# Patient Record
Sex: FEMALE | Race: WHITE | NOT HISPANIC OR LATINO | Employment: FULL TIME | URBAN - NONMETROPOLITAN AREA
[De-identification: names, ages, dates, MRNs, and addresses within clinical notes are randomized per-mention and may not be internally consistent; named-entity substitution may affect disease eponyms.]

---

## 2019-03-06 ENCOUNTER — OFFICE VISIT (OUTPATIENT)
Dept: FAMILY MEDICINE CLINIC | Facility: CLINIC | Age: 34
End: 2019-03-06

## 2019-03-06 VITALS
TEMPERATURE: 98.2 F | DIASTOLIC BLOOD PRESSURE: 72 MMHG | HEIGHT: 65 IN | OXYGEN SATURATION: 98 % | BODY MASS INDEX: 39.49 KG/M2 | HEART RATE: 96 BPM | WEIGHT: 237 LBS | SYSTOLIC BLOOD PRESSURE: 110 MMHG

## 2019-03-06 DIAGNOSIS — E03.4 HYPOTHYROIDISM DUE TO ACQUIRED ATROPHY OF THYROID: ICD-10-CM

## 2019-03-06 DIAGNOSIS — E66.09 CLASS 2 OBESITY DUE TO EXCESS CALORIES WITHOUT SERIOUS COMORBIDITY WITH BODY MASS INDEX (BMI) OF 39.0 TO 39.9 IN ADULT: ICD-10-CM

## 2019-03-06 DIAGNOSIS — Z00.00 ENCOUNTER FOR MEDICAL EXAMINATION TO ESTABLISH CARE: Primary | ICD-10-CM

## 2019-03-06 PROBLEM — E66.812 CLASS 2 OBESITY DUE TO EXCESS CALORIES WITHOUT SERIOUS COMORBIDITY WITH BODY MASS INDEX (BMI) OF 39.0 TO 39.9 IN ADULT: Status: ACTIVE | Noted: 2019-03-06

## 2019-03-06 PROBLEM — E03.9 HYPOTHYROIDISM: Status: ACTIVE | Noted: 2019-03-06

## 2019-03-06 PROCEDURE — 99203 OFFICE O/P NEW LOW 30 MIN: CPT | Performed by: NURSE PRACTITIONER

## 2019-03-06 RX ORDER — PHENTERMINE HYDROCHLORIDE 37.5 MG/1
37.5 TABLET ORAL
Qty: 30 TABLET | Refills: 0 | Status: SHIPPED | OUTPATIENT
Start: 2019-03-06 | End: 2019-04-10 | Stop reason: SDUPTHER

## 2019-03-06 RX ORDER — HYDROXYZINE PAMOATE 25 MG/1
CAPSULE ORAL
COMMUNITY
Start: 2018-11-28 | End: 2019-08-26

## 2019-03-06 RX ORDER — PROMETHAZINE HYDROCHLORIDE 25 MG/1
TABLET ORAL
COMMUNITY
Start: 2019-03-04 | End: 2019-08-26

## 2019-03-06 RX ORDER — LEVOTHYROXINE SODIUM 88 UG/1
TABLET ORAL
COMMUNITY
Start: 2019-01-31 | End: 2019-04-12 | Stop reason: DRUGHIGH

## 2019-03-06 NOTE — PROGRESS NOTES
"Subjective   Jarad Barragan is a 33 y.o. female.     Chief Complaint   Patient presents with   • Establish Care       History of Present Illness     Establish Care-Here to establish care.    Thyroid disorder-is presently on Synthroid 88 mcg.  She reports her last thyroid check was approx 5-6 months ago.  She denies any negative side effects.  She reports prior to diagnosis she was extremely fatigued.  She has had a US at [place] in the [facility].    GERD-she reports she uses OTC Zantac for symptoms.  She does not take daily.  Mostly for acute symptoms.    Weight concern-reports she has noted gain since she stopped smoking approx 50#.  She reports she has taken Adipex in the past.  She tolerated the med well.  She did not have any cardiac related symptoms.  She reports she does eat bread often.  Is not a \"sweet eater\".  Drinks diet soda.  Water intake is fair.  She reports this is the most she has weighed.  She reports she does not cook and eats out often.  Not at goal.      The following portions of the patient's history were reviewed and updated as appropriate: allergies, current medications, past family history, past medical history, past social history, past surgical history and problem list.    Review of Systems   Constitutional: Negative for appetite change, fatigue and unexpected weight change.   HENT: Positive for rhinorrhea. Negative for congestion, ear pain, nosebleeds, sore throat, trouble swallowing and voice change.    Eyes: Negative for pain and visual disturbance.   Respiratory: Negative for cough, shortness of breath and wheezing.    Cardiovascular: Negative for chest pain and palpitations.   Gastrointestinal: Negative for abdominal pain, blood in stool, constipation and diarrhea.   Endocrine: Negative for cold intolerance and polydipsia.   Genitourinary: Negative for difficulty urinating, flank pain and hematuria.   Musculoskeletal: Negative for arthralgias, back pain, gait problem, joint " "swelling and myalgias.   Skin: Negative for color change and rash.   Allergic/Immunologic: Negative.    Neurological: Negative for syncope, numbness and headaches.   Hematological: Negative.    Psychiatric/Behavioral: Negative for sleep disturbance and suicidal ideas.   All other systems reviewed and are negative.      Objective     /72   Pulse 96   Temp 98.2 °F (36.8 °C) (Temporal)   Ht 165.1 cm (65\")   Wt 108 kg (237 lb)   LMP 02/20/2019   SpO2 98%   BMI 39.44 kg/m²     Physical Exam   Constitutional: She is oriented to person, place, and time. She appears well-developed and well-nourished. No distress.   HENT:   Head: Normocephalic and atraumatic.   Right Ear: Hearing, tympanic membrane, external ear and ear canal normal.   Left Ear: Hearing, tympanic membrane, external ear and ear canal normal.   Nose: No mucosal edema or rhinorrhea.   Mouth/Throat: Oropharynx is clear and moist and mucous membranes are normal.   Eyes: Conjunctivae, EOM and lids are normal. Pupils are equal, round, and reactive to light. No scleral icterus. Right eye exhibits normal extraocular motion and no nystagmus. Left eye exhibits normal extraocular motion and no nystagmus.   Neck: Normal range of motion. Neck supple. No JVD present. No tracheal tenderness present. Carotid bruit is not present. No thyromegaly present.   Cardiovascular: Normal rate, regular rhythm, S1 normal, S2 normal, normal heart sounds and intact distal pulses.   No murmur heard.  Pulmonary/Chest: Effort normal and breath sounds normal. She exhibits no tenderness.   Abdominal: Soft. Bowel sounds are normal. She exhibits no mass. There is no hepatosplenomegaly. There is no tenderness.   Musculoskeletal: Normal range of motion. She exhibits no edema or tenderness.   Gait normal.   equal bilaterally. No muscular atrophy or flaccidity.   Lymphadenopathy:     She has no cervical adenopathy.        Right cervical: No superficial cervical adenopathy " present.       Left cervical: No superficial cervical adenopathy present.   Neurological: She is alert and oriented to person, place, and time. She has normal strength and normal reflexes. She displays no tremor. No cranial nerve deficit or sensory deficit. She exhibits normal muscle tone. Coordination and gait normal.   Skin: Skin is warm and dry. Capillary refill takes less than 2 seconds. She is not diaphoretic. No cyanosis. No pallor. Nails show no clubbing.   Psychiatric: She has a normal mood and affect. Her speech is normal and behavior is normal. Judgment and thought content normal. She is not actively hallucinating. Cognition and memory are normal. She is attentive.   Vitals reviewed.    Assessment/Plan     Problem List Items Addressed This Visit        Digestive    Class 2 obesity due to excess calories without serious comorbidity with body mass index (BMI) of 39.0 to 39.9 in adult    Relevant Medications    phentermine (ADIPEX-P) 37.5 MG tablet       Endocrine    Hypothyroidism    Relevant Medications    levothyroxine (SYNTHROID, LEVOTHROID) 88 MCG tablet      Other Visit Diagnoses     Encounter for medical examination to establish care    -  Primary          Patient's Body mass index is 39.44 kg/m². BMI is above normal parameters. Recommendations include: nutrition counseling.   (Normal BMI:  18.5-24.9, OW 25-29.9, Obesity 30 or greater)  Jarad does not use tobacco products.      Understands disease processes and need for medications.  Understands reasons for urgent and emergent care.  Patient (& family) verbalized agreement for treatment plan.   Emotional support and active listening provided.  Patient provided time to verbalize feelings.  Continue medications as directed.  The patient has read and signed the Controlled Substance Contract.  I will continue to see patient for regular follow up appointments.  They are well controlled on their medication.  The patient is aware of the potential for  addiction and dependence.  This patient has been made aware of the appropriate use of such medications, including potential risk of somnolence, limited ability to drive and / or work safely, and potential for overdose.   It has also been made clear that these medications are for use by this patient only, without concomitant use of alcohol or other substances unless prescribed/advised by medical provider.  Patient is aware they will be subjected to random UDS and pill count.  Patient is aware they cannot receive narcotics from any other provider except if under care of pain management or speciality clinic.  Risk and benefits of medication use has been reviewed.  History and physical exam exhibit continued safe and appropriate use of controlled substances.    Patient at low risk for addiction based on use of single controlled substance.    Continue her Synthroid at present dosing.  We will plan labs at her next visit as she is not fasting today.  Return to the clinic in 1 month, sooner if needed for problems or concerns            This document has been electronically signed by:  LANA Quinn, AMILCAR

## 2019-04-10 ENCOUNTER — OFFICE VISIT (OUTPATIENT)
Dept: FAMILY MEDICINE CLINIC | Facility: CLINIC | Age: 34
End: 2019-04-10

## 2019-04-10 VITALS
BODY MASS INDEX: 37.85 KG/M2 | DIASTOLIC BLOOD PRESSURE: 80 MMHG | TEMPERATURE: 98 F | WEIGHT: 227.2 LBS | HEART RATE: 104 BPM | SYSTOLIC BLOOD PRESSURE: 122 MMHG | HEIGHT: 65 IN | OXYGEN SATURATION: 97 %

## 2019-04-10 DIAGNOSIS — E66.09 CLASS 2 OBESITY DUE TO EXCESS CALORIES WITHOUT SERIOUS COMORBIDITY WITH BODY MASS INDEX (BMI) OF 39.0 TO 39.9 IN ADULT: ICD-10-CM

## 2019-04-10 DIAGNOSIS — E03.4 HYPOTHYROIDISM DUE TO ACQUIRED ATROPHY OF THYROID: Primary | ICD-10-CM

## 2019-04-10 LAB
25(OH)D3 SERPL-MCNC: 16.6 NG/ML (ref 30–100)
ALBUMIN SERPL-MCNC: 4.6 G/DL (ref 3.5–5.2)
ALBUMIN/GLOB SERPL: 1.5 G/DL
ALP SERPL-CCNC: 68 U/L (ref 39–117)
ALT SERPL W P-5'-P-CCNC: 12 U/L (ref 1–33)
ANION GAP SERPL CALCULATED.3IONS-SCNC: 14.1 MMOL/L
AST SERPL-CCNC: 12 U/L (ref 1–32)
BASOPHILS # BLD AUTO: 0.07 10*3/MM3 (ref 0–0.2)
BASOPHILS NFR BLD AUTO: 1.3 % (ref 0–1.5)
BILIRUB SERPL-MCNC: 0.7 MG/DL (ref 0.2–1.2)
BUN BLD-MCNC: 13 MG/DL (ref 6–20)
BUN/CREAT SERPL: 13.3 (ref 7–25)
CALCIUM SPEC-SCNC: 9.2 MG/DL (ref 8.6–10.5)
CHLORIDE SERPL-SCNC: 102 MMOL/L (ref 98–107)
CHOLEST SERPL-MCNC: 138 MG/DL (ref 0–200)
CO2 SERPL-SCNC: 22.9 MMOL/L (ref 22–29)
CREAT BLD-MCNC: 0.98 MG/DL (ref 0.57–1)
DEPRECATED RDW RBC AUTO: 44 FL (ref 37–54)
EOSINOPHIL # BLD AUTO: 0.09 10*3/MM3 (ref 0–0.4)
EOSINOPHIL NFR BLD AUTO: 1.7 % (ref 0.3–6.2)
ERYTHROCYTE [DISTWIDTH] IN BLOOD BY AUTOMATED COUNT: 13 % (ref 12.3–15.4)
GFR SERPL CREATININE-BSD FRML MDRD: 65 ML/MIN/1.73
GLOBULIN UR ELPH-MCNC: 3.1 GM/DL
GLUCOSE BLD-MCNC: 106 MG/DL (ref 65–99)
HBA1C MFR BLD: 4.96 % (ref 4.8–5.6)
HCT VFR BLD AUTO: 42.7 % (ref 34–46.6)
HDLC SERPL-MCNC: 42 MG/DL (ref 40–60)
HGB BLD-MCNC: 13.6 G/DL (ref 12–15.9)
IMM GRANULOCYTES # BLD AUTO: 0.02 10*3/MM3 (ref 0–0.05)
IMM GRANULOCYTES NFR BLD AUTO: 0.4 % (ref 0–0.5)
LDLC SERPL CALC-MCNC: 85 MG/DL (ref 0–100)
LDLC/HDLC SERPL: 2.02 {RATIO}
LYMPHOCYTES # BLD AUTO: 1.62 10*3/MM3 (ref 0.7–3.1)
LYMPHOCYTES NFR BLD AUTO: 30.8 % (ref 19.6–45.3)
MCH RBC QN AUTO: 29.5 PG (ref 26.6–33)
MCHC RBC AUTO-ENTMCNC: 31.9 G/DL (ref 31.5–35.7)
MCV RBC AUTO: 92.6 FL (ref 79–97)
MONOCYTES # BLD AUTO: 0.44 10*3/MM3 (ref 0.1–0.9)
MONOCYTES NFR BLD AUTO: 8.4 % (ref 5–12)
NEUTROPHILS # BLD AUTO: 3.02 10*3/MM3 (ref 1.4–7)
NEUTROPHILS NFR BLD AUTO: 57.4 % (ref 42.7–76)
NRBC BLD AUTO-RTO: 0 /100 WBC (ref 0–0)
PLATELET # BLD AUTO: 306 10*3/MM3 (ref 140–450)
PMV BLD AUTO: 10.9 FL (ref 6–12)
POTASSIUM BLD-SCNC: 3.8 MMOL/L (ref 3.5–5.2)
PROT SERPL-MCNC: 7.7 G/DL (ref 6–8.5)
RBC # BLD AUTO: 4.61 10*6/MM3 (ref 3.77–5.28)
SODIUM BLD-SCNC: 139 MMOL/L (ref 136–145)
T4 FREE SERPL-MCNC: 1.68 NG/DL (ref 0.93–1.7)
TRIGL SERPL-MCNC: 56 MG/DL (ref 0–150)
TSH SERPL DL<=0.05 MIU/L-ACNC: 6.7 MIU/ML (ref 0.27–4.2)
VIT B12 BLD-MCNC: 284 PG/ML (ref 211–946)
VLDLC SERPL-MCNC: 11.2 MG/DL (ref 5–40)
WBC NRBC COR # BLD: 5.26 10*3/MM3 (ref 3.4–10.8)

## 2019-04-10 PROCEDURE — 36415 COLL VENOUS BLD VENIPUNCTURE: CPT | Performed by: NURSE PRACTITIONER

## 2019-04-10 PROCEDURE — 82306 VITAMIN D 25 HYDROXY: CPT | Performed by: NURSE PRACTITIONER

## 2019-04-10 PROCEDURE — 85025 COMPLETE CBC W/AUTO DIFF WBC: CPT | Performed by: NURSE PRACTITIONER

## 2019-04-10 PROCEDURE — 82607 VITAMIN B-12: CPT | Performed by: NURSE PRACTITIONER

## 2019-04-10 PROCEDURE — 80053 COMPREHEN METABOLIC PANEL: CPT | Performed by: NURSE PRACTITIONER

## 2019-04-10 PROCEDURE — 99213 OFFICE O/P EST LOW 20 MIN: CPT | Performed by: NURSE PRACTITIONER

## 2019-04-10 PROCEDURE — 84439 ASSAY OF FREE THYROXINE: CPT | Performed by: NURSE PRACTITIONER

## 2019-04-10 PROCEDURE — 80061 LIPID PANEL: CPT | Performed by: NURSE PRACTITIONER

## 2019-04-10 PROCEDURE — 83036 HEMOGLOBIN GLYCOSYLATED A1C: CPT | Performed by: NURSE PRACTITIONER

## 2019-04-10 PROCEDURE — 84443 ASSAY THYROID STIM HORMONE: CPT | Performed by: NURSE PRACTITIONER

## 2019-04-10 RX ORDER — PHENTERMINE HYDROCHLORIDE 37.5 MG/1
37.5 TABLET ORAL
Qty: 30 TABLET | Refills: 0 | Status: SHIPPED | OUTPATIENT
Start: 2019-04-10 | End: 2019-08-26

## 2019-04-10 NOTE — PROGRESS NOTES
"Subjective   Jarad Barragan is a 33 y.o. female.     Chief Complaint   Patient presents with   • Weight Loss     1 mon follow up   • Thyroid Problem       History of Present Illness     Obesity-ongoing.  Patient is here to follow-up after beginning Adipex at her last appointment.  She has noted 10 pound weight loss today.  She denies any negative side effects of the Adipex.  She reports she is cutting portion size.    Right ankle injury-under care of Dr Yang.  She reports occurred approx 2 weeks ago.  She is presently in a walking boot.  She does have a possible MRI to evaluate her foot.  Not at goal    The following portions of the patient's history were reviewed and updated as appropriate: allergies, current medications, past family history, past medical history, past social history, past surgical history and problem list.    Review of Systems   Constitutional: Positive for fatigue. Negative for appetite change.   Respiratory: Negative for cough and shortness of breath.    Cardiovascular: Negative for chest pain and palpitations.   Gastrointestinal: Negative for abdominal pain, constipation and diarrhea.   Genitourinary: Negative for dysuria.   Neurological: Negative for dizziness and headaches.   All other systems reviewed and are negative.      Objective     /80 (BP Location: Right arm, Patient Position: Sitting, Cuff Size: Adult)   Pulse 104   Temp 98 °F (36.7 °C) (Temporal)   Ht 165.1 cm (65\")   Wt 103 kg (227 lb 3.2 oz)   SpO2 97%   BMI 37.81 kg/m²     Physical Exam   Constitutional: She is oriented to person, place, and time. She appears well-developed and well-nourished. No distress.   HENT:   Head: Normocephalic and atraumatic.   Right Ear: Hearing, tympanic membrane, external ear and ear canal normal.   Left Ear: Hearing, tympanic membrane, external ear and ear canal normal.   Nose: No mucosal edema or rhinorrhea.   Mouth/Throat: Oropharynx is clear and moist and mucous membranes are normal. "   Eyes: Conjunctivae, EOM and lids are normal. Pupils are equal, round, and reactive to light. No scleral icterus. Right eye exhibits normal extraocular motion and no nystagmus. Left eye exhibits normal extraocular motion and no nystagmus.   Neck: Normal range of motion. Neck supple. No JVD present. No tracheal tenderness present. Carotid bruit is not present. No thyromegaly present.   Cardiovascular: Normal rate, regular rhythm, S1 normal, S2 normal, normal heart sounds and intact distal pulses.   No murmur heard.  Pulmonary/Chest: Effort normal and breath sounds normal. She exhibits no tenderness.   Abdominal: Soft. Bowel sounds are normal. She exhibits no mass. There is no hepatosplenomegaly. There is no tenderness.   Musculoskeletal: Normal range of motion. She exhibits no edema or tenderness.   Gait normal.   equal bilaterally. No muscular atrophy or flaccidity.   Lymphadenopathy:     She has no cervical adenopathy.        Right cervical: No superficial cervical adenopathy present.       Left cervical: No superficial cervical adenopathy present.   Neurological: She is alert and oriented to person, place, and time. She has normal strength and normal reflexes. She displays no tremor. No cranial nerve deficit or sensory deficit. She exhibits normal muscle tone. Coordination and gait normal.   Skin: Skin is warm and dry. Capillary refill takes less than 2 seconds. She is not diaphoretic. No cyanosis. No pallor. Nails show no clubbing.   Psychiatric: She has a normal mood and affect. Her speech is normal and behavior is normal. Judgment and thought content normal. She is not actively hallucinating. Cognition and memory are normal. She is attentive.   Vitals reviewed.      Assessment/Plan     Problem List Items Addressed This Visit        Digestive    Class 2 obesity due to excess calories without serious comorbidity with body mass index (BMI) of 39.0 to 39.9 in adult    Relevant Medications    phentermine  (ADIPEX-P) 37.5 MG tablet    Other Relevant Orders    CBC & Differential    Comprehensive Metabolic Panel    Lipid Panel    TSH    Hemoglobin A1c    T4, Free    Vitamin B12    Vitamin D 25 Hydroxy    CBC Auto Differential       Endocrine    Hypothyroidism - Primary    Relevant Orders    TSH    T4, Free          Patient's Body mass index is 37.81 kg/m². BMI is above normal parameters. Recommendations include: nutrition counseling.   (Normal BMI:  18.5-24.9, OW 25-29.9, Obesity 30 or greater)    Understands disease processes and need for medications.  Understands reasons for urgent and emergent care.  Patient (& family) verbalized agreement for treatment plan.   Emotional support and active listening provided.  Patient provided time to verbalize feelings.    [facility] #[id number] reviewed today and consistent.  Will refill prescribed controlled medication today.  Patient is aware they cannot receive narcotics from any other provider except if under care of pain management or speciality clinic.  Risk and benefits of medication use has been reviewed.  History and physical exam exhibit continued safe and appropriate use of controlled substances.  The patient is aware of the potential for addiction and dependence.  This patient has been made aware of the appropriate use of such medications, including potential risk of somnolence, limited ability to drive and / or work safely, and potential for overdose.    It has also been made clear that these medications are for use by this patient only, without concomitant use of alcohol or other substances unless prescribed/advised by medical provider.  Patient understands they may be subject to UDS and pill counts at random.    Patient considered to be low risk for addiction due to use of single controlled medications.  Patient understands and accepts these risks.    Goal of [place]: Patient will have reduction in weight with use of Adipex.  Patient will have no negative side effects of  medication.  RTC 1 Month, sooner if needed for problems or concerns          This document has been electronically signed by:  LANA Quinn, FNP-C

## 2019-04-12 RX ORDER — LEVOTHYROXINE SODIUM 112 UG/1
112 TABLET ORAL DAILY
Qty: 30 TABLET | Refills: 5 | Status: SHIPPED | OUTPATIENT
Start: 2019-04-12 | End: 2019-09-09 | Stop reason: SDUPTHER

## 2019-04-12 RX ORDER — ERGOCALCIFEROL 1.25 MG/1
50000 CAPSULE ORAL WEEKLY
Qty: 4 CAPSULE | Refills: 5 | Status: SHIPPED | OUTPATIENT
Start: 2019-04-12 | End: 2019-11-27 | Stop reason: SDUPTHER

## 2019-04-12 NOTE — PROGRESS NOTES
Her thyroid is elevated I will be increasing her Synthroid.  Her vitamin D is low and if she is not taken 1 I will be sending a supplement.  Cholesterol is normal no diabetes.  No anemia.

## 2019-05-23 ENCOUNTER — TELEPHONE (OUTPATIENT)
Dept: FAMILY MEDICINE CLINIC | Facility: CLINIC | Age: 34
End: 2019-05-23

## 2019-05-23 NOTE — TELEPHONE ENCOUNTER
----- Message from LANA Quinn sent at 5/23/2019 10:05 AM EDT -----  That is fine.  Have the patient to make arrangements to come and see Christina and make sure that she has the material she needs to provide the test  ----- Message -----  From: Kamini Mcallister RegSched Rep  Sent: 5/22/2019   2:12 PM  To: LANA Quinn, Betsy Benitez MA, #        interested in having a swab done to tell which anxiety meds would best fits her.  Do we offer those swabs or what/where she need to go         CALLED PATIENT AND LET HER KNOW THAT SHE COULD COME IN ANYTIME NEXT WEEK TO HAVE THE GENETIC TESTING DONE.

## 2019-06-03 ENCOUNTER — RESULTS ENCOUNTER (OUTPATIENT)
Dept: FAMILY MEDICINE CLINIC | Facility: CLINIC | Age: 34
End: 2019-06-03

## 2019-06-03 DIAGNOSIS — F41.9 ANXIETY: ICD-10-CM

## 2019-06-03 DIAGNOSIS — F41.9 ANXIETY: Primary | ICD-10-CM

## 2019-06-24 ENCOUNTER — OFFICE VISIT (OUTPATIENT)
Dept: FAMILY MEDICINE CLINIC | Facility: CLINIC | Age: 34
End: 2019-06-24

## 2019-06-24 VITALS
BODY MASS INDEX: 37.65 KG/M2 | TEMPERATURE: 98.2 F | DIASTOLIC BLOOD PRESSURE: 70 MMHG | SYSTOLIC BLOOD PRESSURE: 118 MMHG | HEART RATE: 98 BPM | HEIGHT: 65 IN | OXYGEN SATURATION: 98 % | WEIGHT: 226 LBS

## 2019-06-24 DIAGNOSIS — E66.09 CLASS 2 OBESITY DUE TO EXCESS CALORIES WITHOUT SERIOUS COMORBIDITY WITH BODY MASS INDEX (BMI) OF 37.0 TO 37.9 IN ADULT: ICD-10-CM

## 2019-06-24 DIAGNOSIS — E03.4 HYPOTHYROIDISM DUE TO ACQUIRED ATROPHY OF THYROID: ICD-10-CM

## 2019-06-24 DIAGNOSIS — E55.9 VITAMIN D DEFICIENCY: ICD-10-CM

## 2019-06-24 DIAGNOSIS — J04.0 LARYNGITIS: Primary | ICD-10-CM

## 2019-06-24 PROCEDURE — 99214 OFFICE O/P EST MOD 30 MIN: CPT | Performed by: PHYSICIAN ASSISTANT

## 2019-06-24 PROCEDURE — 96372 THER/PROPH/DIAG INJ SC/IM: CPT | Performed by: PHYSICIAN ASSISTANT

## 2019-06-24 RX ORDER — METHYLPREDNISOLONE ACETATE 80 MG/ML
80 INJECTION, SUSPENSION INTRA-ARTICULAR; INTRALESIONAL; INTRAMUSCULAR; SOFT TISSUE ONCE
Status: COMPLETED | OUTPATIENT
Start: 2019-06-24 | End: 2019-06-24

## 2019-06-24 RX ORDER — CETIRIZINE HYDROCHLORIDE 10 MG/1
10 TABLET ORAL DAILY
Qty: 30 TABLET | Refills: 0 | Status: SHIPPED | OUTPATIENT
Start: 2019-06-24 | End: 2019-08-26

## 2019-06-24 RX ADMIN — METHYLPREDNISOLONE ACETATE 80 MG: 80 INJECTION, SUSPENSION INTRA-ARTICULAR; INTRALESIONAL; INTRAMUSCULAR; SOFT TISSUE at 09:00

## 2019-06-24 NOTE — PROGRESS NOTES
"Subjective   Jarad Barragan is a 33 y.o. female.     Chief Complaint   Patient presents with   • Loss of Voice       History of Present Illness     The following portions of the patient's history were reviewed and updated as appropriate: allergies, current medications, past family history, past medical history, past social history, past surgical history and problem list.    Review of Systems   Constitutional: Negative for appetite change, fatigue and unexpected weight change.   HENT: Positive for sore throat and voice change. Negative for congestion, ear pain, nosebleeds, postnasal drip, rhinorrhea and trouble swallowing.    Eyes: Negative for pain and visual disturbance.   Respiratory: Negative for cough, shortness of breath and wheezing.    Cardiovascular: Negative for chest pain and palpitations.   Gastrointestinal: Negative for abdominal pain, blood in stool, constipation and diarrhea.   Endocrine: Negative for cold intolerance and polydipsia.   Genitourinary: Negative for difficulty urinating, flank pain and hematuria.   Musculoskeletal: Negative for arthralgias, back pain, gait problem, joint swelling and myalgias.   Skin: Negative for color change and rash.   Allergic/Immunologic: Negative.    Neurological: Negative for syncope, numbness and headaches.   Hematological: Negative.    Psychiatric/Behavioral: Negative for sleep disturbance and suicidal ideas.   All other systems reviewed and are negative.      Objective     /70   Pulse 98   Temp 98.2 °F (36.8 °C) (Temporal)   Ht 165.1 cm (65\")   Wt 103 kg (226 lb)   SpO2 98%   BMI 37.61 kg/m²     Physical Exam    Assessment/Plan     Problem List Items Addressed This Visit     None          Patient's Body mass index is 37.61 kg/m². BMI is {BMI range:95742}.   (Normal BMI:  18.5-24.9, OW 25-29.9, Obesity 30 or greater)  I advised Jarad of the risks of continuing to use tobacco, and I provided her with tobacco cessation educational materials in the After " Visit Summary.     During this visit, I spent *** minutes counseling the patient regarding tobacco cessation.      Understands disease processes and need for medications.  Understands reasons for urgent and emergent care.  Patient (& family) verbalized agreement for treatment plan.   Emotional support and active listening provided.  Patient provided time to verbalize feelings.            This document has been electronically signed by:  LANA Quinn, VINNIEP-C

## 2019-06-24 NOTE — PROGRESS NOTES
Subjective   Jarad Barragan is a 33 y.o. female.       Chief Complaint -loss of voice    History of Present Illness -      Loss of voice-  She complains of hoarseness and loss of voice that began 3 days ago after visiting [email or web address] where it drained during her trip.  She reports that she woke up the next day with an inability to speak.  She denies any pain, fever, or cough.    Vitamin D deficiency-  We discussed her most recent lab work with low vitamin D.  She is taking vitamin D supplementation.    Hypothyroidism-stable with Synthroid 112 mcg daily.  Patient reports she is not experiencing hypothyroid symptoms at this time  Lab Results   Component Value Date    TSH 6.700 (H) 04/10/2019         Obesity-not at goal.  She states that she has been eating smaller portion sizes and trying to remain active by walking.  She has had some appetite suppression with the use of Adipex.    The following portions of the patient's history were reviewed and updated as appropriate: allergies, current medications, past family history, past medical history, past social history, past surgical history and problem list.    Review of Systems   Constitutional: Negative for activity change, appetite change and fever.   HENT: Positive for voice change. Negative for ear pain, sinus pressure and sore throat.    Eyes: Negative for pain and visual disturbance.   Respiratory: Negative for cough and chest tightness.    Cardiovascular: Negative for chest pain and palpitations.   Gastrointestinal: Negative for abdominal pain, constipation, diarrhea, nausea and vomiting.   Endocrine: Negative for polydipsia and polyuria.   Genitourinary: Negative for dysuria and frequency.   Musculoskeletal: Negative for back pain and myalgias.   Skin: Negative for color change and rash.   Allergic/Immunologic: Negative for food allergies and immunocompromised state.   Neurological: Negative for dizziness, syncope and headaches.   Hematological: Negative for  "adenopathy. Does not bruise/bleed easily.   Psychiatric/Behavioral: Negative for hallucinations and suicidal ideas. The patient is not nervous/anxious.        /70   Pulse 98   Temp 98.2 °F (36.8 °C) (Temporal)   Ht 165.1 cm (65\")   Wt 103 kg (226 lb)   SpO2 98%   BMI 37.61 kg/m²   Office Visit on 04/10/2019   Component Date Value Ref Range Status   • Glucose 04/10/2019 106* 65 - 99 mg/dL Final   • BUN 04/10/2019 13  6 - 20 mg/dL Final   • Creatinine 04/10/2019 0.98  0.57 - 1.00 mg/dL Final   • Sodium 04/10/2019 139  136 - 145 mmol/L Final   • Potassium 04/10/2019 3.8  3.5 - 5.2 mmol/L Final   • Chloride 04/10/2019 102  98 - 107 mmol/L Final   • CO2 04/10/2019 22.9  22.0 - 29.0 mmol/L Final   • Calcium 04/10/2019 9.2  8.6 - 10.5 mg/dL Final   • Total Protein 04/10/2019 7.7  6.0 - 8.5 g/dL Final   • Albumin 04/10/2019 4.60  3.50 - 5.20 g/dL Final   • ALT (SGPT) 04/10/2019 12  1 - 33 U/L Final   • AST (SGOT) 04/10/2019 12  1 - 32 U/L Final   • Alkaline Phosphatase 04/10/2019 68  39 - 117 U/L Final   • Total Bilirubin 04/10/2019 0.7  0.2 - 1.2 mg/dL Final   • eGFR Non African Amer 04/10/2019 65  >60 mL/min/1.73 Final   • Globulin 04/10/2019 3.1  gm/dL Final   • A/G Ratio 04/10/2019 1.5  g/dL Final   • BUN/Creatinine Ratio 04/10/2019 13.3  7.0 - 25.0 Final   • Anion Gap 04/10/2019 14.1  mmol/L Final   • Total Cholesterol 04/10/2019 138  0 - 200 mg/dL Final   • Triglycerides 04/10/2019 56  0 - 150 mg/dL Final   • HDL Cholesterol 04/10/2019 42  40 - 60 mg/dL Final   • LDL Cholesterol  04/10/2019 85  0 - 100 mg/dL Final   • VLDL Cholesterol 04/10/2019 11.2  5 - 40 mg/dL Final   • LDL/HDL Ratio 04/10/2019 2.02   Final   • TSH 04/10/2019 6.700* 0.270 - 4.200 mIU/mL Final   • Hemoglobin A1C 04/10/2019 4.96  4.80 - 5.60 % Final   • Free T4 04/10/2019 1.68  0.93 - 1.70 ng/dL Final   • Vitamin B-12 04/10/2019 284  211 - 946 pg/mL Final   • 25 Hydroxy, Vitamin D 04/10/2019 16.6* 30.0 - 100.0 ng/ml Final   • WBC " 04/10/2019 5.26  3.40 - 10.80 10*3/mm3 Final   • RBC 04/10/2019 4.61  3.77 - 5.28 10*6/mm3 Final   • Hemoglobin 04/10/2019 13.6  12.0 - 15.9 g/dL Final   • Hematocrit 04/10/2019 42.7  34.0 - 46.6 % Final   • MCV 04/10/2019 92.6  79.0 - 97.0 fL Final   • MCH 04/10/2019 29.5  26.6 - 33.0 pg Final   • MCHC 04/10/2019 31.9  31.5 - 35.7 g/dL Final   • RDW 04/10/2019 13.0  12.3 - 15.4 % Final   • RDW-SD 04/10/2019 44.0  37.0 - 54.0 fl Final   • MPV 04/10/2019 10.9  6.0 - 12.0 fL Final   • Platelets 04/10/2019 306  140 - 450 10*3/mm3 Final   • Neutrophil % 04/10/2019 57.4  42.7 - 76.0 % Final   • Lymphocyte % 04/10/2019 30.8  19.6 - 45.3 % Final   • Monocyte % 04/10/2019 8.4  5.0 - 12.0 % Final   • Eosinophil % 04/10/2019 1.7  0.3 - 6.2 % Final   • Basophil % 04/10/2019 1.3  0.0 - 1.5 % Final   • Immature Grans % 04/10/2019 0.4  0.0 - 0.5 % Final   • Neutrophils, Absolute 04/10/2019 3.02  1.40 - 7.00 10*3/mm3 Final   • Lymphocytes, Absolute 04/10/2019 1.62  0.70 - 3.10 10*3/mm3 Final   • Monocytes, Absolute 04/10/2019 0.44  0.10 - 0.90 10*3/mm3 Final   • Eosinophils, Absolute 04/10/2019 0.09  0.00 - 0.40 10*3/mm3 Final   • Basophils, Absolute 04/10/2019 0.07  0.00 - 0.20 10*3/mm3 Final   • Immature Grans, Absolute 04/10/2019 0.02  0.00 - 0.05 10*3/mm3 Final   • nRBC 04/10/2019 0.0  0.0 - 0.0 /100 WBC Final       Physical Exam   Constitutional: She is oriented to person, place, and time. She appears well-developed and well-nourished.   HENT:   Head: Normocephalic and atraumatic.   Nose: Nose normal.   Oropharynx mildly erythematous without exudates   Eyes: Conjunctivae and EOM are normal. Pupils are equal, round, and reactive to light.   Neck: Normal range of motion. Neck supple. No tracheal deviation present. No thyromegaly present.   Cardiovascular: Normal rate, regular rhythm, normal heart sounds and intact distal pulses.   No murmur heard.  Pulmonary/Chest: Effort normal and breath sounds normal. No respiratory  distress. She has no wheezes.   Abdominal: Soft. Bowel sounds are normal. There is no tenderness. There is no guarding.   Musculoskeletal: Normal range of motion. She exhibits no edema or tenderness.   Lymphadenopathy:     She has no cervical adenopathy.   Neurological: She is alert and oriented to person, place, and time.   Skin: Skin is warm and dry. No rash noted.   Psychiatric: She has a normal mood and affect. Her behavior is normal.   Nursing note and vitals reviewed.      Assessment/Plan     Jarad was seen today for loss of voice.    Diagnoses and all orders for this visit:    Laryngitis  Comments:  Conservative measures were advised including voice rest, and drinking hot liquids and soup.  Orders:  -     methylPREDNISolone acetate (DEPO-medrol) injection 80 mg  -     cetirizine (zyrTEC) 10 MG tablet; Take 1 tablet by mouth Daily.    Hypothyroidism due to acquired atrophy of thyroid  Comments:  Continue Synthroid 112 mcg daily  Advised patient to report any symptoms related to thyroid function.  Labs discussed today    Vitamin D deficiency  Comments:  Continue vitamin D supplementation    Class 2 obesity due to excess calories without serious comorbidity with body mass index (BMI) of 37.0 to 37.9 in adult  Comments:  Advised to continue at least 30 minutes of cardiovascular activity daily and smaller portion sizes.    Patient's Body mass index is 37.61 kg/m². BMI is above normal parameters. Recommendations include: educational material and exercise counseling.               This document has been electronically signed by:  Juli Mcdonald PA-C

## 2019-06-24 NOTE — PATIENT INSTRUCTIONS

## 2019-08-16 ENCOUNTER — TELEPHONE (OUTPATIENT)
Dept: FAMILY MEDICINE CLINIC | Facility: CLINIC | Age: 34
End: 2019-08-16

## 2019-08-16 NOTE — TELEPHONE ENCOUNTER
Left message for patient, with this info and for patient to call back  ===View-only below this line===    ----- Message -----  From: Christine Joaquin APRN  Sent: 8/16/2019   2:35 PM  To: Syeda Blue MA    I have reviewed her genetic testing.  I have also reviewed the office notes that she has seen me.  I do not have any record of any antidepressant or antianxiety medicine that she has been on.  I am happy to write her a 2 to 3-week supply of a medication to be trying.  She will need to see me at the end of that time.  So that we can tell whether or not she will need an adjustment or if she is having any negative side effects.  We will begin with some of the antidepressants that help with panic and anxiety.  Or if she would prefer I am happy to let her see the psychiatric nurse practitioner.  ----- Message -----  From: Syeda Blue MA  Sent: 8/15/2019  12:45 PM  To: LANA Quinn    Patient called wanting to know if we could give her the results to the genetic testing over the phone, she said that her panic attacks are getting worse.

## 2019-08-26 ENCOUNTER — OFFICE VISIT (OUTPATIENT)
Dept: FAMILY MEDICINE CLINIC | Facility: CLINIC | Age: 34
End: 2019-08-26

## 2019-08-26 VITALS
DIASTOLIC BLOOD PRESSURE: 70 MMHG | HEART RATE: 90 BPM | TEMPERATURE: 98.2 F | SYSTOLIC BLOOD PRESSURE: 110 MMHG | OXYGEN SATURATION: 98 % | HEIGHT: 65 IN | BODY MASS INDEX: 38.32 KG/M2 | WEIGHT: 230 LBS

## 2019-08-26 DIAGNOSIS — E66.09 CLASS 2 OBESITY DUE TO EXCESS CALORIES WITHOUT SERIOUS COMORBIDITY WITH BODY MASS INDEX (BMI) OF 37.0 TO 37.9 IN ADULT: ICD-10-CM

## 2019-08-26 DIAGNOSIS — E03.4 HYPOTHYROIDISM DUE TO ACQUIRED ATROPHY OF THYROID: Primary | ICD-10-CM

## 2019-08-26 DIAGNOSIS — F41.1 GAD (GENERALIZED ANXIETY DISORDER): ICD-10-CM

## 2019-08-26 PROCEDURE — 99214 OFFICE O/P EST MOD 30 MIN: CPT | Performed by: NURSE PRACTITIONER

## 2019-08-26 RX ORDER — VENLAFAXINE HYDROCHLORIDE 37.5 MG/1
37.5 CAPSULE, EXTENDED RELEASE ORAL DAILY
Qty: 21 CAPSULE | Refills: 0 | Status: SHIPPED | OUTPATIENT
Start: 2019-08-26 | End: 2019-09-09

## 2019-08-26 NOTE — PATIENT INSTRUCTIONS
Sciatica Rehab  Ask your health care provider which exercises are safe for you. Do exercises exactly as told by your health care provider and adjust them as directed. It is normal to feel mild stretching, pulling, tightness, or discomfort as you do these exercises, but you should stop right away if you feel sudden pain or your pain gets worse. Do not begin these exercises until told by your health care provider.  Stretching and range of motion exercises  These exercises warm up your muscles and joints and improve the movement and flexibility of your hips and your back. These exercises also help to relieve pain, numbness, and tingling.  Exercise A: Sciatic nerve glide  1. Sit in a chair with your head facing down toward your chest. Place your hands behind your back. Let your shoulders slump forward.  2. Slowly straighten one of your knees while you tilt your head back as if you are looking toward the ceiling. Only straighten your leg as far as you can without making your symptoms worse.  3. Hold for __________ seconds.  4. Slowly return to the starting position.  5. Repeat with your other leg.  Repeat __________ times. Complete this exercise __________ times a day.  Exercise B: Knee to chest with hip adduction and internal rotation    1. Lie on your back on a firm surface with both legs straight.  2. Bend one of your knees and move it up toward your chest until you feel a gentle stretch in your lower back and buttock. Then, move your knee toward the shoulder that is on the opposite side from your leg.  ? Hold your leg in this position by holding onto the front of your knee.  3. Hold for __________ seconds.  4. Slowly return to the starting position.  5. Repeat with your other leg.  Repeat __________ times. Complete this exercise __________ times a day.  Exercise C: Prone extension on elbows    1. Lie on your abdomen on a firm surface. A bed may be too soft for this exercise.  2. Prop yourself up on your  elbows.  3. Use your arms to help lift your chest up until you feel a gentle stretch in your abdomen and your lower back.  ? This will place some of your body weight on your elbows. If this is uncomfortable, try stacking pillows under your chest.  ? Your hips should stay down, against the surface that you are lying on. Keep your hip and back muscles relaxed.  4. Hold for __________ seconds.  5. Slowly relax your upper body and return to the starting position.  Repeat __________ times. Complete this exercise __________ times a day.  Strengthening exercises  These exercises build strength and endurance in your back. Endurance is the ability to use your muscles for a long time, even after they get tired.  Exercise D: Pelvic tilt  1. Lie on your back on a firm surface. Bend your knees and keep your feet flat.  2. Tense your abdominal muscles. Tip your pelvis up toward the ceiling and flatten your lower back into the floor.  ? To help with this exercise, you may place a small towel under your lower back and try to push your back into the towel.  3. Hold for __________ seconds.  4. Let your muscles relax completely before you repeat this exercise.  Repeat __________ times. Complete this exercise __________ times a day.  Exercise E: Alternating arm and leg raises    1. Get on your hands and knees on a firm surface. If you are on a hard floor, you may want to use padding to cushion your knees, such as an exercise mat.  2. Line up your arms and legs. Your hands should be below your shoulders, and your knees should be below your hips.  3. Lift your left leg behind you. At the same time, raise your right arm and straighten it in front of you.  ? Do not lift your leg higher than your hip.  ? Do not lift your arm higher than your shoulder.  ? Keep your abdominal and back muscles tight.  ? Keep your hips facing the ground.  ? Do not arch your back.  ? Keep your balance carefully, and do not hold your breath.  4. Hold for  __________ seconds.  5. Slowly return to the starting position and repeat with your right leg and your left arm.  Repeat __________ times. Complete this exercise __________ times a day.  Posture and body mechanics    Body mechanics refers to the movements and positions of your body while you do your daily activities. Posture is part of body mechanics. Good posture and healthy body mechanics can help to relieve stress in your body's tissues and joints. Good posture means that your spine is in its natural S-curve position (your spine is neutral), your shoulders are pulled back slightly, and your head is not tipped forward. The following are general guidelines for applying improved posture and body mechanics to your everyday activities.  Standing    · When standing, keep your spine neutral and your feet about hip-width apart. Keep a slight bend in your knees. Your ears, shoulders, and hips should line up.  · When you do a task in which you  one place for a long time, place one foot up on a stable object that is 2-4 inches (5-10 cm) high, such as a footstool. This helps keep your spine neutral.  Sitting    · When sitting, keep your spine neutral and keep your feet flat on the floor. Use a footrest, if necessary, and keep your thighs parallel to the floor. Avoid rounding your shoulders, and avoid tilting your head forward.  · When working at a desk or a computer, keep your desk at a height where your hands are slightly lower than your elbows. Slide your chair under your desk so you are close enough to maintain good posture.  · When working at a computer, place your monitor at a height where you are looking straight ahead and you do not have to tilt your head forward or downward to look at the screen.  Resting    · When lying down and resting, avoid positions that are most painful for you.  · If you have pain with activities such as sitting, bending, stooping, or squatting (flexion-based activities), lie in a  position in which your body does not bend very much. For example, avoid curling up on your side with your arms and knees near your chest (fetal position).  · If you have pain with activities such as standing for a long time or reaching with your arms (extension-based activities), lie with your spine in a neutral position and bend your knees slightly. Try the following positions:  ? Lying on your side with a pillow between your knees.  ? Lying on your back with a pillow under your knees.  Lifting    · When lifting objects, keep your feet at least shoulder-width apart and tighten your abdominal muscles.  · Bend your knees and hips and keep your spine neutral. It is important to lift using the strength of your legs, not your back. Do not lock your knees straight out.  · Always ask for help to lift heavy or awkward objects.  This information is not intended to replace advice given to you by your health care provider. Make sure you discuss any questions you have with your health care provider.  Document Released: 12/18/2006 Document Revised: 08/24/2017 Document Reviewed: 09/02/2016  [employer] Interactive Patient Education © 2019 [employer]

## 2019-08-26 NOTE — PROGRESS NOTES
Subjective   Jarad Barragan is a 33 y.o. female.     Chief Complaint   Patient presents with   • Follow up Genetic Testing       History of Present Illness     Anxiety-reports has been present for approx 4 years.  She has been on Hydroxyzine 25 mg TID but feels it is not helping.  She reports she has not been on any other meds.  She reports she has noted an increase in panic symptoms 7-8 times during a week.  She reports some pounding of heart, nervous and shaking.  She reports some SOA.    Thyroid-needs a refill on meds.  She is taking medication as directed.  She denies any negative side effects of her Synthroid 112 mcg.  She denies any concerns hair skin changes.  No heat or cold intolerance.  Ongoing.    Back and hip pain-right.  Has been present for approx 3 weeks.  No falls.  Reports she has been doing conservative measures at home such as OTC pain relievers and ice.  She would like to have some information regarding stretches she can do to aid with relief of her back pain.  Not at goal.     The following portions of the patient's history were reviewed and updated as appropriate: allergies, current medications, past family history, past medical history, past social history, past surgical history and problem list.    Review of Systems   Constitutional: Positive for fatigue. Negative for appetite change and unexpected weight change.   HENT: Negative for congestion, ear pain, nosebleeds, postnasal drip, rhinorrhea, sore throat, trouble swallowing and voice change.    Eyes: Negative for pain and visual disturbance.   Respiratory: Positive for shortness of breath. Negative for cough and wheezing.    Cardiovascular: Positive for palpitations. Negative for chest pain.   Gastrointestinal: Negative for abdominal pain, blood in stool, constipation and diarrhea.   Endocrine: Negative for cold intolerance and polydipsia.   Genitourinary: Negative for difficulty urinating, dysuria, flank pain and hematuria.  "  Musculoskeletal: Positive for back pain. Negative for arthralgias, gait problem, joint swelling and myalgias.   Skin: Negative for color change and rash.   Allergic/Immunologic: Negative.    Neurological: Negative for dizziness, syncope, numbness and headaches.   Hematological: Negative.    Psychiatric/Behavioral: Positive for sleep disturbance. Negative for suicidal ideas. The patient is nervous/anxious.    All other systems reviewed and are negative.      Objective     /70   Pulse 90   Temp 98.2 °F (36.8 °C) (Temporal)   Ht 165.1 cm (65\")   Wt 104 kg (230 lb)   LMP 08/12/2019   SpO2 98%   BMI 38.27 kg/m²     Physical Exam   Constitutional: She is oriented to person, place, and time. She appears well-developed and well-nourished. No distress.   HENT:   Head: Normocephalic and atraumatic.   Right Ear: Hearing, tympanic membrane, external ear and ear canal normal.   Left Ear: Hearing, tympanic membrane, external ear and ear canal normal.   Nose: No mucosal edema or rhinorrhea.   Mouth/Throat: Oropharynx is clear and moist and mucous membranes are normal.   Eyes: Conjunctivae, EOM and lids are normal. Pupils are equal, round, and reactive to light. No scleral icterus. Right eye exhibits normal extraocular motion and no nystagmus. Left eye exhibits normal extraocular motion and no nystagmus.   Neck: Normal range of motion. Neck supple. No JVD present. No tracheal tenderness present. Carotid bruit is not present. No thyromegaly present.   Cardiovascular: Normal rate, regular rhythm, S1 normal, S2 normal, normal heart sounds and intact distal pulses.   No murmur heard.  Pulmonary/Chest: Effort normal and breath sounds normal. She exhibits no tenderness.   Abdominal: Soft. Bowel sounds are normal. She exhibits no mass. There is no hepatosplenomegaly. There is no tenderness.   Musculoskeletal: Normal range of motion. She exhibits no edema.        Lumbar back: She exhibits tenderness.    equal " bilaterally. No muscular atrophy or flaccidity.  Pain elicited with palpation over lumbar musculature.   Lymphadenopathy:     She has no cervical adenopathy.        Right cervical: No superficial cervical adenopathy present.       Left cervical: No superficial cervical adenopathy present.   Neurological: She is alert and oriented to person, place, and time. She has normal strength and normal reflexes. She displays no tremor. No cranial nerve deficit or sensory deficit. She exhibits normal muscle tone. Coordination and gait normal.   Skin: Skin is warm and dry. Capillary refill takes less than 2 seconds. She is not diaphoretic. No cyanosis. No pallor. Nails show no clubbing.   Psychiatric: Her speech is normal and behavior is normal. Judgment and thought content normal. Her mood appears anxious. She is not actively hallucinating. Cognition and memory are normal. She exhibits a depressed mood.   Crying  She is attentive.   Vitals reviewed.      Assessment/Plan     Problem List Items Addressed This Visit        Digestive    Class 2 obesity due to excess calories without serious comorbidity with body mass index (BMI) of 37.0 to 37.9 in adult    Current Assessment & Plan     Dietary counseling provided:  Healthy food choices including fruits and vegetables, limit soda and junk foods, adequate water intake.           Relevant Orders    TSH    T4, Free       Endocrine    Hypothyroidism - Primary    Relevant Orders    TSH    T4, Free       Other    [place] (generalized anxiety disorder)    Relevant Medications    venlafaxine XR (EFFEXOR XR) 37.5 MG 24 hr capsule    Other Relevant Orders    T4, Free          Patient's Body mass index is 38.27 kg/m². BMI is above normal parameters. Recommendations include: nutrition counseling.   (Normal BMI:  18.5-24.9, OW 25-29.9, Obesity 30 or greater)    Understands disease processes and need for medications.  Understands reasons for urgent and emergent care.  Patient (& family) verbalized  agreement for treatment plan.   Emotional support and active listening provided.  Patient provided time to verbalize feelings.    Reviewed DNA results with patient and provided her with a copy.   Trial of Effexor for her anxiety symptoms.  She may stop Hydroxyzine.      Will consider weight loss observation once anxiety has improved.      RTC 2-3 weeks for re-eval, sooner for worsening of symptoms.             This document has been electronically signed by:  LANA Quinn FNP-C Dragon disclaimer:  Much of this encounter note is an electronic transcription/translation of spoken language to printed text. The electronic translation of spoken language may permit erroneous, or at times, nonsensical words or phrases to be inadvertently transcribed; Although I have reviewed the note for such errors, some may still exist.

## 2019-08-30 PROBLEM — F41.1 GAD (GENERALIZED ANXIETY DISORDER): Status: ACTIVE | Noted: 2019-08-30

## 2019-08-30 NOTE — ASSESSMENT & PLAN NOTE
Dietary counseling provided:  Healthy food choices including fruits and vegetables, limit soda and junk foods, adequate water intake.

## 2019-09-09 ENCOUNTER — OFFICE VISIT (OUTPATIENT)
Dept: FAMILY MEDICINE CLINIC | Facility: CLINIC | Age: 34
End: 2019-09-09

## 2019-09-09 VITALS
OXYGEN SATURATION: 98 % | SYSTOLIC BLOOD PRESSURE: 118 MMHG | DIASTOLIC BLOOD PRESSURE: 78 MMHG | TEMPERATURE: 98 F | HEART RATE: 89 BPM | BODY MASS INDEX: 38.49 KG/M2 | HEIGHT: 65 IN | WEIGHT: 231 LBS

## 2019-09-09 DIAGNOSIS — F41.9 ANXIETY: Primary | ICD-10-CM

## 2019-09-09 PROCEDURE — 99213 OFFICE O/P EST LOW 20 MIN: CPT | Performed by: NURSE PRACTITIONER

## 2019-09-09 RX ORDER — LEVOTHYROXINE SODIUM 112 UG/1
112 TABLET ORAL DAILY
Qty: 30 TABLET | Refills: 5 | Status: SHIPPED | OUTPATIENT
Start: 2019-09-09 | End: 2019-11-27 | Stop reason: SDUPTHER

## 2019-09-09 RX ORDER — FLUOXETINE 10 MG/1
10 TABLET, FILM COATED ORAL DAILY
Qty: 30 TABLET | Refills: 0 | Status: SHIPPED | OUTPATIENT
Start: 2019-09-09 | End: 2019-09-27 | Stop reason: DRUGHIGH

## 2019-09-09 RX ORDER — BUSPIRONE HYDROCHLORIDE 7.5 MG/1
7.5 TABLET ORAL 3 TIMES DAILY
Qty: 90 TABLET | Refills: 0 | Status: SHIPPED | OUTPATIENT
Start: 2019-09-09 | End: 2019-09-27 | Stop reason: SINTOL

## 2019-09-09 NOTE — PROGRESS NOTES
"Subjective   Jarad Barragan is a 33 y.o. female.     Chief Complaint   Patient presents with   • Hypothyroidism   • Anxiety       History of Present Illness     Anxiety-reports she has not noted any difference in anxiety symptoms.  She feels her HR has been elevated with use of Effexor.  She reports she stopped the medication.  She reports she is feeling more and more like she cannot function due to being so anxious and overwhelmed with panic.  She is tearful at times.  She reports today that she is always struggled mildly with some anxiety but has been more able to control it than at present time.  She cannot think of any specific stressors or triggers reports that \"it is just life\".  Not at goal    The following portions of the patient's history were reviewed and updated as appropriate: allergies, current medications, past family history, past medical history, past social history, past surgical history and problem list.    Review of Systems   Constitutional: Positive for fatigue. Negative for appetite change and unexpected weight change.   HENT: Negative for congestion, ear pain, nosebleeds, postnasal drip, rhinorrhea, sore throat, trouble swallowing and voice change.    Eyes: Negative for pain and visual disturbance.   Respiratory: Positive for shortness of breath. Negative for cough and wheezing.    Cardiovascular: Positive for palpitations. Negative for chest pain.   Gastrointestinal: Negative for abdominal pain, blood in stool, constipation and diarrhea.   Endocrine: Negative for cold intolerance and polydipsia.   Genitourinary: Negative for difficulty urinating, dysuria, flank pain and hematuria.   Musculoskeletal: Positive for back pain. Negative for arthralgias, gait problem, joint swelling and myalgias.   Skin: Negative for color change and rash.   Allergic/Immunologic: Negative.    Neurological: Negative for dizziness, syncope, numbness and headaches.   Hematological: Negative.    Psychiatric/Behavioral: " "Positive for sleep disturbance. Negative for suicidal ideas. The patient is nervous/anxious.    All other systems reviewed and are negative.      Objective     /78   Pulse 89   Temp 98 °F (36.7 °C) (Temporal)   Ht 165.1 cm (65\")   Wt 105 kg (231 lb)   LMP 09/09/2019   SpO2 98%   BMI 38.44 kg/m²     Physical Exam   Constitutional: She is oriented to person, place, and time. She appears well-developed and well-nourished. No distress.   HENT:   Head: Normocephalic and atraumatic.   Right Ear: Hearing, tympanic membrane, external ear and ear canal normal.   Left Ear: Hearing, tympanic membrane, external ear and ear canal normal.   Nose: No mucosal edema or rhinorrhea.   Mouth/Throat: Oropharynx is clear and moist and mucous membranes are normal.   Eyes: Conjunctivae, EOM and lids are normal. Pupils are equal, round, and reactive to light. No scleral icterus. Right eye exhibits normal extraocular motion and no nystagmus. Left eye exhibits normal extraocular motion and no nystagmus.   Neck: Normal range of motion. Neck supple. No JVD present. No tracheal tenderness present. Carotid bruit is not present. No thyromegaly present.   Cardiovascular: Normal rate, regular rhythm, S1 normal, S2 normal, normal heart sounds and intact distal pulses.   No murmur heard.  Pulmonary/Chest: Effort normal and breath sounds normal. She exhibits no tenderness.   Abdominal: Soft. Bowel sounds are normal. She exhibits no mass. There is no hepatosplenomegaly. There is no tenderness.   Musculoskeletal: Normal range of motion. She exhibits no edema.        Lumbar back: She exhibits tenderness.    equal bilaterally. No muscular atrophy or flaccidity.  Pain elicited with palpation over lumbar musculature.   Lymphadenopathy:     She has no cervical adenopathy.        Right cervical: No superficial cervical adenopathy present.       Left cervical: No superficial cervical adenopathy present.   Neurological: She is alert and " oriented to person, place, and time. She has normal strength and normal reflexes. She displays no tremor. No cranial nerve deficit or sensory deficit. She exhibits normal muscle tone. Coordination and gait normal.   Skin: Skin is warm and dry. Capillary refill takes less than 2 seconds. She is not diaphoretic. No cyanosis. No pallor. Nails show no clubbing.   Psychiatric: Her speech is normal and behavior is normal. Judgment and thought content normal. Her mood appears anxious. She is not actively hallucinating. Cognition and memory are normal. She exhibits a depressed mood.   Crying as she discusses her concerns about her anxiety She is attentive.   Vitals reviewed.      Assessment/Plan     Problem List Items Addressed This Visit     None      Visit Diagnoses     Anxiety    -  Primary    Relevant Medications    FLUoxetine (PROzac) 10 MG tablet    busPIRone (BUSPAR) 7.5 MG tablet          Patient's Body mass index is 38.44 kg/m². BMI is above normal parameters. Recommendations include: nutrition counseling.   (Normal BMI:  18.5-24.9, OW 25-29.9, Obesity 30 or greater)    Understands disease processes and need for medications.  Understands reasons for urgent and emergent care.  Patient (& family) verbalized agreement for treatment plan.   Emotional support and active listening provided.  Patient provided time to verbalize feelings.    Reviewed genetic screening again today with patient.  Will try different course of medication that may be effective for patient.    Stop Effexor.  We will begin Prozac 10 mg and as needed buspirone and reevaluate symptoms in approximately 2 weeks.    RTC 2 to 3 weeks sooner if needed for problems or concerns            This document has been electronically signed by:  LANA Quinn FNP-C Dragon disclaimer:  Much of this encounter note is an electronic transcription/translation of spoken language to printed text. The electronic translation of spoken language may permit  erroneous, or at times, nonsensical words or phrases to be inadvertently transcribed; Although I have reviewed the note for such errors, some may still exist.

## 2019-09-13 ENCOUNTER — TELEPHONE (OUTPATIENT)
Dept: FAMILY MEDICINE CLINIC | Facility: CLINIC | Age: 34
End: 2019-09-13

## 2019-09-13 RX ORDER — PROMETHAZINE HYDROCHLORIDE 25 MG/1
25 TABLET ORAL EVERY 6 HOURS PRN
Qty: 60 TABLET | Refills: 1 | Status: SHIPPED | OUTPATIENT
Start: 2019-09-13 | End: 2020-04-20 | Stop reason: SDUPTHER

## 2019-09-13 NOTE — TELEPHONE ENCOUNTER
----- Message from LANA Quinn sent at 9/13/2019 12:32 PM EDT -----  sent  ----- Message -----  From: Betsy Benitez MA  Sent: 9/13/2019  11:50 AM  To: LANA Quinn    Patient wants to know if you could send her in some phenergan for an upset stomach. She cant take zofran.

## 2019-09-27 ENCOUNTER — OFFICE VISIT (OUTPATIENT)
Dept: FAMILY MEDICINE CLINIC | Facility: CLINIC | Age: 34
End: 2019-09-27

## 2019-09-27 VITALS
HEART RATE: 89 BPM | OXYGEN SATURATION: 97 % | HEIGHT: 65 IN | TEMPERATURE: 98.3 F | WEIGHT: 227 LBS | SYSTOLIC BLOOD PRESSURE: 120 MMHG | BODY MASS INDEX: 37.82 KG/M2 | DIASTOLIC BLOOD PRESSURE: 76 MMHG

## 2019-09-27 DIAGNOSIS — F41.1 GAD (GENERALIZED ANXIETY DISORDER): Primary | ICD-10-CM

## 2019-09-27 PROCEDURE — 99213 OFFICE O/P EST LOW 20 MIN: CPT | Performed by: NURSE PRACTITIONER

## 2019-09-27 RX ORDER — FLUOXETINE HYDROCHLORIDE 20 MG/1
20 CAPSULE ORAL DAILY
Qty: 30 CAPSULE | Refills: 0 | Status: SHIPPED | OUTPATIENT
Start: 2019-09-27 | End: 2019-10-14

## 2019-09-27 RX ORDER — CLONAZEPAM 0.5 MG/1
.25-.5 TABLET ORAL DAILY PRN
Qty: 10 TABLET | Refills: 0 | Status: SHIPPED | OUTPATIENT
Start: 2019-09-27 | End: 2019-10-14 | Stop reason: SDUPTHER

## 2019-09-27 NOTE — PROGRESS NOTES
"Subjective   Jarad Barragan is a 33 y.o. female.     Chief Complaint   Patient presents with   • Anxiety       History of Present Illness     Panic Attacks-reports she has not noted any improvement in symptoms.  She reports BuSpar made her have nausea and headaches.  She is taking prozac 10 mg but feels it is not helping.  She reports her last episode was last night.  Lasted several hours.  She reports she was at [employer] when feeling \"came out of no where\".  She reports she is not anxious this morning but \"am afraid that one will come on\".  She reports she is having some difficulty at work and at times has to \"leave\".  She reports \"I cannot tell when it is going to hit\".  No new stressors.      The following portions of the patient's history were reviewed and updated as appropriate: allergies, current medications, past family history, past medical history, past social history, past surgical history and problem list.    Review of Systems   Constitutional: Negative for appetite change, fatigue and unexpected weight change.   HENT: Negative for congestion, ear pain, nosebleeds, postnasal drip, rhinorrhea, sore throat, trouble swallowing and voice change.    Eyes: Negative for pain and visual disturbance.   Respiratory: Negative for cough, shortness of breath and wheezing.    Cardiovascular: Negative for chest pain and palpitations.   Gastrointestinal: Negative for abdominal pain, blood in stool, constipation and diarrhea.   Endocrine: Negative for cold intolerance and polydipsia.   Genitourinary: Negative for difficulty urinating, flank pain and hematuria.   Musculoskeletal: Negative for arthralgias, back pain, gait problem, joint swelling and myalgias.   Skin: Negative for color change and rash.   Allergic/Immunologic: Negative.    Neurological: Negative for syncope, numbness and headaches.   Hematological: Negative.    Psychiatric/Behavioral: Negative for sleep disturbance and suicidal ideas.   All other systems " "reviewed and are negative.      Objective     /76   Pulse 89   Temp 98.3 °F (36.8 °C) (Tympanic)   Ht 165.1 cm (65\")   Wt 103 kg (227 lb)   LMP 09/09/2019   SpO2 97%   BMI 37.77 kg/m²     Physical Exam   Constitutional: She is oriented to person, place, and time. She appears well-developed and well-nourished. No distress.   HENT:   Head: Normocephalic and atraumatic.   Right Ear: Hearing, tympanic membrane, external ear and ear canal normal.   Left Ear: Hearing, tympanic membrane, external ear and ear canal normal.   Nose: No mucosal edema or rhinorrhea.   Mouth/Throat: Oropharynx is clear and moist and mucous membranes are normal.   Eyes: Conjunctivae, EOM and lids are normal. Pupils are equal, round, and reactive to light. No scleral icterus. Right eye exhibits normal extraocular motion and no nystagmus. Left eye exhibits normal extraocular motion and no nystagmus.   Neck: Normal range of motion. Neck supple. No JVD present. No tracheal tenderness present. Carotid bruit is not present. No thyromegaly present.   Cardiovascular: Normal rate, regular rhythm, S1 normal, S2 normal, normal heart sounds and intact distal pulses.   No murmur heard.  Pulmonary/Chest: Effort normal and breath sounds normal. She exhibits no tenderness.   Abdominal: Soft. Bowel sounds are normal. She exhibits no mass. There is no hepatosplenomegaly. There is no tenderness.   Musculoskeletal: Normal range of motion. She exhibits no edema.        Lumbar back: She exhibits tenderness.    equal bilaterally. No muscular atrophy or flaccidity.  Pain elicited with palpation over lumbar musculature.   Lymphadenopathy:     She has no cervical adenopathy.        Right cervical: No superficial cervical adenopathy present.       Left cervical: No superficial cervical adenopathy present.   Neurological: She is alert and oriented to person, place, and time. She has normal strength and normal reflexes. She displays no tremor. No cranial " nerve deficit or sensory deficit. She exhibits normal muscle tone. Coordination and gait normal.   Skin: Skin is warm and dry. Capillary refill takes less than 2 seconds. She is not diaphoretic. No cyanosis. No pallor. Nails show no clubbing.   Psychiatric: Her speech is normal and behavior is normal. Judgment and thought content normal. Her mood appears anxious. She is not actively hallucinating. Cognition and memory are normal. She exhibits a depressed mood.   Crying as she discusses her concerns about her anxiety She is attentive.   Vitals reviewed.      Assessment/Plan     Problem List Items Addressed This Visit        Other    VALENTINA (generalized anxiety disorder) - Primary    Relevant Medications    FLUoxetine (PROzac) 20 MG capsule          Understands disease processes and need for medications.  Understands reasons for urgent and emergent care.  Patient (& family) verbalized agreement for treatment plan.   Emotional support and active listening provided.  Patient provided time to verbalize feelings.    PURVI requested.  Patient considered to be low risk for addiction due to use of single controlled medications.  Patient understands and accepts these risks.  Patient need for medication will be reassessed at each visit.  Doses will be adjusted according to patient need and findings.    Goal of TX: Patient will not have any adverse reactions of medication.  Patient will have reduction in anxiety symptoms with use of PRN Klonopin daily.  RTC 2-3 weeks for re-eval, sooner for worsening of symptoms.             This document has been electronically signed by:  LANA Quinn, FNP-C    Dragon disclaimer:  Much of this encounter note is an electronic transcription/translation of spoken language to printed text. The electronic translation of spoken language may permit erroneous, or at times, nonsensical words or phrases to be inadvertently transcribed; Although I have reviewed the note for such errors, some may  still exist.

## 2019-10-14 ENCOUNTER — OFFICE VISIT (OUTPATIENT)
Dept: FAMILY MEDICINE CLINIC | Facility: CLINIC | Age: 34
End: 2019-10-14

## 2019-10-14 VITALS
OXYGEN SATURATION: 98 % | WEIGHT: 228.6 LBS | TEMPERATURE: 98.3 F | HEIGHT: 65 IN | SYSTOLIC BLOOD PRESSURE: 106 MMHG | BODY MASS INDEX: 38.09 KG/M2 | DIASTOLIC BLOOD PRESSURE: 72 MMHG | HEART RATE: 84 BPM

## 2019-10-14 DIAGNOSIS — F41.1 GAD (GENERALIZED ANXIETY DISORDER): Primary | ICD-10-CM

## 2019-10-14 DIAGNOSIS — K21.9 GASTROESOPHAGEAL REFLUX DISEASE WITHOUT ESOPHAGITIS: ICD-10-CM

## 2019-10-14 DIAGNOSIS — E03.4 HYPOTHYROIDISM DUE TO ACQUIRED ATROPHY OF THYROID: ICD-10-CM

## 2019-10-14 PROCEDURE — 99214 OFFICE O/P EST MOD 30 MIN: CPT | Performed by: NURSE PRACTITIONER

## 2019-10-14 RX ORDER — CLONAZEPAM 0.5 MG/1
.25-.5 TABLET ORAL DAILY PRN
Qty: 10 TABLET | Refills: 0 | Status: SHIPPED | OUTPATIENT
Start: 2019-10-14 | End: 2019-11-04 | Stop reason: SDUPTHER

## 2019-10-14 RX ORDER — RABEPRAZOLE SODIUM 20 MG/1
20 TABLET, DELAYED RELEASE ORAL DAILY
Qty: 30 TABLET | Refills: 5 | Status: SHIPPED | OUTPATIENT
Start: 2019-10-14 | End: 2019-11-04 | Stop reason: SDUPTHER

## 2019-10-14 NOTE — PROGRESS NOTES
"Subjective   Jarad Barragan is a 33 y.o. female.     Chief Complaint   Patient presents with   • Med check       History of Present Illness     Depression-Here to follow up with medication side effects.  She reports she has noted dizziness and some diarrhea.  Diarrhea is intermittent.  Dizziness is occurring with position changes.  She reports mood continues to be \"about the same\".  She reports taking PRN Klonopin has helped with panic attacks but she is not feeling any decrease in frequency.  She reports she is breaking Klonopin and is only having mild drowsiness.  She has not taken prozac in 2-3 days due to dizziness but feels it has not changed dizziness much.  She reports last took klonopin on Saturday.  Ongoing  GERD-patient reports she is having heartburn daily.  Using OTC PRN meds.  Reports she has been on multiple PPIs in the past but was not effective.  Requests trial of PPI.  Not at goal  Hypothyroidism-patient is presently on Synthroid 112 mcg.  She denies any negative side effects.  Other than anxiety she is not having any symptoms of hypothyroidism.  She denies any hair or skin changes.  No heat or cold intolerance is reported.  Stable    The following portions of the patient's history were reviewed and updated as appropriate: allergies, current medications, past family history, past medical history, past social history, past surgical history and problem list.    Review of Systems   Constitutional: Positive for fatigue.   Respiratory: Negative for cough and shortness of breath.    Cardiovascular: Negative for chest pain and palpitations.   Gastrointestinal: Positive for abdominal pain and diarrhea. Negative for blood in stool.   Genitourinary: Negative for dysuria and hematuria.   Neurological: Positive for light-headedness. Negative for dizziness.   Psychiatric/Behavioral: Negative for dysphoric mood, sleep disturbance and suicidal ideas. The patient is nervous/anxious.    All other systems reviewed and " "are negative.      Objective     /72 (BP Location: Right arm, Patient Position: Sitting, Cuff Size: Adult)   Pulse 84   Temp 98.3 °F (36.8 °C) (Temporal)   Ht 165.1 cm (65\")   Wt 104 kg (228 lb 9.6 oz)   SpO2 98%   BMI 38.04 kg/m²     Physical Exam   Constitutional: She is oriented to person, place, and time. She appears well-developed and well-nourished. No distress.   HENT:   Head: Normocephalic and atraumatic.   Right Ear: Hearing, external ear and ear canal normal. A middle ear effusion is present.   Left Ear: Hearing, tympanic membrane, external ear and ear canal normal.   Nose: No mucosal edema or rhinorrhea.   Mouth/Throat: Oropharynx is clear and moist and mucous membranes are normal.   Eyes: Conjunctivae, EOM and lids are normal. Pupils are equal, round, and reactive to light. No scleral icterus. Right eye exhibits normal extraocular motion and no nystagmus. Left eye exhibits normal extraocular motion and no nystagmus.   Neck: Normal range of motion. Neck supple. No JVD present. No tracheal tenderness present. Carotid bruit is not present. No thyromegaly present.   Cardiovascular: Normal rate, regular rhythm, S1 normal, S2 normal, normal heart sounds and intact distal pulses.   No murmur heard.  Pulmonary/Chest: Effort normal and breath sounds normal. She exhibits no tenderness.   Abdominal: Soft. Bowel sounds are normal. She exhibits no mass. There is no hepatosplenomegaly. There is tenderness in the epigastric area.   Musculoskeletal: Normal range of motion. She exhibits no edema.        Lumbar back: She exhibits tenderness.    equal bilaterally. No muscular atrophy or flaccidity.  Pain elicited with palpation over lumbar musculature.   Lymphadenopathy:     She has no cervical adenopathy.        Right cervical: No superficial cervical adenopathy present.       Left cervical: No superficial cervical adenopathy present.   Neurological: She is alert and oriented to person, place, and time. " She has normal strength and normal reflexes. She displays no tremor. No cranial nerve deficit or sensory deficit. She exhibits normal muscle tone. Coordination and gait normal.   Skin: Skin is warm and dry. Capillary refill takes less than 2 seconds. She is not diaphoretic. No cyanosis. No pallor. Nails show no clubbing.   Psychiatric: Her speech is normal and behavior is normal. Judgment and thought content normal. Her mood appears anxious. She is not actively hallucinating. Cognition and memory are normal. She exhibits a depressed mood.   Crying as she discusses her concerns about her anxiety She is attentive.   Vitals reviewed.      Assessment/Plan     Problem List Items Addressed This Visit        Endocrine    Hypothyroidism    Current Assessment & Plan     Continue Synthroid 112 mcg.  Report any negative side effects medication.  We will continue to monitor labs and adjust medication if needed.            Other    VALENTINA (generalized anxiety disorder) - Primary    Relevant Medications    Vortioxetine HBr (TRINTELLIX) 10 MG tablet      Other Visit Diagnoses     Gastroesophageal reflux disease without esophagitis        Relevant Medications    RABEprazole (ACIPHEX) 20 MG EC tablet          Patient's Body mass index is 38.04 kg/m². BMI is above normal parameters. Recommendations include: nutrition counseling.   (Normal BMI:  18.5-24.9, OW 25-29.9, Obesity 30 or greater)    Understands disease processes and need for medications.  Understands reasons for urgent and emergent care.  Patient (& family) verbalized agreement for treatment plan.   Emotional support and active listening provided.  Patient provided time to verbalize feelings.    PURVI #[id number] reviewed today and consistent.  Will refill prescribed controlled medication today.  Patient is aware they cannot receive narcotics from any other provider except if under care of pain management or speciality clinic.  Risk and benefits of medication use has been  reviewed.  History and physical exam exhibit continued safe and appropriate use of controlled substances.  The patient is aware of the potential for addiction and dependence.  This patient has been made aware of the appropriate use of such medications, including potential risk of somnolence, limited ability to drive and / or work safely, and potential for overdose.    It has also been made clear that these medications are for use by this patient only, without concomitant use of alcohol or other substances unless prescribed/advised by medical provider.  Patient understands they may be subject to UDS and pill counts at random.    Patient considered to be low risk for addiction due to use of single controlled medications.  Patient understands and accepts these risks.  Patient need for medication will be reassessed at each visit.  Doses will be adjusted according to patient need and findings.    Goal of TX: Patient will not have any adverse reactions of medication.  Patient will have reduction in anxiety symptoms with use of PRN Klonopin until daily maintenance meds can become therapeutic.    Will stop Prozac today.  We will begin Trintellix 10 mg.    Reviewed genetic testing again today.  Will try AcipHex with standard precaution recommendations due to and efficacy of multiple other PPIs noted.    RTC 2 to 3 weeks, sooner if needed for problems or concerns          This document has been electronically signed by:  LANA Quinn, FNP-C    Dragon disclaimer:  Much of this encounter note is an electronic transcription/translation of spoken language to printed text. The electronic translation of spoken language may permit erroneous, or at times, nonsensical words or phrases to be inadvertently transcribed; Although I have reviewed the note for such errors, some may still exist.

## 2019-10-14 NOTE — ASSESSMENT & PLAN NOTE
Continue Synthroid 112 mcg.  Report any negative side effects medication.  We will continue to monitor labs and adjust medication if needed.

## 2019-11-04 ENCOUNTER — OFFICE VISIT (OUTPATIENT)
Dept: FAMILY MEDICINE CLINIC | Facility: CLINIC | Age: 34
End: 2019-11-04

## 2019-11-04 VITALS
WEIGHT: 225 LBS | DIASTOLIC BLOOD PRESSURE: 82 MMHG | SYSTOLIC BLOOD PRESSURE: 122 MMHG | OXYGEN SATURATION: 98 % | HEIGHT: 65 IN | TEMPERATURE: 97.6 F | HEART RATE: 91 BPM | BODY MASS INDEX: 37.49 KG/M2

## 2019-11-04 DIAGNOSIS — F41.8 DEPRESSION WITH ANXIETY: ICD-10-CM

## 2019-11-04 DIAGNOSIS — F41.1 GAD (GENERALIZED ANXIETY DISORDER): Primary | ICD-10-CM

## 2019-11-04 DIAGNOSIS — K21.9 GASTROESOPHAGEAL REFLUX DISEASE WITHOUT ESOPHAGITIS: ICD-10-CM

## 2019-11-04 PROCEDURE — 99213 OFFICE O/P EST LOW 20 MIN: CPT | Performed by: NURSE PRACTITIONER

## 2019-11-04 RX ORDER — DESVENLAFAXINE 25 MG/1
25 TABLET, EXTENDED RELEASE ORAL DAILY
Qty: 30 TABLET | Refills: 0 | Status: SHIPPED | OUTPATIENT
Start: 2019-11-04 | End: 2019-11-04 | Stop reason: SDUPTHER

## 2019-11-04 RX ORDER — CLONAZEPAM 0.5 MG/1
.25-.5 TABLET ORAL DAILY PRN
Qty: 10 TABLET | Refills: 0 | Status: SHIPPED | OUTPATIENT
Start: 2019-11-04 | End: 2019-11-27 | Stop reason: SDUPTHER

## 2019-11-04 RX ORDER — DESVENLAFAXINE 25 MG/1
25 TABLET, EXTENDED RELEASE ORAL DAILY
Qty: 30 TABLET | Refills: 0 | Status: SHIPPED | OUTPATIENT
Start: 2019-11-04 | End: 2019-11-06

## 2019-11-04 RX ORDER — RABEPRAZOLE SODIUM 20 MG/1
20 TABLET, DELAYED RELEASE ORAL DAILY
Qty: 30 TABLET | Refills: 5 | Status: SHIPPED | OUTPATIENT
Start: 2019-11-04 | End: 2020-04-20 | Stop reason: SDUPTHER

## 2019-11-04 NOTE — PROGRESS NOTES
"Subjective   Jarad Barragan is a 33 y.o. female.     Chief Complaint   Patient presents with   • Anxiety       History of Present Illness     Anxiety-patient is here to follow-up for anxiety.  She is presently on Trintellix 10 mg.  She has been taking Klonopin 0.5 mg daily PRN.  Today she reports she could not take Trintellex due to nausea.  She reports nausea that was bad even when eating.  Patient has failed Trintellix, BuSpar, Prozac, Effexor, and hydroxyzine.  Her genetic testing does not recommend Elavil, Silenor, Lexapro, or Tofranil.  It is noted that she does not metabolize those medications correctly.  She reports that anxiety and \"panic attacks\" have been occurring for the last 4 to 5 years but over the last 6 months has become progressive and debilitating at times.  She reports that when the anxiety happens she has to leave work or stop whatever she is doing.  She is using PRN Klonopin.  She reports after the \"attack\" she feels weak fatigued and has to lay down for short period of time.  She reports that while she is having the episode she feels that her heart is pounding and she cannot catch her breath.  She has not noted any specific precipitating factors.  Not at goal  GERD-patient has not been able to obtain AcipHex ordered at her last visit.  Would like a paper copy of the prescription so that she can check at other pharmacies for availability.  She reports that she is using multiple OTC times but has stopped Zantac due to fear for cancer due to recall of medication.  At goal    The following portions of the patient's history were reviewed and updated as appropriate: CC, ROS, allergies, current medications, past family history, past medical history, past social history, past surgical history and problem list.    Review of Systems   Constitutional: Positive for fatigue. Negative for appetite change and unexpected weight change.   HENT: Negative for congestion, ear pain, nosebleeds, postnasal drip, " "rhinorrhea, sore throat, trouble swallowing and voice change.    Eyes: Negative for pain and visual disturbance.   Respiratory: Negative for cough, shortness of breath and wheezing.    Cardiovascular: Negative for chest pain and palpitations.   Gastrointestinal: Negative for abdominal pain, blood in stool, constipation and diarrhea.   Endocrine: Negative for cold intolerance and polydipsia.   Genitourinary: Negative for difficulty urinating, dysuria, flank pain and hematuria.   Musculoskeletal: Negative for arthralgias, back pain, gait problem, joint swelling and myalgias.   Skin: Negative for color change and rash.   Allergic/Immunologic: Negative.    Neurological: Positive for light-headedness. Negative for dizziness, syncope, numbness and headaches.   Hematological: Negative.    Psychiatric/Behavioral: Negative for dysphoric mood, sleep disturbance and suicidal ideas. The patient is nervous/anxious.    All other systems reviewed and are negative.      Objective     /82   Pulse 91   Temp 97.6 °F (36.4 °C) (Temporal)   Ht 165.1 cm (65\")   Wt 102 kg (225 lb)   LMP 10/31/2019   SpO2 98%   BMI 37.44 kg/m²     Physical Exam   Constitutional: She is oriented to person, place, and time. She appears well-developed and well-nourished. No distress.   HENT:   Head: Normocephalic and atraumatic.   Right Ear: Hearing, tympanic membrane, external ear and ear canal normal.   Left Ear: Hearing, tympanic membrane, external ear and ear canal normal.   Nose: No mucosal edema or rhinorrhea.   Mouth/Throat: Oropharynx is clear and moist and mucous membranes are normal.   Eyes: Conjunctivae, EOM and lids are normal. Pupils are equal, round, and reactive to light. No scleral icterus. Right eye exhibits normal extraocular motion and no nystagmus. Left eye exhibits normal extraocular motion and no nystagmus.   Neck: Normal range of motion. Neck supple. No JVD present. No tracheal tenderness present. Carotid bruit is not " present. No thyromegaly present.   Cardiovascular: Normal rate, regular rhythm, S1 normal, S2 normal, normal heart sounds and intact distal pulses.   No murmur heard.  Pulmonary/Chest: Effort normal and breath sounds normal. She exhibits no tenderness.   Abdominal: Soft. Bowel sounds are normal. She exhibits no mass. There is no hepatosplenomegaly. There is tenderness in the epigastric area.   Musculoskeletal: Normal range of motion. She exhibits no edema.        Lumbar back: She exhibits tenderness.    equal bilaterally. No muscular atrophy or flaccidity.  Pain elicited with palpation over lumbar musculature.   Lymphadenopathy:     She has no cervical adenopathy.        Right cervical: No superficial cervical adenopathy present.       Left cervical: No superficial cervical adenopathy present.   Neurological: She is alert and oriented to person, place, and time. She has normal strength and normal reflexes. She displays no tremor. No cranial nerve deficit or sensory deficit. She exhibits normal muscle tone. Coordination and gait normal.   Skin: Skin is warm and dry. Capillary refill takes less than 2 seconds. She is not diaphoretic. No cyanosis. No pallor. Nails show no clubbing.   Psychiatric: Her speech is normal and behavior is normal. Judgment and thought content normal. Her mood appears anxious. She is not actively hallucinating. Cognition and memory are normal. She exhibits a depressed mood.   Crying as she discusses her concerns about her anxiety She is attentive.   Vitals reviewed.      Assessment/Plan     Problem List Items Addressed This Visit        Other    Depression with anxiety - Primary    Relevant Medications    Desvenlafaxine Succinate ER (PRISTIQ) 25 MG tablet sustained-release 24 hour    clonazePAM (KlonoPIN) 0.5 MG tablet      Other Visit Diagnoses     Gastroesophageal reflux disease without esophagitis        Relevant Medications    RABEprazole (ACIPHEX) 20 MG EC tablet          Patient's  Body mass index is 37.44 kg/m². BMI is above normal parameters. Recommendations include: nutrition counseling.       Emotional support and active listening provided.  Patient provided time to verbalize feelings.  Understands disease processes and need for medications.  Understands reasons for urgent and emergent care.  Patient (& family) verbalized agreement for treatment plan     PURVI#[id number] reviewed today and consistent.  Will refill prescribed controlled medication today.  Patient is aware they cannot receive narcotics from any other provider except if under care of pain management or speciality clinic.  Risk and benefits of medication use has been reviewed.  History and physical exam exhibit continued safe and appropriate use of controlled substances.  The patient is aware of the potential for addiction and dependence.  This patient has been made aware of the appropriate use of such medications, including potential risk of somnolence, limited ability to drive and / or work safely, and potential for overdose.    It has also been made clear that these medications are for use by this patient only, without concomitant use of alcohol or other substances unless prescribed/advised by medical provider.  Patient understands they may be subject to UDS and pill counts at random.     Patient considered to be low risk for addiction due to use of single controlled medications.  Patient understands and accepts these risks.  Patient need for medication will be reassessed at each visit.  Doses will be adjusted according to patient need and findings.    Goal of TX: Patient will not have any adverse reactions of medication.  Patient will have reduction in anxiety symptoms with use of PRN Klonopin patient will be able to function and maintain her activities at work without interference from anxiety symptoms    RTC 1 month, sooner if needed for problems or concerns          This document has been electronically signed by:   LANA Quinn, FNP-C    Dragon disclaimer:  Much of this encounter note is an electronic transcription/translation of spoken language to printed text. The electronic translation of spoken language may permit erroneous, or at times, nonsensical words or phrases to be inadvertently transcribed; Although I have reviewed the note for such errors, some may still exist.

## 2019-11-06 ENCOUNTER — TELEPHONE (OUTPATIENT)
Dept: FAMILY MEDICINE CLINIC | Facility: CLINIC | Age: 34
End: 2019-11-06

## 2019-11-06 RX ORDER — PAROXETINE 10 MG/1
10 TABLET, FILM COATED ORAL EVERY MORNING
Qty: 30 TABLET | Refills: 0 | Status: SHIPPED | OUTPATIENT
Start: 2019-11-06 | End: 2019-12-24 | Stop reason: SDUPTHER

## 2019-11-06 NOTE — TELEPHONE ENCOUNTER
Pt is aware of this information.   ----- Message from LANA Quinn sent at 11/6/2019  1:42 PM EST -----  I have sent her Paxil to the pharmacy.  ----- Message -----  From: Betsy Benitez MA  Sent: 11/4/2019   2:34 PM  To: LANA Quinn    Patient will need different medicine please.

## 2019-11-27 ENCOUNTER — OFFICE VISIT (OUTPATIENT)
Dept: FAMILY MEDICINE CLINIC | Facility: CLINIC | Age: 34
End: 2019-11-27

## 2019-11-27 VITALS
HEIGHT: 65 IN | TEMPERATURE: 98.3 F | BODY MASS INDEX: 37.65 KG/M2 | OXYGEN SATURATION: 99 % | SYSTOLIC BLOOD PRESSURE: 120 MMHG | HEART RATE: 85 BPM | DIASTOLIC BLOOD PRESSURE: 70 MMHG | WEIGHT: 226 LBS

## 2019-11-27 DIAGNOSIS — E66.09 CLASS 2 OBESITY DUE TO EXCESS CALORIES WITHOUT SERIOUS COMORBIDITY WITH BODY MASS INDEX (BMI) OF 37.0 TO 37.9 IN ADULT: Primary | ICD-10-CM

## 2019-11-27 DIAGNOSIS — F41.1 GAD (GENERALIZED ANXIETY DISORDER): ICD-10-CM

## 2019-11-27 DIAGNOSIS — F41.8 DEPRESSION WITH ANXIETY: ICD-10-CM

## 2019-11-27 PROCEDURE — 99214 OFFICE O/P EST MOD 30 MIN: CPT | Performed by: NURSE PRACTITIONER

## 2019-11-27 RX ORDER — CLONAZEPAM 0.5 MG/1
.25-.5 TABLET ORAL DAILY PRN
Qty: 10 TABLET | Refills: 0 | Status: SHIPPED | OUTPATIENT
Start: 2019-11-27 | End: 2019-12-24 | Stop reason: SDUPTHER

## 2019-11-27 RX ORDER — ERGOCALCIFEROL 1.25 MG/1
50000 CAPSULE ORAL WEEKLY
Qty: 4 CAPSULE | Refills: 5 | Status: SHIPPED | OUTPATIENT
Start: 2019-11-27 | End: 2020-06-08 | Stop reason: SDUPTHER

## 2019-11-27 RX ORDER — LEVOTHYROXINE SODIUM 112 UG/1
112 TABLET ORAL DAILY
Qty: 30 TABLET | Refills: 5 | Status: SHIPPED | OUTPATIENT
Start: 2019-11-27 | End: 2020-03-11

## 2019-11-27 NOTE — PROGRESS NOTES
"Subjective   Jarad Barragan is a 34 y.o. female.     Chief Complaint   Patient presents with   • Anxiety       History of Present Illness     Anxiety-ongoing.  She does feel she is less anxious but is only taking 1/2 of the Paxil.  She reports she is only using Klonopin PRN.  She reports she has had some GI upset.    Insomnia-reports she is having difficulty \"falling asleep\".  She reports she is concerned it is due to RLS.  She reports once she goes to sleep she \"is asleep\".  She reports she feels persistent urge to keep moving.  She has tried positioning devices and \"hot pad\".  She is not having symptoms during the day.  She does not have a standing position at work and does primarily sit for her job.  She reports she had only been noting the restlessness of her legs in the last month.  Not at goal.   GERD-is improving with Aciphex.  She reports \"only thing\" has ever helped.  She reports she has not noted any symptoms \"coming up in my neck\".      The following portions of the patient's history were reviewed and updated as appropriate: CC, ROS, allergies, current medications, past family history, past medical history, past social history, past surgical history and problem list.      Review of Systems   Constitutional: Positive for fatigue. Negative for appetite change and unexpected weight change.   HENT: Negative for congestion, ear pain, nosebleeds, postnasal drip, rhinorrhea, sore throat, trouble swallowing and voice change.    Eyes: Negative for pain and visual disturbance.   Respiratory: Negative for cough, shortness of breath and wheezing.    Cardiovascular: Negative for chest pain and palpitations.   Gastrointestinal: Negative for abdominal pain, blood in stool, constipation and diarrhea.   Endocrine: Negative for cold intolerance and polydipsia.   Genitourinary: Negative for difficulty urinating, dysuria, flank pain and hematuria.   Musculoskeletal: Negative for arthralgias, back pain, gait problem, joint " "swelling and myalgias.   Skin: Negative for color change and rash.   Allergic/Immunologic: Negative.    Neurological: Positive for light-headedness. Negative for dizziness, syncope, numbness and headaches.   Hematological: Negative.    Psychiatric/Behavioral: Negative for dysphoric mood, sleep disturbance and suicidal ideas. The patient is nervous/anxious.    All other systems reviewed and are negative.      Objective     /70   Pulse 85   Temp 98.3 °F (36.8 °C) (Temporal)   Ht 165.1 cm (65\")   Wt 103 kg (226 lb)   LMP 10/31/2019   SpO2 99%   BMI 37.61 kg/m²     Physical Exam   Constitutional: She is oriented to person, place, and time. She appears well-developed and well-nourished. No distress.   HENT:   Head: Normocephalic and atraumatic.   Right Ear: External ear normal.   Left Ear: External ear normal.   Nose: Nose normal.   Mouth/Throat: Oropharynx is clear and moist.   Eyes: EOM are normal. Pupils are equal, round, and reactive to light. No scleral icterus.   Neck: Normal range of motion. Neck supple. No JVD present. No thyromegaly present.   Cardiovascular: Normal rate, regular rhythm and normal heart sounds.   Pulmonary/Chest: Effort normal and breath sounds normal.   Abdominal: Soft. Bowel sounds are normal. There is no tenderness.   Neurological: She is alert and oriented to person, place, and time. No cranial nerve deficit. Coordination normal.   Skin: Skin is warm and dry.   Psychiatric: She has a normal mood and affect. Her behavior is normal. Judgment and thought content normal. Her mood appears not anxious. She does not exhibit a depressed mood.   Very neatly groomed with hair and make up fixed today   Vitals reviewed.      Assessment/Plan     Problem List Items Addressed This Visit        Digestive    Class 2 obesity due to excess calories without serious comorbidity with body mass index (BMI) of 37.0 to 37.9 in adult - Primary    Current Assessment & Plan     Continue to work on diet " changes.  Be active as able.            Other    Depression with anxiety    Current Assessment & Plan     Increase paxil to 10 mg.  Report any negative side effects. PRN Klonopin for panic attacks symptoms.         Relevant Medications    clonazePAM (KlonoPIN) 0.5 MG tablet      Other Visit Diagnoses     VAL[place] (generalized anxiety disorder)        Relevant Medications    clonazePAM (KlonoPIN) 0.5 MG tablet          Patient's Body mass index is 37.61 kg/m². BMI is above normal parameters. Recommendations include: nutrition counseling.     Emotional support and active listening provided.  Patient provided time to verbalize feelings.  Understands disease processes and need for medications.  Understands reasons for urgent and emergent care.  Patient (& family) verbalized agreement for treatment plan.     [facility] #[id number] reviewed today and consistent.  Will refill prescribed controlled medication today.  Patient is aware they cannot receive narcotics from any other provider except if under care of pain management or speciality clinic.  Risk and benefits of medication use has been reviewed.  History and physical exam exhibit continued safe and appropriate use of controlled substances.  The patient is aware of the potential for addiction and dependence.  This patient has been made aware of the appropriate use of such medications, including potential risk of somnolence, limited ability to drive and / or work safely, and potential for overdose.    It has also been made clear that these medications are for use by this patient only, without concomitant use of alcohol or other substances unless prescribed/advised by medical provider.  Patient understands they may be subject to UDS and pill counts at random.      Patient considered to be low risk for addiction due to use of single controlled medications.  Patient understands and accepts these risks.  Patient need for medication will be reassessed at each visit.  Doses will be  adjusted according to patient need and findings.    Goal of TX: Patient will not have any adverse reactions of medication.  Patient will have reduction in anxiety symptoms with use of PRN Klonopin.     RTC 2-3 weeks for re-eval, sooner for worsening of symptoms.           This document has been electronically signed by:  LANA Quinn FNP-C Dragon disclaimer:  Much of this encounter note is an electronic transcription/translation of spoken language to printed text. The electronic translation of spoken language may permit erroneous, or at times, nonsensical words or phrases to be inadvertently transcribed; Although I have reviewed the note for such errors, some may still exist.

## 2019-11-30 NOTE — ASSESSMENT & PLAN NOTE
Increase paxil to 10 mg.  Report any negative side effects. PRN Klonopin for panic attacks symptoms.

## 2019-12-24 ENCOUNTER — OFFICE VISIT (OUTPATIENT)
Dept: FAMILY MEDICINE CLINIC | Facility: CLINIC | Age: 34
End: 2019-12-24

## 2019-12-24 VITALS
TEMPERATURE: 97.9 F | HEIGHT: 65 IN | OXYGEN SATURATION: 97 % | WEIGHT: 227 LBS | DIASTOLIC BLOOD PRESSURE: 80 MMHG | SYSTOLIC BLOOD PRESSURE: 124 MMHG | BODY MASS INDEX: 37.82 KG/M2 | HEART RATE: 100 BPM

## 2019-12-24 DIAGNOSIS — F41.8 DEPRESSION WITH ANXIETY: Primary | ICD-10-CM

## 2019-12-24 DIAGNOSIS — J30.1 SEASONAL ALLERGIC RHINITIS DUE TO POLLEN: ICD-10-CM

## 2019-12-24 DIAGNOSIS — K21.9 GASTROESOPHAGEAL REFLUX DISEASE WITHOUT ESOPHAGITIS: ICD-10-CM

## 2019-12-24 PROBLEM — F41.1 GAD (GENERALIZED ANXIETY DISORDER): Status: ACTIVE | Noted: 2019-12-24

## 2019-12-24 PROCEDURE — 99214 OFFICE O/P EST MOD 30 MIN: CPT | Performed by: NURSE PRACTITIONER

## 2019-12-24 RX ORDER — PAROXETINE 10 MG/1
10 TABLET, FILM COATED ORAL EVERY MORNING
Qty: 30 TABLET | Refills: 0 | Status: SHIPPED | OUTPATIENT
Start: 2019-12-24 | End: 2020-01-08 | Stop reason: SDUPTHER

## 2019-12-24 RX ORDER — FLUTICASONE PROPIONATE 50 MCG
SPRAY, SUSPENSION (ML) NASAL
Qty: 1 BOTTLE | Refills: 5 | Status: SHIPPED | OUTPATIENT
Start: 2019-12-24 | End: 2020-01-24 | Stop reason: SDUPTHER

## 2019-12-24 RX ORDER — CLONAZEPAM 0.5 MG/1
.25-.5 TABLET ORAL DAILY PRN
Qty: 10 TABLET | Refills: 0 | Status: SHIPPED | OUTPATIENT
Start: 2019-12-24 | End: 2020-01-08 | Stop reason: SDUPTHER

## 2019-12-24 NOTE — PROGRESS NOTES
"Subjective   Jarad Barragan is a 34 y.o. female.     Chief Complaint   Patient presents with   • follow back up on med paxil       History of Present Illness       URI symptom- since yesterday.  Reports she has been noting some sore throat.  No ear pain.  No HA.  She reports sinus pressure.  Mild cough.  Cough is dry in nature.  No obvious PND.  Anxiety-taking Paxil 5 mg.  She reports out of Clonazepam for 4 days.  Has been having more panic symptoms since that time.  She has been sleeping.  She does have some mild BP increase this morning. She has not been eating any salty foods that would trigger her BP to be elevated.  She reports that she has been more anxious with the upcoming holidays.  States \"I just keep feeling like I am panicking\".  Ongoing  GERD-stable on AcipHex 20.  No negative side effects of medication.  Patient does avoid foods that trigger reflux symptoms.  Denies any recent exacerbations.      The following portions of the patient's history were reviewed and updated as appropriate: CC, ROS, allergies, current medications, past family history, past medical history, past social history, past surgical history and problem list.      Review of Systems   Constitutional: Negative for appetite change, fatigue and unexpected weight change.   HENT: Positive for sinus pressure and sinus pain. Negative for congestion, ear pain, nosebleeds, postnasal drip, rhinorrhea, sore throat, trouble swallowing and voice change.    Eyes: Negative for pain and visual disturbance.   Respiratory: Negative for cough, shortness of breath and wheezing.    Cardiovascular: Negative for chest pain and palpitations.   Gastrointestinal: Negative for abdominal pain, blood in stool, constipation and diarrhea.   Endocrine: Negative for cold intolerance and polydipsia.   Genitourinary: Negative for difficulty urinating, flank pain and hematuria.   Musculoskeletal: Negative for arthralgias, back pain, gait problem, joint swelling and " "myalgias.   Skin: Negative for color change and rash.   Allergic/Immunologic: Negative.    Neurological: Negative for syncope, numbness and headaches.   Hematological: Negative.    Psychiatric/Behavioral: Negative for sleep disturbance and suicidal ideas.   All other systems reviewed and are negative.      Objective     /80 (BP Location: Right arm, Patient Position: Sitting, Cuff Size: Adult)   Pulse 100   Temp 97.9 °F (36.6 °C) (Tympanic)   Ht 165.1 cm (65\")   Wt 103 kg (227 lb)   LMP 12/21/2019   SpO2 97%   BMI 37.77 kg/m²     Physical Exam   Constitutional: She is oriented to person, place, and time. She appears well-developed and well-nourished. No distress.   HENT:   Head: Normocephalic and atraumatic.   Right Ear: Hearing, tympanic membrane, external ear and ear canal normal.   Left Ear: Hearing, tympanic membrane, external ear and ear canal normal.   Nose: Mucosal edema present. No rhinorrhea. Right sinus exhibits no maxillary sinus tenderness and no frontal sinus tenderness. Left sinus exhibits no maxillary sinus tenderness and no frontal sinus tenderness.   Mouth/Throat: Oropharynx is clear and moist and mucous membranes are normal.   Eyes: Pupils are equal, round, and reactive to light. Conjunctivae, EOM and lids are normal. No scleral icterus. Right eye exhibits normal extraocular motion and no nystagmus. Left eye exhibits normal extraocular motion and no nystagmus.   Neck: Normal range of motion. Neck supple. No JVD present. No tracheal tenderness present. Carotid bruit is not present. No thyromegaly present.   Cardiovascular: Normal rate, regular rhythm, S1 normal, S2 normal, normal heart sounds and intact distal pulses.   No murmur heard.  Pulmonary/Chest: Effort normal and breath sounds normal. She exhibits no tenderness.   Abdominal: Soft. Bowel sounds are normal. She exhibits no mass. There is no hepatosplenomegaly. There is no tenderness.   Musculoskeletal: Normal range of motion. She " exhibits no edema or tenderness.   Gait normal.   equal bilaterally. No muscular atrophy or flaccidity.   Lymphadenopathy:     She has no cervical adenopathy.        Right cervical: No superficial cervical adenopathy present.       Left cervical: No superficial cervical adenopathy present.   Neurological: She is alert and oriented to person, place, and time. She has normal strength and normal reflexes. She displays no tremor. No cranial nerve deficit or sensory deficit. She exhibits normal muscle tone. Coordination and gait normal.   Skin: Skin is warm and dry. Capillary refill takes less than 2 seconds. She is not diaphoretic. No cyanosis. No pallor. Nails show no clubbing.   Psychiatric: Her speech is normal and behavior is normal. Judgment and thought content normal. Her mood appears anxious. She is not actively hallucinating. Cognition and memory are normal. She exhibits a depressed mood.   Flat affect She is attentive.   Vitals reviewed.      Assessment/Plan     Problem List Items Addressed This Visit        Digestive    Gastroesophageal reflux disease without esophagitis    Current Assessment & Plan     Continue AcipHex as directed.  GI triggers            Other    Depression with anxiety - Primary    Current Assessment & Plan     Not at goal.  Again advised patient to increase her Paxil to 10 mg daily.  PRN Klonopin.         Relevant Medications    PARoxetine (PAXIL) 10 MG tablet    clonazePAM (KlonoPIN) 0.5 MG tablet      Other Visit Diagnoses     Seasonal allergic rhinitis due to pollen        Relevant Medications    fluticasone (FLONASE) 50 MCG/ACT nasal spray          Patient's Body mass index is 37.77 kg/m². BMI is above normal parameters. Recommendations include: nutrition counseling.     Emotional support and active listening provided.  Patient provided time to verbalize feelings.  Understands disease processes and need for medications.  Understands reasons for urgent and emergent care.  Patient  (& family) verbalized agreement for treatment plan    [facility] #[id number] reviewed today and consistent.  Will refill prescribed controlled medication today.  Patient is aware they cannot receive narcotics from any other provider except if under care of pain management or speciality clinic.  Risk and benefits of medication use has been reviewed.  History and physical exam exhibit continued safe and appropriate use of controlled substances.  The patient is aware of the potential for addiction and dependence.  This patient has been made aware of the appropriate use of such medications, including potential risk of somnolence, limited ability to drive and / or work safely, and potential for overdose.    It has also been made clear that these medications are for use by this patient only, without concomitant use of alcohol or other substances unless prescribed/advised by medical provider.  Patient understands they may be subject to UDS and pill counts at random.    Patient considered to be low risk for addiction due to use of single controlled medications.  Patient understands and accepts these risks.  Patient need for medication will be reassessed at each visit.  Doses will be adjusted according to patient need and findings.    Goal of TX: Patient will not have any adverse reactions of medication.  Patient will have reduction in anxiety symptoms with use of PRN Klonopin.    RTC 2 to 3 weeks, sooner if needed for problems or concerns          This document has been electronically signed by:  LANA Quinn, FNP-C    Dragon disclaimer:  Much of this encounter note is an electronic transcription/translation of spoken language to printed text. The electronic translation of spoken language may permit erroneous, or at times, nonsensical words or phrases to be inadvertently transcribed; Although I have reviewed the note for such errors, some may still exist.

## 2020-01-08 ENCOUNTER — OFFICE VISIT (OUTPATIENT)
Dept: FAMILY MEDICINE CLINIC | Facility: CLINIC | Age: 35
End: 2020-01-08

## 2020-01-08 VITALS
WEIGHT: 224 LBS | OXYGEN SATURATION: 98 % | TEMPERATURE: 98.3 F | SYSTOLIC BLOOD PRESSURE: 120 MMHG | DIASTOLIC BLOOD PRESSURE: 76 MMHG | HEIGHT: 66 IN | HEART RATE: 75 BPM | BODY MASS INDEX: 36 KG/M2

## 2020-01-08 DIAGNOSIS — F41.8 DEPRESSION WITH ANXIETY: ICD-10-CM

## 2020-01-08 DIAGNOSIS — J06.9 ACUTE URI: ICD-10-CM

## 2020-01-08 DIAGNOSIS — F41.1 GAD (GENERALIZED ANXIETY DISORDER): Primary | ICD-10-CM

## 2020-01-08 PROCEDURE — 99214 OFFICE O/P EST MOD 30 MIN: CPT | Performed by: NURSE PRACTITIONER

## 2020-01-08 RX ORDER — CLONAZEPAM 0.5 MG/1
.25-.5 TABLET ORAL DAILY PRN
Qty: 10 TABLET | Refills: 0 | Status: SHIPPED | OUTPATIENT
Start: 2020-01-08 | End: 2020-01-21 | Stop reason: SDUPTHER

## 2020-01-08 RX ORDER — AMOXICILLIN AND CLAVULANATE POTASSIUM 875; 125 MG/1; MG/1
1 TABLET, FILM COATED ORAL EVERY 12 HOURS SCHEDULED
Qty: 20 TABLET | Refills: 0 | Status: SHIPPED | OUTPATIENT
Start: 2020-01-08 | End: 2020-01-18

## 2020-01-08 RX ORDER — PAROXETINE 10 MG/1
10 TABLET, FILM COATED ORAL EVERY MORNING
Qty: 30 TABLET | Refills: 0 | Status: SHIPPED | OUTPATIENT
Start: 2020-01-08 | End: 2020-01-21 | Stop reason: SDUPTHER

## 2020-01-08 NOTE — PROGRESS NOTES
"Subjective   Jarad Barragan is a 34 y.o. female.     Chief Complaint   Patient presents with   • Depression   • Anxiety       History of Present Illness     Depression and Anxiety-ongoing. Continues to have some \"panic attacks\".  Reports \"bad last night\".  She reports overwhelming fear.  She reports no specific trigger.  She reports she had to take Klonopin last night to get calmed down.  She is taking Paxil 10 mg.  She reports she has tolerated the whole tablet well.  She does not feel symptoms are worse just not improving.  She is not sleeping well.  Denies any night kraus.  Reports her mind \"keeps going\" all night long.  She reports when she is able to fall asleep she does stay asleep.  She reports symptoms are occurring daily.  Occurs regardless of where she is.  She reports she has not had to leave work again for the symptoms.  Not at goal.    URI complaint-reports began with sore throat that has mildly improved.  Productive cough with small amount of phelgm.  She reports she is not having any HA.  No obvious sinus pressure or pain.  No eye complaints.  She reports she has noted some wheezing.  No SOA.  Has had some congestion and rhinorrhea but that seems to be improving.  She has not taken any meds at home for symptoms.  She reports she has noted some \"blisters\" in her mouth.  She reports she has noted the \"blisters\" for a month or longer\".  She reports no fluid filled areas \"just raw and sore\".  She has used salt gargles but that has not helped much.  No teeth pain is present.  She reports it takes approx 1 week for an area to heal.  Not at goal.    GERD-chronic and ongoing.  Patient is presently taking AcipHex 20 mg daily.  She denies any negative side effects.  Reports that her GI symptoms are doing \"better than I have ever done\".  No negative side effects of medication.  Patient does avoid foods that trigger reflux symptoms.  Denies any recent exacerbations.      The following portions of the patient's " "history were reviewed and updated as appropriate: CC, ROS, allergies, current medications, past family history, past medical history, past social history, past surgical history and problem list.      Review of Systems   Constitutional: Negative for chills, fatigue and fever.   HENT: Positive for congestion, postnasal drip, sneezing, sore throat and voice change. Negative for ear pain, sinus pressure and trouble swallowing.    Eyes: Negative for itching and visual disturbance.   Respiratory: Positive for cough and wheezing. Negative for chest tightness and shortness of breath.    Cardiovascular: Negative for chest pain, palpitations and leg swelling.   Gastrointestinal: Negative for abdominal pain, diarrhea, nausea and vomiting.   Genitourinary: Negative for difficulty urinating.   Musculoskeletal: Positive for back pain.   Neurological: Negative for dizziness, seizures, light-headedness and headaches.   Psychiatric/Behavioral: Positive for dysphoric mood and sleep disturbance. Negative for suicidal ideas. The patient is nervous/anxious.    All other systems reviewed and are negative.      Objective     /76 (BP Location: Left arm, Patient Position: Sitting, Cuff Size: Adult)   Pulse 75   Temp 98.3 °F (36.8 °C) (Temporal)   Ht 167.6 cm (66\")   Wt 102 kg (224 lb)   LMP 12/21/2019   SpO2 98%   BMI 36.15 kg/m²     Physical Exam   Constitutional: She is oriented to person, place, and time. She appears well-developed and well-nourished. No distress.   HENT:   Head: Normocephalic and atraumatic.   Right Ear: Hearing, external ear and ear canal normal. Tympanic membrane is erythematous. A middle ear effusion is present.   Left Ear: Hearing, external ear and ear canal normal. Tympanic membrane is erythematous. A middle ear effusion is present.   Nose: Mucosal edema and rhinorrhea present. Right sinus exhibits no maxillary sinus tenderness and no frontal sinus tenderness. Left sinus exhibits no maxillary sinus " tenderness and no frontal sinus tenderness.   Mouth/Throat: Mucous membranes are normal. Oral lesions present. Oropharyngeal exudate and posterior oropharyngeal erythema present.       Eyes: Pupils are equal, round, and reactive to light. Conjunctivae, EOM and lids are normal. No scleral icterus. Right eye exhibits normal extraocular motion and no nystagmus. Left eye exhibits normal extraocular motion and no nystagmus.   Neck: Normal range of motion. Neck supple. No JVD present. No tracheal tenderness present. Carotid bruit is not present. No thyromegaly present.   Cardiovascular: Normal rate, regular rhythm, S1 normal, S2 normal, normal heart sounds and intact distal pulses.   No murmur heard.  Pulmonary/Chest: Effort normal and breath sounds normal. She exhibits no tenderness.   Abdominal: Soft. Bowel sounds are normal. She exhibits no mass. There is no hepatosplenomegaly. There is no tenderness.   Musculoskeletal: Normal range of motion. She exhibits no edema or tenderness.   Gait normal.   equal bilaterally. No muscular atrophy or flaccidity.   Lymphadenopathy:     She has no cervical adenopathy.        Right cervical: No superficial cervical adenopathy present.       Left cervical: No superficial cervical adenopathy present.   Neurological: She is alert and oriented to person, place, and time. She has normal strength and normal reflexes. She displays no tremor. No cranial nerve deficit or sensory deficit. She exhibits normal muscle tone. Coordination and gait normal.   Skin: Skin is warm and dry. Capillary refill takes less than 2 seconds. She is not diaphoretic. No cyanosis. No pallor. Nails show no clubbing.   Psychiatric: Her speech is normal and behavior is normal. Judgment and thought content normal. Her mood appears anxious. She is not actively hallucinating. Cognition and memory are normal. She exhibits a depressed mood.   Flat affect She is attentive.   Vitals reviewed.      Assessment/Plan      Problem List Items Addressed This Visit        Other    Depression with anxiety    Relevant Medications    PARoxetine (PAXIL) 10 MG tablet    clonazePAM (KlonoPIN) 0.5 MG tablet    [place] (generalized anxiety disorder) - Primary    Relevant Medications    PARoxetine (PAXIL) 10 MG tablet      Other Visit Diagnoses     Acute URI        Relevant Medications    amoxicillin-clavulanate (AUGMENTIN) 875-125 MG per tablet    nystatin (MYCOSTATIN) 667046 UNIT/ML suspension          Patient's Body mass index is 36.15 kg/m². BMI is above normal parameters. Recommendations include: nutrition counseling.   Understands disease processes and need for medications.  Understands reasons for urgent and emergent care.  Patient (& family) verbalized agreement for treatment plan.   Emotional support and active listening provided.  Patient provided time to verbalize feelings.    [facility] #[id number] reviewed today and consistent.  Will refill prescribed controlled medication today.  Patient is aware they cannot receive narcotics from any other provider except if under care of pain management or speciality clinic.  Risk and benefits of medication use has been reviewed.  History and physical exam exhibit continued safe and appropriate use of controlled substances.  The patient is aware of the potential for addiction and dependence.  This patient has been made aware of the appropriate use of such medications, including potential risk of somnolence, limited ability to drive and / or work safely, and potential for overdose.    It has also been made clear that these medications are for use by this patient only, without concomitant use of alcohol or other substances unless prescribed/advised by medical provider.  Patient understands they may be subject to UDS and pill counts at random.      Patient considered to be low risk for addiction due to use of single controlled medications.  Patient understands and accepts these risks.  Patient need for  medication will be reassessed at each visit.  Doses will be adjusted according to patient need and findings.    Goal of TX: Patient will not have any adverse reactions of medication.  Patient will have reduction in her anxiety symptoms with use of PRN Klonopin and with Paxil.  Will refer to psych NP today for evaluation of symptoms.    RTC 2 to 3 weeks, sooner if needed for problems or concerns          This document has been electronically signed by:  LANA Quinn, FNP-C    Dragon disclaimer:  Much of this encounter note is an electronic transcription/translation of spoken language to printed text. The electronic translation of spoken language may permit erroneous, or at times, nonsensical words or phrases to be inadvertently transcribed; Although I have reviewed the note for such errors, some may still exist.

## 2020-01-21 ENCOUNTER — OFFICE VISIT (OUTPATIENT)
Dept: FAMILY MEDICINE CLINIC | Facility: CLINIC | Age: 35
End: 2020-01-21

## 2020-01-21 VITALS
DIASTOLIC BLOOD PRESSURE: 78 MMHG | TEMPERATURE: 97.6 F | SYSTOLIC BLOOD PRESSURE: 120 MMHG | HEIGHT: 66 IN | WEIGHT: 227 LBS | HEART RATE: 90 BPM | OXYGEN SATURATION: 98 % | BODY MASS INDEX: 36.48 KG/M2

## 2020-01-21 DIAGNOSIS — K21.9 GASTROESOPHAGEAL REFLUX DISEASE WITHOUT ESOPHAGITIS: ICD-10-CM

## 2020-01-21 DIAGNOSIS — E66.09 CLASS 2 OBESITY DUE TO EXCESS CALORIES WITHOUT SERIOUS COMORBIDITY WITH BODY MASS INDEX (BMI) OF 37.0 TO 37.9 IN ADULT: Primary | ICD-10-CM

## 2020-01-21 DIAGNOSIS — F41.8 DEPRESSION WITH ANXIETY: ICD-10-CM

## 2020-01-21 PROCEDURE — 99214 OFFICE O/P EST MOD 30 MIN: CPT | Performed by: NURSE PRACTITIONER

## 2020-01-21 RX ORDER — PAROXETINE 10 MG/1
15 TABLET, FILM COATED ORAL EVERY MORNING
Qty: 45 TABLET | Refills: 0 | Status: SHIPPED | OUTPATIENT
Start: 2020-01-21 | End: 2020-02-04 | Stop reason: SDUPTHER

## 2020-01-21 RX ORDER — CLONAZEPAM 0.5 MG/1
.25-.5 TABLET ORAL DAILY PRN
Qty: 10 TABLET | Refills: 0 | Status: SHIPPED | OUTPATIENT
Start: 2020-01-21 | End: 2020-02-04 | Stop reason: SDUPTHER

## 2020-01-21 NOTE — PROGRESS NOTES
"Subjective   Jarad Barragan is a 34 y.o. female.     Chief Complaint   Patient presents with   • Anxiety   • Depression       History of Present Illness     Panic attack and anxiety-last episode approx 2 days ago on Sunday.  She reports she did not have any specific trigger.  She is doing good on Paxil 10 mg.  She reports she has not had any GI side effects.  She reports she is taking Klonopin only PRN and last took on Sunday.  She is due to see SACHA Nicole APRN.  No new symptoms.  She reports depression is not any worse.  \"same\".    Leg color-has noted some occasional spotted dark discoloration on her BLE from the knee down.  Happens \"random\".  Not painful.  She reports does not vary more between sitting and standing.  Ongoing.      The following portions of the patient's history were reviewed and updated as appropriate: CC, ROS, allergies, current medications, past family history, past medical history, past social history, past surgical history and problem list.      Review of Systems   Constitutional: Negative for appetite change, chills, fatigue, fever and unexpected weight change.   HENT: Negative for congestion, ear pain, nosebleeds, postnasal drip, rhinorrhea, sinus pressure, sneezing, sore throat, trouble swallowing and voice change.    Eyes: Negative for pain, itching and visual disturbance.   Respiratory: Negative for cough, chest tightness, shortness of breath and wheezing.    Cardiovascular: Negative for chest pain, palpitations and leg swelling.   Gastrointestinal: Negative for abdominal pain, blood in stool, constipation, diarrhea, nausea and vomiting.   Endocrine: Negative for cold intolerance and polydipsia.   Genitourinary: Negative for difficulty urinating, flank pain and hematuria.   Musculoskeletal: Positive for back pain. Negative for arthralgias, gait problem, joint swelling and myalgias.   Skin: Negative for color change and rash.   Allergic/Immunologic: Negative.    Neurological: Negative for " "dizziness, seizures, syncope, light-headedness, numbness and headaches.   Hematological: Negative.    Psychiatric/Behavioral: Positive for dysphoric mood and sleep disturbance. Negative for suicidal ideas. The patient is nervous/anxious.    All other systems reviewed and are negative.      Objective     /78   Pulse 90   Temp 97.6 °F (36.4 °C) (Temporal)   Ht 167.6 cm (66\")   Wt 103 kg (227 lb)   LMP 01/14/2020   SpO2 98%   BMI 36.64 kg/m²     Physical Exam   Constitutional: She is oriented to person, place, and time. She appears well-developed and well-nourished. No distress.   HENT:   Head: Normocephalic and atraumatic.   Right Ear: Hearing, tympanic membrane, external ear and ear canal normal.   Left Ear: Hearing, tympanic membrane, external ear and ear canal normal.   Nose: No mucosal edema or rhinorrhea.   Mouth/Throat: Oropharynx is clear and moist and mucous membranes are normal.   Eyes: Pupils are equal, round, and reactive to light. Conjunctivae, EOM and lids are normal. No scleral icterus. Right eye exhibits normal extraocular motion and no nystagmus. Left eye exhibits normal extraocular motion and no nystagmus.   Neck: Normal range of motion. Neck supple. No JVD present. No tracheal tenderness present. Carotid bruit is not present. No thyromegaly present.   Cardiovascular: Normal rate, regular rhythm, S1 normal, S2 normal, normal heart sounds and intact distal pulses.   No murmur heard.  Pulmonary/Chest: Effort normal and breath sounds normal. She exhibits no tenderness.   Abdominal: Soft. Bowel sounds are normal. She exhibits no mass. There is no hepatosplenomegaly. There is no tenderness.   Musculoskeletal: Normal range of motion. She exhibits no edema or tenderness.   Gait normal.   equal bilaterally. No muscular atrophy or flaccidity.   Lymphadenopathy:     She has no cervical adenopathy.        Right cervical: No superficial cervical adenopathy present.       Left cervical: No " superficial cervical adenopathy present.   Neurological: She is alert and oriented to person, place, and time. She has normal strength and normal reflexes. She displays no tremor. No cranial nerve deficit or sensory deficit. She exhibits normal muscle tone. Coordination and gait normal.   Skin: Skin is warm and dry. Capillary refill takes less than 2 seconds. She is not diaphoretic. No cyanosis. No pallor. Nails show no clubbing.   Mottled darker pigmented discoloration to BLE.  Blanches with touch.  No pain elicited.  No sensation changes.  Extremities warm, pulses WNL.  Cap refill brisk   Psychiatric: Her speech is normal and behavior is normal. Judgment and thought content normal. She is not actively hallucinating. Cognition and memory are normal. She exhibits a depressed mood. She is attentive.   Vitals reviewed.      Assessment/Plan     Problem List Items Addressed This Visit        Digestive    Class 2 obesity due to excess calories without serious comorbidity with body mass index (BMI) of 37.0 to 37.9 in adult - Primary    Current Assessment & Plan     Discussed phendimetrazine.  Patient to consider.  Dietary counseling provided:  Healthy food choices including fruits and vegetables, limit soda and junk foods, adequate water intake.           Gastroesophageal reflux disease without esophagitis    Current Assessment & Plan     Continue AcipHex 20 mg daily.  Advised to avoid known GI triggers such as spicy foods.  Upright 30 minutes after meals and avoid eating large meals.  Several small meals daily as able to avoid overfilling stomach.            Other    Depression with anxiety    Relevant Medications    clonazePAM (KlonoPIN) 0.5 MG tablet    PARoxetine (PAXIL) 10 MG tablet          Patient's Body mass index is 36.64 kg/m². BMI is above normal parameters. Recommendations include: nutrition counseling.  Understands disease processes and need for medications.  Understands reasons for urgent and emergent care.   Patient (& family) verbalized agreement for treatment plan.   Emotional support and active listening provided.  Patient provided time to verbalize feelings.    PURVI reviewed today and consistent.  Will refill prescribed controlled medication today.  Patient is aware they cannot receive narcotics from any other provider except if under care of pain management or speciality clinic.  Risk and benefits of medication use has been reviewed.  History and physical exam exhibit continued safe and appropriate use of controlled substances.  The patient is aware of the potential for addiction and dependence.  This patient has been made aware of the appropriate use of such medications, including potential risk of somnolence, limited ability to drive and / or work safely, and potential for overdose.    It has also been made clear that these medications are for use by this patient only, without concomitant use of alcohol or other substances unless prescribed/advised by medical provider.  Patient understands they may be subject to UDS and pill counts at random.      Patient considered to be low risk for addiction due to use of single controlled medications.  Patient understands and accepts these risks.  Patient need for medication will be reassessed at each visit.  Doses will be adjusted according to patient need and findings.    Goal of TX: Patient will not have any adverse reactions of medication.  Patient will have reduction in anxiety with use of PRN klonopin.      Keep upcoming appt with LANA Marino  RTC 1 month, sooner if needed.           This document has been electronically signed by:  LANA Quinn, FNP-C    Dragon disclaimer:  Much of this encounter note is an electronic transcription/translation of spoken language to printed text. The electronic translation of spoken language may permit erroneous, or at times, nonsensical words or phrases to be inadvertently transcribed; Although I have reviewed the note for  such errors, some may still exist.

## 2020-01-22 NOTE — ASSESSMENT & PLAN NOTE
Continue AcipHex 20 mg daily.  Advised to avoid known GI triggers such as spicy foods.  Upright 30 minutes after meals and avoid eating large meals.  Several small meals daily as able to avoid overfilling stomach.

## 2020-01-22 NOTE — ASSESSMENT & PLAN NOTE
Discussed phendimetrazine.  Patient to consider.  Dietary counseling provided:  Healthy food choices including fruits and vegetables, limit soda and junk foods, adequate water intake.

## 2020-01-24 ENCOUNTER — OFFICE VISIT (OUTPATIENT)
Dept: FAMILY MEDICINE CLINIC | Facility: CLINIC | Age: 35
End: 2020-01-24

## 2020-01-24 DIAGNOSIS — J30.9 ALLERGIC RHINITIS, UNSPECIFIED SEASONALITY, UNSPECIFIED TRIGGER: ICD-10-CM

## 2020-01-24 DIAGNOSIS — R05.9 COUGH: ICD-10-CM

## 2020-01-24 DIAGNOSIS — J40 BRONCHITIS: Primary | ICD-10-CM

## 2020-01-24 PROCEDURE — 96372 THER/PROPH/DIAG INJ SC/IM: CPT | Performed by: NURSE PRACTITIONER

## 2020-01-24 PROCEDURE — 99213 OFFICE O/P EST LOW 20 MIN: CPT | Performed by: NURSE PRACTITIONER

## 2020-01-24 RX ORDER — BENZONATATE 200 MG/1
200 CAPSULE ORAL 3 TIMES DAILY PRN
Qty: 20 CAPSULE | Refills: 0 | Status: SHIPPED | OUTPATIENT
Start: 2020-01-24 | End: 2020-06-08

## 2020-01-24 RX ORDER — METHYLPREDNISOLONE ACETATE 80 MG/ML
80 INJECTION, SUSPENSION INTRA-ARTICULAR; INTRALESIONAL; INTRAMUSCULAR; SOFT TISSUE ONCE
Status: COMPLETED | OUTPATIENT
Start: 2020-01-24 | End: 2020-01-24

## 2020-01-24 RX ORDER — METHYLPREDNISOLONE 4 MG/1
TABLET ORAL
Qty: 21 TABLET | Refills: 0 | Status: SHIPPED | OUTPATIENT
Start: 2020-01-24 | End: 2020-06-08

## 2020-01-24 RX ORDER — FLUTICASONE PROPIONATE 50 MCG
1 SPRAY, SUSPENSION (ML) NASAL 2 TIMES DAILY PRN
Qty: 1 BOTTLE | Refills: 1 | Status: SHIPPED | OUTPATIENT
Start: 2020-01-24 | End: 2020-09-10

## 2020-01-24 RX ORDER — DEXTROMETHORPHAN HYDROBROMIDE AND PROMETHAZINE HYDROCHLORIDE 15; 6.25 MG/5ML; MG/5ML
5 SYRUP ORAL NIGHTLY PRN
Qty: 150 ML | Refills: 0 | Status: SHIPPED | OUTPATIENT
Start: 2020-01-24 | End: 2020-06-08

## 2020-01-24 RX ADMIN — METHYLPREDNISOLONE ACETATE 80 MG: 80 INJECTION, SUSPENSION INTRA-ARTICULAR; INTRALESIONAL; INTRAMUSCULAR; SOFT TISSUE at 13:27

## 2020-01-24 NOTE — PROGRESS NOTES
"  Jarad Barragan is a 34 y.o. female who presents to the clinic today c/o upper respiratory symptoms which started three to four days ago. Associated symptoms include a dry, nonproductive cough and \"sore chest\". She has tried no medications or home remedies. She does have GERD but does not feel it is related to this.     URI    This is a new problem. The current episode started in the past 7 days. The problem has been waxing and waning. There has been no fever. Associated symptoms include congestion and coughing. Pertinent negatives include no chest pain, ear pain, headaches, nausea, plugged ear sensation, rash, rhinorrhea, sinus pain, sneezing, sore throat, vomiting or wheezing. She has tried nothing for the symptoms.      Refer to ROS for additional information.    The following portions of the patient's history were reviewed and updated as appropriate: allergies, current medications, past family history, past medical history, past social history, past surgical history and problem list.    Current Outpatient Medications:   •  clonazePAM (KlonoPIN) 0.5 MG tablet, Take 0.5-1 tablets by mouth Daily As Needed for Anxiety., Disp: 10 tablet, Rfl: 0  •  fluticasone (FLONASE) 50 MCG/ACT nasal spray, 1 spray into the nostril(s) as directed by provider 2 (Two) Times a Day As Needed for Rhinitis. Administer 2 sprays both nostrils once daily, Disp: 1 bottle, Rfl: 1  •  levothyroxine (SYNTHROID) 112 MCG tablet, Take 1 tablet by mouth Daily., Disp: 30 tablet, Rfl: 5  •  nystatin (MYCOSTATIN) 256450 UNIT/ML suspension, Swish and swallow 5 mL 4 (Four) Times a Day., Disp: 120 mL, Rfl: 0  •  PARoxetine (PAXIL) 10 MG tablet, Take 1.5 tablets by mouth Every Morning., Disp: 45 tablet, Rfl: 0  •  promethazine (PHENERGAN) 25 MG tablet, Take 1 tablet by mouth Every 6 (Six) Hours As Needed for Nausea or Vomiting., Disp: 60 tablet, Rfl: 1  •  RABEprazole (ACIPHEX) 20 MG EC tablet, Take 1 tablet by mouth Daily., Disp: 30 tablet, Rfl: 5  •  " "vitamin D (ERGOCALCIFEROL) 1.25 MG (60773 UT) capsule capsule, Take 1 capsule by mouth 1 (One) Time Per Week., Disp: 4 capsule, Rfl: 5  •  benzonatate (TESSALON) 200 MG capsule, Take 1 capsule by mouth 3 (Three) Times a Day As Needed for Cough., Disp: 20 capsule, Rfl: 0  •  methylPREDNISolone (MEDROL, ROBIN,) 4 MG tablet, Take as directed on package instructions., Disp: 21 tablet, Rfl: 0  •  promethazine-dextromethorphan (PROMETHAZINE-DM) 6.25-15 MG/5ML syrup, Take 5 mL by mouth At Night As Needed for Cough., Disp: 150 mL, Rfl: 0  No current facility-administered medications for this visit.     Allergies   Allergen Reactions   • Tape Unknown (See Comments)     Blisters on skin   • Codeine Rash     Review of Systems   Constitutional: Positive for activity change and fatigue. Negative for appetite change and fever.   HENT: Positive for congestion and postnasal drip. Negative for ear pain, rhinorrhea, sinus pressure, sinus pain, sneezing and sore throat.    Eyes: Negative for discharge and redness.   Respiratory: Positive for cough and chest tightness. Negative for shortness of breath and wheezing.    Cardiovascular: Negative for chest pain, palpitations and leg swelling.   Gastrointestinal: Negative for nausea and vomiting.   Musculoskeletal: Negative for myalgias.   Skin: Negative for color change and rash.   Neurological: Negative for headaches.   Hematological: Negative for adenopathy.   All other systems reviewed and are negative.    Visit Vitals  /68   Pulse 80   Temp 98 °F (36.7 °C) (Temporal)   Resp 14   Ht 167.6 cm (66\")   Wt 103 kg (227 lb 6.4 oz)   LMP 01/14/2020   SpO2 98%   BMI 36.70 kg/m²     Physical Exam   Constitutional: She is oriented to person, place, and time. She appears well-developed and well-nourished. No distress.   HENT:   Head: Normocephalic.   Right Ear: Tympanic membrane and ear canal normal.   Left Ear: Tympanic membrane and ear canal normal.   Nose: Mucosal edema and rhinorrhea " present. Right sinus exhibits no maxillary sinus tenderness and no frontal sinus tenderness. Left sinus exhibits no maxillary sinus tenderness and no frontal sinus tenderness.   Mouth/Throat: Mucous membranes are normal. Oropharyngeal exudate (PND) and posterior oropharyngeal erythema present.   Eyes: Pupils are equal, round, and reactive to light. Conjunctivae are normal. Right eye exhibits no discharge. Left eye exhibits no discharge.   Neck: Neck supple.   Cardiovascular: Normal rate, regular rhythm and normal heart sounds. Exam reveals no friction rub.   No murmur heard.  Pulmonary/Chest: Effort normal. No respiratory distress. She has decreased breath sounds. She has no wheezes. She has no rhonchi. She has no rales.   Abdominal: Soft. There is no tenderness. There is no guarding.   Musculoskeletal: She exhibits no edema.   Lymphadenopathy:     She has no cervical adenopathy.   Neurological: She is alert and oriented to person, place, and time.   Skin: Skin is warm and dry. Capillary refill takes less than 2 seconds. No rash noted. No erythema.   Psychiatric: She has a normal mood and affect. Her speech is normal and behavior is normal. Judgment and thought content normal. Cognition and memory are normal.   Nursing note and vitals reviewed.    Assessment/Plan   Diagnoses and all orders for this visit:    Bronchitis  Comments:  Findings and recommendations discussed with Jarad. Treatment options reviewed.  Orders:  -     methylPREDNISolone acetate (DEPO-medrol) injection 80 mg  -     methylPREDNISolone (MEDROL, ROBIN,) 4 MG tablet; Take as directed on package instructions.    Cough  Comments:  Counseled regarding supportive care measures.  Orders:  -     benzonatate (TESSALON) 200 MG capsule; Take 1 capsule by mouth 3 (Three) Times a Day As Needed for Cough.  -     promethazine-dextromethorphan (PROMETHAZINE-DM) 6.25-15 MG/5ML syrup; Take 5 mL by mouth At Night As Needed for Cough.    Allergic rhinitis, unspecified  seasonality, unspecified trigger  -     fluticasone (FLONASE) 50 MCG/ACT nasal spray; 1 spray into the nostril(s) as directed by provider 2 (Two) Times a Day As Needed for Rhinitis. Administer 2 sprays both nostrils once daily    Findings and recommendations discussed with Jarad. Treatment options reviewed. Counseled regarding supportive care measures. Encouraged her to seek further medical evaluation if symptoms worsen or do not improve within 48-72 hours.     This document has been electronically signed by LANA Decker, IDANIA-BC, ELPIDIO  January 25, 2020 8:57 AM

## 2020-01-24 NOTE — PATIENT INSTRUCTIONS
Cough, Adult    A cough helps to clear your throat and lungs. A cough may last only 2-3 weeks (acute), or it may last longer than 8 weeks (chronic). Many different things can cause a cough. A cough may be a sign of an illness or another medical condition.  Follow these instructions at home:  · Pay attention to any changes in your cough.  · Take medicines only as told by your doctor.  ? If you were prescribed an antibiotic medicine, take it as told by your doctor. Do not stop taking it even if you start to feel better.  ? Talk with your doctor before you try using a cough medicine.  · Drink enough fluid to keep your pee (urine) clear or pale yellow.  · If the air is dry, use a cold steam vaporizer or humidifier in your home.  · Stay away from things that make you cough at work or at home.  · If your cough is worse at night, try using extra pillows to raise your head up higher while you sleep.  · Do not smoke, and try not to be around smoke. If you need help quitting, ask your doctor.  · Do not have caffeine.  · Do not drink alcohol.  · Rest as needed.  Contact a doctor if:  · You have new problems (symptoms).  · You cough up yellow fluid (pus).  · Your cough does not get better after 2-3 weeks, or your cough gets worse.  · Medicine does not help your cough and you are not sleeping well.  · You have pain that gets worse or pain that is not helped with medicine.  · You have a fever.  · You are losing weight and you do not know why.  · You have night sweats.  Get help right away if:  · You cough up blood.  · You have trouble breathing.  · Your heartbeat is very fast.  This information is not intended to replace advice given to you by your health care provider. Make sure you discuss any questions you have with your health care provider.  Document Released: 08/30/2012 Document Revised: 05/25/2017 Document Reviewed: 02/24/2016  [employer] Interactive Patient Education © 2019 [employer]

## 2020-01-25 VITALS
SYSTOLIC BLOOD PRESSURE: 120 MMHG | TEMPERATURE: 98 F | HEART RATE: 80 BPM | RESPIRATION RATE: 14 BRPM | BODY MASS INDEX: 36.55 KG/M2 | WEIGHT: 227.4 LBS | DIASTOLIC BLOOD PRESSURE: 68 MMHG | HEIGHT: 66 IN | OXYGEN SATURATION: 98 %

## 2020-02-04 ENCOUNTER — OFFICE VISIT (OUTPATIENT)
Dept: PSYCHIATRY | Facility: CLINIC | Age: 35
End: 2020-02-04

## 2020-02-04 VITALS
HEIGHT: 66 IN | DIASTOLIC BLOOD PRESSURE: 80 MMHG | HEART RATE: 79 BPM | SYSTOLIC BLOOD PRESSURE: 122 MMHG | WEIGHT: 225.8 LBS | BODY MASS INDEX: 36.29 KG/M2

## 2020-02-04 DIAGNOSIS — F41.0 PANIC DISORDER WITHOUT AGORAPHOBIA: Primary | ICD-10-CM

## 2020-02-04 DIAGNOSIS — F41.8 DEPRESSION WITH ANXIETY: ICD-10-CM

## 2020-02-04 PROCEDURE — 90792 PSYCH DIAG EVAL W/MED SRVCS: CPT | Performed by: NURSE PRACTITIONER

## 2020-02-04 RX ORDER — TRAZODONE HYDROCHLORIDE 50 MG/1
25 TABLET ORAL NIGHTLY
Qty: 15 TABLET | Refills: 0 | Status: SHIPPED | OUTPATIENT
Start: 2020-02-04 | End: 2020-05-04 | Stop reason: SDDI

## 2020-02-04 RX ORDER — PAROXETINE 10 MG/1
15 TABLET, FILM COATED ORAL EVERY MORNING
Qty: 45 TABLET | Refills: 0 | Status: SHIPPED | OUTPATIENT
Start: 2020-02-04 | End: 2020-03-04 | Stop reason: SDUPTHER

## 2020-02-04 RX ORDER — CLONAZEPAM 0.5 MG/1
.25-.5 TABLET ORAL DAILY PRN
Qty: 10 TABLET | Refills: 0
Start: 2020-02-04 | End: 2020-02-18 | Stop reason: SDUPTHER

## 2020-02-04 NOTE — PROGRESS NOTES
Subjective   Jarad Barragan is a 34 y.o. female who is here today for initial appointment to evaluate for medication options.     Chief Complaint: Panic attacks and anxiety    HPI:  History of Present Illness  Patient presents today for an initial evaluation for medication management for panic attacks and anxiety.  Patient reports this all started about 5 years ago.  Patient states it started bad then eased up then 6-7 months ago got severe.  Patient reports she has a lot of stress and worrying. Patient reports irritability and on edge.  Patient states she is currently having panic attacks almost every day and depends on stress at the time.  Patient states during the panic attack her heart is racing, feel like going to pass out, sweating, and tense. Patient states after attack she feels drained.  Patient states the panic attacks last approximately 20 to 30 minutes however she feels like she is on edge the rest the day.  Patient states her PCP prescribed clonazepam and it does help calm down sometimes unless it is severe.  Patient states currently her anxiety is at a 10 on a 0-to-10 scale with 10 being the worst.  Patient reports with the anxiety constanly thinking of things and worrying about what ifs. Patient denies any OCD s/s.  Patient reports she does have to make sure everything is turned off when leave home.  Patient states she will unplug TV and stove. Patient states if she can't remember if she something is off will go home and check. Patient denies any social anxiety.  Patient reports she feels like her depression gets fed by anxiety.  Patient states currently her depression is at a 4 or 5 on a 0-to-10 scale with 10 being the worst.  Patient reports symptoms of depression of low mood, low energy, insomnia, and decreased appetite.  Patient denies any mood swings, mita, or hypomania. Patient reports struggle going to sleep with mind racing.  Patient states she gets 5-6 hrs of sleep on a good night.  Patient  states on the days that she has had a panic attack she usually only gets about 3 hours asleep at night.  Patient denies any nightmares.  Patient reports her biggest problem is falling asleep and has woken up in panic attack before. Patient states appetite is decreased if anxiety and panic is high.  Patient states eating only 1 meal if high anxiety but 2-3 meals a day.  Patient denies any nausea or vomiting however states she is just unable to eat. Patient denies any trouble with focus or concentrate. Patient denies any flashbacks. Patient denies any auditory or visual hallucinations.  Patient adamantly denies any SI or HI.  Patient states she is currently on Paxil 10mg daily that was just recently started as well as clonazepam 0.5 every other day as needed.  Patient states the Paxil is the only medication she has started that she has not had side effects to.    Past Psych History:    Denies any inpatient treatment. Denies any suicide attempts or self harm. Denies any psychiatrist or therapy/counseling. Denies any abuse or trauma.     Previous Psych Meds:    Patient reports she has tried Trintellix, Effexor, Pristiq, Prozac, and Cymbalta.  Patient states each 1 of these she had side effects to them was unable to take.    Substance Abuse:    Denies any tobacco use, alcohol, or illicit drug use. Patient states she quit smoking three years ago.     Social History:    Patient reports she grew up with mom and sister.  Patient states her mom and dad were  and she had little contact with her dad however has close relationship with mom and sister.  Patient states she was able to graduate high school.  Patient states she has been  for 5 years has good relationship with .  Patient denies any children and states she currently works at [employer].      Family Psychiatric History:  family history includes Anxiety disorder in her maternal aunt, mother, and sister.    Medical/Surgical  History:  Past Medical History:   Diagnosis Date   • Broken ankle    • Hypothyroidism      Past Surgical History:   Procedure Laterality Date   • BACK SURGERY         Allergies   Allergen Reactions   • Tape Unknown (See Comments)     Blisters on skin   • Codeine Rash           Current Medications:   Current Outpatient Medications   Medication Sig Dispense Refill   • benzonatate (TESSALON) 200 MG capsule Take 1 capsule by mouth 3 (Three) Times a Day As Needed for Cough. 20 capsule 0   • clonazePAM (KlonoPIN) 0.5 MG tablet Take 0.5-1 tablets by mouth Daily As Needed for Anxiety. 10 tablet 0   • fluticasone (FLONASE) 50 MCG/ACT nasal spray 1 spray into the nostril(s) as directed by provider 2 (Two) Times a Day As Needed for Rhinitis. Administer 2 sprays both nostrils once daily 1 bottle 1   • levothyroxine (SYNTHROID) 112 MCG tablet Take 1 tablet by mouth Daily. 30 tablet 5   • methylPREDNISolone (MEDROL, ROBIN,) 4 MG tablet Take as directed on package instructions. 21 tablet 0   • nystatin (MYCOSTATIN) 898037 UNIT/ML suspension Swish and swallow 5 mL 4 (Four) Times a Day. 120 mL 0   • PARoxetine (PAXIL) 10 MG tablet Take 1.5 tablets by mouth Every Morning. 45 tablet 0   • promethazine (PHENERGAN) 25 MG tablet Take 1 tablet by mouth Every 6 (Six) Hours As Needed for Nausea or Vomiting. 60 tablet 1   • promethazine-dextromethorphan (PROMETHAZINE-DM) 6.25-15 MG/5ML syrup Take 5 mL by mouth At Night As Needed for Cough. 150 mL 0   • RABEprazole (ACIPHEX) 20 MG EC tablet Take 1 tablet by mouth Daily. 30 tablet 5   • traZODone (DESYREL) 50 MG tablet Take 0.5 tablets by mouth Every Night. 15 tablet 0   • vitamin D (ERGOCALCIFEROL) 1.25 MG (58123 UT) capsule capsule Take 1 capsule by mouth 1 (One) Time Per Week. 4 capsule 5     No current facility-administered medications for this visit.          Review of Systems   Constitutional: Positive for appetite change.   Respiratory: Negative.    Cardiovascular: Negative.   "  Gastrointestinal: Negative.    Neurological: Negative.    Psychiatric/Behavioral: Positive for dysphoric mood and sleep disturbance. The patient is nervous/anxious.     denies HEENT, cardiovascular, respiratory, liver, renal, GI/, endocrine, neuro, DERM, hematology, immunology, musculoskeletal disorders.    Objective   Physical Exam   Constitutional: Vital signs are normal. She appears well-developed and well-nourished. She is cooperative.   Neurological: She is alert.   Psychiatric: Her speech is normal and behavior is normal. Judgment and thought content normal. Her mood appears anxious. Cognition and memory are normal.   Vitals reviewed.    Blood pressure 122/80, pulse 79, height 167.6 cm (66\"), weight 102 kg (225 lb 12.8 oz), last menstrual period 01/14/2020. Body mass index is 36.45 kg/m².      Mental Status Exam:   Hygiene:   good  Cooperation:  Cooperative  Eye Contact:  Good  Psychomotor Behavior:  Appropriate  Affect:  Appropriate  Hopelessness: Denies  Speech:  Normal  Thought Process:  Goal directed and Linear  Thought Content:  Normal  Suicidal:  None  Homicidal:  None  Hallucinations:  None  Delusion:  None  Memory:  Intact  Orientation:  Person, Place, Time and Situation  Reliability:  fair  Insight:  Fair  Judgement:  Fair  Impulse Control:  Fair  Physical/Medical Issues:  No       Short-term goals: Patient will be compliant with clinic appointments.  Patient will be engaged in therapy, medication compliant with minimal side effects. Patient  will report decrease of symptoms and frequency.    Long-term goals: Patient will have minimal symptoms of panic attacks, anxiety and depression with continued medication management. Patient will be compliant with treatment and appointments.       Problem list: panic attacks  Strengths: honest, respectful, humble  Weaknesses: shy, backwards, anxious, panic              Assessment/Plan   Diagnoses and all orders for this visit:    Panic disorder without " agoraphobia  -     clonazePAM (KlonoPIN) 0.5 MG tablet; Take 0.5-1 tablets by mouth Daily As Needed for Anxiety.  -     PARoxetine (PAXIL) 10 MG tablet; Take 1.5 tablets by mouth Every Morning.    Depression with anxiety  -     clonazePAM (KlonoPIN) 0.5 MG tablet; Take 0.5-1 tablets by mouth Daily As Needed for Anxiety.  -     PARoxetine (PAXIL) 10 MG tablet; Take 1.5 tablets by mouth Every Morning.  -     traZODone (DESYREL) 50 MG tablet; Take 0.5 tablets by mouth Every Night.             Discussed medication options with patient. Start Trazodone 25mg at bedtime for depression and insomnia. Cont. Paxil 10mg daily for depression and anxiety and Clonazepam 0.5mg daily as needed for anxiety. Discussed the risks, benefits, and side effects of the medication; client acknowledged and verbally consented. Patient is being prescribed a controlled substance as part of treatment plan. Patient has been educated of appropriate use of the medications, including risk of somnolence, limited ability to drive and/or work safely, and potential for dependence, respiratory depression and overdose. Patient is also informed that the medication are to be used by the patient only- avoid any combined use of ETOH or other substances unless prescribed. [facility] report reviewed #[id number].    Patient is aware to contact the office with any worsening of symptoms, questions, or concerns.  Patient is agreeable to go to the ER or call 911 should they begin SI/HI.      Follow up in four weeks      Errors in dictation may reflect use of voice recognition software and not all errors in transcription may have been detected prior to signing.         This document has been electronically signed by ALNA Siu   February 10, 2020 8:57 AM

## 2020-02-10 NOTE — TREATMENT PLAN
Multi-Disciplinary Problems (from Behavioral Health Treatment Plan)    Active Problems     Problem: Assessment/EAP  Start Date: 02/10/20    Problem Details:  The patient self-scales this problem as a 2 with 10 being the worst.       Goal Priority Start Date Expected End Date End Date    Patient will utilize appropriate treatment services. -- 02/10/20 -- --    Goal Details:  Progress toward goal:  Not appropriate to rate progress toward goal since this is the initial treatment plan.         Goal Intervention Frequency Start Date End Date    Assist patient in developing a plan of action Weekly 02/10/20 --    Intervention Details:  Duration of treatment until until remission of symptoms.             Problem: Anxiety  Start Date: 02/10/20    Problem Details:  The patient self-scales this problem as a 10 with 10 being the worst.       Goal Priority Start Date Expected End Date End Date    Patient will develop and implement behavioral and cognitive strategies to reduce anxiety and irrational fears. -- 02/10/20 -- --    Goal Details:  Progress toward goal:  Not appropriate to rate progress toward goal since this is the initial treatment plan.       Goal Intervention Frequency Start Date End Date    Help patient explore past emotional issues in relation to present anxiety. Weekly 02/10/20 --    Intervention Details:  Duration of treatment until until remission of symptoms.       Goal Intervention Frequency Start Date End Date    Help patient develop an awareness of their cognitive and physical responses to anxiety. Weekly 02/10/20 --    Intervention Details:  Duration of treatment until until remission of symptoms.             Problem: Depression  Start Date: 02/10/20    Problem Details:  The patient self-scales this problem as a 4 with 10 being the worst.       Goal Priority Start Date Expected End Date End Date    Patient will demonstrate the ability to initiate new constructive life skills outside of sessions on a  consistent basis. -- 02/10/20 -- --    Goal Details:  Progress toward goal:  Not appropriate to rate progress toward goal since this is the initial treatment plan.       Goal Intervention Frequency Start Date End Date    Assist patient in setting attainable activities of daily living goals. PRN 02/10/20 --    Goal Intervention Frequency Start Date End Date    Provide education about depression Weekly 02/10/20 --    Intervention Details:  Duration of treatment until until remission of symptoms.       Goal Intervention Frequency Start Date End Date    Assist patient in developing healthy coping strategies. Weekly 02/10/20 --    Intervention Details:  Duration of treatment until until remission of symptoms.                          I have discussed and reviewed this treatment plan with the patient.

## 2020-02-18 DIAGNOSIS — F41.0 PANIC DISORDER WITHOUT AGORAPHOBIA: ICD-10-CM

## 2020-02-18 DIAGNOSIS — F41.8 DEPRESSION WITH ANXIETY: ICD-10-CM

## 2020-02-18 RX ORDER — CLONAZEPAM 0.5 MG/1
.25-.5 TABLET ORAL DAILY PRN
Qty: 10 TABLET | Refills: 0 | Status: SHIPPED | OUTPATIENT
Start: 2020-02-18 | End: 2020-03-04 | Stop reason: SDUPTHER

## 2020-02-19 ENCOUNTER — TELEPHONE (OUTPATIENT)
Dept: FAMILY MEDICINE CLINIC | Facility: CLINIC | Age: 35
End: 2020-02-19

## 2020-02-19 NOTE — TELEPHONE ENCOUNTER
S/w Jarad erazo     ----- Message from LANA Siu sent at 2/18/2020  4:20 PM EST -----  Regarding: RE: refill/medication concern   I sent her refill in, she can decrease the paxil back to 10mg   ----- Message -----  From: Kamini Mcallister RegSched Rep  Sent: 2/18/2020  11:02 AM EST  To: Rose Espinoza, Betsy Benitez MA, #  Subject: refill/medication concern                        [place]   1.) Needs refill on klonopin.    2.) She is concerned that the Paxil is effecting her vision

## 2020-02-19 NOTE — TELEPHONE ENCOUNTER
Pt is aware of this information.       ----- Message from LANA Siu sent at 2/18/2020  4:20 PM EST -----  Regarding: RE: refill/medication concern   I sent her refill in, she can decrease the paxil back to 10mg   ----- Message -----  From: Kamini Mcallister RegSched Rep  Sent: 2/18/2020  11:02 AM EST  To: Rose Espinoza, Betsy Benitez MA, #  Subject: refill/medication concern                        [place]   1.) Needs refill on klonopin.    2.) She is concerned that the Paxil is effecting her vision

## 2020-03-04 ENCOUNTER — OFFICE VISIT (OUTPATIENT)
Dept: PSYCHIATRY | Facility: CLINIC | Age: 35
End: 2020-03-04

## 2020-03-04 VITALS
HEART RATE: 79 BPM | BODY MASS INDEX: 36.32 KG/M2 | SYSTOLIC BLOOD PRESSURE: 138 MMHG | WEIGHT: 225 LBS | DIASTOLIC BLOOD PRESSURE: 70 MMHG

## 2020-03-04 DIAGNOSIS — F41.8 DEPRESSION WITH ANXIETY: ICD-10-CM

## 2020-03-04 DIAGNOSIS — F41.0 PANIC DISORDER WITHOUT AGORAPHOBIA: ICD-10-CM

## 2020-03-04 PROCEDURE — 99213 OFFICE O/P EST LOW 20 MIN: CPT | Performed by: NURSE PRACTITIONER

## 2020-03-04 RX ORDER — CLONAZEPAM 0.5 MG/1
.25-.5 TABLET ORAL DAILY PRN
Qty: 20 TABLET | Refills: 0 | Status: SHIPPED | OUTPATIENT
Start: 2020-03-04 | End: 2020-04-06 | Stop reason: SDUPTHER

## 2020-03-04 RX ORDER — PAROXETINE 10 MG/1
5 TABLET, FILM COATED ORAL EVERY MORNING
Qty: 15 TABLET | Refills: 0
Start: 2020-03-04 | End: 2020-04-06 | Stop reason: SDUPTHER

## 2020-03-04 NOTE — PROGRESS NOTES
Subjective   Jarad Barragan is a 34 y.o. female is here today for medication management follow-up.    Chief Complaint:  Panic attacks and anxiety     History of Present Illness:    Patient presents today for follow up for medication management for panic attacks and anxiety. Patient states she cut down to 5mg on the paxil due to the problems with eyes. Patient states the fogginess and glare starts in the middle then goes to the side of her vision. Patient states only lasts a few mins then its gone. Patient states it is happening more than once a day sometimes but not every day. Patient reports currently depression is at a 0 on a 0-10 scale with 10 being the worst. Patient reports anxiety is at a 8-9 on a 0-10 scale with 10 being the worst. Patient reports having daily panic attacks. Patient states some are worse than others. Patient reports panic attacks last 30 minutes and during an attacks patient has shortness of breath, sweating, tense, feel like going to pass out, and her heart racing. Patient states if panic attack is bad then will last for more than 30mins. Patient reports sleeping is about the same and patient denies any nightmares. Patient reports appetite is good and eating at least 2-3 meals a day. Patient denies any auditory or visual hallucinations. Patient adamantly denies any SI or HI. Patient denies any side effects to medications. Patient denies any medical changes since last visit.  Patient states she didn't try to pristiq due to insurance did not cover.   The following portions of the patient's history were reviewed and updated as appropriate: allergies, current medications, past family history, past medical history, past social history, past surgical history and problem list.    Review of Systems   Constitutional: Negative.    Respiratory: Negative.    Cardiovascular: Negative.    Gastrointestinal: Negative.    Neurological: Negative.    Psychiatric/Behavioral: Positive for sleep disturbance.  The patient is nervous/anxious.        Objective   Physical Exam   Constitutional: Vital signs are normal. She appears well-developed and well-nourished. She is cooperative.   Neurological: She is alert.   Psychiatric: Her speech is normal and behavior is normal. Judgment and thought content normal. Her mood appears anxious. Cognition and memory are normal.   Vitals reviewed.    Blood pressure 138/70, pulse 79, weight 102 kg (225 lb). Body mass index is 36.32 kg/m².    Allergies   Allergen Reactions   • Tape Unknown (See Comments)     Blisters on skin   • Codeine Rash       Medication List:   Current Outpatient Medications   Medication Sig Dispense Refill   • benzonatate (TESSALON) 200 MG capsule Take 1 capsule by mouth 3 (Three) Times a Day As Needed for Cough. 20 capsule 0   • clonazePAM (KlonoPIN) 0.5 MG tablet Take 0.5-1 tablets by mouth Daily As Needed for Anxiety. 10 tablet 0   • fluticasone (FLONASE) 50 MCG/ACT nasal spray 1 spray into the nostril(s) as directed by provider 2 (Two) Times a Day As Needed for Rhinitis. Administer 2 sprays both nostrils once daily 1 bottle 1   • levothyroxine (SYNTHROID) 112 MCG tablet Take 1 tablet by mouth Daily. 30 tablet 5   • methylPREDNISolone (MEDROL, ROBIN,) 4 MG tablet Take as directed on package instructions. 21 tablet 0   • nystatin (MYCOSTATIN) 830625 UNIT/ML suspension Swish and swallow 5 mL 4 (Four) Times a Day. 120 mL 0   • PARoxetine (PAXIL) 10 MG tablet Take 1.5 tablets by mouth Every Morning. 45 tablet 0   • promethazine (PHENERGAN) 25 MG tablet Take 1 tablet by mouth Every 6 (Six) Hours As Needed for Nausea or Vomiting. 60 tablet 1   • promethazine-dextromethorphan (PROMETHAZINE-DM) 6.25-15 MG/5ML syrup Take 5 mL by mouth At Night As Needed for Cough. 150 mL 0   • RABEprazole (ACIPHEX) 20 MG EC tablet Take 1 tablet by mouth Daily. 30 tablet 5   • traZODone (DESYREL) 50 MG tablet Take 0.5 tablets by mouth Every Night. 15 tablet 0   • vitamin D (ERGOCALCIFEROL)  1.25 MG (46843 UT) capsule capsule Take 1 capsule by mouth 1 (One) Time Per Week. 4 capsule 5     No current facility-administered medications for this visit.        Mental Status Exam:   Hygiene:   good  Cooperation:  Cooperative  Eye Contact:  Good  Psychomotor Behavior:  Appropriate  Affect:  Appropriate  Hopelessness: Denies  Speech:  Normal  Thought Process:  Goal directed and Linear  Thought Content:  Normal  Suicidal:  None  Homicidal:  None  Hallucinations:  None  Delusion:  None  Memory:  Intact  Orientation:  Person, Place, Time and Situation  Reliability:  fair  Insight:  Fair  Judgement:  Fair  Impulse Control:  Fair  Physical/Medical Issues:  No                 Assessment/Plan   Diagnoses and all orders for this visit:    Depression with anxiety  -     clonazePAM (KlonoPIN) 0.5 MG tablet; Take 0.5-1 tablets by mouth Daily As Needed for Anxiety.  -     PARoxetine (PAXIL) 10 MG tablet; Take 0.5 tablets by mouth Every Morning.    Panic disorder without agoraphobia  -     clonazePAM (KlonoPIN) 0.5 MG tablet; Take 0.5-1 tablets by mouth Daily As Needed for Anxiety.  -     PARoxetine (PAXIL) 10 MG tablet; Take 0.5 tablets by mouth Every Morning.            Discussed medication options with patient. Cont Paxil 5mg daily for anxiety and mood. Cont. Clonazepam 0.5mg daily as needed for anxiety. Discussed with patient after Optometrist appointment to evaluate vision difficulty with adjust medications. Reviewed the risks, benefits, and side effects of the medications; patient acknowledged and verbally consented.  Patient is agreeable to call the office with any questions, concerns, or worsening of symptoms.  Patient is aware to call 911 or go to the nearest ER should begin having SI/HI.          Follow up in three weeks        Errors in dictation may reflect use of voice recognition software and not all errors in transcription may have been detected prior to signing.              This document has been  electronically signed by LANA Siu   March 4, 2020 4:00 PM

## 2020-03-11 RX ORDER — LEVOTHYROXINE SODIUM 112 UG/1
112 TABLET ORAL DAILY
Qty: 30 TABLET | Refills: 5 | Status: SHIPPED | OUTPATIENT
Start: 2020-03-11 | End: 2020-06-08 | Stop reason: SDUPTHER

## 2020-04-06 ENCOUNTER — OFFICE VISIT (OUTPATIENT)
Dept: PSYCHIATRY | Facility: CLINIC | Age: 35
End: 2020-04-06

## 2020-04-06 DIAGNOSIS — F41.8 DEPRESSION WITH ANXIETY: ICD-10-CM

## 2020-04-06 DIAGNOSIS — F41.0 PANIC DISORDER WITHOUT AGORAPHOBIA: ICD-10-CM

## 2020-04-06 PROCEDURE — 99213 OFFICE O/P EST LOW 20 MIN: CPT | Performed by: NURSE PRACTITIONER

## 2020-04-06 RX ORDER — CLONAZEPAM 0.5 MG/1
.25-.5 TABLET ORAL DAILY PRN
Qty: 20 TABLET | Refills: 0 | Status: SHIPPED | OUTPATIENT
Start: 2020-04-06 | End: 2020-05-04 | Stop reason: SDUPTHER

## 2020-04-06 RX ORDER — PAROXETINE 10 MG/1
5 TABLET, FILM COATED ORAL EVERY MORNING
Qty: 15 TABLET | Refills: 0 | Status: SHIPPED | OUTPATIENT
Start: 2020-04-06 | End: 2020-05-04 | Stop reason: SDUPTHER

## 2020-04-06 NOTE — PROGRESS NOTES
You have chosen to receive care through a telephone visit today. Do you consent to use a telephone visit for your medical care today? Yes      Subjective   Jarad Barragan is a 34 y.o. female is here today for medication management follow-up.    This provider is located at [facility] at [address] [place] [address]. The provider identified herself as well as her credentials. The Patient is at/in home by herself/himself, using her/his phone because problems with video connection. The patient's condition being diagnosed/treated is appropriate for telemedicine. The patient gave consent to be seen remotely, and when consent is given they understand that the consent allows for patient identifiable information to be sent to a third party as needed. They may refuse to be seen remotely at any time. The electronic data is encrypted and password protected, and the patient has been advised of the potential risks to privacy not withstanding such measures.      Chief Complaint:  Panic attacks and anxiety     History of Present Illness:    Patient presents today for follow up for medication management for panic attacks and anxiety. Patient states she has not been able to go to the eye doctor. Patient states still taking the 5mg and still having panic attacks. Patient states she woke up this morning out of her sleep with a panic attack around 3am. Patient states she woke up she was panicky and starting to worry. Patient states heart racing when woke up too. Patient reports currently depression is at a 0 on a 0-10 scale with 10 being the worst. Patient denies any mood swings. Patient states irritability during panic attacks.  Patient reports anxiety is at a 10 on a 0-10 scale with 10 being the worst. Patient reports having sporadic and random panic attacks. Patient states she will have one everyday then not have one for a couple days. Patient reports panic attacks last 20-30 minutes and during an attacks patient has  shortness of breath and heart racing. Patient states she feels drained for the rest of the day after attack. Patient reports sleeping at least 6-8 hours a night and patient denies any nightmares. Patient reports appetite is good and eating at least 2-3 meals a day. Patient denies any auditory or visual hallucinations. Patient adamantly denies any SI or HI. Patient denies any side effects to medications. Patient denies any medical changes since last visit. Patient states she has still not started the trazodone and was waiting to see how the eye doctor appointment goes. Patient reports she has another appointment set up next week.  The following portions of the patient's history were reviewed and updated as appropriate: allergies, current medications, past family history, past medical history, past social history, past surgical history and problem list.    Review of Systems   Constitutional: Negative.    Respiratory: Negative.    Cardiovascular: Negative.    Gastrointestinal: Negative.    Neurological: Negative.    Psychiatric/Behavioral: Positive for agitation and sleep disturbance. The patient is nervous/anxious.        Objective   Physical Exam   Constitutional: She is cooperative.   Neurological: She is alert.   Psychiatric: Her speech is normal. Cognition and memory are normal.     There were no vitals taken for this visit. There is no height or weight on file to calculate BMI.  Unable to obtain due to telephone visit.     Allergies   Allergen Reactions   • Tape Unknown (See Comments)     Blisters on skin   • Codeine Rash       Medication List:   Current Outpatient Medications   Medication Sig Dispense Refill   • benzonatate (TESSALON) 200 MG capsule Take 1 capsule by mouth 3 (Three) Times a Day As Needed for Cough. 20 capsule 0   • clonazePAM (KlonoPIN) 0.5 MG tablet Take 0.5-1 tablets by mouth Daily As Needed for Anxiety. 20 tablet 0   • fluticasone (FLONASE) 50 MCG/ACT nasal spray 1 spray into the nostril(s)  as directed by provider 2 (Two) Times a Day As Needed for Rhinitis. Administer 2 sprays both nostrils once daily 1 bottle 1   • levothyroxine (SYNTHROID, LEVOTHROID) 112 MCG tablet TAKE 1 TABLET BY MOUTH DAILY. 30 tablet 5   • methylPREDNISolone (MEDROL, ROBIN,) 4 MG tablet Take as directed on package instructions. 21 tablet 0   • nystatin (MYCOSTATIN) 531107 UNIT/ML suspension Swish and swallow 5 mL 4 (Four) Times a Day. 120 mL 0   • PARoxetine (PAXIL) 10 MG tablet Take 0.5 tablets by mouth Every Morning. 15 tablet 0   • promethazine (PHENERGAN) 25 MG tablet Take 1 tablet by mouth Every 6 (Six) Hours As Needed for Nausea or Vomiting. 60 tablet 1   • promethazine-dextromethorphan (PROMETHAZINE-DM) 6.25-15 MG/5ML syrup Take 5 mL by mouth At Night As Needed for Cough. 150 mL 0   • RABEprazole (ACIPHEX) 20 MG EC tablet Take 1 tablet by mouth Daily. 30 tablet 5   • traZODone (DESYREL) 50 MG tablet Take 0.5 tablets by mouth Every Night. 15 tablet 0   • vitamin D (ERGOCALCIFEROL) 1.25 MG (80760 UT) capsule capsule Take 1 capsule by mouth 1 (One) Time Per Week. 4 capsule 5     No current facility-administered medications for this visit.        Mental Status Exam:   Hygiene:   Unable to evaluate  Cooperation:  Cooperative  Eye Contact:  Unable to evaluate  Psychomotor Behavior:  Unable to evaluate  Affect:  Unable to evaluate  Hopelessness: Denies  Speech:  Normal  Thought Process:  Goal directed and Linear  Thought Content:  Normal  Suicidal:  None  Homicidal:  None  Hallucinations:  None  Delusion:  None  Memory:  Intact  Orientation:  Person, Place, Time and Situation  Reliability:  fair  Insight:  Fair  Judgement:  Fair  Impulse Control:  Fair  Physical/Medical Issues:  No                 Assessment/Plan   Diagnoses and all orders for this visit:    Depression with anxiety  -     clonazePAM (KlonoPIN) 0.5 MG tablet; Take 0.5-1 tablets by mouth Daily As Needed for Anxiety.  -     PARoxetine (Paxil) 10 MG tablet; Take 0.5  tablets by mouth Every Morning.    Panic disorder without agoraphobia  -     clonazePAM (KlonoPIN) 0.5 MG tablet; Take 0.5-1 tablets by mouth Daily As Needed for Anxiety.  -     PARoxetine (Paxil) 10 MG tablet; Take 0.5 tablets by mouth Every Morning.            Discussed medication options with patient.  Continue Paxil 5 mg daily for panic disorder and depression.  Continue clonazepam 0.5 mg half to 1 tablet daily as needed for anxiety.  Reviewed the risks, benefits, and side effects of the medications; patient acknowledged and verbally consented. Patient is being prescribed a controlled substance as part of treatment plan. Patient has been educated of appropriate use of the medications, including risk of somnolence, limited ability to drive and/or work safely, and potential for dependence, respiratory depression and overdose. Patient is also informed that the medication are to be used by the patient only- avoid any combined use of ETOH or other substances unless prescribed.    Discussed with patient as soon as received results from optometry appointment to notify office.  Patient is agreeable to call the office with any questions, concerns, or worsening of symptoms.  Patient is aware to call 911 or go to the nearest ER should begin having SI/HI.          Follow up in four weeks    Errors in dictation may reflect use of voice recognition software and not all errors in transcription may have been detected prior to signing.      This visit has been rescheduled as a phone visit to comply with patient safety concerns in accordance with CDC recommendations. Total time of discussion was 16 minutes.            This document has been electronically signed by LANA Siu   April 6, 2020 14:23

## 2020-04-09 ENCOUNTER — TELEPHONE (OUTPATIENT)
Dept: PSYCHIATRY | Facility: CLINIC | Age: 35
End: 2020-04-09

## 2020-04-09 NOTE — TELEPHONE ENCOUNTER
PATIENT WAS MADE AWARE    ----- Message from LANA Siu sent at 4/9/2020  2:48 PM EDT -----  Let the patient know we will keep medications the same for the time being until we find out more information regarding her vision difficulty.  ----- Message -----  From: Rose Espinoza  Sent: 4/9/2020  10:47 AM EDT  To: LANA Siu    Patient called and wanted to let you know her eye doctor is not seeing anyone till after the 16th as of now but may change. She said you needed to know this concerning her medication

## 2020-04-20 DIAGNOSIS — K21.9 GASTROESOPHAGEAL REFLUX DISEASE WITHOUT ESOPHAGITIS: ICD-10-CM

## 2020-04-20 RX ORDER — RABEPRAZOLE SODIUM 20 MG/1
20 TABLET, DELAYED RELEASE ORAL DAILY
Qty: 30 TABLET | Refills: 5 | Status: SHIPPED | OUTPATIENT
Start: 2020-04-20 | End: 2020-06-08 | Stop reason: SDUPTHER

## 2020-04-20 RX ORDER — PROMETHAZINE HYDROCHLORIDE 25 MG/1
25 TABLET ORAL EVERY 6 HOURS PRN
Qty: 60 TABLET | Refills: 1 | Status: SHIPPED | OUTPATIENT
Start: 2020-04-20 | End: 2021-05-04

## 2020-04-20 RX ORDER — RABEPRAZOLE SODIUM 20 MG/1
20 TABLET, DELAYED RELEASE ORAL DAILY
Qty: 30 TABLET | Refills: 5 | Status: SHIPPED | OUTPATIENT
Start: 2020-04-20 | End: 2020-04-20 | Stop reason: SDUPTHER

## 2020-05-04 ENCOUNTER — OFFICE VISIT (OUTPATIENT)
Dept: PSYCHIATRY | Facility: CLINIC | Age: 35
End: 2020-05-04

## 2020-05-04 VITALS — WEIGHT: 227.2 LBS | BODY MASS INDEX: 36.67 KG/M2 | TEMPERATURE: 98 F | HEART RATE: 94 BPM

## 2020-05-04 DIAGNOSIS — F41.0 PANIC DISORDER WITHOUT AGORAPHOBIA: ICD-10-CM

## 2020-05-04 DIAGNOSIS — F41.8 DEPRESSION WITH ANXIETY: ICD-10-CM

## 2020-05-04 PROCEDURE — 99213 OFFICE O/P EST LOW 20 MIN: CPT | Performed by: NURSE PRACTITIONER

## 2020-05-04 RX ORDER — PAROXETINE 10 MG/1
5 TABLET, FILM COATED ORAL EVERY MORNING
Qty: 15 TABLET | Refills: 0 | Status: SHIPPED | OUTPATIENT
Start: 2020-05-04 | End: 2020-06-02 | Stop reason: SDUPTHER

## 2020-05-04 RX ORDER — CLONAZEPAM 0.5 MG/1
.25-.5 TABLET ORAL DAILY PRN
Qty: 20 TABLET | Refills: 0 | Status: SHIPPED | OUTPATIENT
Start: 2020-05-04 | End: 2020-06-02 | Stop reason: SDUPTHER

## 2020-05-04 NOTE — PROGRESS NOTES
Subjective   Jarad Barragan is a 34 y.o. female is here today for medication management follow-up.    Chief Complaint:  Anxiety panic    History of Present Illness:    Patient presents today for follow up for medication management for panic attacks and anxiety. Patient states she still hasn't been able to go into the eye doctor. Patient states the blurry vision and issues with her eyes is not as bad or as often. Patient states she is laid off for two weeks now. Patient states she has not even got her first unemployment check. Patient reports currently depression is at a 0 on a 0-10 scale with 10 being the worst. Patient reports anxiety is at a 8 on a 0-10 scale with 10 being the worst. Patient states worrying about things. Patient reports her panic attacks have slacked off now. Patient states after getting laid off had a few but its eased up since then. Patient reports panic attacks last 5 minutes and during an attacks patient has shortness of breath and heart racing. Patient reports sleeping good and patient denies any nightmares. Patient reports appetite is about the same and eating at least 2-3 meals a day. Patient denies any auditory or visual hallucinations. Patient adamantly denies any SI or HI. Patient denies any side effects to medications. Patient denies any medical changes since last visit.   The following portions of the patient's history were reviewed and updated as appropriate: allergies, current medications, past family history, past medical history, past social history, past surgical history and problem list.    Review of Systems   Constitutional: Negative.    Respiratory: Negative.    Cardiovascular: Negative.    Gastrointestinal: Negative.    Neurological: Negative.    Psychiatric/Behavioral: The patient is nervous/anxious.        Objective   Physical Exam   Constitutional: Vital signs are normal. She appears well-developed and well-nourished. She is cooperative.   Neurological: She is alert.    Psychiatric: Her speech is normal and behavior is normal. Judgment and thought content normal. Her mood appears anxious. Cognition and memory are normal.   Vitals reviewed.    Pulse 94, temperature 98 °F (36.7 °C), weight 103 kg (227 lb 3.2 oz). Body mass index is 36.67 kg/m².    Allergies   Allergen Reactions   • Tape Unknown (See Comments)     Blisters on skin   • Codeine Rash       Medication List:   Current Outpatient Medications   Medication Sig Dispense Refill   • benzonatate (TESSALON) 200 MG capsule Take 1 capsule by mouth 3 (Three) Times a Day As Needed for Cough. 20 capsule 0   • clonazePAM (KlonoPIN) 0.5 MG tablet Take 0.5-1 tablets by mouth Daily As Needed for Anxiety. 20 tablet 0   • fluticasone (FLONASE) 50 MCG/ACT nasal spray 1 spray into the nostril(s) as directed by provider 2 (Two) Times a Day As Needed for Rhinitis. Administer 2 sprays both nostrils once daily 1 bottle 1   • levothyroxine (SYNTHROID, LEVOTHROID) 112 MCG tablet TAKE 1 TABLET BY MOUTH DAILY. 30 tablet 5   • methylPREDNISolone (MEDROL, ROBIN,) 4 MG tablet Take as directed on package instructions. 21 tablet 0   • nystatin (MYCOSTATIN) 114882 UNIT/ML suspension Swish and swallow 5 mL 4 (Four) Times a Day. 120 mL 0   • PARoxetine (Paxil) 10 MG tablet Take 0.5 tablets by mouth Every Morning. 15 tablet 0   • promethazine (PHENERGAN) 25 MG tablet Take 1 tablet by mouth Every 6 (Six) Hours As Needed for Nausea or Vomiting. 60 tablet 1   • promethazine-dextromethorphan (PROMETHAZINE-DM) 6.25-15 MG/5ML syrup Take 5 mL by mouth At Night As Needed for Cough. 150 mL 0   • RABEprazole (Aciphex) 20 MG EC tablet Take 1 tablet by mouth Daily. 30 tablet 5   • vitamin D (ERGOCALCIFEROL) 1.25 MG (16419 UT) capsule capsule Take 1 capsule by mouth 1 (One) Time Per Week. 4 capsule 5     No current facility-administered medications for this visit.        Mental Status Exam:   Hygiene:   good  Cooperation:  Cooperative  Eye Contact:  Good  Psychomotor  Behavior:  Restless  Affect:  Appropriate  Hopelessness: Denies  Speech:  Normal  Thought Process:  Goal directed and Linear  Thought Content:  Normal  Suicidal:  None  Homicidal:  None  Hallucinations:  None  Delusion:  None  Memory:  Intact  Orientation:  Person, Place, Time and Situation  Reliability:  fair  Insight:  Fair  Judgement:  Fair  Impulse Control:  Fair  Physical/Medical Issues:  No                 Assessment/Plan   Diagnoses and all orders for this visit:    Depression with anxiety  -     clonazePAM (KlonoPIN) 0.5 MG tablet; Take 0.5-1 tablets by mouth Daily As Needed for Anxiety.  -     PARoxetine (Paxil) 10 MG tablet; Take 0.5 tablets by mouth Every Morning.    Panic disorder without agoraphobia  -     clonazePAM (KlonoPIN) 0.5 MG tablet; Take 0.5-1 tablets by mouth Daily As Needed for Anxiety.  -     PARoxetine (Paxil) 10 MG tablet; Take 0.5 tablets by mouth Every Morning.            Discussed medication options with patient.  Continue Paxil 5 mg daily for depression anxiety.  Continue clonazepam 0.5 mg half a tablet to 1 tablet daily as needed for anxiety and panic.  Reviewed the risks, benefits, and side effects of the medications; patient acknowledged and verbally consented. Patient is being prescribed a controlled substance as part of treatment plan. Patient has been educated of appropriate use of the medications, including risk of somnolence, limited ability to drive and/or work safely, and potential for dependence, respiratory depression and overdose. Patient is also informed that the medication are to be used by the patient only- avoid any combined use of ETOH or other substances unless prescribed.  [employer] report reviewed #[id number].   Patient is agreeable to call the office with any questions, concerns, or worsening of symptoms.  Patient is aware to call 911 or go to the nearest ER should begin having SI/HI.            Follow up in four weeks      Errors in dictation may reflect use of voice  recognition software and not all errors in transcription may have been detected prior to signing.            This document has been electronically signed by LANA Siu   May 12, 2020 15:28

## 2020-06-02 DIAGNOSIS — F41.8 DEPRESSION WITH ANXIETY: ICD-10-CM

## 2020-06-02 DIAGNOSIS — F41.0 PANIC DISORDER WITHOUT AGORAPHOBIA: ICD-10-CM

## 2020-06-02 RX ORDER — PAROXETINE 10 MG/1
5 TABLET, FILM COATED ORAL EVERY MORNING
Qty: 15 TABLET | Refills: 0 | Status: SHIPPED | OUTPATIENT
Start: 2020-06-02 | End: 2020-06-17 | Stop reason: SDUPTHER

## 2020-06-02 RX ORDER — CLONAZEPAM 0.5 MG/1
.25-.5 TABLET ORAL DAILY PRN
Qty: 20 TABLET | Refills: 0 | Status: SHIPPED | OUTPATIENT
Start: 2020-06-02 | End: 2020-06-17 | Stop reason: SDUPTHER

## 2020-06-08 ENCOUNTER — OFFICE VISIT (OUTPATIENT)
Dept: FAMILY MEDICINE CLINIC | Facility: CLINIC | Age: 35
End: 2020-06-08

## 2020-06-08 ENCOUNTER — HOSPITAL ENCOUNTER (OUTPATIENT)
Dept: GENERAL RADIOLOGY | Facility: HOSPITAL | Age: 35
Discharge: HOME | End: 2020-06-08

## 2020-06-08 ENCOUNTER — TELEPHONE (OUTPATIENT)
Dept: FAMILY MEDICINE CLINIC | Facility: CLINIC | Age: 35
End: 2020-06-08

## 2020-06-08 ENCOUNTER — HOSPITAL ENCOUNTER (OUTPATIENT)
Dept: GENERAL RADIOLOGY | Facility: HOSPITAL | Age: 35
Discharge: HOME | End: 2020-06-08
Admitting: NURSE PRACTITIONER

## 2020-06-08 VITALS
HEART RATE: 87 BPM | HEIGHT: 66 IN | WEIGHT: 225 LBS | BODY MASS INDEX: 36.16 KG/M2 | OXYGEN SATURATION: 98 % | TEMPERATURE: 97.8 F | SYSTOLIC BLOOD PRESSURE: 100 MMHG | DIASTOLIC BLOOD PRESSURE: 70 MMHG

## 2020-06-08 DIAGNOSIS — M54.9 MECHANICAL BACK PAIN: ICD-10-CM

## 2020-06-08 DIAGNOSIS — K21.9 GASTROESOPHAGEAL REFLUX DISEASE WITHOUT ESOPHAGITIS: ICD-10-CM

## 2020-06-08 DIAGNOSIS — E55.9 VITAMIN D DEFICIENCY: ICD-10-CM

## 2020-06-08 DIAGNOSIS — R30.0 DYSURIA: ICD-10-CM

## 2020-06-08 DIAGNOSIS — R10.2 PELVIC PAIN: Primary | ICD-10-CM

## 2020-06-08 DIAGNOSIS — E03.4 HYPOTHYROIDISM DUE TO ACQUIRED ATROPHY OF THYROID: ICD-10-CM

## 2020-06-08 PROCEDURE — 73523 X-RAY EXAM HIPS BI 5/> VIEWS: CPT

## 2020-06-08 PROCEDURE — 72100 X-RAY EXAM L-S SPINE 2/3 VWS: CPT

## 2020-06-08 PROCEDURE — 81003 URINALYSIS AUTO W/O SCOPE: CPT | Performed by: NURSE PRACTITIONER

## 2020-06-08 PROCEDURE — 99214 OFFICE O/P EST MOD 30 MIN: CPT | Performed by: NURSE PRACTITIONER

## 2020-06-08 PROCEDURE — 72100 X-RAY EXAM L-S SPINE 2/3 VWS: CPT | Performed by: RADIOLOGY

## 2020-06-08 PROCEDURE — 73523 X-RAY EXAM HIPS BI 5/> VIEWS: CPT | Performed by: RADIOLOGY

## 2020-06-08 RX ORDER — LEVOTHYROXINE SODIUM 112 UG/1
112 TABLET ORAL DAILY
Qty: 30 TABLET | Refills: 5 | Status: SHIPPED | OUTPATIENT
Start: 2020-06-08 | End: 2020-06-08 | Stop reason: SDUPTHER

## 2020-06-08 RX ORDER — RABEPRAZOLE SODIUM 20 MG/1
20 TABLET, DELAYED RELEASE ORAL DAILY
Qty: 30 TABLET | Refills: 5 | Status: SHIPPED | OUTPATIENT
Start: 2020-06-08 | End: 2020-07-23 | Stop reason: SDUPTHER

## 2020-06-08 RX ORDER — ERGOCALCIFEROL 1.25 MG/1
50000 CAPSULE ORAL WEEKLY
Qty: 4 CAPSULE | Refills: 5 | Status: SHIPPED | OUTPATIENT
Start: 2020-06-08 | End: 2020-12-16 | Stop reason: SDUPTHER

## 2020-06-08 RX ORDER — LEVOTHYROXINE SODIUM 112 UG/1
112 TABLET ORAL DAILY
Qty: 30 TABLET | Refills: 5 | Status: SHIPPED | OUTPATIENT
Start: 2020-06-08 | End: 2020-10-26 | Stop reason: SDUPTHER

## 2020-06-08 NOTE — PROGRESS NOTES
"Subjective   Jarad Barragan is a 34 y.o. female.     Chief Complaint   Patient presents with   • Back Pain   • Menstrual Problem       History of Present Illness     GERD-chronic and ongoing.  Patient is presently taking AcipHex 20 mg.  She reports that her GERD symptoms are doing well.  Vitamin D deficiency-chronic and ongoing.  Patient is presently taking vitamin D 50,000 units supplement.  No negative side effects of medication are reported.  Vitamin D level is stable with medication use.  Anxiety-patient is presently taking Paxil 10 mg and Klonopin 0.5 mg as needed.  Some increase due to stress of not working.  She feels that the paxil seems to be helping \"more and more as I take it\".    Hypothyroidism-patient is presently taking Synthroid 112 mcg.  No negative side effects of medication.  Patient denies any hair or skin changes.  No reports of heat or cold intolerance.  Back concern-reports has been bothering her \"for a while\".  She reports she is beginning to have difficulty \"turning in the bed\".  Sitting is not bothersome  She reports low back and into her right hip.  She reports is \"10 times worse\" if she does any activities.  She reports bending and squatting are difficult.  She reports no past injury but did have surgery \"for that nerve\".  She denies any numbness in her back or hip.    Menses pain-Has been \"present for a long time\".  She reports \"just painful\".  No history of female surgery.  Bleeding is mostly unchanged.  She reports pain begins with her menses and last for the first 4 days of her cycle.  She report across her low abdomen.  She reports she takes OTC meds but \"it don't touch it\".  She reports she uses heat and \"hot water\".  She reports her last female exam has \"been a while\".  No other period changes.    Dysuria-reports she has noted some urinary frequency.  She questions if she may have an infection.  She has not been drinking well.  She denies any hematuria is not having any bladder pain " "or flank pain.  Ongoing    The following portions of the patient's history were reviewed and updated as appropriate: CC, ROS, allergies, current medications, past family history, past medical history, past social history, past surgical history and problem list.      Review of Systems   Constitutional: Positive for activity change and fatigue. Negative for appetite change and fever.   HENT: Negative for congestion, ear pain, rhinorrhea, sinus pressure, sinus pain, sneezing and sore throat.    Eyes: Negative for discharge and redness.   Respiratory: Negative for cough, chest tightness, shortness of breath and wheezing.    Cardiovascular: Negative for chest pain, palpitations and leg swelling.   Gastrointestinal: Negative for abdominal pain, blood in stool, constipation, nausea and vomiting.   Genitourinary: Positive for frequency, pelvic pain and urgency. Negative for flank pain and hematuria.   Musculoskeletal: Positive for back pain. Negative for myalgias.   Skin: Negative for color change and rash.   Neurological: Negative for dizziness and headaches.   Hematological: Negative for adenopathy.   Psychiatric/Behavioral: Positive for dysphoric mood. Negative for sleep disturbance and suicidal ideas. The patient is nervous/anxious.    All other systems reviewed and are negative.      Objective     /70   Pulse 87   Temp 97.8 °F (36.6 °C) (Temporal)   Ht 167.6 cm (66\")   Wt 102 kg (225 lb)   SpO2 98%   BMI 36.32 kg/m²     Physical Exam   Constitutional: She is oriented to person, place, and time. She appears well-developed and well-nourished. No distress.   HENT:   Head: Normocephalic and atraumatic.   Right Ear: Hearing, tympanic membrane, external ear and ear canal normal.   Left Ear: Hearing, tympanic membrane, external ear and ear canal normal.   Oropharynx not examined.  Patient is presently wearing a face covering/mask due to COVID-19 pandemic.   Eyes: Pupils are equal, round, and reactive to light. " Conjunctivae, EOM and lids are normal. No scleral icterus. Right eye exhibits normal extraocular motion and no nystagmus. Left eye exhibits normal extraocular motion and no nystagmus.   Neck: Normal range of motion. Neck supple. No JVD present. No tracheal tenderness present. Carotid bruit is not present. No thyromegaly present.   Cardiovascular: Normal rate, regular rhythm, S1 normal, S2 normal, normal heart sounds and intact distal pulses.   No murmur heard.  Pulmonary/Chest: Effort normal and breath sounds normal. She exhibits no tenderness.   Abdominal: Soft. Bowel sounds are normal. She exhibits no mass. There is no hepatosplenomegaly. There is no tenderness. There is no CVA tenderness.   Musculoskeletal: Normal range of motion. She exhibits no edema.        Right hip: She exhibits tenderness.        Left hip: She exhibits tenderness.        Lumbar back: She exhibits tenderness.   Gait normal.   equal bilaterally. No muscular atrophy or flaccidity.   Lymphadenopathy:     She has no cervical adenopathy.        Right cervical: No superficial cervical adenopathy present.       Left cervical: No superficial cervical adenopathy present.   Neurological: She is alert and oriented to person, place, and time. She has normal strength and normal reflexes. She displays no tremor. No cranial nerve deficit or sensory deficit. She exhibits normal muscle tone. Coordination and gait normal.   Skin: Skin is warm and dry. Capillary refill takes less than 2 seconds. She is not diaphoretic. No cyanosis. No pallor. Nails show no clubbing.   Psychiatric: Her speech is normal and behavior is normal. Judgment and thought content normal. Her mood appears anxious. She is not actively hallucinating. Cognition and memory are normal. She exhibits a depressed mood. She is attentive.   Vitals reviewed.      Assessment/Plan     Problem List Items Addressed This Visit        Digestive    Vitamin D deficiency    Relevant Medications     vitamin D (ERGOCALCIFEROL) 1.25 MG (79077 UT) capsule capsule    Gastroesophageal reflux disease without esophagitis    Relevant Medications    RABEprazole (Aciphex) 20 MG EC tablet       Endocrine    Hypothyroidism    Relevant Medications    levothyroxine (SYNTHROID, LEVOTHROID) 112 MCG tablet      Other Visit Diagnoses     Pelvic pain    -  Primary    Relevant Orders    Ambulatory Referral to Gynecology (Completed)    Mechanical back pain        Relevant Orders    XR Spine Lumbar 2 or 3 View (Completed)    XR Hips Bilateral With or Without Pelvis 5 View (Completed)    Dysuria        Relevant Orders    POC Urinalysis Dipstick, Automated (Completed)          Patient's Body mass index is 36.32 kg/m². BMI is above normal parameters. Recommendations include: nutrition counseling.     Understands disease processes and need for medications.  Understands reasons for urgent and emergent care.  Patient (& family) verbalized agreement for treatment plan.   Emotional support and active listening provided.  Patient provided time to verbalize feelings.    GYN referral.      Refill on routine maintenance meds today.  Continue them as directed.  Continue under the care of psych NP for depression and anxiety symptoms.    POC urine for leukocytes and nitrites.  Encourage patient to be pushing fluids for hydration    RTC 2-3 weeks for re-eval, sooner for worsening of symptoms.           This document has been electronically signed by:  LANA Quinn, FNP-C    Dragon disclaimer:  Much of this encounter note is an electronic transcription/translation of spoken language to printed text. The electronic translation of spoken language may permit erroneous, or at times, nonsensical words or phrases to be inadvertently transcribed; Although I have reviewed the note for such errors, some may still exist.

## 2020-06-08 NOTE — TELEPHONE ENCOUNTER
----- Message from LANA Quinn sent at 6/8/2020  2:32 PM EDT -----  X-ray of her low back shows some arthritis changes at her L5-S1.  It also shows that she has moderate amount of stool in her colon.  She is interested in taking a stool softener to aid with her bowels please let me know.      Spoke to roxanne and she said she was good with no stool softener at this time

## 2020-06-09 LAB
BILIRUB BLD-MCNC: NEGATIVE MG/DL
CLARITY, POC: CLEAR
COLOR UR: YELLOW
GLUCOSE UR STRIP-MCNC: NEGATIVE MG/DL
KETONES UR QL: NEGATIVE
LEUKOCYTE EST, POC: NEGATIVE
NITRITE UR-MCNC: NEGATIVE MG/ML
PH UR: 60 [PH] (ref 5–8)
PROT UR STRIP-MCNC: NEGATIVE MG/DL
RBC # UR STRIP: NEGATIVE /UL
SP GR UR: 1.03 (ref 1–1.03)
UROBILINOGEN UR QL: NORMAL

## 2020-06-17 ENCOUNTER — OFFICE VISIT (OUTPATIENT)
Dept: PSYCHIATRY | Facility: CLINIC | Age: 35
End: 2020-06-17

## 2020-06-17 VITALS
HEART RATE: 87 BPM | SYSTOLIC BLOOD PRESSURE: 118 MMHG | BODY MASS INDEX: 36.64 KG/M2 | DIASTOLIC BLOOD PRESSURE: 82 MMHG | TEMPERATURE: 98 F | WEIGHT: 227 LBS

## 2020-06-17 DIAGNOSIS — F33.0 MILD EPISODE OF RECURRENT MAJOR DEPRESSIVE DISORDER (HCC): Primary | ICD-10-CM

## 2020-06-17 DIAGNOSIS — F41.0 PANIC DISORDER WITHOUT AGORAPHOBIA: ICD-10-CM

## 2020-06-17 DIAGNOSIS — F41.1 GENERALIZED ANXIETY DISORDER: ICD-10-CM

## 2020-06-17 PROCEDURE — 99213 OFFICE O/P EST LOW 20 MIN: CPT | Performed by: NURSE PRACTITIONER

## 2020-06-17 RX ORDER — CLONAZEPAM 0.5 MG/1
.25-.5 TABLET ORAL DAILY PRN
Qty: 20 TABLET | Refills: 0
Start: 2020-06-17 | End: 2020-06-30 | Stop reason: SDUPTHER

## 2020-06-17 RX ORDER — PAROXETINE 10 MG/1
5 TABLET, FILM COATED ORAL EVERY MORNING
Qty: 15 TABLET | Refills: 0
Start: 2020-06-17 | End: 2020-06-30 | Stop reason: SDUPTHER

## 2020-06-17 NOTE — PROGRESS NOTES
Subjective   Jarad Barragan is a 34 y.o. female is here today for medication management follow-up.    Chief Complaint:  Panic attacks anxiety, mood    History of Present Illness:   Patient presents today for follow up for medication management for panic attacks, depression and anxiety. Patient states everything is about the same. Patient states every once in a while has issues with eyes but not like it was. Patient states she is still doing unemployment. Patient states going back to work probably in July. Patient denies any mood swings or irritability. Patient reports currently depression is at a 0 on a 0-10 scale with 10 being the worst. Patient reports anxiety is at a 5 on a 0-10 scale with 10 being the worst. Patient reports having a lot panic attacks when she was not sure about work but calmed down now. Patient reports panic attacks last a couple minutes and during an attacks patient has shortness of breath and heart racing. Patient states the effects will last all day. Patient reports sleeping about 5-6 hours and patient denies any nightmares. Patient reports appetite is good and eating at least 2-3 meals a day. Patient denies any auditory or visual hallucinations. Patient adamantly denies any SI or HI. Patient denies any side effects to medications. Patient denies any medical changes since last visit.   The following portions of the patient's history were reviewed and updated as appropriate: allergies, current medications, past family history, past medical history, past social history, past surgical history and problem list.    Review of Systems   Constitutional: Negative.    Respiratory: Negative.    Cardiovascular: Negative.    Gastrointestinal: Negative.    Neurological: Negative.    Psychiatric/Behavioral: The patient is nervous/anxious.        Objective   Physical Exam   Constitutional: Vital signs are normal. She appears well-developed and well-nourished. She is cooperative.   Neurological: She is  alert.   Psychiatric: She has a normal mood and affect. Her speech is normal and behavior is normal. Judgment and thought content normal. Cognition and memory are normal.   Vitals reviewed.    Blood pressure 118/82, pulse 87, temperature 98 °F (36.7 °C), weight 103 kg (227 lb). Body mass index is 36.64 kg/m².    Allergies   Allergen Reactions   • Tape Unknown (See Comments)     Blisters on skin   • Codeine Rash       Medication List:   Current Outpatient Medications   Medication Sig Dispense Refill   • clonazePAM (KlonoPIN) 0.5 MG tablet Take 0.5-1 tablets by mouth Daily As Needed for Anxiety. 20 tablet 0   • fluticasone (FLONASE) 50 MCG/ACT nasal spray 1 spray into the nostril(s) as directed by provider 2 (Two) Times a Day As Needed for Rhinitis. Administer 2 sprays both nostrils once daily 1 bottle 1   • levothyroxine (SYNTHROID, LEVOTHROID) 112 MCG tablet Take 1 tablet by mouth Daily. 30 tablet 5   • nystatin (MYCOSTATIN) 931343 UNIT/ML suspension Swish and swallow 5 mL 4 (Four) Times a Day. 120 mL 0   • PARoxetine (Paxil) 10 MG tablet Take 0.5 tablets by mouth Every Morning. 15 tablet 0   • promethazine (PHENERGAN) 25 MG tablet Take 1 tablet by mouth Every 6 (Six) Hours As Needed for Nausea or Vomiting. 60 tablet 1   • RABEprazole (Aciphex) 20 MG EC tablet Take 1 tablet by mouth Daily. 30 tablet 5   • vitamin D (ERGOCALCIFEROL) 1.25 MG (86406 UT) capsule capsule Take 1 capsule by mouth 1 (One) Time Per Week. 4 capsule 5     No current facility-administered medications for this visit.        Mental Status Exam:   Hygiene:   good  Cooperation:  Cooperative  Eye Contact:  Good  Psychomotor Behavior:  Appropriate  Affect:  Appropriate  Hopelessness: Denies  Speech:  Normal  Thought Process:  Goal directed and Linear  Thought Content:  Normal  Suicidal:  None  Homicidal:  None  Hallucinations:  None  Delusion:  None  Memory:  Intact  Orientation:  Person, Place, Time and Situation  Reliability:  good  Insight:   Fair  Judgement:  Fair  Impulse Control:  Fair  Physical/Medical Issues:  No                 Assessment/Plan   Diagnoses and all orders for this visit:    Depression with anxiety  -     clonazePAM (KlonoPIN) 0.5 MG tablet; Take 0.5-1 tablets by mouth Daily As Needed for Anxiety.  -     PARoxetine (Paxil) 10 MG tablet; Take 0.5 tablets by mouth Every Morning.    Panic disorder without agoraphobia  -     clonazePAM (KlonoPIN) 0.5 MG tablet; Take 0.5-1 tablets by mouth Daily As Needed for Anxiety.  -     PARoxetine (Paxil) 10 MG tablet; Take 0.5 tablets by mouth Every Morning.          Discussed medication options with patient.  Continue Paxil 5 mg daily for depression and anxiety.  Continue clonazepam 0.5 mg half a tablet to 1 tablet daily as needed for anxiety.  Reviewed the risks, benefits, and side effects of the medications; patient acknowledged and verbally consented. Patient is being prescribed a controlled substance as part of treatment plan. Patient has been educated of appropriate use of the medications, including risk of somnolence, limited ability to drive and/or work safely, and potential for dependence, respiratory depression and overdose. Patient is also informed that the medication are to be used by the patient only- avoid any combined use of ETOH or other substances unless prescribed.  [employer] report reviewed #[id number].    Patient is agreeable to call the office with any questions, concerns, or worsening of symptoms.  Patient is aware to call 911 or go to the nearest ER should begin having SI/HI.            Follow up in four weeks        Errors in dictation may reflect use of voice recognition software and not all errors in transcription may have been detected prior to signing.              This document has been electronically signed by LANA Siu   June 17, 2020 10:26

## 2020-06-30 DIAGNOSIS — F41.0 PANIC DISORDER WITHOUT AGORAPHOBIA: ICD-10-CM

## 2020-06-30 DIAGNOSIS — F33.0 MILD EPISODE OF RECURRENT MAJOR DEPRESSIVE DISORDER (HCC): ICD-10-CM

## 2020-06-30 DIAGNOSIS — F41.1 GENERALIZED ANXIETY DISORDER: ICD-10-CM

## 2020-06-30 RX ORDER — CLONAZEPAM 0.5 MG/1
.25-.5 TABLET ORAL DAILY PRN
Qty: 20 TABLET | Refills: 0 | Status: SHIPPED | OUTPATIENT
Start: 2020-06-30 | End: 2020-07-17 | Stop reason: SDUPTHER

## 2020-06-30 RX ORDER — PAROXETINE 10 MG/1
5 TABLET, FILM COATED ORAL EVERY MORNING
Qty: 15 TABLET | Refills: 0 | Status: SHIPPED | OUTPATIENT
Start: 2020-06-30 | End: 2020-07-17 | Stop reason: SDUPTHER

## 2020-07-17 ENCOUNTER — OFFICE VISIT (OUTPATIENT)
Dept: PSYCHIATRY | Facility: CLINIC | Age: 35
End: 2020-07-17

## 2020-07-17 VITALS
DIASTOLIC BLOOD PRESSURE: 84 MMHG | WEIGHT: 227 LBS | SYSTOLIC BLOOD PRESSURE: 120 MMHG | HEART RATE: 87 BPM | BODY MASS INDEX: 36.64 KG/M2

## 2020-07-17 DIAGNOSIS — F41.0 PANIC DISORDER WITHOUT AGORAPHOBIA: ICD-10-CM

## 2020-07-17 DIAGNOSIS — F41.1 GENERALIZED ANXIETY DISORDER: ICD-10-CM

## 2020-07-17 DIAGNOSIS — F33.0 MILD EPISODE OF RECURRENT MAJOR DEPRESSIVE DISORDER (HCC): ICD-10-CM

## 2020-07-17 PROCEDURE — 99213 OFFICE O/P EST LOW 20 MIN: CPT | Performed by: NURSE PRACTITIONER

## 2020-07-17 RX ORDER — PAROXETINE 10 MG/1
5 TABLET, FILM COATED ORAL EVERY OTHER DAY
Qty: 7 TABLET | Refills: 0
Start: 2020-07-17 | End: 2020-09-10

## 2020-07-17 RX ORDER — ESCITALOPRAM OXALATE 5 MG/1
5 TABLET ORAL DAILY
Qty: 30 TABLET | Refills: 0 | Status: SHIPPED | OUTPATIENT
Start: 2020-07-17 | End: 2020-09-10

## 2020-07-17 RX ORDER — CLONAZEPAM 0.5 MG/1
.25-.5 TABLET ORAL DAILY PRN
Qty: 20 TABLET | Refills: 0
Start: 2020-07-17 | End: 2020-07-30 | Stop reason: SDUPTHER

## 2020-07-23 ENCOUNTER — OFFICE VISIT (OUTPATIENT)
Dept: FAMILY MEDICINE CLINIC | Facility: CLINIC | Age: 35
End: 2020-07-23

## 2020-07-23 VITALS
HEIGHT: 66 IN | BODY MASS INDEX: 36.64 KG/M2 | OXYGEN SATURATION: 97 % | SYSTOLIC BLOOD PRESSURE: 120 MMHG | HEART RATE: 85 BPM | DIASTOLIC BLOOD PRESSURE: 70 MMHG | TEMPERATURE: 97.1 F | WEIGHT: 228 LBS

## 2020-07-23 DIAGNOSIS — E03.4 HYPOTHYROIDISM DUE TO ACQUIRED ATROPHY OF THYROID: ICD-10-CM

## 2020-07-23 DIAGNOSIS — K21.9 GASTROESOPHAGEAL REFLUX DISEASE WITHOUT ESOPHAGITIS: ICD-10-CM

## 2020-07-23 DIAGNOSIS — M54.16 LUMBAR BACK PAIN WITH RADICULOPATHY AFFECTING RIGHT LOWER EXTREMITY: Primary | ICD-10-CM

## 2020-07-23 PROCEDURE — 99214 OFFICE O/P EST MOD 30 MIN: CPT | Performed by: NURSE PRACTITIONER

## 2020-07-23 RX ORDER — DICLOFENAC SODIUM AND MISOPROSTOL 50; 200 MG/1; UG/1
1 TABLET, DELAYED RELEASE ORAL 2 TIMES DAILY
Qty: 60 TABLET | Refills: 2 | Status: SHIPPED | OUTPATIENT
Start: 2020-07-23 | End: 2020-09-10

## 2020-07-23 RX ORDER — RABEPRAZOLE SODIUM 20 MG/1
20 TABLET, DELAYED RELEASE ORAL DAILY
Qty: 30 TABLET | Refills: 5 | Status: SHIPPED | OUTPATIENT
Start: 2020-07-23 | End: 2020-12-16 | Stop reason: SDUPTHER

## 2020-07-23 NOTE — PROGRESS NOTES
"Subjective   Jarad Barragan is a 34 y.o. female.     Chief Complaint   Patient presents with   • Back Pain   • Obesity       History of Present Illness     Back pain-reports \"killing me\".  She reports past history of sciatic nerve surgery.  She reports prolonged sitting or standing.  She reports pain radiates into her leg.  She reports right is worse. She is using Heating pad that helps some.  She reports any movement is uncomfortable.  She reports at times a \"tearing sensation\" in her back.  She reports she does Motrin PRN but not often due to GI pain and irritation.   Pain is not shooting in nature but is more dull, tired and aching.    GERD-doing well.  Needs refill on Aciphex.  No negative side effects of medication.  Patient does avoid foods that trigger reflux symptoms.  Denies any recent exacerbations.  Thyroid-reports they have done a recent thyroid lab when she had her Pap at [facility].  She reports she is taking her Synthroid 112 mcg as directed.  No associated symptoms such as heat or cold intolerance.  No hair or skin changes are reported.        The following portions of the patient's history were reviewed and updated as appropriate: CC, ROS, allergies, current medications, past family history, past medical history, past social history, past surgical history and problem list.      Review of Systems   Constitutional: Positive for activity change and fatigue. Negative for appetite change, fever and unexpected weight change.   HENT: Negative for congestion, ear pain, nosebleeds, postnasal drip, rhinorrhea, sinus pressure, sinus pain, sneezing, sore throat, trouble swallowing and voice change.    Eyes: Negative for photophobia, pain, discharge, redness and visual disturbance.   Respiratory: Negative for cough, chest tightness, shortness of breath and wheezing.    Cardiovascular: Negative for chest pain, palpitations and leg swelling.   Gastrointestinal: Negative for abdominal pain, blood in stool, " "constipation, diarrhea, nausea and vomiting.   Endocrine: Negative for cold intolerance and polydipsia.   Genitourinary: Positive for pelvic pain. Negative for difficulty urinating, flank pain, frequency, hematuria and urgency.   Musculoskeletal: Positive for back pain. Negative for arthralgias, gait problem, joint swelling and myalgias.   Skin: Negative for color change and rash.   Allergic/Immunologic: Negative.    Neurological: Negative for dizziness, syncope, numbness and headaches.   Hematological: Negative.  Negative for adenopathy.   Psychiatric/Behavioral: Positive for dysphoric mood. Negative for sleep disturbance and suicidal ideas. The patient is nervous/anxious.    All other systems reviewed and are negative.      Objective     /70   Pulse 85   Temp 97.1 °F (36.2 °C) (Temporal)   Ht 167.6 cm (66\")   Wt 103 kg (228 lb)   LMP 06/25/2020   SpO2 97%   BMI 36.80 kg/m²     Physical Exam   Constitutional: She is oriented to person, place, and time. She appears well-developed and well-nourished. No distress.   HENT:   Head: Normocephalic and atraumatic.   Right Ear: External ear normal.   Left Ear: External ear normal.   Oropharynx not examined.  Patient is presently wearing a face covering/mask due to COVID-19 pandemic.   Eyes: Pupils are equal, round, and reactive to light. EOM are normal. No scleral icterus.   Neck: Normal range of motion. Neck supple. No JVD present. No thyromegaly present.   Cardiovascular: Normal rate, regular rhythm and normal heart sounds.   Pulmonary/Chest: Effort normal and breath sounds normal.   Abdominal: Soft. Bowel sounds are normal. There is no tenderness.   Musculoskeletal:        Lumbar back: She exhibits decreased range of motion, tenderness, pain and spasm.        Back:    Neurological: She is alert and oriented to person, place, and time. No cranial nerve deficit. Coordination normal.   Skin: Skin is warm and dry.   Psychiatric: Her behavior is normal. Judgment " and thought content normal. Her mood appears anxious.   Vitals reviewed.      Assessment/Plan     Problem List Items Addressed This Visit        Digestive    Gastroesophageal reflux disease without esophagitis    Relevant Medications    RABEprazole (Aciphex) 20 MG EC tablet       Endocrine    Hypothyroidism    Current Assessment & Plan     Continue Synthroid.  Will plan for repeat labs at time of next appt.             Other Visit Diagnoses     Lumbar back pain with radiculopathy affecting right lower extremity    -  Primary    Relevant Medications    diclofenac-miSOPROStol (ARTHROTEC 50) 50-0.2 MG EC tablet    Other Relevant Orders    MRI Lumbar Spine Without Contrast          Patient's Body mass index is 36.8 kg/m². BMI is above normal parameters. Recommendations include: nutrition counseling.     Understands disease processes and need for medications.  Understands reasons for urgent and emergent care.  Patient (& family) verbalized agreement for treatment plan.   Emotional support and active listening provided.  Patient provided time to verbalize feelings.    Trial of NSAID.  Patient to report if not able to tolerate.     MRI ordered.  Discussed with patient that she may need PT before she can get an approval for the scan.     RTC 3 months, sooner if needed.             This document has been electronically signed by:  LANA Quinn, FNP-C    Dragon disclaimer:  Much of this encounter note is an electronic transcription/translation of spoken language to printed text. The electronic translation of spoken language may permit erroneous, or at times, nonsensical words or phrases to be inadvertently transcribed; Although I have reviewed the note for such errors, some may still exist.

## 2020-07-30 DIAGNOSIS — F41.1 GENERALIZED ANXIETY DISORDER: ICD-10-CM

## 2020-07-30 DIAGNOSIS — F41.0 PANIC DISORDER WITHOUT AGORAPHOBIA: ICD-10-CM

## 2020-07-31 RX ORDER — DIPHENHYDRAMINE, LIDOCAINE, NYSTATIN
5 KIT ORAL 4 TIMES DAILY
Qty: 300 ML | Refills: 0 | Status: SHIPPED | OUTPATIENT
Start: 2020-07-31 | End: 2020-09-10

## 2020-07-31 RX ORDER — CLONAZEPAM 0.5 MG/1
.25-.5 TABLET ORAL DAILY PRN
Qty: 20 TABLET | Refills: 0 | Status: SHIPPED | OUTPATIENT
Start: 2020-07-31 | End: 2020-08-25 | Stop reason: SDUPTHER

## 2020-08-05 ENCOUNTER — HOSPITAL ENCOUNTER (OUTPATIENT)
Dept: MRI IMAGING | Facility: HOSPITAL | Age: 35
Discharge: HOME | End: 2020-08-05
Admitting: NURSE PRACTITIONER

## 2020-08-05 PROCEDURE — 72148 MRI LUMBAR SPINE W/O DYE: CPT

## 2020-08-05 PROCEDURE — 72148 MRI LUMBAR SPINE W/O DYE: CPT | Performed by: RADIOLOGY

## 2020-08-06 ENCOUNTER — TELEPHONE (OUTPATIENT)
Dept: FAMILY MEDICINE CLINIC | Facility: CLINIC | Age: 35
End: 2020-08-06

## 2020-08-06 DIAGNOSIS — M51.26 LUMBAR DISC HERNIATION: Primary | ICD-10-CM

## 2020-08-06 NOTE — TELEPHONE ENCOUNTER
Pt contacts office stating she is having severe back and arm pain.  Would like MRI results and provider recommendations?

## 2020-08-13 ENCOUNTER — LAB (OUTPATIENT)
Dept: FAMILY MEDICINE CLINIC | Facility: CLINIC | Age: 35
End: 2020-08-13

## 2020-08-13 DIAGNOSIS — E55.9 VITAMIN D DEFICIENCY: ICD-10-CM

## 2020-08-13 DIAGNOSIS — K21.9 GASTROESOPHAGEAL REFLUX DISEASE WITHOUT ESOPHAGITIS: ICD-10-CM

## 2020-08-13 DIAGNOSIS — E66.09 CLASS 2 OBESITY DUE TO EXCESS CALORIES WITHOUT SERIOUS COMORBIDITY WITH BODY MASS INDEX (BMI) OF 37.0 TO 37.9 IN ADULT: ICD-10-CM

## 2020-08-13 DIAGNOSIS — E03.4 HYPOTHYROIDISM DUE TO ACQUIRED ATROPHY OF THYROID: Primary | ICD-10-CM

## 2020-08-13 DIAGNOSIS — Z00.00 ROUTINE GENERAL MEDICAL EXAMINATION AT A HEALTH CARE FACILITY: ICD-10-CM

## 2020-08-13 PROCEDURE — 83036 HEMOGLOBIN GLYCOSYLATED A1C: CPT | Performed by: NURSE PRACTITIONER

## 2020-08-13 PROCEDURE — 82306 VITAMIN D 25 HYDROXY: CPT | Performed by: NURSE PRACTITIONER

## 2020-08-13 PROCEDURE — 36415 COLL VENOUS BLD VENIPUNCTURE: CPT | Performed by: NURSE PRACTITIONER

## 2020-08-13 PROCEDURE — 80053 COMPREHEN METABOLIC PANEL: CPT | Performed by: NURSE PRACTITIONER

## 2020-08-13 PROCEDURE — 82607 VITAMIN B-12: CPT | Performed by: NURSE PRACTITIONER

## 2020-08-13 PROCEDURE — 85025 COMPLETE CBC W/AUTO DIFF WBC: CPT | Performed by: NURSE PRACTITIONER

## 2020-08-13 PROCEDURE — 80061 LIPID PANEL: CPT | Performed by: NURSE PRACTITIONER

## 2020-08-14 LAB
25(OH)D3 SERPL-MCNC: 24.8 NG/ML (ref 30–100)
ALBUMIN SERPL-MCNC: 4.4 G/DL (ref 3.5–5.2)
ALBUMIN/GLOB SERPL: 1.6 G/DL
ALP SERPL-CCNC: 54 U/L (ref 39–117)
ALT SERPL W P-5'-P-CCNC: 10 U/L (ref 1–33)
ANION GAP SERPL CALCULATED.3IONS-SCNC: 8.9 MMOL/L (ref 5–15)
AST SERPL-CCNC: 13 U/L (ref 1–32)
BASOPHILS # BLD AUTO: 0.06 10*3/MM3 (ref 0–0.2)
BASOPHILS NFR BLD AUTO: 0.9 % (ref 0–1.5)
BILIRUB SERPL-MCNC: 0.6 MG/DL (ref 0–1.2)
BUN SERPL-MCNC: 14 MG/DL (ref 6–20)
BUN/CREAT SERPL: 15.4 (ref 7–25)
CALCIUM SPEC-SCNC: 9.2 MG/DL (ref 8.6–10.5)
CHLORIDE SERPL-SCNC: 104 MMOL/L (ref 98–107)
CHOLEST SERPL-MCNC: 154 MG/DL (ref 0–200)
CO2 SERPL-SCNC: 24.1 MMOL/L (ref 22–29)
CREAT SERPL-MCNC: 0.91 MG/DL (ref 0.57–1)
DEPRECATED RDW RBC AUTO: 39.7 FL (ref 37–54)
EOSINOPHIL # BLD AUTO: 0.07 10*3/MM3 (ref 0–0.4)
EOSINOPHIL NFR BLD AUTO: 1.1 % (ref 0.3–6.2)
ERYTHROCYTE [DISTWIDTH] IN BLOOD BY AUTOMATED COUNT: 12.2 % (ref 12.3–15.4)
GFR SERPL CREATININE-BSD FRML MDRD: 70 ML/MIN/1.73
GLOBULIN UR ELPH-MCNC: 2.7 GM/DL
GLUCOSE SERPL-MCNC: 99 MG/DL (ref 65–99)
HBA1C MFR BLD: 5.1 % (ref 4.8–5.6)
HCT VFR BLD AUTO: 40 % (ref 34–46.6)
HDLC SERPL-MCNC: 43 MG/DL (ref 40–60)
HGB BLD-MCNC: 13.5 G/DL (ref 12–15.9)
IMM GRANULOCYTES # BLD AUTO: 0.02 10*3/MM3 (ref 0–0.05)
IMM GRANULOCYTES NFR BLD AUTO: 0.3 % (ref 0–0.5)
LDLC SERPL CALC-MCNC: 102 MG/DL (ref 0–100)
LDLC/HDLC SERPL: 2.38 {RATIO}
LYMPHOCYTES # BLD AUTO: 1.77 10*3/MM3 (ref 0.7–3.1)
LYMPHOCYTES NFR BLD AUTO: 27.4 % (ref 19.6–45.3)
MCH RBC QN AUTO: 30.3 PG (ref 26.6–33)
MCHC RBC AUTO-ENTMCNC: 33.8 G/DL (ref 31.5–35.7)
MCV RBC AUTO: 89.9 FL (ref 79–97)
MONOCYTES # BLD AUTO: 0.45 10*3/MM3 (ref 0.1–0.9)
MONOCYTES NFR BLD AUTO: 7 % (ref 5–12)
NEUTROPHILS NFR BLD AUTO: 4.08 10*3/MM3 (ref 1.7–7)
NEUTROPHILS NFR BLD AUTO: 63.3 % (ref 42.7–76)
NRBC BLD AUTO-RTO: 0 /100 WBC (ref 0–0.2)
PLATELET # BLD AUTO: 307 10*3/MM3 (ref 140–450)
PMV BLD AUTO: 10.9 FL (ref 6–12)
POTASSIUM SERPL-SCNC: 3.8 MMOL/L (ref 3.5–5.2)
PROT SERPL-MCNC: 7.1 G/DL (ref 6–8.5)
RBC # BLD AUTO: 4.45 10*6/MM3 (ref 3.77–5.28)
SODIUM SERPL-SCNC: 137 MMOL/L (ref 136–145)
TRIGL SERPL-MCNC: 44 MG/DL (ref 0–150)
VIT B12 BLD-MCNC: 278 PG/ML (ref 211–946)
VLDLC SERPL-MCNC: 8.8 MG/DL (ref 5–40)
WBC # BLD AUTO: 6.45 10*3/MM3 (ref 3.4–10.8)

## 2020-08-21 ENCOUNTER — TELEPHONE (OUTPATIENT)
Dept: FAMILY MEDICINE CLINIC | Facility: CLINIC | Age: 35
End: 2020-08-21

## 2020-08-21 NOTE — TELEPHONE ENCOUNTER
----- Message from LANA Quinn sent at 8/17/2020  8:41 AM EDT -----  Labs are good.  Vit D is up from 16 to 24.  Continue her vit D supplement      Spoke with pt and gave her results

## 2020-08-25 DIAGNOSIS — F41.1 GENERALIZED ANXIETY DISORDER: ICD-10-CM

## 2020-08-25 DIAGNOSIS — F41.0 PANIC DISORDER WITHOUT AGORAPHOBIA: ICD-10-CM

## 2020-08-25 DIAGNOSIS — F33.0 MILD EPISODE OF RECURRENT MAJOR DEPRESSIVE DISORDER (HCC): ICD-10-CM

## 2020-08-25 RX ORDER — PAROXETINE 10 MG/1
5 TABLET, FILM COATED ORAL EVERY OTHER DAY
Qty: 7 TABLET | Refills: 0
Start: 2020-08-25

## 2020-08-25 RX ORDER — CLONAZEPAM 0.5 MG/1
.25-.5 TABLET ORAL DAILY PRN
Qty: 20 TABLET | Refills: 0 | Status: SHIPPED | OUTPATIENT
Start: 2020-08-25 | End: 2020-09-11 | Stop reason: SDUPTHER

## 2020-09-10 ENCOUNTER — OFFICE VISIT (OUTPATIENT)
Dept: FAMILY MEDICINE CLINIC | Facility: CLINIC | Age: 35
End: 2020-09-10

## 2020-09-10 VITALS
HEIGHT: 66 IN | WEIGHT: 220.2 LBS | HEART RATE: 100 BPM | DIASTOLIC BLOOD PRESSURE: 76 MMHG | OXYGEN SATURATION: 98 % | TEMPERATURE: 97.2 F | SYSTOLIC BLOOD PRESSURE: 118 MMHG | BODY MASS INDEX: 35.39 KG/M2

## 2020-09-10 DIAGNOSIS — M54.42 ACUTE LEFT-SIDED LOW BACK PAIN WITH LEFT-SIDED SCIATICA: ICD-10-CM

## 2020-09-10 DIAGNOSIS — R20.2 PARESTHESIA OF LEFT ARM: ICD-10-CM

## 2020-09-10 DIAGNOSIS — M79.602 LEFT ARM PAIN: Primary | ICD-10-CM

## 2020-09-10 DIAGNOSIS — M62.830 MUSCLE SPASM OF BACK: ICD-10-CM

## 2020-09-10 DIAGNOSIS — S43.402A SPRAIN OF LEFT SHOULDER, UNSPECIFIED SHOULDER SPRAIN TYPE, INITIAL ENCOUNTER: ICD-10-CM

## 2020-09-10 PROCEDURE — 99214 OFFICE O/P EST MOD 30 MIN: CPT | Performed by: PHYSICIAN ASSISTANT

## 2020-09-10 RX ORDER — METHOCARBAMOL 500 MG/1
500 TABLET, FILM COATED ORAL 3 TIMES DAILY PRN
Qty: 90 TABLET | Refills: 2 | Status: SHIPPED | OUTPATIENT
Start: 2020-09-10 | End: 2020-09-18

## 2020-09-10 RX ORDER — NORETHINDRONE ACETATE AND ETHINYL ESTRADIOL, ETHINYL ESTRADIOL AND FERROUS FUMARATE 1MG-10(24)
KIT ORAL
COMMUNITY
Start: 2020-08-21 | End: 2020-12-16

## 2020-09-10 RX ORDER — NABUMETONE 750 MG/1
750 TABLET, FILM COATED ORAL DAILY
Qty: 30 TABLET | Refills: 3 | Status: SHIPPED | OUTPATIENT
Start: 2020-09-10 | End: 2020-09-18
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
## 2020-09-11 ENCOUNTER — OFFICE VISIT (OUTPATIENT)
Dept: PSYCHIATRY | Facility: CLINIC | Age: 35
End: 2020-09-11

## 2020-09-11 VITALS
SYSTOLIC BLOOD PRESSURE: 138 MMHG | DIASTOLIC BLOOD PRESSURE: 88 MMHG | BODY MASS INDEX: 35.86 KG/M2 | WEIGHT: 222.16 LBS | HEART RATE: 96 BPM

## 2020-09-11 DIAGNOSIS — F41.1 GENERALIZED ANXIETY DISORDER: ICD-10-CM

## 2020-09-11 DIAGNOSIS — F41.0 PANIC DISORDER WITHOUT AGORAPHOBIA: ICD-10-CM

## 2020-09-11 PROCEDURE — 99213 OFFICE O/P EST LOW 20 MIN: CPT | Performed by: NURSE PRACTITIONER

## 2020-09-11 RX ORDER — CLONAZEPAM 0.5 MG/1
.25-.5 TABLET ORAL DAILY PRN
Qty: 20 TABLET | Refills: 0 | Status: SHIPPED | OUTPATIENT
Start: 2020-09-11 | End: 2020-10-20 | Stop reason: SDUPTHER

## 2020-09-11 RX ORDER — PAROXETINE 10 MG/1
5 TABLET, FILM COATED ORAL EVERY MORNING
Qty: 15 TABLET | Refills: 0 | Status: SHIPPED | OUTPATIENT
Start: 2020-09-11 | End: 2020-10-08 | Stop reason: SDUPTHER
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
## 2020-09-14 ENCOUNTER — TELEPHONE (OUTPATIENT)
Dept: FAMILY MEDICINE CLINIC | Facility: CLINIC | Age: 35
End: 2020-09-14

## 2020-09-14 NOTE — TELEPHONE ENCOUNTER
Pt is aware of this information.       ----- Message from GEOVANNI Richard sent at 9/14/2020  2:56 PM EDT -----  I recommend conservative treatment including those medications and activity or stretching as tolerated.  She is scheduled for MRI of the lower back to reevaluate on 9/22/2020.    ----- Message -----  From: Betsy Benitez MA  Sent: 9/14/2020   2:33 PM EDT  To: GEOVANNI Richard    Pt called and stated that you gave her some nabumetone and methocarbamol on the 10th for her back and shoulder but it is not helping. She wanted to know if there was anything else you could prescribe her?
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
## 2020-09-18 ENCOUNTER — OFFICE VISIT (OUTPATIENT)
Dept: FAMILY MEDICINE CLINIC | Facility: CLINIC | Age: 35
End: 2020-09-18

## 2020-09-18 DIAGNOSIS — Z00.00 HEALTHCARE MAINTENANCE: ICD-10-CM

## 2020-09-18 DIAGNOSIS — M51.26 LUMBAR DISC HERNIATION: Primary | ICD-10-CM

## 2020-09-18 PROCEDURE — 99213 OFFICE O/P EST LOW 20 MIN: CPT | Performed by: GENERAL PRACTICE

## 2020-09-18 RX ORDER — TRAMADOL HYDROCHLORIDE 50 MG/1
TABLET ORAL
Qty: 60 TABLET | Refills: 0 | Status: SHIPPED | OUTPATIENT
Start: 2020-09-18 | End: 2021-01-20 | Stop reason: SDUPTHER

## 2020-09-18 NOTE — PROGRESS NOTES
Subjective   Jarad Barragan is a 34 y.o. female.     History of Present Illness     Low Back Pain  Returns with persistent low back pain.  This is described as a sharp bilateral ache worse on the left.  The pain radiates to the back of both legs as far as the calves worse on the left.  The pain has been associated with numbness and tingling in the same distribution on the left that extends to the sole of that foot.  There is no history of any numbness or tingling elsewhere and she denies any weakness, change in her bowel or bladder control, fever, chills, night sweats or weight loss.  She has been prescribed nabumetone and methocarbamol and has tried OTC ibuprofen with no apparent benefit.  She is 4 years post lumbar discectomy by Dr. Piña at [facility].  She underwent an updated MRI on 8/5/2020 that was reported as showing a posterior disc herniation at L5-S1 with severe central canal stenosis and bilateral neuroforaminal stenosis.  She was seen by Dr. Werner at [facility] on 9/1/2020 but did not have her films with her for review.  She is scheduled for another MRI on 9/22/2020 and will see him again on 9/29/2020.    The following portions of the patient's history were reviewed and updated as appropriate: allergies, current medications, past medical history, past surgical history and problem list.    Review of Systems   Constitutional: Positive for fatigue. Negative for chills and fever.   HENT: Negative for congestion.    Respiratory: Negative for cough, shortness of breath and wheezing.    Cardiovascular: Negative for chest pain, palpitations and leg swelling.   Gastrointestinal: Negative for abdominal pain, blood in stool, constipation, diarrhea, nausea and vomiting.   Genitourinary: Negative for difficulty urinating, dysuria, frequency, hematuria and urgency.   Musculoskeletal: Positive for back pain. Negative for arthralgias.   Skin: Negative for rash.   Neurological: Positive for numbness. Negative for weakness  and headaches.   Psychiatric/Behavioral: Positive for sleep disturbance. Negative for dysphoric mood and suicidal ideas. The patient is nervous/anxious.      Objective   Physical Exam  Constitutional:       Appearance: Normal appearance. She is well-developed. She is not diaphoretic.      Comments: Alert and in fair spirits.  Somewhat uncomfortable with movement.  No apparent distress at rest   HENT:      Head: Atraumatic.   Eyes:      Conjunctiva/sclera: Conjunctivae normal.   Neck:      Thyroid: No thyroid mass or thyromegaly.      Vascular: No carotid bruit or JVD.      Trachea: Trachea normal. No tracheal deviation.   Cardiovascular:      Rate and Rhythm: Regular rhythm. Tachycardia present.      Heart sounds: Normal heart sounds, S1 normal and S2 normal. No murmur. No gallop.    Pulmonary:      Effort: Pulmonary effort is normal.      Breath sounds: Normal breath sounds.   Abdominal:      General: Bowel sounds are normal. There is no distension.      Tenderness: There is no abdominal tenderness.   Musculoskeletal:      Right lower leg: No edema.      Left lower leg: No edema.      Comments: Negative straight leg raise.   Lymphadenopathy:      Head:      Right side of head: No submental, submandibular, tonsillar, preauricular, posterior auricular or occipital adenopathy.      Left side of head: No submental, submandibular, tonsillar, preauricular, posterior auricular or occipital adenopathy.      Cervical: No cervical adenopathy.      Upper Body:      Right upper body: No supraclavicular adenopathy.      Left upper body: No supraclavicular adenopathy.   Skin:     General: Skin is warm.      Coloration: Skin is not cyanotic, jaundiced or pale.      Findings: No rash.      Nails: There is no clubbing.     Neurological:      Mental Status: She is alert and oriented to person, place, and time.      Cranial Nerves: No cranial nerve deficit.      Motor: No tremor.      Coordination: Coordination normal.      Gait:  Gait normal.      Deep Tendon Reflexes:      Reflex Scores:       Patellar reflexes are 2+ on the right side and 2+ on the left side.       Achilles reflexes are 2+ on the right side and 2+ on the left side.     Comments: Able to walk fully on her heels and toes   Psychiatric:         Speech: Speech normal.         Behavior: Behavior normal.         Thought Content: Thought content normal.       Assessment/Plan   Problems Addressed this Visit        Musculoskeletal and Integument    Lumbar disc herniation   Posterior disc herniation at L5/S1 with severe central canal stenosis and bilateral neuroforaminal stenosis  Nabumetone, ibuprofen, and methocarbamol will be discontinued due to lack of effect  Patient was prescribed tramadol prior to her previous discectomy with apparently good effect and this will be resumed for now  Encouraged to follow-up with her scheduled MRI and neurosurgical reassessment  Advised to seek immediate assessment if any worse and in particular if she should develop numbness or tingling of the buttocks, perineum, or right leg, weakness, or any difficulty with her bowel/bladder control    Relevant Medications    traMADol (ULTRAM) 50 MG tablet       Other    Healthcare maintenance  Recommended flu shot.  Patient uninterested but will consider

## 2020-09-19 VITALS
DIASTOLIC BLOOD PRESSURE: 64 MMHG | SYSTOLIC BLOOD PRESSURE: 122 MMHG | BODY MASS INDEX: 35.68 KG/M2 | TEMPERATURE: 97.5 F | HEIGHT: 66 IN | WEIGHT: 222 LBS | HEART RATE: 102 BPM | OXYGEN SATURATION: 98 % | RESPIRATION RATE: 12 BRPM

## 2020-09-19 PROBLEM — Z00.00 HEALTHCARE MAINTENANCE: Status: ACTIVE | Noted: 2020-09-19

## 2020-09-21 ENCOUNTER — TELEPHONE (OUTPATIENT)
Dept: FAMILY MEDICINE CLINIC | Facility: CLINIC | Age: 35
End: 2020-09-21

## 2020-09-21 NOTE — TELEPHONE ENCOUNTER
----- Message from LANA Siu sent at 9/21/2020  8:21 AM EDT -----  Ok thanks  ----- Message -----  From: Betsy Benitez MA  Sent: 9/18/2020   3:36 PM EDT  To: LANA Siu    No, she never came.   ----- Message -----  From: Radha Nicole APRN  Sent: 9/18/2020   3:21 PM EDT  To: Betsy Benitez MA    Did she come in for a UDS

## 2020-09-22 ENCOUNTER — HOSPITAL ENCOUNTER (OUTPATIENT)
Dept: MRI IMAGING | Facility: HOSPITAL | Age: 35
Discharge: HOME | End: 2020-09-22
Admitting: PHYSICIAN ASSISTANT

## 2020-09-22 DIAGNOSIS — M54.42 ACUTE LEFT-SIDED LOW BACK PAIN WITH LEFT-SIDED SCIATICA: ICD-10-CM

## 2020-09-22 PROCEDURE — 72148 MRI LUMBAR SPINE W/O DYE: CPT

## 2020-09-22 PROCEDURE — 72148 MRI LUMBAR SPINE W/O DYE: CPT | Performed by: RADIOLOGY

## 2020-10-08 ENCOUNTER — OFFICE VISIT (OUTPATIENT)
Dept: PSYCHIATRY | Facility: CLINIC | Age: 35
End: 2020-10-08

## 2020-10-08 VITALS
DIASTOLIC BLOOD PRESSURE: 70 MMHG | HEART RATE: 100 BPM | BODY MASS INDEX: 35.83 KG/M2 | WEIGHT: 222 LBS | SYSTOLIC BLOOD PRESSURE: 128 MMHG

## 2020-10-08 DIAGNOSIS — F41.0 PANIC DISORDER WITHOUT AGORAPHOBIA: ICD-10-CM

## 2020-10-08 DIAGNOSIS — Z79.899 HIGH RISK MEDICATION USE: Primary | ICD-10-CM

## 2020-10-08 DIAGNOSIS — F33.0 MILD EPISODE OF RECURRENT MAJOR DEPRESSIVE DISORDER (HCC): ICD-10-CM

## 2020-10-08 DIAGNOSIS — F41.1 GENERALIZED ANXIETY DISORDER: ICD-10-CM

## 2020-10-08 PROCEDURE — 99213 OFFICE O/P EST LOW 20 MIN: CPT | Performed by: NURSE PRACTITIONER

## 2020-10-08 RX ORDER — PAROXETINE 10 MG/1
10 TABLET, FILM COATED ORAL EVERY MORNING
Qty: 30 TABLET | Refills: 0 | Status: SHIPPED | OUTPATIENT
Start: 2020-10-08 | End: 2020-11-06 | Stop reason: SDUPTHER
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
## 2020-10-20 DIAGNOSIS — F41.0 PANIC DISORDER WITHOUT AGORAPHOBIA: ICD-10-CM

## 2020-10-20 DIAGNOSIS — F41.1 GENERALIZED ANXIETY DISORDER: ICD-10-CM

## 2020-10-20 RX ORDER — CLONAZEPAM 0.5 MG/1
.25-.5 TABLET ORAL DAILY PRN
Qty: 20 TABLET | Refills: 0 | Status: SHIPPED | OUTPATIENT
Start: 2020-10-20 | End: 2020-11-24 | Stop reason: SDUPTHER

## 2020-10-26 ENCOUNTER — OFFICE VISIT (OUTPATIENT)
Dept: FAMILY MEDICINE CLINIC | Facility: CLINIC | Age: 35
End: 2020-10-26

## 2020-10-26 VITALS
HEART RATE: 87 BPM | SYSTOLIC BLOOD PRESSURE: 100 MMHG | TEMPERATURE: 97.7 F | HEIGHT: 66 IN | WEIGHT: 228 LBS | OXYGEN SATURATION: 98 % | BODY MASS INDEX: 36.64 KG/M2 | DIASTOLIC BLOOD PRESSURE: 70 MMHG

## 2020-10-26 DIAGNOSIS — K21.9 GASTROESOPHAGEAL REFLUX DISEASE WITHOUT ESOPHAGITIS: ICD-10-CM

## 2020-10-26 DIAGNOSIS — M51.26 LUMBAR DISC HERNIATION: Primary | ICD-10-CM

## 2020-10-26 DIAGNOSIS — F41.8 DEPRESSION WITH ANXIETY: ICD-10-CM

## 2020-10-26 PROCEDURE — 99214 OFFICE O/P EST MOD 30 MIN: CPT | Performed by: NURSE PRACTITIONER

## 2020-10-26 PROCEDURE — 96372 THER/PROPH/DIAG INJ SC/IM: CPT | Performed by: NURSE PRACTITIONER

## 2020-10-26 RX ORDER — CYCLOBENZAPRINE HCL 5 MG
5 TABLET ORAL 3 TIMES DAILY PRN
Qty: 90 TABLET | Refills: 5 | Status: SHIPPED | OUTPATIENT
Start: 2020-10-26 | End: 2021-01-20 | Stop reason: SDUPTHER

## 2020-10-26 RX ORDER — KETOROLAC TROMETHAMINE 30 MG/ML
60 INJECTION, SOLUTION INTRAMUSCULAR; INTRAVENOUS ONCE
Status: COMPLETED | OUTPATIENT
Start: 2020-10-26 | End: 2020-10-26

## 2020-10-26 RX ORDER — DEXAMETHASONE SODIUM PHOSPHATE 4 MG/ML
8 INJECTION, SOLUTION INTRA-ARTICULAR; INTRALESIONAL; INTRAMUSCULAR; INTRAVENOUS; SOFT TISSUE ONCE
Status: COMPLETED | OUTPATIENT
Start: 2020-10-26 | End: 2020-10-26

## 2020-10-26 RX ORDER — HYDROCODONE BITARTRATE AND ACETAMINOPHEN 5; 325 MG/1; MG/1
1 TABLET ORAL EVERY 6 HOURS PRN
Qty: 12 TABLET | Refills: 0 | Status: SHIPPED | OUTPATIENT
Start: 2020-10-26 | End: 2020-11-16 | Stop reason: SDUPTHER

## 2020-10-26 RX ORDER — PREDNISONE 20 MG/1
TABLET ORAL
Qty: 10 TABLET | Refills: 0 | Status: SHIPPED | OUTPATIENT
Start: 2020-10-26 | End: 2020-11-05

## 2020-10-26 RX ORDER — LEVOTHYROXINE SODIUM 112 UG/1
112 TABLET ORAL DAILY
Qty: 30 TABLET | Refills: 5 | Status: SHIPPED | OUTPATIENT
Start: 2020-10-26 | End: 2020-12-30 | Stop reason: DRUGHIGH

## 2020-10-26 RX ADMIN — DEXAMETHASONE SODIUM PHOSPHATE 8 MG: 4 INJECTION, SOLUTION INTRA-ARTICULAR; INTRALESIONAL; INTRAMUSCULAR; INTRAVENOUS; SOFT TISSUE at 14:29

## 2020-10-26 RX ADMIN — KETOROLAC TROMETHAMINE 60 MG: 30 INJECTION, SOLUTION INTRAMUSCULAR; INTRAVENOUS at 14:28
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
## 2020-10-28 NOTE — ASSESSMENT & PLAN NOTE
Continue AcipHex.  Advised to avoid known GI triggers such as spicy foods.  Upright 30 minutes after meals and avoid eating large meals.  Several small meals daily as able to avoid overfilling stomach.

## 2020-10-28 NOTE — ASSESSMENT & PLAN NOTE
Continue Paxil as directed.  Continue as needed Klonopin.  Stress reduction advised (examples:  make time for self, Reading, Listening to music, Exercise, keeping a journal, venting to a confidant, etc.)

## 2020-10-29 RX ORDER — SULFAMETHOXAZOLE AND TRIMETHOPRIM 800; 160 MG/1; MG/1
1 TABLET ORAL EVERY 12 HOURS
Qty: 20 TABLET | Refills: 0 | Status: SHIPPED | OUTPATIENT
Start: 2020-10-29 | End: 2020-11-08

## 2020-11-06 DIAGNOSIS — F41.1 GENERALIZED ANXIETY DISORDER: ICD-10-CM

## 2020-11-06 DIAGNOSIS — F41.0 PANIC DISORDER WITHOUT AGORAPHOBIA: ICD-10-CM

## 2020-11-06 DIAGNOSIS — F33.0 MILD EPISODE OF RECURRENT MAJOR DEPRESSIVE DISORDER (HCC): ICD-10-CM

## 2020-11-06 RX ORDER — PAROXETINE 10 MG/1
10 TABLET, FILM COATED ORAL EVERY MORNING
Qty: 30 TABLET | Refills: 5 | Status: SHIPPED | OUTPATIENT
Start: 2020-11-06 | End: 2020-12-16 | Stop reason: SDUPTHER

## 2020-11-16 ENCOUNTER — OFFICE VISIT (OUTPATIENT)
Dept: FAMILY MEDICINE CLINIC | Facility: CLINIC | Age: 35
End: 2020-11-16

## 2020-11-16 VITALS
BODY MASS INDEX: 36.64 KG/M2 | DIASTOLIC BLOOD PRESSURE: 70 MMHG | OXYGEN SATURATION: 98 % | TEMPERATURE: 97.1 F | HEIGHT: 66 IN | HEART RATE: 90 BPM | WEIGHT: 228 LBS | SYSTOLIC BLOOD PRESSURE: 130 MMHG

## 2020-11-16 DIAGNOSIS — M51.26 LUMBAR DISC HERNIATION: Primary | ICD-10-CM

## 2020-11-16 DIAGNOSIS — R60.0 EDEMA OF FOOT: ICD-10-CM

## 2020-11-16 PROCEDURE — 99213 OFFICE O/P EST LOW 20 MIN: CPT | Performed by: NURSE PRACTITIONER

## 2020-11-16 RX ORDER — HYDROCODONE BITARTRATE AND ACETAMINOPHEN 5; 325 MG/1; MG/1
1 TABLET ORAL EVERY 6 HOURS PRN
Qty: 12 TABLET | Refills: 0 | Status: SHIPPED | OUTPATIENT
Start: 2020-11-16 | End: 2020-12-16

## 2020-11-16 NOTE — PROGRESS NOTES
"Subjective   Jarad Barragan is a 35 y.o. female.     Chief Complaint   Patient presents with   • Med Refill       History of Present Illness     Back  Pain-has an appt on December 11th for pain management.  She reports \"hurting today\".  She has been at work and sat all day.  She reports pain is present when she stands as well.  She reprots numbness in both legs but left is worse.  \"tingling\".  Sensation is radiating all down into her leg.  She has used PRN Tramadol but feels it is not helping much.  She reports \"Not helping.  I don't know what else to do\".  Not at goal   Foot swelling-bilateral lateral ankles.  Has noted since she has gone back to work.  She is mostly sedentary for clerical work.  She reports she is generally at work 8  hours per day.  She has not been elevating her feet but does plan to resume.  She reports she is drinking well but \"not a lot of water\" but that is usual for her.  She reports her urine output is her normal.  She reports resolves overnight then returns during the day.       The following portions of the patient's history were reviewed and updated as appropriate: CC, ROS, allergies, current medications, past family history, past medical history, past social history, past surgical history and problem list.      Review of Systems   Constitutional: Positive for fatigue. Negative for appetite change and fever.   HENT: Negative for congestion, ear pain, nosebleeds, postnasal drip, rhinorrhea, sore throat, tinnitus, trouble swallowing and voice change.    Eyes: Negative for blurred vision, photophobia and visual disturbance.   Respiratory: Negative for cough, chest tightness, shortness of breath and wheezing.    Cardiovascular: Negative for chest pain and palpitations.   Gastrointestinal: Negative for abdominal pain, blood in stool, constipation, diarrhea, nausea and GERD.   Endocrine: Negative for cold intolerance, heat intolerance and polydipsia.   Genitourinary: Negative for decreased " "urine volume, difficulty urinating, dysuria and hematuria.   Musculoskeletal: Positive for arthralgias, back pain and gait problem. Negative for myalgias.        Bilateral lateral ankle swelling   Skin: Negative for color change, pallor and bruise.   Allergic/Immunologic: Negative.    Neurological: Negative for dizziness, syncope, numbness and headache.   Hematological: Negative.    Psychiatric/Behavioral: Positive for depressed mood and stress. Negative for decreased concentration, sleep disturbance and suicidal ideas. The patient is nervous/anxious.    All other systems reviewed and are negative.      Objective     /70   Pulse 90   Temp 97.1 °F (36.2 °C) (Temporal)   Ht 167.6 cm (65.98\")   Wt 103 kg (228 lb)   SpO2 98%   BMI 36.82 kg/m²     Physical Exam  Vitals signs reviewed.   Constitutional:       General: She is not in acute distress.     Appearance: She is well-developed. She is not diaphoretic.   HENT:      Head: Normocephalic and atraumatic.      Comments: Oropharynx not examined.  Patient is presently wearing a face covering/mask due to COVID-19 pandemic.     Right Ear: Hearing, tympanic membrane, ear canal and external ear normal.      Left Ear: Hearing, tympanic membrane, ear canal and external ear normal.   Eyes:      General: Lids are normal. No scleral icterus.     Extraocular Movements:      Right eye: Normal extraocular motion and no nystagmus.      Left eye: Normal extraocular motion and no nystagmus.      Conjunctiva/sclera: Conjunctivae normal.      Pupils: Pupils are equal, round, and reactive to light.   Neck:      Musculoskeletal: Normal range of motion and neck supple.      Thyroid: No thyromegaly.      Vascular: No carotid bruit or JVD.      Trachea: No tracheal tenderness.   Cardiovascular:      Rate and Rhythm: Normal rate and regular rhythm.      Pulses:           Dorsalis pedis pulses are 2+ on the right side and 2+ on the left side.        Posterior tibial pulses are 2+ on " the right side and 2+ on the left side.      Heart sounds: Normal heart sounds, S1 normal and S2 normal. No murmur.   Pulmonary:      Effort: Pulmonary effort is normal.      Breath sounds: Normal breath sounds.   Chest:      Chest wall: No tenderness.   Abdominal:      General: Bowel sounds are normal.      Palpations: Abdomen is soft. There is no hepatomegaly, splenomegaly or mass.      Tenderness: There is no abdominal tenderness.   Musculoskeletal:      Lumbar back: She exhibits decreased range of motion, tenderness, pain and spasm.      Right lower leg: Edema (mild non pitting lateral ankle) present.      Left lower leg: Edema (mild non pitting to lateral ankle) present.      Comments: Gait slow and steady.   equal bilaterally. No muscular atrophy or flaccidity.   Lymphadenopathy:      Cervical: No cervical adenopathy.      Right cervical: No superficial cervical adenopathy.     Left cervical: No superficial cervical adenopathy.   Skin:     General: Skin is warm and dry.      Capillary Refill: Capillary refill takes less than 2 seconds.      Coloration: Skin is not pale.      Findings: No erythema.      Nails: There is no clubbing.     Neurological:      Mental Status: She is alert and oriented to person, place, and time.      Cranial Nerves: No cranial nerve deficit.      Sensory: No sensory deficit.      Motor: No tremor, atrophy or abnormal muscle tone.      Coordination: Coordination normal.      Deep Tendon Reflexes: Reflexes are normal and symmetric.   Psychiatric:         Attention and Perception: She is attentive.         Mood and Affect: Mood normal.         Speech: Speech normal.         Behavior: Behavior normal.         Thought Content: Thought content normal.         Judgment: Judgment normal.         Assessment/Plan     Problem List Items Addressed This Visit        Musculoskeletal and Integument    Lumbar disc herniation    Relevant Medications    HYDROcodone-acetaminophen (Norco) 5-325 MG  per tablet          Patient's Body mass index is 36.82 kg/m². BMI is above normal parameters. Recommendations include: nutrition counseling.       Understands disease processes and need for medications.  Understands reasons for urgent and emergent care.  Patient (& family) verbalized agreement for treatment plan.   Emotional support and active listening provided.  Patient provided time to verbalize feelings.    PURVI reviewed today and consistent.  Will refill prescribed controlled medication today.  Patient is aware they cannot receive narcotics from any other provider except if under care of pain management or speciality clinic.  Risk and benefits of medication use has been reviewed.  History and physical exam exhibit continued safe and appropriate use of controlled substances.  The patient is aware of the potential for addiction and dependence.  This patient has been made aware of the appropriate use of such medications, including potential risk of somnolence, limited ability to drive and / or work safely, and potential for overdose.    It has also been made clear that these medications are for use by this patient only, without concomitant use of alcohol or other substances unless prescribed/advised by medical provider.  Patient understands they may be subject to UDS and pill counts at random.      Patient considered to be low risk for addiction due to use of single controlled medications.  Patient understands and accepts these risks.  Patient need for medication will be reassessed at each visit.  Doses will be adjusted according to patient need and findings.    Goal of TX: Patient will not have any adverse reactions of medication.  Patient have reduction in pain symptoms with use of PRN Norco as directed.  Patient will not have any adverse side effects from medication.  Patient will be able to remain active in an outside of home without interference from pain symptoms.      RTC 1 month, sooner if needed.                This document has been electronically signed by:  LANA Quinn, FNP-C    Dragon disclaimer:  Much of this encounter note is an electronic transcription/translation of spoken language to printed text. The electronic translation of spoken language may permit erroneous, or at times, nonsensical words or phrases to be inadvertently transcribed; Although I have reviewed the note for such errors, some may still exist.

## 2020-11-24 DIAGNOSIS — F41.1 GENERALIZED ANXIETY DISORDER: ICD-10-CM

## 2020-11-24 DIAGNOSIS — F41.0 PANIC DISORDER WITHOUT AGORAPHOBIA: ICD-10-CM

## 2020-11-24 RX ORDER — CLONAZEPAM 0.5 MG/1
.25-.5 TABLET ORAL DAILY PRN
Qty: 20 TABLET | Refills: 0 | Status: SHIPPED | OUTPATIENT
Start: 2020-11-24 | End: 2020-12-28 | Stop reason: SDUPTHER

## 2020-12-16 ENCOUNTER — OFFICE VISIT (OUTPATIENT)
Dept: FAMILY MEDICINE CLINIC | Facility: CLINIC | Age: 35
End: 2020-12-16

## 2020-12-16 VITALS
WEIGHT: 236 LBS | HEART RATE: 84 BPM | HEIGHT: 66 IN | DIASTOLIC BLOOD PRESSURE: 80 MMHG | TEMPERATURE: 97.3 F | BODY MASS INDEX: 37.93 KG/M2 | OXYGEN SATURATION: 97 % | SYSTOLIC BLOOD PRESSURE: 132 MMHG

## 2020-12-16 DIAGNOSIS — K21.9 GASTROESOPHAGEAL REFLUX DISEASE WITHOUT ESOPHAGITIS: ICD-10-CM

## 2020-12-16 DIAGNOSIS — F41.0 PANIC DISORDER WITHOUT AGORAPHOBIA: ICD-10-CM

## 2020-12-16 DIAGNOSIS — F33.0 MILD EPISODE OF RECURRENT MAJOR DEPRESSIVE DISORDER (HCC): ICD-10-CM

## 2020-12-16 DIAGNOSIS — E03.4 HYPOTHYROIDISM DUE TO ACQUIRED ATROPHY OF THYROID: Primary | ICD-10-CM

## 2020-12-16 DIAGNOSIS — F41.1 GENERALIZED ANXIETY DISORDER: ICD-10-CM

## 2020-12-16 DIAGNOSIS — E55.9 VITAMIN D DEFICIENCY: ICD-10-CM

## 2020-12-16 DIAGNOSIS — M51.26 LUMBAR DISC HERNIATION: ICD-10-CM

## 2020-12-16 PROCEDURE — 99214 OFFICE O/P EST MOD 30 MIN: CPT | Performed by: NURSE PRACTITIONER

## 2020-12-16 RX ORDER — ERGOCALCIFEROL 1.25 MG/1
50000 CAPSULE ORAL WEEKLY
Qty: 4 CAPSULE | Refills: 5 | Status: SHIPPED | OUTPATIENT
Start: 2020-12-16 | End: 2021-08-04

## 2020-12-16 RX ORDER — ETODOLAC 500 MG/1
500 TABLET, FILM COATED ORAL 2 TIMES DAILY
Qty: 60 TABLET | Refills: 0 | Status: SHIPPED | OUTPATIENT
Start: 2020-12-16 | End: 2021-01-20

## 2020-12-16 RX ORDER — RABEPRAZOLE SODIUM 20 MG/1
20 TABLET, DELAYED RELEASE ORAL DAILY
Qty: 30 TABLET | Refills: 5 | Status: SHIPPED | OUTPATIENT
Start: 2020-12-16 | End: 2021-06-03 | Stop reason: SDUPTHER

## 2020-12-16 RX ORDER — PAROXETINE 10 MG/1
10 TABLET, FILM COATED ORAL EVERY MORNING
Qty: 30 TABLET | Refills: 5 | Status: SHIPPED | OUTPATIENT
Start: 2020-12-16 | End: 2021-02-02 | Stop reason: SDUPTHER

## 2020-12-16 NOTE — PROGRESS NOTES
"Subjective   Jarad Barragan is a 35 y.o. female.     Chief Complaint   Patient presents with   • Back Pain       History of Present Illness     Back pain-chronic and ongoing.  She will have an appt January for an injection.  She reports she is back to work.  She reports she is doing \"fair\".  \"good days and bad\".  She reports she is hurting today but not daily.  She reports she has been prescribed Nabumetone in the past for joint pain but it did not seem to help.  She has not been on any other anti-inflammatory medications.  She does take Flexeril 5 mg.  For spasm but does not get to take much during the day due to side effect of drowsiness.    Hypothyroidism-chronic and ongoing.  Patient is presently taking Synthroid 112 mcg.  No negative side effects.  No negative side effects of medication.  Patient denies any hair or skin changes.  No reports of heat or cold intolerance.  She is due for an updated thyroid panel.  Anxiety and depression-chronic and ongoing.  Patient is presently taking Paxil 10 mg.  She is also taking Klonopin 0.5 mg as needed for panic related symptoms.  She has been under the care of psych for treatment over the last several months.  Her psych provider is currently out of the office on maternity leave.  She does need refill on her medications today.  No concerns today with medications or with anxiety symptoms.  Vitamin D deficiency-chronic and ongoing.  Patient is presently taking vitamin D 50,000 units supplement.  No negative side effects of medication are reported.  Vitamin D level is stable with medication use.  GERD-stable with AcipHex.  She reports she does well as long as she \"does not miss a pill\".  No concerns today.    The following portions of the patient's history were reviewed and updated as appropriate: CC, ROS, allergies, current medications, past family history, past medical history, past social history, past surgical history and problem list.      Review of Systems " "  Constitutional: Negative for appetite change, fatigue and fever.   HENT: Negative for congestion, ear pain, nosebleeds, postnasal drip, rhinorrhea, sore throat, tinnitus, trouble swallowing and voice change.    Eyes: Negative for blurred vision, photophobia and visual disturbance.   Respiratory: Negative for cough, chest tightness, shortness of breath and wheezing.    Cardiovascular: Positive for leg swelling (ankle swelling). Negative for chest pain and palpitations.   Gastrointestinal: Negative for abdominal pain, blood in stool, constipation, diarrhea, nausea and GERD.   Endocrine: Negative for cold intolerance, heat intolerance and polydipsia.   Genitourinary: Negative for decreased urine volume, difficulty urinating, dysuria and hematuria.   Musculoskeletal: Positive for arthralgias and back pain. Negative for gait problem and myalgias.   Skin: Negative for color change, pallor and bruise.   Allergic/Immunologic: Negative.    Neurological: Negative for dizziness, syncope, numbness and headache.   Hematological: Negative.    Psychiatric/Behavioral: Negative for decreased concentration, sleep disturbance, suicidal ideas and depressed mood. The patient is nervous/anxious.    All other systems reviewed and are negative.      Objective     /80   Pulse 84   Temp 97.3 °F (36.3 °C) (Tympanic)   Ht 167.6 cm (65.98\")   Wt 107 kg (236 lb)   LMP 12/16/2020   SpO2 97%   BMI 38.11 kg/m²     Physical Exam  Vitals signs reviewed.   Constitutional:       General: She is not in acute distress.     Appearance: She is well-developed. She is not diaphoretic.   HENT:      Head: Normocephalic and atraumatic.      Comments: Oropharynx not examined.  Patient is presently wearing a face covering/mask due to COVID-19 pandemic.     Right Ear: Hearing, tympanic membrane, ear canal and external ear normal.      Left Ear: Hearing, tympanic membrane, ear canal and external ear normal.   Eyes:      General: Lids are normal. No " scleral icterus.     Extraocular Movements:      Right eye: Normal extraocular motion and no nystagmus.      Left eye: Normal extraocular motion and no nystagmus.      Conjunctiva/sclera: Conjunctivae normal.      Pupils: Pupils are equal, round, and reactive to light.   Neck:      Musculoskeletal: Normal range of motion and neck supple.      Thyroid: No thyromegaly.      Vascular: No carotid bruit or JVD.      Trachea: No tracheal tenderness.   Cardiovascular:      Rate and Rhythm: Normal rate and regular rhythm.      Heart sounds: Normal heart sounds, S1 normal and S2 normal. No murmur.   Pulmonary:      Effort: Pulmonary effort is normal.      Breath sounds: Normal breath sounds.   Chest:      Chest wall: No tenderness.   Abdominal:      General: Bowel sounds are normal.      Palpations: Abdomen is soft. There is no hepatomegaly, splenomegaly or mass.      Tenderness: There is no abdominal tenderness.   Musculoskeletal:      Right ankle: She exhibits swelling (laterally behind ankle).      Left ankle: She exhibits swelling (laterally behind ankle).      Lumbar back: She exhibits decreased range of motion, tenderness and spasm.      Right upper leg: She exhibits tenderness.      Left upper leg: She exhibits tenderness.      Right lower leg: She exhibits tenderness. No edema.      Left lower leg: She exhibits tenderness. No edema.      Comments: Gait slow and steady.   equal bilaterally. No muscular atrophy or flaccidity.   Lymphadenopathy:      Cervical: No cervical adenopathy.      Right cervical: No superficial cervical adenopathy.     Left cervical: No superficial cervical adenopathy.   Skin:     General: Skin is warm and dry.      Capillary Refill: Capillary refill takes less than 2 seconds.      Coloration: Skin is not pale.      Findings: No erythema.      Nails: There is no clubbing.     Neurological:      Mental Status: She is alert and oriented to person, place, and time.      Cranial Nerves: No  cranial nerve deficit.      Sensory: No sensory deficit.      Motor: No tremor, atrophy or abnormal muscle tone.      Coordination: Coordination normal.      Deep Tendon Reflexes: Reflexes are normal and symmetric.   Psychiatric:         Attention and Perception: She is attentive.         Mood and Affect: Mood is anxious (mildly) and depressed. Affect is not tearful.         Speech: Speech normal.         Behavior: Behavior normal.         Thought Content: Thought content normal.         Judgment: Judgment normal.       Assessment/Plan     Problem List Items Addressed This Visit        Digestive    Vitamin D deficiency    Relevant Medications    vitamin D (ERGOCALCIFEROL) 1.25 MG (44509 UT) capsule capsule    Gastroesophageal reflux disease without esophagitis    Relevant Medications    RABEprazole (Aciphex) 20 MG EC tablet       Endocrine    Hypothyroidism - Primary    Relevant Medications    levothyroxine (SYNTHROID, LEVOTHROID) 112 MCG tablet       Musculoskeletal and Integument    Lumbar disc herniation    Relevant Medications    traMADol (ULTRAM) 50 MG tablet    cyclobenzaprine (FLEXERIL) 5 MG tablet      Other Visit Diagnoses     Generalized anxiety disorder        Relevant Medications    PARoxetine (Paxil) 10 MG tablet    Panic disorder without agoraphobia        Relevant Medications    PARoxetine (Paxil) 10 MG tablet    Mild episode of recurrent major depressive disorder (CMS/HCC)        Relevant Medications    PARoxetine (Paxil) 10 MG tablet          Patient's Body mass index is 38.11 kg/m². BMI is above normal parameters. Recommendations include: nutrition counseling.       Understands disease processes and need for medications.  Understands reasons for urgent and emergent care.  Patient (& family) verbalized agreement for treatment plan.   Emotional support and active listening provided.  Patient provided time to verbalize feelings.    Refill on routine maintenance meds today.     [facility] reviewed today.   Patient declines need for any controlled medications today.    RTC 1 month, sooner if needed for problems or concerns          This document has been electronically signed by:  LANA Quinn, FNP-C    Dragon disclaimer:  Much of this encounter note is an electronic transcription/translation of spoken language to printed text. The electronic translation of spoken language may permit erroneous, or at times, nonsensical words or phrases to be inadvertently transcribed; Although I have reviewed the note for such errors, some may still exist.

## 2020-12-23 ENCOUNTER — LAB (OUTPATIENT)
Dept: FAMILY MEDICINE CLINIC | Facility: CLINIC | Age: 35
End: 2020-12-23

## 2020-12-23 DIAGNOSIS — F41.0 PANIC DISORDER WITHOUT AGORAPHOBIA: ICD-10-CM

## 2020-12-23 DIAGNOSIS — F41.8 DEPRESSION WITH ANXIETY: ICD-10-CM

## 2020-12-23 DIAGNOSIS — K21.9 GASTROESOPHAGEAL REFLUX DISEASE WITHOUT ESOPHAGITIS: ICD-10-CM

## 2020-12-23 DIAGNOSIS — F41.1 GENERALIZED ANXIETY DISORDER: ICD-10-CM

## 2020-12-23 DIAGNOSIS — E66.09 CLASS 2 OBESITY DUE TO EXCESS CALORIES WITHOUT SERIOUS COMORBIDITY WITH BODY MASS INDEX (BMI) OF 37.0 TO 37.9 IN ADULT: Primary | ICD-10-CM

## 2020-12-23 DIAGNOSIS — F41.1 GAD (GENERALIZED ANXIETY DISORDER): ICD-10-CM

## 2020-12-23 DIAGNOSIS — E55.9 VITAMIN D DEFICIENCY: ICD-10-CM

## 2020-12-23 DIAGNOSIS — E03.4 HYPOTHYROIDISM DUE TO ACQUIRED ATROPHY OF THYROID: ICD-10-CM

## 2020-12-23 DIAGNOSIS — R20.2 PARESTHESIA OF LEFT ARM: ICD-10-CM

## 2020-12-23 DIAGNOSIS — M51.26 LUMBAR DISC HERNIATION: ICD-10-CM

## 2020-12-23 DIAGNOSIS — Z79.899 HIGH RISK MEDICATION USE: Primary | ICD-10-CM

## 2020-12-23 PROCEDURE — 80307 DRUG TEST PRSMV CHEM ANLYZR: CPT | Performed by: NURSE PRACTITIONER

## 2020-12-23 PROCEDURE — 82607 VITAMIN B-12: CPT | Performed by: NURSE PRACTITIONER

## 2020-12-23 PROCEDURE — 82306 VITAMIN D 25 HYDROXY: CPT | Performed by: NURSE PRACTITIONER

## 2020-12-23 PROCEDURE — 83036 HEMOGLOBIN GLYCOSYLATED A1C: CPT | Performed by: NURSE PRACTITIONER

## 2020-12-23 PROCEDURE — 85025 COMPLETE CBC W/AUTO DIFF WBC: CPT | Performed by: NURSE PRACTITIONER

## 2020-12-23 PROCEDURE — 84443 ASSAY THYROID STIM HORMONE: CPT | Performed by: NURSE PRACTITIONER

## 2020-12-23 PROCEDURE — 80061 LIPID PANEL: CPT | Performed by: NURSE PRACTITIONER

## 2020-12-23 PROCEDURE — 80053 COMPREHEN METABOLIC PANEL: CPT | Performed by: NURSE PRACTITIONER

## 2020-12-23 PROCEDURE — 84439 ASSAY OF FREE THYROXINE: CPT | Performed by: NURSE PRACTITIONER

## 2020-12-24 LAB
25(OH)D3 SERPL-MCNC: 27.8 NG/ML (ref 30–100)
ALBUMIN SERPL-MCNC: 4.2 G/DL (ref 3.5–5.2)
ALBUMIN/GLOB SERPL: 1.5 G/DL
ALP SERPL-CCNC: 66 U/L (ref 39–117)
ALT SERPL W P-5'-P-CCNC: 11 U/L (ref 1–33)
ANION GAP SERPL CALCULATED.3IONS-SCNC: 10.2 MMOL/L (ref 5–15)
AST SERPL-CCNC: 15 U/L (ref 1–32)
BASOPHILS # BLD AUTO: 0.06 10*3/MM3 (ref 0–0.2)
BASOPHILS NFR BLD AUTO: 1.2 % (ref 0–1.5)
BILIRUB SERPL-MCNC: 0.8 MG/DL (ref 0–1.2)
BUN SERPL-MCNC: 15 MG/DL (ref 6–20)
BUN/CREAT SERPL: 19.5 (ref 7–25)
CALCIUM SPEC-SCNC: 9.2 MG/DL (ref 8.6–10.5)
CHLORIDE SERPL-SCNC: 102 MMOL/L (ref 98–107)
CHOLEST SERPL-MCNC: 162 MG/DL (ref 0–200)
CO2 SERPL-SCNC: 24.8 MMOL/L (ref 22–29)
CREAT SERPL-MCNC: 0.77 MG/DL (ref 0.57–1)
DEPRECATED RDW RBC AUTO: 38.6 FL (ref 37–54)
EOSINOPHIL # BLD AUTO: 0.06 10*3/MM3 (ref 0–0.4)
EOSINOPHIL NFR BLD AUTO: 1.2 % (ref 0.3–6.2)
ERYTHROCYTE [DISTWIDTH] IN BLOOD BY AUTOMATED COUNT: 12.1 % (ref 12.3–15.4)
GFR SERPL CREATININE-BSD FRML MDRD: 84 ML/MIN/1.73
GLOBULIN UR ELPH-MCNC: 2.8 GM/DL
GLUCOSE SERPL-MCNC: 87 MG/DL (ref 65–99)
HBA1C MFR BLD: 5.1 % (ref 4.8–5.6)
HCT VFR BLD AUTO: 39.4 % (ref 34–46.6)
HDLC SERPL-MCNC: 52 MG/DL (ref 40–60)
HGB BLD-MCNC: 13.4 G/DL (ref 12–15.9)
IMM GRANULOCYTES # BLD AUTO: 0.01 10*3/MM3 (ref 0–0.05)
IMM GRANULOCYTES NFR BLD AUTO: 0.2 % (ref 0–0.5)
LDLC SERPL CALC-MCNC: 97 MG/DL (ref 0–100)
LDLC/HDLC SERPL: 1.86 {RATIO}
LYMPHOCYTES # BLD AUTO: 1.72 10*3/MM3 (ref 0.7–3.1)
LYMPHOCYTES NFR BLD AUTO: 34.2 % (ref 19.6–45.3)
MCH RBC QN AUTO: 29.8 PG (ref 26.6–33)
MCHC RBC AUTO-ENTMCNC: 34 G/DL (ref 31.5–35.7)
MCV RBC AUTO: 87.8 FL (ref 79–97)
MONOCYTES # BLD AUTO: 0.32 10*3/MM3 (ref 0.1–0.9)
MONOCYTES NFR BLD AUTO: 6.4 % (ref 5–12)
NEUTROPHILS NFR BLD AUTO: 2.86 10*3/MM3 (ref 1.7–7)
NEUTROPHILS NFR BLD AUTO: 56.8 % (ref 42.7–76)
NRBC BLD AUTO-RTO: 0 /100 WBC (ref 0–0.2)
PLATELET # BLD AUTO: 301 10*3/MM3 (ref 140–450)
PMV BLD AUTO: 10.5 FL (ref 6–12)
POTASSIUM SERPL-SCNC: 4.1 MMOL/L (ref 3.5–5.2)
PROT SERPL-MCNC: 7 G/DL (ref 6–8.5)
RBC # BLD AUTO: 4.49 10*6/MM3 (ref 3.77–5.28)
SODIUM SERPL-SCNC: 137 MMOL/L (ref 136–145)
T4 FREE SERPL-MCNC: 1.18 NG/DL (ref 0.93–1.7)
TRIGL SERPL-MCNC: 66 MG/DL (ref 0–150)
TSH SERPL DL<=0.05 MIU/L-ACNC: 4.52 UIU/ML (ref 0.27–4.2)
VIT B12 BLD-MCNC: 288 PG/ML (ref 211–946)
VLDLC SERPL-MCNC: 13 MG/DL (ref 5–40)
WBC # BLD AUTO: 5.03 10*3/MM3 (ref 3.4–10.8)

## 2020-12-25 LAB
AMPHETAMINES SERPL QL SCN: NEGATIVE NG/ML
BARBITURATES SERPL QL SCN: NEGATIVE UG/ML
BENZODIAZ SERPL QL SCN: NEGATIVE NG/ML
CANNABINOIDS SERPL QL SCN: NEGATIVE NG/ML
COCAINE+BZE SERPL QL SCN: NEGATIVE NG/ML
METHADONE SERPL QL SCN: NEGATIVE NG/ML
OPIATES SERPL QL SCN: NEGATIVE NG/ML
OXYCODONE+OXYMORPHONE SERPLBLD QL SCN: NEGATIVE NG/ML
PCP SERPL QL SCN: NEGATIVE NG/ML
PROPOXYPH SERPL QL SCN: NEGATIVE NG/ML

## 2020-12-28 DIAGNOSIS — F41.1 GENERALIZED ANXIETY DISORDER: ICD-10-CM

## 2020-12-28 DIAGNOSIS — F41.0 PANIC DISORDER WITHOUT AGORAPHOBIA: ICD-10-CM

## 2020-12-28 RX ORDER — CLONAZEPAM 0.5 MG/1
.25-.5 TABLET ORAL DAILY PRN
Qty: 20 TABLET | Refills: 0 | Status: SHIPPED | OUTPATIENT
Start: 2020-12-28 | End: 2021-02-02 | Stop reason: SDUPTHER

## 2020-12-28 RX ORDER — CLONAZEPAM 0.5 MG/1
.25-.5 TABLET ORAL DAILY PRN
Qty: 20 TABLET | Refills: 0 | OUTPATIENT
Start: 2020-12-28

## 2020-12-28 RX ORDER — CLONAZEPAM 0.5 MG/1
.25-.5 TABLET ORAL DAILY PRN
Qty: 20 TABLET | Refills: 0 | Status: CANCELLED | OUTPATIENT
Start: 2020-12-28

## 2020-12-30 RX ORDER — LEVOTHYROXINE SODIUM 0.15 MG/1
150 TABLET ORAL DAILY
Qty: 30 TABLET | Refills: 5 | Status: SHIPPED | OUTPATIENT
Start: 2020-12-30 | End: 2021-06-03 | Stop reason: SDUPTHER

## 2021-01-20 ENCOUNTER — OFFICE VISIT (OUTPATIENT)
Dept: FAMILY MEDICINE CLINIC | Facility: CLINIC | Age: 36
End: 2021-01-20

## 2021-01-20 VITALS
OXYGEN SATURATION: 99 % | HEIGHT: 66 IN | WEIGHT: 237 LBS | TEMPERATURE: 96.8 F | BODY MASS INDEX: 38.09 KG/M2 | HEART RATE: 79 BPM | DIASTOLIC BLOOD PRESSURE: 80 MMHG | SYSTOLIC BLOOD PRESSURE: 100 MMHG

## 2021-01-20 DIAGNOSIS — M51.26 LUMBAR DISC HERNIATION: ICD-10-CM

## 2021-01-20 PROCEDURE — 96372 THER/PROPH/DIAG INJ SC/IM: CPT | Performed by: NURSE PRACTITIONER

## 2021-01-20 PROCEDURE — 99213 OFFICE O/P EST LOW 20 MIN: CPT | Performed by: NURSE PRACTITIONER

## 2021-01-20 RX ORDER — KETOROLAC TROMETHAMINE 30 MG/ML
60 INJECTION, SOLUTION INTRAMUSCULAR; INTRAVENOUS ONCE
Status: COMPLETED | OUTPATIENT
Start: 2021-01-20 | End: 2021-01-20

## 2021-01-20 RX ORDER — CYCLOBENZAPRINE HCL 5 MG
TABLET ORAL
Qty: 120 TABLET | Refills: 5 | Status: SHIPPED | OUTPATIENT
Start: 2021-01-20 | End: 2021-05-03 | Stop reason: SINTOL

## 2021-01-20 RX ORDER — TRAMADOL HYDROCHLORIDE 50 MG/1
TABLET ORAL
Qty: 60 TABLET | Refills: 0 | Status: SHIPPED | OUTPATIENT
Start: 2021-01-20 | End: 2021-05-03 | Stop reason: SDUPTHER

## 2021-01-20 RX ORDER — DICLOFENAC SODIUM AND MISOPROSTOL 50; 200 MG/1; UG/1
1 TABLET, DELAYED RELEASE ORAL 2 TIMES DAILY
Qty: 60 TABLET | Refills: 2 | Status: SHIPPED | OUTPATIENT
Start: 2021-01-20 | End: 2021-02-23

## 2021-01-20 RX ADMIN — KETOROLAC TROMETHAMINE 60 MG: 30 INJECTION, SOLUTION INTRAMUSCULAR; INTRAVENOUS at 17:36

## 2021-01-20 NOTE — PROGRESS NOTES
"Subjective   Jarad Barragan is a 35 y.o. female.     Chief Complaint   Patient presents with   • Back Pain       History of Present Illness     Back pain-chronic and ongoing.  Reports she continues to have pain in her back and legs. She has had 6 (3 per each side of back) injections but \"didn't help\" per one time.  She had a tele-visit on the 15th.  She will have a follow up appt.  She has not had an epidural procedure.  She reports that she may have gotten \"an hour\".  She reports she is having \"good days and bad\".  She reports she \"does not hurt every day\" but the bad days seem to be more intense.  She has left work early today due to her pain.  She reports tramadol is helping \"some\".  She reports when she take medication she has to just rest.  She does not have any follow up with Neurosurgeon.  She reports no numbness in her legs today.  Pain is radiating into her bilateral groin and is sharp in nature.  She reports she has noted an increase in urgency of urine.  No incontinence of urine or bowel.       The following portions of the patient's history were reviewed and updated as appropriate: CC, ROS, allergies, current medications, past family history, past medical history, past social history, past surgical history and problem list.      Review of Systems   Constitutional: Positive for fatigue. Negative for appetite change and fever.   HENT: Negative for congestion, ear pain, nosebleeds, postnasal drip, rhinorrhea, sore throat, tinnitus, trouble swallowing and voice change.    Eyes: Negative for blurred vision, photophobia and visual disturbance.   Respiratory: Negative for cough, chest tightness, shortness of breath and wheezing.    Cardiovascular: Negative for chest pain and palpitations.   Gastrointestinal: Negative for abdominal pain, blood in stool, constipation, diarrhea, nausea and GERD.   Endocrine: Negative for cold intolerance, heat intolerance and polydipsia.   Genitourinary: Positive for urgency. " "Negative for decreased urine volume, difficulty urinating, dysuria and hematuria.   Musculoskeletal: Positive for back pain. Negative for arthralgias, gait problem and myalgias.   Skin: Negative for color change, pallor and bruise.   Allergic/Immunologic: Negative.    Neurological: Negative for dizziness, syncope, numbness and headache.   Hematological: Negative.    Psychiatric/Behavioral: Positive for depressed mood. Negative for decreased concentration, sleep disturbance and suicidal ideas. The patient is nervous/anxious.    All other systems reviewed and are negative.      Objective     /80   Pulse 79   Temp 96.8 °F (36 °C) (Tympanic)   Ht 167.6 cm (65.98\")   Wt 108 kg (237 lb)   LMP 12/16/2020   SpO2 99%   BMI 38.28 kg/m²     Physical Exam  Vitals signs reviewed.   Constitutional:       General: She is not in acute distress.     Appearance: She is well-developed. She is not diaphoretic.   HENT:      Head: Normocephalic and atraumatic.      Comments: Oropharynx not examined.  Patient is presently wearing a face covering/mask due to COVID-19 pandemic.     Right Ear: Hearing, tympanic membrane, ear canal and external ear normal.      Left Ear: Hearing, tympanic membrane, ear canal and external ear normal.   Eyes:      General: Lids are normal. No scleral icterus.     Extraocular Movements:      Right eye: Normal extraocular motion and no nystagmus.      Left eye: Normal extraocular motion and no nystagmus.      Conjunctiva/sclera: Conjunctivae normal.      Pupils: Pupils are equal, round, and reactive to light.   Neck:      Musculoskeletal: Normal range of motion and neck supple.      Thyroid: No thyromegaly.      Vascular: No carotid bruit or JVD.      Trachea: No tracheal tenderness.   Cardiovascular:      Rate and Rhythm: Normal rate and regular rhythm.      Heart sounds: Normal heart sounds, S1 normal and S2 normal. No murmur.   Pulmonary:      Effort: Pulmonary effort is normal.      Breath " sounds: Normal breath sounds.   Chest:      Chest wall: No tenderness.   Abdominal:      General: Bowel sounds are normal.      Palpations: Abdomen is soft. There is no hepatomegaly, splenomegaly or mass.      Tenderness: There is no abdominal tenderness.   Musculoskeletal:      Right ankle: She exhibits swelling (laterally behind ankle).      Left ankle: She exhibits swelling (laterally behind ankle).      Lumbar back: She exhibits decreased range of motion, tenderness and spasm.      Right upper leg: She exhibits tenderness.      Left upper leg: She exhibits tenderness.      Right lower leg: She exhibits tenderness. No edema.      Left lower leg: She exhibits tenderness. No edema.      Comments: Gait slow and steady.   equal bilaterally. No muscular atrophy or flaccidity.   Lymphadenopathy:      Cervical: No cervical adenopathy.      Right cervical: No superficial cervical adenopathy.     Left cervical: No superficial cervical adenopathy.   Skin:     General: Skin is warm and dry.      Capillary Refill: Capillary refill takes less than 2 seconds.      Coloration: Skin is not pale.      Findings: No erythema.      Nails: There is no clubbing.     Neurological:      Mental Status: She is alert and oriented to person, place, and time.      Cranial Nerves: No cranial nerve deficit.      Sensory: No sensory deficit.      Motor: No tremor, atrophy or abnormal muscle tone.      Coordination: Coordination normal.      Deep Tendon Reflexes: Reflexes are normal and symmetric.   Psychiatric:         Attention and Perception: She is attentive.         Mood and Affect: Mood is anxious (mildly) and depressed. Affect is not tearful.         Speech: Speech normal.         Behavior: Behavior normal.         Thought Content: Thought content normal.         Judgment: Judgment normal.       Assessment/Plan     Problem List Items Addressed This Visit        Neuro    Lumbar disc herniation    Relevant Medications     cyclobenzaprine (FLEXERIL) 5 MG tablet    diclofenac-miSOPROStol (ARTHROTEC 50) 50-0.2 MG EC tablet    traMADol (ULTRAM) 50 MG tablet          Patient's Body mass index is 38.28 kg/m². BMI is above normal parameters. Recommendations include: nutrition counseling.       Understands disease processes and need for medications.  Understands reasons for urgent and emergent care.  Patient (& family) verbalized agreement for treatment plan.   Emotional support and active listening provided.  Patient provided time to verbalize feelings.    PURVI reviewed today and consistent.  Will refill prescribed controlled medication today.  Patient is aware they cannot receive narcotics from any other provider except if under care of pain management or speciality clinic.  Risk and benefits of medication use has been reviewed.  History and physical exam exhibit continued safe and appropriate use of controlled substances.  The patient is aware of the potential for addiction and dependence.  This patient has been made aware of the appropriate use of such medications, including potential risk of somnolence, limited ability to drive and / or work safely, and potential for overdose.    It has also been made clear that these medications are for use by this patient only, without concomitant use of alcohol or other substances unless prescribed/advised by medical provider.  Patient understands they may be subject to UDS and pill counts at random.      Patient considered to be moderate risk for addiction due to use of multiple controlled medications.  Patient understands and accepts these risks.  Patient need for medication will be reassessed at each visit.  Doses will be adjusted according to patient need and findings.    Goal of TX: Patient will not have any adverse reactions of medication.  Patient have reduction in pain symptoms with use of PRN Norco as directed.  Patient will not have any adverse side effects from medication.  Patient will  be able to remain active in an outside of home without interference from pain symptoms.    Medication Dispense Information    Hydrocodone/Acetaminophen   Dispensed: 11/16/2020 12:00 AM   Written:  11/16/2020   Unit strength: 325MG/5MG   Days supply: 3   Dispense Note: GivenName=Mike=NOELBirthDate=[id number]=2031 [address], [address]   Quantity: 12 each   Pharmacy: [facility]   Authorizing provider: VIPUL SOSA   Received from: [facility] (Fill History)   Brand or Generic:       Medication Dispense Information    Clonazepam   Dispensed: 12/28/2020 12:00 AM   Written:  12/28/2020   Unit strength: 0.5MG   Days supply: 20   Dispense Note: GivenName=Mike=NOELBirthDate=[id number]=0678 [address], [address]   Quantity: 20 each   Pharmacy: [facility]   Authorizing provider: OMLAN CARIAS   Received from: [facility] (Fill History)   Brand or Generic:  Unknown     Medication Dispense Information    Tramadol Hcl   Dispensed: 10/20/2020 12:00 AM   Written:  10/19/2020   Unit strength: 50MG   Days supply: 30   Dispense Note: GivenName=Mike=NOELBirthDate=[id number]=06[birth year] [address], [address]   Quantity: 30 each   Pharmacy: [facility]   Authorizing provider: SIVAN DONALD   Received from: [facility] (Fill History)   Brand or Generic:         Do not take Klonopin and Norco or tramadol simultaneously.     RTC 1 month, sooner if needed.             This document has been electronically signed by:  LANA Quinn FNP-C Dragon disclaimer:  Much of this encounter note is an electronic transcription/translation of spoken language to printed text. The electronic translation of spoken language may permit erroneous, or at times, nonsensical words or phrases to be inadvertently transcribed; Although I have reviewed the note for such errors, some may still exist.

## 2021-02-01 ENCOUNTER — LAB (OUTPATIENT)
Dept: FAMILY MEDICINE CLINIC | Facility: CLINIC | Age: 36
End: 2021-02-01

## 2021-02-01 DIAGNOSIS — N91.2 AMENORRHEA: Primary | ICD-10-CM

## 2021-02-01 DIAGNOSIS — E03.4 HYPOTHYROIDISM DUE TO ACQUIRED ATROPHY OF THYROID: Primary | ICD-10-CM

## 2021-02-01 LAB — HCG INTACT+B SERPL-ACNC: <0.5 MIU/ML

## 2021-02-01 PROCEDURE — 84702 CHORIONIC GONADOTROPIN TEST: CPT | Performed by: NURSE PRACTITIONER

## 2021-02-02 ENCOUNTER — OFFICE VISIT (OUTPATIENT)
Dept: PSYCHIATRY | Facility: CLINIC | Age: 36
End: 2021-02-02

## 2021-02-02 VITALS
SYSTOLIC BLOOD PRESSURE: 128 MMHG | WEIGHT: 239 LBS | DIASTOLIC BLOOD PRESSURE: 80 MMHG | OXYGEN SATURATION: 98 % | BODY MASS INDEX: 38.6 KG/M2 | HEART RATE: 105 BPM

## 2021-02-02 DIAGNOSIS — F41.0 PANIC DISORDER WITHOUT AGORAPHOBIA: ICD-10-CM

## 2021-02-02 DIAGNOSIS — Z79.899 HIGH RISK MEDICATION USE: Primary | ICD-10-CM

## 2021-02-02 DIAGNOSIS — F33.0 MILD EPISODE OF RECURRENT MAJOR DEPRESSIVE DISORDER (HCC): ICD-10-CM

## 2021-02-02 DIAGNOSIS — F41.8 DEPRESSION WITH ANXIETY: ICD-10-CM

## 2021-02-02 DIAGNOSIS — F41.1 GENERALIZED ANXIETY DISORDER: ICD-10-CM

## 2021-02-02 PROCEDURE — 99214 OFFICE O/P EST MOD 30 MIN: CPT | Performed by: NURSE PRACTITIONER

## 2021-02-02 RX ORDER — SERTRALINE HYDROCHLORIDE 25 MG/1
12.5 TABLET, FILM COATED ORAL DAILY
Qty: 15 TABLET | Refills: 0 | Status: SHIPPED | OUTPATIENT
Start: 2021-02-02 | End: 2021-03-04 | Stop reason: SDUPTHER

## 2021-02-02 RX ORDER — CLONAZEPAM 0.5 MG/1
.25-.5 TABLET ORAL DAILY PRN
Qty: 20 TABLET | Refills: 0 | Status: SHIPPED | OUTPATIENT
Start: 2021-02-02 | End: 2021-03-04 | Stop reason: SDUPTHER

## 2021-02-02 RX ORDER — PAROXETINE 10 MG/1
5 TABLET, FILM COATED ORAL EVERY MORNING
Qty: 7 TABLET | Refills: 0 | Status: SHIPPED | OUTPATIENT
Start: 2021-02-02 | End: 2021-03-04

## 2021-02-02 NOTE — PROGRESS NOTES
"      Subjective   Jarad Barragan is a 35 y.o. female is here today for medication management follow-up.    Chief Complaint:  Anxiety panic     History of Present Illness:    Patient presents today for follow up for medication management for depression and anxiety. Patient states she always has a regular menstrual cycle, but she is late this month. Patient states she always starts her cycle on the 16th but has not started it yet. Patient states she has took seven at home pregnancy tests and they were all negative. Patient states she had a blood test done here as well and it was negative. Patient states her thyroid level has been messed up as well so she is unsure if that caused it or her shots for her back. Patient states still taking the Paxil and Clonazepam. Patient denies any depression or sadness. Patient states her \"nerves\" are alright right now. Patient states her anxiety and attacks take spells. Patient states last week Monday thru Thursday she was more anxious and since then she has been more calm. Patient reports anxiety is at a 0-1 on a 0-10 scale with 10 being the worst. Patient reports having 3-4 panic attacks a week for the most part. Patient states she took Clonazepam 0.5tab last night due to being upset. Patient reports panic attacks last all day and during an attacks patient has shortness of breath, heart racing, and panic feeling. Patient states not sleeping the best due to her back. Patient reports sleeping 5-6 hours a night and patient denies any nightmares. Patient reports appetite is good and eating 2-3 meals a day. Patient denies any auditory or visual hallucinations. Patient adamantly denies any SI or HI. Patient denies any side effects to medications.   The following portions of the patient's history were reviewed and updated as appropriate: allergies, current medications, past family history, past medical history, past social history, past surgical history and problem list.    Review of " Systems   Constitutional: Negative.    Respiratory: Negative.    Cardiovascular: Negative.    Gastrointestinal: Negative.    Neurological: Negative.    Psychiatric/Behavioral: Positive for sleep disturbance. The patient is nervous/anxious.        Objective   Physical Exam  Vitals signs reviewed.   Constitutional:       Appearance: Normal appearance. She is well-developed, well-groomed and normal weight.   Neurological:      Mental Status: She is alert.   Psychiatric:         Attention and Perception: Attention and perception normal.         Mood and Affect: Affect normal. Mood is anxious.         Speech: Speech normal.         Behavior: Behavior normal. Behavior is cooperative.         Thought Content: Thought content normal.         Cognition and Memory: Cognition and memory normal.         Judgment: Judgment normal.       Blood pressure 128/80, pulse 105, weight 108 kg (239 lb), SpO2 98 %. Body mass index is 38.6 kg/m².    Allergies   Allergen Reactions   • Tape Unknown (See Comments)     Blisters on skin   • Codeine Rash   • Hydrocodone Itching       Medication List:   Current Outpatient Medications   Medication Sig Dispense Refill   • clonazePAM (KlonoPIN) 0.5 MG tablet Take 0.5-1 tablets by mouth Daily As Needed for Anxiety. 20 tablet 0   • cyclobenzaprine (FLEXERIL) 5 MG tablet 1 in the AM, 1 at noon, 2 @  tablet 5   • diclofenac-miSOPROStol (ARTHROTEC 50) 50-0.2 MG EC tablet Take 1 tablet by mouth 2 (Two) Times a Day. 60 tablet 2   • levothyroxine (Synthroid) 150 MCG tablet Take 1 tablet by mouth Daily. 30 tablet 5   • PARoxetine (Paxil) 10 MG tablet Take 1 tablet by mouth Every Morning. 30 tablet 5   • promethazine (PHENERGAN) 25 MG tablet Take 1 tablet by mouth Every 6 (Six) Hours As Needed for Nausea or Vomiting. 60 tablet 1   • RABEprazole (Aciphex) 20 MG EC tablet Take 1 tablet by mouth Daily. 30 tablet 5   • traMADol (ULTRAM) 50 MG tablet 1 po bid and 2 po qhs 60 tablet 0   • vitamin D  (ERGOCALCIFEROL) 1.25 MG (98993 UT) capsule capsule Take 1 capsule by mouth 1 (One) Time Per Week. 4 capsule 5     No current facility-administered medications for this visit.        Mental Status Exam:   Hygiene:   Good  Cooperation:  Cooperative  Eye Contact:  Good  Psychomotor Behavior:  Appropriate  Affect:  Appropriate  Hopelessness: Denies  Speech:  Normal  Thought Process:  Goal directed and Linear  Thought Content:  Normal  Suicidal:  None  Homicidal:  None  Hallucinations:  None  Delusion:  None  Memory:  Intact  Orientation:  Person, Place, Time and Situation  Reliability:  fair  Insight:  Poor  Judgement:  Fair  Impulse Control:  Fair  Physical/Medical Issues:  No               Assessment/Plan   Diagnoses and all orders for this visit:    1. High risk medication use (Primary)  -     Urine Drug Screen - Urine, Clean Catch; Future    2. Panic disorder without agoraphobia  -     clonazePAM (KlonoPIN) 0.5 MG tablet; Take 0.5-1 tablets by mouth Daily As Needed for Anxiety.  Dispense: 20 tablet; Refill: 0  -     PARoxetine (Paxil) 10 MG tablet; Take 0.5 tablets by mouth Every Morning. For one week then stop  Dispense: 7 tablet; Refill: 0  -     sertraline (Zoloft) 25 MG tablet; Take 0.5 tablets by mouth Daily.  Dispense: 15 tablet; Refill: 0    3. Generalized anxiety disorder  -     clonazePAM (KlonoPIN) 0.5 MG tablet; Take 0.5-1 tablets by mouth Daily As Needed for Anxiety.  Dispense: 20 tablet; Refill: 0  -     PARoxetine (Paxil) 10 MG tablet; Take 0.5 tablets by mouth Every Morning. For one week then stop  Dispense: 7 tablet; Refill: 0  -     sertraline (Zoloft) 25 MG tablet; Take 0.5 tablets by mouth Daily.  Dispense: 15 tablet; Refill: 0    4. Mild episode of recurrent major depressive disorder (CMS/HCC)  -     PARoxetine (Paxil) 10 MG tablet; Take 0.5 tablets by mouth Every Morning. For one week then stop  Dispense: 7 tablet; Refill: 0  -     sertraline (Zoloft) 25 MG tablet; Take 0.5 tablets by mouth  Daily.  Dispense: 15 tablet; Refill: 0            Discussed medication options with patient. Decrease Paxil to 5mg daily for one week then stop. Start Zoloft 12.5mg daily for panic and anxiety. Cont. Clonazepam 0.5mg 0.5-1tab daily as needed for anxiety.  Reviewed the risks, benefits, and side effects of the medications; patient acknowledged and verbally consented. Patient is being prescribed a controlled substance as part of treatment plan. Patient has been educated of appropriate use of the medications, including risk of somnolence, limited ability to drive and/or work safely, and potential for dependence, respiratory depression and overdose. Patient is also informed that the medication are to be used by the patient only- avoid any combined use of ETOH or other substances unless prescribed. UDS ordered.   PURVI Patient Controlled Substance Report (from 2/3/2020 to 2/1/2021)    Dispensed  Strength Quantity Days Supply Provider Pharmacy   01/21/2021 Tramadol Hcl 50MG/50MG/50MG/50MG/50MG/ 60 each 15 SHEILAVIPUL [facility]   12/28/2020 Clonazepam 0.5MG 20 each 20 OLMAN CARIAS [facility]   11/25/2020 Clonazepam 0.5MG 20 each 20 ISBELLYESSENIA [facility]   11/16/2020 Hydrocodone/Acetaminophen 325MG/5MG 12 each 3 SHEILA, VIPUL [facility]   10/26/2020 Hydrocodone/Acetaminophen 325MG/5MG 12 each 3 SHEILA, VIPUL [facility]   10/24/2020 Clonazepam 0.5MG 20 each 20 MARLADEYA [facility]   10/20/2020 Tramadol Hcl 50MG 30 each 30 ODILON, SIVAN [facility]   09/18/2020 Tramadol Hcl 50MG 60 each 15 ERICKSONBENIGNO RIOS [facility]   09/14/2020 Clonazepam 0.5MG 20 each 20 CLOUD, PATRICIA [facility]   08/25/2020 Clonazepam 0.5MG 20 each 20 CLOUD, PATRICIA [facility]   08/04/2020 Clonazepam 0.5MG 20 each 30 CLOUD, PATRICIA [facility]   07/01/2020 Clonazepam 0.5MG 20 each 20 CLOUD, PATRICIAHER [facility]   06/02/2020 Clonazepam 0.5MG 20 each 20 CLOUD, PATRICIA [facility]  [place]   05/06/2020 Clonazepam 0.5MG 20 each 20 CLOUD, PATRICIA [facility] [place]   04/06/2020 Clonazepam 0.5MG 20 each 20 CLOUD, PATRICIA [facility] [place]   03/05/2020 Clonazepam 0.5MG 20 each 20 CLOUD, PATRICIA [facility] [place]   02/20/2020 Clonazepam 0.5MG 10 each 10 CLOUD, PATRICIA [facility] [place]        Discussed with patient risk associated with Paxil use during pregnancy. Discussed with patient risk and consequences associated with medication use and pregnancy. Patient states she would like to try something different in case she became pregnant. Discussed with patient setting up an appointment with GYN. Patient acknowledged and agreed. Discussed with patient talking with GYN regarding birth control options. Patient states she does not want to take birth control pills. Discussed with patient if she were to become pregnant to notify the office.    Patient is agreeable to call the office with any questions, concerns, or worsening of symptoms. Patient is aware to call 911 or go to the nearest ER should begin having SI/HI.        Follow up in four weeks      Errors in dictation may reflect use of voice recognition software and not all errors in transcription may have been detected prior to signing.              This document has been electronically signed by LANA Siu   February 2, 2021 09:27 EST

## 2021-02-05 ENCOUNTER — TELEPHONE (OUTPATIENT)
Dept: FAMILY MEDICINE CLINIC | Facility: CLINIC | Age: 36
End: 2021-02-05

## 2021-02-05 DIAGNOSIS — N91.2 AMENORRHEA: Primary | ICD-10-CM

## 2021-02-05 NOTE — TELEPHONE ENCOUNTER
I have placed an order for a pelvic and an abdominal ultrasound for the patient.  I have placed that order for [facility].  Please advise her that someone from scheduling should be calling her with an appointment

## 2021-02-05 NOTE — TELEPHONE ENCOUNTER
Patient is calling, she states that she is still having complications with her period.   She would like an ultrasound done.    Please advise.

## 2021-02-18 ENCOUNTER — APPOINTMENT (OUTPATIENT)
Dept: ULTRASOUND IMAGING | Facility: HOSPITAL | Age: 36
End: 2021-02-18

## 2021-02-18 ENCOUNTER — HOSPITAL ENCOUNTER (OUTPATIENT)
Dept: ULTRASOUND IMAGING | Facility: HOSPITAL | Age: 36
End: 2021-02-18

## 2021-02-19 ENCOUNTER — HOSPITAL ENCOUNTER (OUTPATIENT)
Dept: GENERAL RADIOLOGY | Facility: HOSPITAL | Age: 36
Discharge: HOME | End: 2021-02-19
Admitting: NURSE PRACTITIONER

## 2021-02-19 DIAGNOSIS — M54.9 BACK PAIN, UNSPECIFIED BACK LOCATION, UNSPECIFIED BACK PAIN LATERALITY, UNSPECIFIED CHRONICITY: ICD-10-CM

## 2021-02-19 DIAGNOSIS — M54.18 RADICULAR PAIN OF SACRUM: Primary | ICD-10-CM

## 2021-02-19 DIAGNOSIS — M54.18 RADICULAR PAIN OF SACRUM: ICD-10-CM

## 2021-02-19 PROCEDURE — 72220 X-RAY EXAM SACRUM TAILBONE: CPT

## 2021-02-19 PROCEDURE — 72050 X-RAY EXAM NECK SPINE 4/5VWS: CPT

## 2021-02-19 PROCEDURE — 72050 X-RAY EXAM NECK SPINE 4/5VWS: CPT | Performed by: RADIOLOGY

## 2021-02-19 PROCEDURE — 71100 X-RAY EXAM RIBS UNI 2 VIEWS: CPT

## 2021-02-19 PROCEDURE — 72072 X-RAY EXAM THORAC SPINE 3VWS: CPT | Performed by: RADIOLOGY

## 2021-02-19 PROCEDURE — 72220 X-RAY EXAM SACRUM TAILBONE: CPT | Performed by: RADIOLOGY

## 2021-02-19 PROCEDURE — 72070 X-RAY EXAM THORAC SPINE 2VWS: CPT

## 2021-02-19 PROCEDURE — 71100 X-RAY EXAM RIBS UNI 2 VIEWS: CPT | Performed by: RADIOLOGY

## 2021-02-19 PROCEDURE — 72100 X-RAY EXAM L-S SPINE 2/3 VWS: CPT | Performed by: RADIOLOGY

## 2021-02-19 PROCEDURE — 72100 X-RAY EXAM L-S SPINE 2/3 VWS: CPT

## 2021-02-23 ENCOUNTER — OFFICE VISIT (OUTPATIENT)
Dept: FAMILY MEDICINE CLINIC | Facility: CLINIC | Age: 36
End: 2021-02-23

## 2021-02-23 VITALS
TEMPERATURE: 97.3 F | HEIGHT: 66 IN | WEIGHT: 240 LBS | OXYGEN SATURATION: 98 % | HEART RATE: 96 BPM | SYSTOLIC BLOOD PRESSURE: 124 MMHG | DIASTOLIC BLOOD PRESSURE: 82 MMHG | BODY MASS INDEX: 38.57 KG/M2

## 2021-02-23 DIAGNOSIS — E66.09 CLASS 2 OBESITY DUE TO EXCESS CALORIES WITHOUT SERIOUS COMORBIDITY WITH BODY MASS INDEX (BMI) OF 37.0 TO 37.9 IN ADULT: Primary | ICD-10-CM

## 2021-02-23 DIAGNOSIS — F41.8 DEPRESSION WITH ANXIETY: ICD-10-CM

## 2021-02-23 DIAGNOSIS — M51.26 LUMBAR DISC HERNIATION: ICD-10-CM

## 2021-02-23 PROCEDURE — 99214 OFFICE O/P EST MOD 30 MIN: CPT | Performed by: NURSE PRACTITIONER

## 2021-02-23 RX ORDER — PHENTERMINE HYDROCHLORIDE 37.5 MG/1
37.5 TABLET ORAL
Qty: 30 TABLET | Refills: 0
Start: 2021-02-23 | End: 2021-03-23 | Stop reason: SDUPTHER

## 2021-02-23 NOTE — PROGRESS NOTES
"Subjective   Jarad Barragan is a 35 y.o. female.     Chief Complaint   Patient presents with   • Back Pain        History of Present Illness     Tailbone pain-reports sore from fall last week on ice.  She reports bruising.  She came down onto her tailbone.  She reports she had some increased pain related to fall.  She is under the care of pain management provider but is only had cortisone injections in her back.  She was advised per Dr. JANNY Craig, neurosurgeon that she would need epidural for pain relief.  Patient questions if her results from Dr. Werner office can be sent to her pain management provider for continuum of care.  She continues to take as needed tramadol but does not take daily.  She is also on as needed Flexeril for muscle spasms.  She has tried to be more mindful of her sitting positions at work as she has a mostly sedentary job.  Obesity-would like to resume weight loss.  She has an RX at home of Adipex.   She would like to try and take again.  She did not take consistently. She reports she did have occasional palpitations.   Depression & anxiety-ongoing.  Patient is currently having a wean off of Paxil as she will be starting on Zoloft.  She is also on as needed Klonopin.  No negative side effects.  Depression and anxiety are \"about the same\".  She is not having any negative medication side effects.    The following portions of the patient's history were reviewed and updated as appropriate: CC, ROS, allergies, current medications, past family history, past medical history, past social history, past surgical history and problem list.      Review of Systems   Constitutional: Positive for fatigue. Negative for appetite change and fever.   HENT: Negative for congestion, ear pain, nosebleeds, postnasal drip, rhinorrhea, sore throat, tinnitus, trouble swallowing and voice change.    Eyes: Negative for blurred vision, photophobia and visual disturbance.   Respiratory: Negative for cough, chest tightness, " "shortness of breath and wheezing.    Cardiovascular: Negative for chest pain and palpitations.   Gastrointestinal: Negative for abdominal pain, blood in stool, constipation, diarrhea, nausea and GERD.   Endocrine: Negative for cold intolerance, heat intolerance and polydipsia.   Genitourinary: Negative for decreased urine volume, difficulty urinating, dysuria and hematuria.   Musculoskeletal: Positive for arthralgias, back pain and gait problem. Negative for myalgias.   Skin: Negative for color change, pallor and bruise.   Allergic/Immunologic: Negative.    Neurological: Negative for dizziness, syncope, numbness and headache.   Hematological: Negative.    Psychiatric/Behavioral: Positive for dysphoric mood and stress. Negative for decreased concentration, sleep disturbance, suicidal ideas and depressed mood. The patient is nervous/anxious.    All other systems reviewed and are negative.      Objective     /82   Pulse 96   Temp 97.3 °F (36.3 °C)   Ht 167.6 cm (65.98\")   Wt 109 kg (240 lb)   SpO2 98%   BMI 38.76 kg/m²     Physical Exam  Vitals signs reviewed.   Constitutional:       General: She is not in acute distress.     Appearance: She is well-developed. She is not diaphoretic.   HENT:      Head: Normocephalic and atraumatic.      Comments: Oropharynx not examined.  Patient is presently wearing a face covering/mask due to COVID-19 pandemic.     Right Ear: Hearing, tympanic membrane, ear canal and external ear normal.      Left Ear: Hearing, tympanic membrane, ear canal and external ear normal.   Eyes:      General: Lids are normal. No scleral icterus.     Extraocular Movements:      Right eye: Normal extraocular motion and no nystagmus.      Left eye: Normal extraocular motion and no nystagmus.      Conjunctiva/sclera: Conjunctivae normal.      Pupils: Pupils are equal, round, and reactive to light.   Neck:      Musculoskeletal: Normal range of motion and neck supple.      Thyroid: No thyromegaly.     "  Vascular: No carotid bruit or JVD.      Trachea: No tracheal tenderness.   Cardiovascular:      Rate and Rhythm: Normal rate and regular rhythm.      Heart sounds: Normal heart sounds, S1 normal and S2 normal. No murmur.   Pulmonary:      Effort: Pulmonary effort is normal.      Breath sounds: Normal breath sounds.   Chest:      Chest wall: No tenderness.   Abdominal:      General: Bowel sounds are normal.      Palpations: Abdomen is soft. There is no hepatomegaly, splenomegaly or mass.      Tenderness: There is no abdominal tenderness.   Musculoskeletal:         General: No tenderness.      Right lower leg: No edema.      Left lower leg: No edema.      Comments: Gait normal.  No muscular atrophy or flaccidity.   Lymphadenopathy:      Cervical: No cervical adenopathy.      Right cervical: No superficial cervical adenopathy.     Left cervical: No superficial cervical adenopathy.   Skin:     General: Skin is warm and dry.      Capillary Refill: Capillary refill takes less than 2 seconds.      Coloration: Skin is not pale.      Findings: No erythema.      Nails: There is no clubbing.     Neurological:      Mental Status: She is alert and oriented to person, place, and time.      Cranial Nerves: No cranial nerve deficit or facial asymmetry.      Sensory: No sensory deficit.      Motor: No tremor, atrophy or abnormal muscle tone.      Coordination: Coordination normal.      Deep Tendon Reflexes: Reflexes are normal and symmetric.   Psychiatric:         Attention and Perception: She is attentive.         Mood and Affect: Mood normal.         Speech: Speech normal.         Behavior: Behavior normal.         Thought Content: Thought content normal.         Judgment: Judgment normal.       Assessment/Plan     Problem List Items Addressed This Visit        Endocrine and Metabolic    Class 2 obesity due to excess calories without serious comorbidity with body mass index (BMI) of 37.0 to 37.9 in adult - Primary    Current  Assessment & Plan     May resume prescription of Adipex that patient already has at home.  Encouraged to monitor for negative side effects.  Report any palpitations or overstimulation symptoms.  Encouraged her to avoid caffeine when taking medication.  Discussed patient 70 to 80 g of protein per day to aid with metabolism.  Encouraged her to be drinking more fluids especially water.  Discussed with patient that she may flavor water if necessary to increase palatability         Relevant Medications    phentermine (Adipex-P) 37.5 MG tablet       Mental Health    Depression with anxiety    Current Assessment & Plan     Continue PRN Klonopin and Paxil 10 mg until weaned off.  Begin Zoloft as directed.  Stress reduction advised (examples:  make time for self, Reading, Listening to music, Exercise, keeping a journal, venting to a confidant, etc.)  Continue under care of psych NP         Relevant Medications    PARoxetine (Paxil) 10 MG tablet    sertraline (Zoloft) 25 MG tablet    phentermine (Adipex-P) 37.5 MG tablet       Neuro    Lumbar disc herniation    Current Assessment & Plan     Will follow up with pain management provider to see if patient able to receive recommended Epidural as recommended by Dr Werner, Neurology.  Continue PRN tramadol and Flexeril.           Relevant Medications    cyclobenzaprine (FLEXERIL) 5 MG tablet    traMADol (ULTRAM) 50 MG tablet          Patient's Body mass index is 38.76 kg/m². BMI is above normal parameters. Recommendations include: nutrition counseling.     Understands disease processes and need for medications.  Understands reasons for urgent and emergent care.  Patient (& family) verbalized agreement for treatment plan.   Emotional support and active listening provided.  Patient provided time to verbalize feelings.    Copy of [facility] chair exercises for fitness provided.  Advised patient to begin slowly and increase frequency as able to tolerate.  Patient understands they daniels  incorporate resistance bands and small hand weights as tolerated into their exercise routine.     RTC 1 month, sooner if needed.             This document has been electronically signed by:  LANA Quinn FNP-C Dragon disclaimer:  Much of this encounter note is an electronic transcription/translation of spoken language to printed text. The electronic translation of spoken language may permit erroneous, or at times, nonsensical words or phrases to be inadvertently transcribed; Although I have reviewed the note for such errors, some may still exist.

## 2021-02-25 ENCOUNTER — TELEPHONE (OUTPATIENT)
Dept: FAMILY MEDICINE CLINIC | Facility: CLINIC | Age: 36
End: 2021-02-25

## 2021-02-25 RX ORDER — NITROFURANTOIN 25; 75 MG/1; MG/1
100 CAPSULE ORAL EVERY 12 HOURS SCHEDULED
Qty: 20 CAPSULE | Refills: 0 | Status: SHIPPED | OUTPATIENT
Start: 2021-02-25 | End: 2021-03-23

## 2021-02-25 NOTE — TELEPHONE ENCOUNTER
Patient is calling requesting antibiotic be sent to pharmacy for UTI.   She states that she woke up this morning with burning.

## 2021-02-28 NOTE — ASSESSMENT & PLAN NOTE
Will follow up with pain management provider to see if patient able to receive recommended Epidural as recommended by Dr Werner, Neurology.  Continue PRN tramadol and Flexeril.

## 2021-02-28 NOTE — ASSESSMENT & PLAN NOTE
May resume prescription of Adipex that patient already has at home.  Encouraged to monitor for negative side effects.  Report any palpitations or overstimulation symptoms.  Encouraged her to avoid caffeine when taking medication.  Discussed patient 70 to 80 g of protein per day to aid with metabolism.  Encouraged her to be drinking more fluids especially water.  Discussed with patient that she may flavor water if necessary to increase palatability

## 2021-02-28 NOTE — ASSESSMENT & PLAN NOTE
Continue PRN Klonopin and Paxil 10 mg until weaned off.  Begin Zoloft as directed.  Stress reduction advised (examples:  make time for self, Reading, Listening to music, Exercise, keeping a journal, venting to a confidant, etc.)  Continue under care of psych NP

## 2021-03-04 ENCOUNTER — OFFICE VISIT (OUTPATIENT)
Dept: PSYCHIATRY | Facility: CLINIC | Age: 36
End: 2021-03-04

## 2021-03-04 VITALS
HEART RATE: 82 BPM | SYSTOLIC BLOOD PRESSURE: 126 MMHG | WEIGHT: 239 LBS | BODY MASS INDEX: 38.59 KG/M2 | DIASTOLIC BLOOD PRESSURE: 78 MMHG

## 2021-03-04 DIAGNOSIS — F41.1 GENERALIZED ANXIETY DISORDER: ICD-10-CM

## 2021-03-04 DIAGNOSIS — F41.0 PANIC DISORDER WITHOUT AGORAPHOBIA: ICD-10-CM

## 2021-03-04 DIAGNOSIS — F33.0 MILD EPISODE OF RECURRENT MAJOR DEPRESSIVE DISORDER (HCC): ICD-10-CM

## 2021-03-04 PROCEDURE — 99213 OFFICE O/P EST LOW 20 MIN: CPT | Performed by: NURSE PRACTITIONER

## 2021-03-04 RX ORDER — SERTRALINE HYDROCHLORIDE 25 MG/1
25 TABLET, FILM COATED ORAL DAILY
Qty: 30 TABLET | Refills: 0 | Status: SHIPPED | OUTPATIENT
Start: 2021-03-04 | End: 2021-04-08 | Stop reason: SDUPTHER

## 2021-03-04 RX ORDER — CLONAZEPAM 0.5 MG/1
.25-.5 TABLET ORAL DAILY PRN
Qty: 20 TABLET | Refills: 0 | Status: SHIPPED | OUTPATIENT
Start: 2021-03-04 | End: 2021-04-08 | Stop reason: SDUPTHER

## 2021-03-04 NOTE — PROGRESS NOTES
Subjective   Jarad Barragan is a 35 y.o. female is here today for medication management follow-up.    Chief Complaint:  Anxiety panic attacks    History of Present Illness:    Patient presents today for follow up for medication management for panic attacks and anxiety. Patient states she did switch the medication and denies any side effects. Patient states still taking half a pill. Patient denies depression or sadness. Patient states having days in which do alright and then days that she doesn't Patient reports anxiety is at a 9 on a 0-10 scale with 10 being the worst on bad days. Patient states feeling on edge when anxiety is high. Patient reports having 5-6 panic attacks in the last two weeks. Patient reports panic attacks last all day and during an attacks patient has shortness of breath, heart racing, and panic feeling. Patient reports sleeping 5-6 hours a night and patient denies any nightmares. Patient reports appetite is good  and eating 2-3 meals a day. Patient denies any auditory or visual hallucinations. Patient adamantly denies any SI or HI. Patient denies any side effects to medications. Patient denies any medical changes since last visit.   The following portions of the patient's history were reviewed and updated as appropriate: allergies, current medications, past family history, past medical history, past social history, past surgical history and problem list.    Review of Systems   Constitutional: Negative.    Respiratory: Negative.    Cardiovascular: Negative.    Gastrointestinal: Negative.    Neurological: Negative.    Psychiatric/Behavioral: The patient is nervous/anxious.        Objective   Physical Exam  Vitals reviewed.   Constitutional:       Appearance: Normal appearance. She is well-developed and well-groomed.   Neurological:      Mental Status: She is alert.   Psychiatric:         Attention and Perception: Attention and perception normal.         Mood and Affect: Affect normal. Mood is  anxious.         Speech: Speech normal.         Behavior: Behavior normal. Behavior is cooperative.         Thought Content: Thought content normal.         Cognition and Memory: Cognition and memory normal.         Judgment: Judgment normal.       Blood pressure 126/78, pulse 82, weight 108 kg (239 lb). Body mass index is 38.59 kg/m².    Allergies   Allergen Reactions   • Tape Unknown (See Comments)     Blisters on skin   • Codeine Rash   • Hydrocodone Itching       Medication List:   Current Outpatient Medications   Medication Sig Dispense Refill   • clonazePAM (KlonoPIN) 0.5 MG tablet Take 0.5-1 tablets by mouth Daily As Needed for Anxiety. 20 tablet 0   • cyclobenzaprine (FLEXERIL) 5 MG tablet 1 in the AM, 1 at noon, 2 @  tablet 5   • levothyroxine (Synthroid) 150 MCG tablet Take 1 tablet by mouth Daily. 30 tablet 5   • phentermine (Adipex-P) 37.5 MG tablet Take 1 tablet by mouth Every Morning Before Breakfast. 30 tablet 0   • promethazine (PHENERGAN) 25 MG tablet Take 1 tablet by mouth Every 6 (Six) Hours As Needed for Nausea or Vomiting. 60 tablet 1   • RABEprazole (Aciphex) 20 MG EC tablet Take 1 tablet by mouth Daily. 30 tablet 5   • sertraline (Zoloft) 25 MG tablet Take 1 tablet by mouth Daily. 30 tablet 0   • traMADol (ULTRAM) 50 MG tablet 1 po bid and 2 po qhs 60 tablet 0   • vitamin D (ERGOCALCIFEROL) 1.25 MG (53877 UT) capsule capsule Take 1 capsule by mouth 1 (One) Time Per Week. 4 capsule 5   • nitrofurantoin, macrocrystal-monohydrate, (Macrobid) 100 MG capsule Take 1 capsule by mouth Every 12 (Twelve) Hours. 20 capsule 0     No current facility-administered medications for this visit.       Mental Status Exam:   Hygiene:   good  Cooperation:  Cooperative  Eye Contact:  Good  Psychomotor Behavior:  Appropriate  Affect:  Appropriate  Hopelessness: Denies  Speech:  Normal  Thought Process:  Goal directed and Linear  Thought Content:  Normal  Suicidal:  None  Homicidal:  None  Hallucinations:   None  Delusion:  None  Memory:  Intact  Orientation:  Person, Place, Time and Situation  Reliability:  fair  Insight:  Poor  Judgement:  Fair  Impulse Control:  Fair  Physical/Medical Issues:  No               Assessment/Plan   Diagnoses and all orders for this visit:    1. Panic disorder without agoraphobia  -     sertraline (Zoloft) 25 MG tablet; Take 1 tablet by mouth Daily.  Dispense: 30 tablet; Refill: 0  -     clonazePAM (KlonoPIN) 0.5 MG tablet; Take 0.5-1 tablets by mouth Daily As Needed for Anxiety.  Dispense: 20 tablet; Refill: 0    2. Generalized anxiety disorder  -     sertraline (Zoloft) 25 MG tablet; Take 1 tablet by mouth Daily.  Dispense: 30 tablet; Refill: 0  -     clonazePAM (KlonoPIN) 0.5 MG tablet; Take 0.5-1 tablets by mouth Daily As Needed for Anxiety.  Dispense: 20 tablet; Refill: 0    3. Mild episode of recurrent major depressive disorder (CMS/HCC)  -     sertraline (Zoloft) 25 MG tablet; Take 1 tablet by mouth Daily.  Dispense: 30 tablet; Refill: 0            Discussed medication options with patient. Increase Zoloft to 25mg daily for panic and anxiety. Discont. Paxil. Cont. Clonazepam 0.5mg 0.5-1tab daily as needed for anxiety.  Reviewed the risks, benefits, and side effects of the medications; patient acknowledged and verbally consented. Patient is being prescribed a controlled substance as part of treatment plan. Patient has been educated of appropriate use of the medications, including risk of somnolence, limited ability to drive and/or work safely, and potential for dependence, respiratory depression and overdose. Patient is also informed that the medication are to be used by the patient only- avoid any combined use of ETOH or other substances unless prescribed.     [facility] Patient Controlled Substance Report (from 3/16/2020 to 3/4/2021)    Dispensed  Strength Quantity Days Supply Provider Pharmacy   02/02/2021 Clonazepam 0.5MG 20 each 20 PATRICIA SEQUEIR[facility] [place]   01/21/2021  Tramadol Hcl 50MG/50MG/50MG/50MG/50MG/ 60 each 15 SHEILA, VIPUL [facility]   12/28/2020 Clonazepam 0.5MG 20 each 20 ARETHA, OLMAN [facility]   11/25/2020 Clonazepam 0.5MG 20 each 20 ISBELL, YESSENIA [facility]   11/16/2020 Hydrocodone/Acetaminophen 325MG/5MG 12 each 3 [facility]ILA, VIPUL [facility]   10/26/2020 Hydrocodone/Acetaminophen 325MG/5MG 12 each 3 SHEILA, VIPUL [facility]   10/24/2020 Clonazepam 0.5MG 20 each 20 MARLA, DEYA [facility]   10/20/2020 Tramadol Hcl 50MG 30 each 30 ODILON, SIVAN [facility]   09/18/2020 Tramadol Hcl 50MG 60 each 15 ERICKSON, BENIGNO [facility]   09/14/2020 Clonazepam 0.5MG 20 each 20 CLOUD, PATRICIA [facility]   08/25/2020 Clonazepam 0.5MG 20 each 20 CLOUD, PATRICIA [facility]   08/04/2020 Clonazepam 0.5MG 20 each 30 [facility], [facility]RICIA [facility]   07/01/2020 Clonazepam 0.5MG 20 each 20 CLOUD, PATRICIA [facility]   06/02/2020 Clonazepam 0.5MG 20 each 20 CLOU[facility], [facility]RICIA [facility]   05/06/2020 Clonazepam 0.5MG 20 each 20 CLOUD, PATRICIA [facility]   04/06/2020 Clonazepam 0.5MG 20 each 20 CLOUD, PATRICIA            Patient is agreeable to call the office with any questions, concerns, or worsening of symptoms.  Patient is aware to call 911 or go to the nearest ER should begin having SI/HI.        Follow up in four weeks       Errors in dictation may reflect use of voice recognition software and not all errors in transcription may have been detected prior to signing.              This document has been electronically signed by LANA Siu   March 16, 2021 12:26 EDT

## 2021-03-18 ENCOUNTER — BULK ORDERING (OUTPATIENT)
Dept: CASE MANAGEMENT | Facility: OTHER | Age: 36
End: 2021-03-18

## 2021-03-18 DIAGNOSIS — Z23 IMMUNIZATION DUE: ICD-10-CM

## 2021-03-23 ENCOUNTER — OFFICE VISIT (OUTPATIENT)
Dept: FAMILY MEDICINE CLINIC | Facility: CLINIC | Age: 36
End: 2021-03-23

## 2021-03-23 VITALS
TEMPERATURE: 97.6 F | OXYGEN SATURATION: 98 % | HEART RATE: 102 BPM | DIASTOLIC BLOOD PRESSURE: 82 MMHG | SYSTOLIC BLOOD PRESSURE: 122 MMHG | WEIGHT: 233 LBS | HEIGHT: 66 IN | BODY MASS INDEX: 37.45 KG/M2

## 2021-03-23 DIAGNOSIS — M51.26 LUMBAR DISC HERNIATION: Primary | ICD-10-CM

## 2021-03-23 DIAGNOSIS — F41.8 DEPRESSION WITH ANXIETY: ICD-10-CM

## 2021-03-23 DIAGNOSIS — E66.09 CLASS 2 OBESITY DUE TO EXCESS CALORIES WITHOUT SERIOUS COMORBIDITY WITH BODY MASS INDEX (BMI) OF 37.0 TO 37.9 IN ADULT: ICD-10-CM

## 2021-03-23 PROCEDURE — 99214 OFFICE O/P EST MOD 30 MIN: CPT | Performed by: NURSE PRACTITIONER

## 2021-03-23 RX ORDER — PHENTERMINE HYDROCHLORIDE 37.5 MG/1
37.5 TABLET ORAL
Qty: 30 TABLET | Refills: 0 | Status: SHIPPED | OUTPATIENT
Start: 2021-03-23 | End: 2021-06-03 | Stop reason: SDUPTHER
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
## 2021-03-28 NOTE — ASSESSMENT & PLAN NOTE
Visit notes printed from [facility] orthopedic.  Will follow up again with current pain management provider to see if he is able to provide an epidural injection for patient's pain relief.  If not able, will consider referral to the clinic who does epidural blocks

## 2021-04-08 ENCOUNTER — OFFICE VISIT (OUTPATIENT)
Dept: PSYCHIATRY | Facility: CLINIC | Age: 36
End: 2021-04-08

## 2021-04-08 VITALS
SYSTOLIC BLOOD PRESSURE: 132 MMHG | WEIGHT: 230.2 LBS | OXYGEN SATURATION: 98 % | BODY MASS INDEX: 37 KG/M2 | HEIGHT: 66 IN | HEART RATE: 98 BPM | DIASTOLIC BLOOD PRESSURE: 90 MMHG

## 2021-04-08 DIAGNOSIS — F41.0 PANIC DISORDER WITHOUT AGORAPHOBIA: ICD-10-CM

## 2021-04-08 DIAGNOSIS — F33.0 MILD EPISODE OF RECURRENT MAJOR DEPRESSIVE DISORDER (HCC): ICD-10-CM

## 2021-04-08 DIAGNOSIS — F41.1 GENERALIZED ANXIETY DISORDER: ICD-10-CM

## 2021-04-08 PROCEDURE — 99213 OFFICE O/P EST LOW 20 MIN: CPT | Performed by: NURSE PRACTITIONER

## 2021-04-08 RX ORDER — SERTRALINE HYDROCHLORIDE 25 MG/1
25 TABLET, FILM COATED ORAL DAILY
Qty: 30 TABLET | Refills: 0
Start: 2021-04-08 | End: 2021-04-16

## 2021-04-08 RX ORDER — CLONAZEPAM 0.5 MG/1
.25-.5 TABLET ORAL DAILY PRN
Qty: 20 TABLET | Refills: 0 | Status: SHIPPED | OUTPATIENT
Start: 2021-04-08 | End: 2021-05-07 | Stop reason: SDUPTHER

## 2021-04-08 NOTE — PROGRESS NOTES
Subjective   Jarad Barragan is a 35 y.o. female is here today for medication management follow-up.    Chief Complaint:  Anxiety panic    History of Present Illness:    Patient presents today for follow up for medication management for depression and anxiety. Patient states any time increase the med gets sick to stomach and having headaches. Patient states taking the 12.5mg.  Patient reports currently depression is at a 0 on a 0-10 scale with 10 being the worst. Patient reports anxiety is at a 5 on a 0-10 scale with 10 being the worst. Patient reports having 3 panic attacks in the last week. Patient reports panic attacks last 20-30 minutes and during an attacks patient has shortness of breath, heart racing, and panic feeling. Patient reports sleeping 6-8 hours a night and patient denies any nightmares. Patient reports appetite is good and eating 2-3 meals a day. Patient denies any auditory or visual hallucinations. Patient adamantly denies any SI or HI. Patient denies any side effects to medications. Patient denies any medical changes since last visit. Patient states not having the floaters anymore.   The following portions of the patient's history were reviewed and updated as appropriate: allergies, current medications, past family history, past medical history, past social history, past surgical history and problem list.    Review of Systems   Constitutional: Negative.    Respiratory: Negative.    Cardiovascular: Negative.    Gastrointestinal: Negative.    Neurological: Negative.    Psychiatric/Behavioral: The patient is nervous/anxious.        Objective   Physical Exam  Vitals reviewed.   Constitutional:       Appearance: Normal appearance. She is well-developed and well-groomed.   Neurological:      Mental Status: She is alert.   Psychiatric:         Attention and Perception: Attention and perception normal.         Mood and Affect: Affect normal. Mood is anxious.         Speech: Speech normal.          "Behavior: Behavior normal. Behavior is cooperative.         Thought Content: Thought content normal.         Cognition and Memory: Cognition and memory normal.         Judgment: Judgment normal.       Blood pressure 132/90, pulse 98, height 167.6 cm (66\"), weight 104 kg (230 lb 3.2 oz), SpO2 98 %. Body mass index is 37.16 kg/m².    Allergies   Allergen Reactions   • Tape Unknown (See Comments)     Blisters on skin   • Codeine Rash   • Hydrocodone Itching       Medication List:   Current Outpatient Medications   Medication Sig Dispense Refill   • citalopram (CeleXA) 10 MG tablet Take 1 tablet by mouth Daily. 30 tablet 0   • clonazePAM (KlonoPIN) 0.5 MG tablet Take 0.5-1 tablets by mouth Daily As Needed for Anxiety. 20 tablet 0   • cyclobenzaprine (FLEXERIL) 5 MG tablet 1 in the AM, 1 at noon, 2 @  tablet 5   • levothyroxine (Synthroid) 150 MCG tablet Take 1 tablet by mouth Daily. 30 tablet 5   • phentermine (Adipex-P) 37.5 MG tablet Take 1 tablet by mouth Every Morning Before Breakfast. 30 tablet 0   • promethazine (PHENERGAN) 25 MG tablet Take 1 tablet by mouth Every 6 (Six) Hours As Needed for Nausea or Vomiting. 60 tablet 1   • RABEprazole (Aciphex) 20 MG EC tablet Take 1 tablet by mouth Daily. 30 tablet 5   • traMADol (ULTRAM) 50 MG tablet 1 po bid and 2 po qhs 60 tablet 0   • vitamin D (ERGOCALCIFEROL) 1.25 MG (71387 UT) capsule capsule Take 1 capsule by mouth 1 (One) Time Per Week. 4 capsule 5     No current facility-administered medications for this visit.       Mental Status Exam:   Hygiene:   good  Cooperation:  Cooperative  Eye Contact:  Good  Psychomotor Behavior:  Appropriate  Affect:  Appropriate  Hopelessness: Denies  Speech:  Normal  Thought Process:  Goal directed and Linear  Thought Content:  Normal  Suicidal:  None  Homicidal:  None  Hallucinations:  None  Delusion:  None  Memory:  Intact  Orientation:  Person, Place, Time and Situation  Reliability:  fair  Insight:  Fair  Judgement:  " Fair  Impulse Control:  Fair  Physical/Medical Issues:  No               Assessment/Plan   Diagnoses and all orders for this visit:    1. Panic disorder without agoraphobia  -     Discontinue: sertraline (Zoloft) 25 MG tablet; Take 1 tablet by mouth Daily.  Dispense: 30 tablet; Refill: 0  -     clonazePAM (KlonoPIN) 0.5 MG tablet; Take 0.5-1 tablets by mouth Daily As Needed for Anxiety.  Dispense: 20 tablet; Refill: 0    2. Generalized anxiety disorder  -     Discontinue: sertraline (Zoloft) 25 MG tablet; Take 1 tablet by mouth Daily.  Dispense: 30 tablet; Refill: 0  -     clonazePAM (KlonoPIN) 0.5 MG tablet; Take 0.5-1 tablets by mouth Daily As Needed for Anxiety.  Dispense: 20 tablet; Refill: 0    3. Mild episode of recurrent major depressive disorder (CMS/HCC)  -     Discontinue: sertraline (Zoloft) 25 MG tablet; Take 1 tablet by mouth Daily.  Dispense: 30 tablet; Refill: 0            Discussed medication options with patient. Decrease Zoloft back to 12.5mg daily with plan to wean off. Cont. Clonazepam 0.5mg 0.5-1tab daily as needed for anxiety. Reviewed the risks, benefits, and side effects of the medications; patient acknowledged and verbally consented. Patient is being prescribed a controlled substance as part of treatment plan. Patient has been educated of appropriate use of the medications, including risk of somnolence, limited ability to drive and/or work safely, and potential for dependence, respiratory depression and overdose. Patient is also informed that the medication are to be used by the patient only- avoid any combined use of ETOH or other substances unless prescribed.   PURVI Patient Controlled Substance Report (from 4/29/2020 to 4/16/2021)    Dispensed  Strength Quantity Days Supply Provider Pharmacy   04/12/2021 Clonazepam 0.5MG 20 each 30 CLOUDPATRICIA [facility]   03/24/2021 Phentermine Hcl 37.5MG 30 each 30 SHEILAVIPUL SMART [facility]   03/05/2021 Clonazepam 0.5MG 20 each 30 CLOUD,  PATRICIA [facility] [place]   02/02/2021 Clonazepam 0.5MG 20 each 20 CLOUD, PATRICIA [facility] [place]   01/21/2021 Tramadol Hcl 50MG/50MG/50MG/50MG/50MG/ 60 each 15 SHEILA, VIPUL [facility] [place]   12/28/2020 Clonazepam 0.5MG 20 each 20 ARETHA, OLMAN [facility] [place]   11/25/2020 Clonazepam 0.5MG 20 each 20 ISBELL, YESSENIA [facility] [place]   11/16/2020 Hydrocodone/Acetaminophen 325MG/5MG 12 each 3 SHEILA, VIPUL [facility] [place]   10/26/2020 Hydrocodone/Acetaminophen 325MG/5MG 12 each 3 SHEILA, VIPUL [facility] [place]   10/24/2020 Clonazepam 0.5MG 20 each 20 MARLA, DEYA [facility] [place]   10/20/2020 Tramadol Hcl 50MG 30 each 30 ODILON, SIVAN [facility] [place]   09/18/2020 Tramadol Hcl 50MG 60 each 15 ERICKSON, BENIGNO [facility] [place]   09/14/2020 Clonazepam 0.5MG 20 each 20 CLOUD, PATRICIA [facility] [place]   08/25/2020 Clonazepam 0.5MG 20 each 20 CLOUD, PATRICIA [facility] [place]   08/04/2020 Clonazepam 0.5MG 20 each 30 CLOUD, PATRICIA [facility] [place]   07/01/2020 Clonazepam 0.5MG 20 each 20 CLOUD, PATRICIA [facility] [place]   06/02/2020 Clonazepam 0.5MG 20 each 20 CLOUD, PATRICIA [facility] [place]   05/06/2020 Clonazepam 0.5MG 20 each 20 CLOUD, PATRICIA            Patient is agreeable to call the office with any questions, concerns, or worsening of symptoms.   Patient is aware to call 911 or go to the nearest ER should begin having SI/HI.          Follow up in four weeks      Errors in dictation may reflect use of voice recognition software and not all errors in transcription may have been detected prior to signing.               This document has been electronically signed by LANA Siu   April 29, 2021 15:51 EDT

## 2021-04-13 NOTE — PROGRESS NOTES
04/13/21 at 5:01pm Attempted to call patient regarding new medication Celexa. No answer and went to voicemail. Will attempt to call patient back on Thursday to discuss possible side effects, risks, and benefits as well as [employer] results.

## 2021-04-14 ENCOUNTER — TELEPHONE (OUTPATIENT)
Dept: FAMILY MEDICINE CLINIC | Facility: CLINIC | Age: 36
End: 2021-04-14

## 2021-04-14 NOTE — TELEPHONE ENCOUNTER
Pt is aware of this information.        ----- Message from LANA Siu sent at 4/13/2021  5:03 PM EDT -----  I attempted to call her and let her know what medication I was going to try instead, but it went straight to voicemail. Will you call at some point tomorrow and let her know I had attempted to call but I will call her back again on Thursday due to me being off tomorrow. Thank you

## 2021-04-16 DIAGNOSIS — F41.0 PANIC DISORDER WITHOUT AGORAPHOBIA: Primary | ICD-10-CM

## 2021-04-16 DIAGNOSIS — F41.1 GENERALIZED ANXIETY DISORDER: ICD-10-CM

## 2021-04-16 RX ORDER — CITALOPRAM 10 MG/1
10 TABLET ORAL DAILY
Qty: 30 TABLET | Refills: 0 | Status: SHIPPED | OUTPATIENT
Start: 2021-04-16 | End: 2021-05-07 | Stop reason: SDUPTHER

## 2021-04-16 NOTE — PROGRESS NOTES
04/16/21 at 1:27pm Discussed with patient via telephone regarding changing medication. Patient states she has not been able to work for the last two to three days due to frequent panic attacks. Patient states the zoloft is still not helping. Patient states due to the increase in panic attacks she has not had any adipex in the last couple days. Discussed with patient [employer] testing results regarding Celexa. Discussed with patient to [facility]. Zoloft. Start Celexa 10mg daily for worsening anxiety and panic attacks. Discussed with patient benefits, risks, and side effects of medication.  Discussed with patient if having any questions concerns or worsening of symptoms to notify the office.  Discussed with patient if began having any SI or HI to call 911 or go to the nearest ER.  Patient acknowledged and agreed.

## 2021-05-03 ENCOUNTER — OFFICE VISIT (OUTPATIENT)
Dept: FAMILY MEDICINE CLINIC | Facility: CLINIC | Age: 36
End: 2021-05-03

## 2021-05-03 VITALS
HEART RATE: 96 BPM | TEMPERATURE: 97.3 F | HEIGHT: 66 IN | WEIGHT: 226 LBS | SYSTOLIC BLOOD PRESSURE: 114 MMHG | OXYGEN SATURATION: 99 % | DIASTOLIC BLOOD PRESSURE: 80 MMHG | BODY MASS INDEX: 36.32 KG/M2

## 2021-05-03 DIAGNOSIS — F41.1 GAD (GENERALIZED ANXIETY DISORDER): ICD-10-CM

## 2021-05-03 DIAGNOSIS — E66.09 CLASS 2 OBESITY DUE TO EXCESS CALORIES WITHOUT SERIOUS COMORBIDITY WITH BODY MASS INDEX (BMI) OF 37.0 TO 37.9 IN ADULT: Primary | ICD-10-CM

## 2021-05-03 DIAGNOSIS — K21.9 GASTROESOPHAGEAL REFLUX DISEASE WITHOUT ESOPHAGITIS: ICD-10-CM

## 2021-05-03 DIAGNOSIS — M51.26 LUMBAR DISC HERNIATION: ICD-10-CM

## 2021-05-03 PROCEDURE — 99214 OFFICE O/P EST MOD 30 MIN: CPT | Performed by: NURSE PRACTITIONER

## 2021-05-03 RX ORDER — TRAMADOL HYDROCHLORIDE 50 MG/1
TABLET ORAL
Qty: 60 TABLET | Refills: 0 | Status: SHIPPED | OUTPATIENT
Start: 2021-05-03 | End: 2021-08-04 | Stop reason: SDUPTHER

## 2021-05-03 RX ORDER — METHOCARBAMOL 500 MG/1
500 TABLET, FILM COATED ORAL 2 TIMES DAILY
Qty: 60 TABLET | Refills: 2 | Status: SHIPPED | OUTPATIENT
Start: 2021-05-03 | End: 2021-08-04

## 2021-05-03 NOTE — PROGRESS NOTES
"Subjective   Jarad Barragan is a 35 y.o. female.     Chief Complaint   Patient presents with   • Weight Loss       History of Present Illness      Obesity-ongoing.  Patient resumed Adipex at last visit.  She is noted down 4 pounds.  She tolerated restarting the medication well.  She has been taking 1/2 tablet.  She reports she has had some increased anxiety based on situations.    Back pain-ongoing.  Patient continues to have back pain.  Varies in severity based on activity and prolonged sitting or standing.  She reports today \"ok\" but some days \"have to take something.\"   She has not been for epidural yet.  She reports \"same stuff\" in lower back in hips.  Mild radiation into her legs.  She reports she has been limiting her activity to prevent symptoms. She is not taking Flexeril due to next day drowsiness.  She would be interested in trying a different medication for muscle spasm.    GERD-stable on AcipHex.  No concerns today.  Hypothyroidism-ongoing.  Patient is presently taking Synthroid 150 mcg.  No negative side effects of medication.  Patient denies any hair or skin changes.  No reports of heat or cold intolerance.    The following portions of the patient's history were reviewed and updated as appropriate: CC, ROS, allergies, current medications, past family history, past medical history, past social history, past surgical history and problem list.    Review of Systems   Constitutional: Negative for appetite change, fatigue and fever.   HENT: Negative for congestion, ear pain, nosebleeds, postnasal drip, rhinorrhea, sore throat, tinnitus, trouble swallowing and voice change.         Tongue irritation   Eyes: Negative for blurred vision, photophobia and visual disturbance.   Respiratory: Negative for cough, chest tightness, shortness of breath and wheezing.    Cardiovascular: Negative for chest pain and palpitations.   Gastrointestinal: Negative for abdominal pain, blood in stool, constipation, diarrhea, nausea " "and GERD.   Endocrine: Negative for cold intolerance, heat intolerance and polydipsia.   Genitourinary: Negative for decreased urine volume, difficulty urinating, dysuria and hematuria.   Musculoskeletal: Positive for arthralgias and back pain. Negative for gait problem and myalgias.   Skin: Negative for color change, pallor and bruise.   Allergic/Immunologic: Negative.    Neurological: Negative for dizziness, syncope, numbness and headache.   Hematological: Negative.    Psychiatric/Behavioral: Positive for depressed mood and stress. Negative for decreased concentration, sleep disturbance and suicidal ideas. The patient is nervous/anxious.    All other systems reviewed and are negative.      Objective     /80   Pulse 96   Temp 97.3 °F (36.3 °C)   Ht 167.6 cm (65.98\")   Wt 103 kg (226 lb)   SpO2 99%   BMI 36.50 kg/m²     Physical Exam  Vitals reviewed.   Constitutional:       General: She is not in acute distress.     Appearance: She is well-developed. She is obese. She is not diaphoretic.   HENT:      Head: Normocephalic and atraumatic.      Comments: Oropharynx not examined.  Patient is presently wearing a face covering/mask due to COVID-19 pandemic.     Right Ear: Hearing, tympanic membrane, ear canal and external ear normal.      Left Ear: Hearing, tympanic membrane, ear canal and external ear normal.   Eyes:      General: Lids are normal. No scleral icterus.     Extraocular Movements:      Right eye: Normal extraocular motion and no nystagmus.      Left eye: Normal extraocular motion and no nystagmus.      Conjunctiva/sclera: Conjunctivae normal.      Pupils: Pupils are equal, round, and reactive to light.   Neck:      Thyroid: No thyromegaly.      Vascular: No carotid bruit or JVD.      Trachea: No tracheal tenderness.   Cardiovascular:      Rate and Rhythm: Normal rate and regular rhythm.      Heart sounds: Normal heart sounds, S1 normal and S2 normal. No murmur heard.     Pulmonary:      Effort: " Pulmonary effort is normal.      Breath sounds: Normal breath sounds.   Chest:      Chest wall: No tenderness.   Abdominal:      General: Bowel sounds are normal.      Palpations: Abdomen is soft. There is no mass.      Tenderness: There is no abdominal tenderness.   Musculoskeletal:         General: Tenderness present.      Cervical back: Normal range of motion and neck supple.      Thoracic back: Tenderness present.      Lumbar back: Spasms and tenderness present. Decreased range of motion.      Right lower leg: No edema.      Left lower leg: No edema.      Comments: No muscular atrophy or flaccidity.  Gait antalgic   Lymphadenopathy:      Cervical: No cervical adenopathy.      Right cervical: No superficial cervical adenopathy.     Left cervical: No superficial cervical adenopathy.   Skin:     General: Skin is warm and dry.      Capillary Refill: Capillary refill takes less than 2 seconds.      Coloration: Skin is not pale.      Findings: No erythema.      Nails: There is no clubbing.   Neurological:      Mental Status: She is alert and oriented to person, place, and time.      Cranial Nerves: No cranial nerve deficit or facial asymmetry.      Sensory: No sensory deficit.      Motor: No tremor, atrophy or abnormal muscle tone.      Coordination: Coordination normal.      Deep Tendon Reflexes: Reflexes are normal and symmetric.   Psychiatric:         Attention and Perception: She is attentive.         Mood and Affect: Mood is anxious and depressed.         Speech: Speech normal.         Behavior: Behavior normal. Behavior is cooperative.         Thought Content: Thought content normal.         Cognition and Memory: Cognition normal.         Judgment: Judgment normal.       Assessment/Plan     Problem List Items Addressed This Visit        Endocrine and Metabolic    Class 2 obesity due to excess calories without serious comorbidity with body mass index (BMI) of 37.0 to 37.9 in adult - Primary    Current Assessment  & Plan     Encourage low CHO high-protein diet.  Continue current prescription of Adipex.  Patient declines the need for refill today  Encouraged to 7200 g protein per day.  Encouraged at least 60 ounces of water per day         Relevant Medications    phentermine (Adipex-P) 37.5 MG tablet       Gastrointestinal Abdominal     Gastroesophageal reflux disease without esophagitis    Current Assessment & Plan     Continue AcipHex 20 mg.  Advised to avoid known GI triggers such as spicy foods.  Upright 30 minutes after meals and avoid eating large meals.  Several small meals daily as able to avoid overfilling stomach.         Relevant Medications    RABEprazole (Aciphex) 20 MG EC tablet       Mental Health    VALENTINA (generalized anxiety disorder)    Current Assessment & Plan     Continue under the care of psych NP.  Continue Klonopin as needed.  Continue Celexa 10 mg         Relevant Medications    phentermine (Adipex-P) 37.5 MG tablet    citalopram (CeleXA) 10 MG tablet       Neuro    Lumbar disc herniation    Current Assessment & Plan     Continue as needed tramadol.  NSAIDs only as needed due to stomach concerns.  As needed heat and ice.  Avoid prolonged sitting or standing.  Be active as physically able         Relevant Medications    traMADol (ULTRAM) 50 MG tablet          Patient's Body mass index is 36.5 kg/m². BMI is above normal parameters. Recommendations include: nutrition counseling and pharmacological intervention.       Understands disease processes and need for medications.  Understands reasons for urgent and emergent care.  Patient (& family) verbalized agreement for treatment plan.   Emotional support and active listening provided.  Patient provided time to verbalize feelings.    PURVI reviewed today and consistent.  Will refill prescribed controlled medication today.  Patient is aware they cannot receive narcotics from any other provider except if under care of pain management or speciality clinic.  Risk  and benefits of medication use has been reviewed.  History and physical exam exhibit continued safe and appropriate use of controlled substances.  The patient is aware of the potential for addiction and dependence.  This patient has been made aware of the appropriate use of such medications, including potential risk of somnolence, limited ability to drive and / or work safely, and potential for overdose.  Patient understands not to take medication and drive until they know how medication may affect their cognition/decision making.   It has also been made clear that these medications are for use by this patient only, without concomitant use of alcohol or other substances unless prescribed/advised by medical provider.  Patient understands they may be subject to UDS and pill counts at random.      Patient considered to be low risk for addiction due to use of single controlled medications.  Patient understands and accepts these risks.  Patient need for medication will be reassessed at each visit.  Doses will be adjusted according to patient need and findings.    Goal of TX: Patient will not have any adverse reactions of medication.  Patient will have reduction in weight with use of Adipex as directed with simultaneous diet and lifestyle changes.  Medication Dispense Information    Phentermine Hcl   Dispensed: 3/24/2021 12:00 AM   Written:  3/23/2021   Unit strength: 37.5MG   Days supply: 30   Dispense Note: GivenName=Mike=BAKERBirthDate=[id number]=[address] [address]   Quantity: 30 each   Pharmacy: [facility]   Authorizing provider: VIPUL SOSA   Received from: [facility] PDMP (Fill History)   Brand or Generic:       Medication Dispense Information    Clonazepam   Dispensed: 4/12/2021 12:00 AM   Written:  4/8/2021   Unit strength: 0.5MG   Days supply: 30   Dispense Note: GivenName=DANIurName=BAKERBirthDate=[id number]=[address][birth year] [address], [address]    Quantity: 20 each   Pharmacy: [facility]   Authorizing provider: PATRICIA SEQUEIRA   Received from: [facility] PDMP (Fill History)   Brand or Generic:         RTC 1 month, sooner if needed.             This document has been electronically signed by:  LANA Quinn, FNP-C    Dragon disclaimer:  Much of this encounter note is an electronic transcription/translation of spoken language to printed text. The electronic translation of spoken language may permit erroneous, or at times, nonsensical words or phrases to be inadvertently transcribed; Although I have reviewed the note for such errors, some may still exist.

## 2021-05-03 NOTE — ASSESSMENT & PLAN NOTE
Continue AcipHex 20 mg.  Advised to avoid known GI triggers such as spicy foods.  Upright 30 minutes after meals and avoid eating large meals.  Several small meals daily as able to avoid overfilling stomach.

## 2021-05-03 NOTE — ASSESSMENT & PLAN NOTE
Continue as needed tramadol.  NSAIDs only as needed due to stomach concerns.  As needed heat and ice.  Avoid prolonged sitting or standing.  Be active as physically able

## 2021-05-03 NOTE — ASSESSMENT & PLAN NOTE
Encourage low CHO high-protein diet.  Continue current prescription of Adipex.  Patient declines the need for refill today  Encouraged to 7200 g protein per day.  Encouraged at least 60 ounces of water per day

## 2021-05-04 RX ORDER — PROMETHAZINE HYDROCHLORIDE 25 MG/1
TABLET ORAL
Qty: 60 TABLET | Refills: 1 | Status: SHIPPED | OUTPATIENT
Start: 2021-05-04 | End: 2021-06-03 | Stop reason: SDUPTHER

## 2021-05-07 ENCOUNTER — OFFICE VISIT (OUTPATIENT)
Dept: PSYCHIATRY | Facility: CLINIC | Age: 36
End: 2021-05-07

## 2021-05-07 VITALS
SYSTOLIC BLOOD PRESSURE: 118 MMHG | DIASTOLIC BLOOD PRESSURE: 64 MMHG | BODY MASS INDEX: 36.33 KG/M2 | HEART RATE: 78 BPM | WEIGHT: 225 LBS

## 2021-05-07 DIAGNOSIS — F41.1 GENERALIZED ANXIETY DISORDER: ICD-10-CM

## 2021-05-07 DIAGNOSIS — F41.0 PANIC DISORDER WITHOUT AGORAPHOBIA: ICD-10-CM

## 2021-05-07 DIAGNOSIS — Z79.899 HIGH RISK MEDICATION USE: Primary | ICD-10-CM

## 2021-05-07 PROCEDURE — 99213 OFFICE O/P EST LOW 20 MIN: CPT | Performed by: NURSE PRACTITIONER

## 2021-05-07 RX ORDER — CLONAZEPAM 0.5 MG/1
.25-.5 TABLET ORAL DAILY PRN
Qty: 30 TABLET | Refills: 0 | Status: SHIPPED | OUTPATIENT
Start: 2021-05-07 | End: 2021-06-01 | Stop reason: SDUPTHER

## 2021-05-07 RX ORDER — CITALOPRAM 20 MG/1
20 TABLET ORAL DAILY
Qty: 30 TABLET | Refills: 0 | Status: SHIPPED | OUTPATIENT
Start: 2021-05-07 | End: 2021-06-28 | Stop reason: SDUPTHER

## 2021-06-01 ENCOUNTER — TELEPHONE (OUTPATIENT)
Dept: PSYCHIATRY | Facility: CLINIC | Age: 36
End: 2021-06-01

## 2021-06-01 ENCOUNTER — LAB (OUTPATIENT)
Dept: FAMILY MEDICINE CLINIC | Facility: CLINIC | Age: 36
End: 2021-06-01

## 2021-06-01 DIAGNOSIS — F41.1 GENERALIZED ANXIETY DISORDER: ICD-10-CM

## 2021-06-01 DIAGNOSIS — F41.0 PANIC DISORDER WITHOUT AGORAPHOBIA: ICD-10-CM

## 2021-06-01 DIAGNOSIS — M51.26 LUMBAR DISC HERNIATION: ICD-10-CM

## 2021-06-01 RX ORDER — CLONAZEPAM 0.5 MG/1
.25-.5 TABLET ORAL DAILY PRN
Qty: 30 TABLET | Refills: 0 | Status: SHIPPED | OUTPATIENT
Start: 2021-06-01 | End: 2021-06-28 | Stop reason: SDUPTHER

## 2021-06-01 NOTE — TELEPHONE ENCOUNTER
Called and made patient aware that provider would be out of the office on Friday 06/04/2021. Made her aware that provider wanted her to come in today 06/01/2021 for a urine in order for refills to be sent for medication.       Patient stated she understood and would come once off work around 4:30.

## 2021-06-03 ENCOUNTER — OFFICE VISIT (OUTPATIENT)
Dept: FAMILY MEDICINE CLINIC | Facility: CLINIC | Age: 36
End: 2021-06-03

## 2021-06-03 VITALS
WEIGHT: 225.2 LBS | BODY MASS INDEX: 36.19 KG/M2 | SYSTOLIC BLOOD PRESSURE: 116 MMHG | HEART RATE: 94 BPM | OXYGEN SATURATION: 98 % | TEMPERATURE: 97.1 F | DIASTOLIC BLOOD PRESSURE: 82 MMHG | HEIGHT: 66 IN

## 2021-06-03 DIAGNOSIS — K21.9 GASTROESOPHAGEAL REFLUX DISEASE WITHOUT ESOPHAGITIS: ICD-10-CM

## 2021-06-03 DIAGNOSIS — E66.09 CLASS 2 OBESITY DUE TO EXCESS CALORIES WITHOUT SERIOUS COMORBIDITY WITH BODY MASS INDEX (BMI) OF 37.0 TO 37.9 IN ADULT: ICD-10-CM

## 2021-06-03 DIAGNOSIS — E53.8 LOW SERUM VITAMIN B12: ICD-10-CM

## 2021-06-03 DIAGNOSIS — E03.4 HYPOTHYROIDISM DUE TO ACQUIRED ATROPHY OF THYROID: Primary | ICD-10-CM

## 2021-06-03 DIAGNOSIS — E55.9 VITAMIN D DEFICIENCY: ICD-10-CM

## 2021-06-03 DIAGNOSIS — Z13.1 DIABETES MELLITUS SCREENING: ICD-10-CM

## 2021-06-03 PROCEDURE — 99214 OFFICE O/P EST MOD 30 MIN: CPT | Performed by: NURSE PRACTITIONER

## 2021-06-03 RX ORDER — PHENTERMINE HYDROCHLORIDE 37.5 MG/1
37.5 TABLET ORAL
Qty: 30 TABLET | Refills: 0 | Status: SHIPPED | OUTPATIENT
Start: 2021-06-03 | End: 2021-06-28 | Stop reason: SDUPTHER

## 2021-06-03 RX ORDER — PROMETHAZINE HYDROCHLORIDE 25 MG/1
25 TABLET ORAL EVERY 6 HOURS PRN
Qty: 60 TABLET | Refills: 1 | Status: SHIPPED | OUTPATIENT
Start: 2021-06-03 | End: 2021-09-01 | Stop reason: SDUPTHER

## 2021-06-03 RX ORDER — LEVOTHYROXINE SODIUM 0.15 MG/1
150 TABLET ORAL DAILY
Qty: 30 TABLET | Refills: 5 | Status: SHIPPED | OUTPATIENT
Start: 2021-06-03 | End: 2021-07-21

## 2021-06-03 RX ORDER — RABEPRAZOLE SODIUM 20 MG/1
20 TABLET, DELAYED RELEASE ORAL DAILY
Qty: 30 TABLET | Refills: 5 | Status: SHIPPED | OUTPATIENT
Start: 2021-06-03 | End: 2021-09-01 | Stop reason: SDUPTHER

## 2021-06-03 NOTE — PROGRESS NOTES
"Subjective   Jarad Barragan is a 35 y.o. female.     Chief Complaint   Patient presents with   • Weight Loss       History of Present Illness     Weight loss-reports going very slow.  She is taking Adipex 37.5 mg.   She is taking 1/2 tablet.  She is mostly taking during the week but is occasionally taking on the weekend.  She has been eating less and trying to drink more water.  She is down approx 5# since April.  She is not having any palpitations or concerns.  She does not wish to take a whole tablet at once due to fear of side effects.    Thyroid-taking meds.  She is due for updated thyroid level.  She reports that she needs refills today.  She is needing a refill today.   Back Pain- she is currently not wanting to seek further intervention due  Her mother having surgery.  She has robaxin and Tramadol for PRN use.  She reports at times \"killing me\". Other times are \"bearable\".   GERD-stable on AcipHex.  No negative side effects.  No GI concerns today.  Vitamin D deficiency-chronic and ongoing.  Patient is presently taking vitamin D 50,000 units supplement.  No negative side effects of medication are reported.  Vitamin D level is stable with medication use.      The following portions of the patient's history were reviewed and updated as appropriate: CC, ROS, allergies, current medications, past family history, past medical history, past social history, past surgical history and problem list.      Review of Systems   Constitutional: Positive for fatigue. Negative for appetite change and fever.   HENT: Negative for congestion, ear pain, nosebleeds, postnasal drip, rhinorrhea, sinus pressure, sore throat, tinnitus, trouble swallowing and voice change.    Eyes: Negative for blurred vision, photophobia and visual disturbance.   Respiratory: Negative for cough, chest tightness, shortness of breath and wheezing.    Cardiovascular: Negative for chest pain, palpitations and leg swelling.   Gastrointestinal: Negative for " "abdominal pain, blood in stool, constipation, diarrhea, nausea and GERD (Controlled with medication).   Endocrine: Negative for cold intolerance, heat intolerance and polydipsia.   Genitourinary: Negative for decreased urine volume, difficulty urinating, dysuria and hematuria.   Musculoskeletal: Positive for arthralgias. Negative for back pain, gait problem and myalgias.   Skin: Negative for color change, pallor and bruise.   Allergic/Immunologic: Negative.    Neurological: Negative for dizziness, syncope, numbness and headache.   Hematological: Negative.    Psychiatric/Behavioral: Positive for stress. Negative for decreased concentration, self-injury, sleep disturbance, suicidal ideas and depressed mood. The patient is nervous/anxious.    All other systems reviewed and are negative.      Objective     /82   Pulse 94   Temp 97.1 °F (36.2 °C)   Ht 167.6 cm (65.98\")   Wt 102 kg (225 lb 3.2 oz)   SpO2 98%   BMI 36.37 kg/m²     Physical Exam  Vitals reviewed.   Constitutional:       General: She is not in acute distress.     Appearance: She is well-developed. She is not diaphoretic.   HENT:      Head: Normocephalic and atraumatic.      Comments: Oropharynx not examined.  Patient is presently wearing a face covering/mask due to COVID-19 pandemic.     Right Ear: Hearing, tympanic membrane, ear canal and external ear normal.      Left Ear: Hearing, tympanic membrane, ear canal and external ear normal.   Eyes:      General: Lids are normal. No scleral icterus.     Extraocular Movements:      Right eye: Normal extraocular motion and no nystagmus.      Left eye: Normal extraocular motion and no nystagmus.      Conjunctiva/sclera: Conjunctivae normal.      Pupils: Pupils are equal, round, and reactive to light.   Neck:      Thyroid: No thyromegaly.      Vascular: No carotid bruit or JVD.      Trachea: No tracheal tenderness.   Cardiovascular:      Rate and Rhythm: Normal rate and regular rhythm.      Heart sounds: " Normal heart sounds, S1 normal and S2 normal. No murmur heard.     Pulmonary:      Effort: Pulmonary effort is normal.      Breath sounds: Normal breath sounds.   Chest:      Chest wall: No tenderness.   Abdominal:      General: Bowel sounds are normal.      Palpations: Abdomen is soft. There is no mass.      Tenderness: There is no abdominal tenderness.   Musculoskeletal:      Cervical back: Normal range of motion and neck supple.      Thoracic back: Tenderness present.      Lumbar back: Spasms and tenderness present. Decreased range of motion.      Right lower leg: No edema.      Left lower leg: No edema.      Comments: No muscular atrophy or flaccidity.  Gait antalgic   Lymphadenopathy:      Cervical: No cervical adenopathy.      Right cervical: No superficial cervical adenopathy.     Left cervical: No superficial cervical adenopathy.   Skin:     General: Skin is warm and dry.      Capillary Refill: Capillary refill takes less than 2 seconds.      Coloration: Skin is not pale.      Findings: No erythema.      Nails: There is no clubbing.   Neurological:      Mental Status: She is alert and oriented to person, place, and time.      Cranial Nerves: No cranial nerve deficit or facial asymmetry.      Sensory: No sensory deficit.      Motor: No tremor, atrophy or abnormal muscle tone.      Coordination: Coordination normal.      Deep Tendon Reflexes: Reflexes are normal and symmetric.   Psychiatric:         Attention and Perception: She is attentive.         Mood and Affect: Mood normal.         Speech: Speech normal.         Behavior: Behavior normal.         Thought Content: Thought content normal.         Judgment: Judgment normal.       Assessment/Plan     Problem List Items Addressed This Visit        Endocrine and Metabolic    Hypothyroidism - Primary    Relevant Medications    levothyroxine (Synthroid) 150 MCG tablet    Other Relevant Orders    TSH    T4, Free    Class 2 obesity due to excess calories without  serious comorbidity with body mass index (BMI) of 37.0 to 37.9 in adult    Overview     Beginning weight at 240 on 02/23/2021         Relevant Medications    phentermine (Adipex-P) 37.5 MG tablet    Other Relevant Orders    CBC & Differential    Comprehensive Metabolic Panel    Lipid Panel    Vitamin D deficiency    Relevant Medications    vitamin D (ERGOCALCIFEROL) 1.25 MG (30534 UT) capsule capsule    Other Relevant Orders    Vitamin D 25 Hydroxy       Gastrointestinal Abdominal     Gastroesophageal reflux disease without esophagitis    Relevant Medications    RABEprazole (Aciphex) 20 MG EC tablet      Other Visit Diagnoses     Diabetes mellitus screening        Relevant Orders    Hemoglobin A1c    Low serum vitamin B12        Relevant Orders    Vitamin B12          Patient's Body mass index is 36.37 kg/m². indicating that she is obese (BMI >30). Obesity-related health conditions include the following: none. Obesity is improving with treatment. BMI is is above average. We discussed portion control, increasing exercise and pharmacologic options including Adipex..       Understands disease processes and need for medications.  Understands reasons for urgent and emergent care.  Patient (& family) verbalized agreement for treatment plan.   Emotional support and active listening provided.  Patient provided time to verbalize feelings.    PURVI reviewed today and consistent.  Will refill prescribed controlled medication today.  Patient is aware they cannot receive narcotics from any other provider except if under care of pain management or speciality clinic.  Risk and benefits of medication use has been reviewed.  History and physical exam exhibit continued safe and appropriate use of controlled substances.  The patient is aware of the potential for addiction and dependence.  This patient has been made aware of the appropriate use of such medications, including potential risk of somnolence, limited ability to drive and /  or work safely, and potential for overdose.  Patient understands not to take medication and drive until they know how medication may affect their cognition/decision making.   It has also been made clear that these medications are for use by this patient only, without concomitant use of alcohol or other substances unless prescribed/advised by medical provider.  Patient understands they may be subject to UDS and pill counts at random.    Patient considered to be moderate risk for addiction due to use of multiple controlled medications.  Patient understands and accepts these risks.  Patient need for medication will be reassessed at each visit.  Doses will be adjusted according to patient need and findings.    Goal of TX: Patient will not have any adverse reactions of medication.  Patient will have reduction in weight with use of Adipex as directed with simultaneous diet and lifestyle changes.  Patient will have reduction in there chronic back and joint pain symptoms with use of tramadol as needed as directed.  Patient will be able to remain active in an outside of their home with minimal to no interference from their pain symptoms.  Continue under the care of psych NP for Klonopin.    Medication Dispense Information    Tramadol Hcl   Dispensed: 5/4/2021 12:00 AM   Written:  5/3/2021   Unit strength: 50MG/50MG/50MG/50MG/50MG/   Days supply: 15   Dispense Note: GivenName=Mike=NOELBirthDate=[id number]=[address][birth year] [address], [address]   Quantity: 60 each   Pharmacy: [facility]   Authorizing provider: VIPUL SOSA   Received from: [facility] PDMP (Fill History)   Brand or Generic:       Medication Dispense Information    Clonazepam   Dispensed: 5/10/2021 12:00 AM   Written:  5/7/2021   Unit strength: 0.5MG   Days supply: 30   Dispense Note: GivenName=Yaelame=NOELBirthDate=[id number]=[address], [address]   Quantity: 30 each   Pharmacy: [facility]    Authorizing provider: PATRICIA SEQUEIRA   Received from: [facility] PDMP (Fill History)   Brand or Generic:         Fasting labs ordered.  Patient will RTC to have done.    Continue under care of psych provider.     RTC 1 month, sooner if needed.             This document has been electronically signed by:  LANA Quinn, FNP-C    Dragon disclaimer:  Much of this encounter note is an electronic transcription/translation of spoken language to printed text. The electronic translation of spoken language may permit erroneous, or at times, nonsensical words or phrases to be inadvertently transcribed; Although I have reviewed the note for such errors, some may still exist.

## 2021-06-28 DIAGNOSIS — F41.1 GENERALIZED ANXIETY DISORDER: ICD-10-CM

## 2021-06-28 DIAGNOSIS — E66.09 CLASS 2 OBESITY DUE TO EXCESS CALORIES WITHOUT SERIOUS COMORBIDITY WITH BODY MASS INDEX (BMI) OF 37.0 TO 37.9 IN ADULT: ICD-10-CM

## 2021-06-28 DIAGNOSIS — F41.0 PANIC DISORDER WITHOUT AGORAPHOBIA: ICD-10-CM

## 2021-06-28 RX ORDER — PHENTERMINE HYDROCHLORIDE 37.5 MG/1
37.5 TABLET ORAL
Qty: 30 TABLET | Refills: 0 | Status: SHIPPED | OUTPATIENT
Start: 2021-06-28 | End: 2021-08-04 | Stop reason: SDUPTHER

## 2021-06-28 RX ORDER — CITALOPRAM 20 MG/1
20 TABLET ORAL DAILY
Qty: 30 TABLET | Refills: 0 | Status: SHIPPED | OUTPATIENT
Start: 2021-06-28 | End: 2021-07-27 | Stop reason: SDUPTHER

## 2021-06-28 RX ORDER — CLONAZEPAM 0.5 MG/1
.25-.5 TABLET ORAL DAILY PRN
Qty: 30 TABLET | Refills: 0 | Status: SHIPPED | OUTPATIENT
Start: 2021-06-28 | End: 2021-07-27 | Stop reason: SDUPTHER

## 2021-07-21 RX ORDER — LEVOTHYROXINE SODIUM 0.15 MG/1
TABLET ORAL
Qty: 30 TABLET | Refills: 5 | Status: SHIPPED | OUTPATIENT
Start: 2021-07-21 | End: 2021-10-11 | Stop reason: DRUGHIGH

## 2021-07-27 ENCOUNTER — OFFICE VISIT (OUTPATIENT)
Dept: PSYCHIATRY | Facility: CLINIC | Age: 36
End: 2021-07-27

## 2021-07-27 VITALS
WEIGHT: 225.4 LBS | DIASTOLIC BLOOD PRESSURE: 70 MMHG | HEART RATE: 104 BPM | SYSTOLIC BLOOD PRESSURE: 138 MMHG | BODY MASS INDEX: 36.4 KG/M2

## 2021-07-27 DIAGNOSIS — Z79.899 HIGH RISK MEDICATION USE: Primary | ICD-10-CM

## 2021-07-27 DIAGNOSIS — F41.1 GENERALIZED ANXIETY DISORDER: ICD-10-CM

## 2021-07-27 DIAGNOSIS — F41.0 PANIC DISORDER WITHOUT AGORAPHOBIA: ICD-10-CM

## 2021-07-27 PROCEDURE — 99213 OFFICE O/P EST LOW 20 MIN: CPT | Performed by: NURSE PRACTITIONER

## 2021-07-27 RX ORDER — CITALOPRAM 20 MG/1
20 TABLET ORAL DAILY
Qty: 30 TABLET | Refills: 0 | Status: SHIPPED | OUTPATIENT
Start: 2021-07-27 | End: 2021-08-23 | Stop reason: SDUPTHER

## 2021-07-27 RX ORDER — CLONAZEPAM 0.5 MG/1
.25-.5 TABLET ORAL DAILY PRN
Qty: 30 TABLET | Refills: 0 | Status: SHIPPED | OUTPATIENT
Start: 2021-07-27 | End: 2021-08-23 | Stop reason: SDUPTHER

## 2021-08-04 ENCOUNTER — OFFICE VISIT (OUTPATIENT)
Dept: FAMILY MEDICINE CLINIC | Facility: CLINIC | Age: 36
End: 2021-08-04

## 2021-08-04 VITALS
HEIGHT: 66 IN | SYSTOLIC BLOOD PRESSURE: 144 MMHG | WEIGHT: 221 LBS | OXYGEN SATURATION: 99 % | HEART RATE: 110 BPM | TEMPERATURE: 97.1 F | BODY MASS INDEX: 35.52 KG/M2 | DIASTOLIC BLOOD PRESSURE: 88 MMHG

## 2021-08-04 DIAGNOSIS — E66.09 CLASS 2 OBESITY DUE TO EXCESS CALORIES WITHOUT SERIOUS COMORBIDITY WITH BODY MASS INDEX (BMI) OF 37.0 TO 37.9 IN ADULT: Primary | ICD-10-CM

## 2021-08-04 DIAGNOSIS — M51.26 LUMBAR DISC HERNIATION: ICD-10-CM

## 2021-08-04 DIAGNOSIS — E55.9 VITAMIN D DEFICIENCY: ICD-10-CM

## 2021-08-04 DIAGNOSIS — K21.9 GASTROESOPHAGEAL REFLUX DISEASE WITHOUT ESOPHAGITIS: ICD-10-CM

## 2021-08-04 PROCEDURE — 99214 OFFICE O/P EST MOD 30 MIN: CPT | Performed by: NURSE PRACTITIONER

## 2021-08-04 RX ORDER — PHENTERMINE HYDROCHLORIDE 37.5 MG/1
37.5 TABLET ORAL
Qty: 30 TABLET | Refills: 0 | Status: SHIPPED | OUTPATIENT
Start: 2021-08-04 | End: 2021-09-01 | Stop reason: SDUPTHER

## 2021-08-04 RX ORDER — CYCLOBENZAPRINE HCL 5 MG
5 TABLET ORAL 3 TIMES DAILY PRN
Qty: 90 TABLET | Refills: 1 | Status: SHIPPED | OUTPATIENT
Start: 2021-08-04 | End: 2021-09-01 | Stop reason: SDUPTHER

## 2021-08-04 RX ORDER — TRAMADOL HYDROCHLORIDE 50 MG/1
50 TABLET ORAL 2 TIMES DAILY PRN
Qty: 60 TABLET | Refills: 1 | Status: SHIPPED | OUTPATIENT
Start: 2021-08-04 | End: 2021-10-05 | Stop reason: SDUPTHER

## 2021-08-04 RX ORDER — CYCLOBENZAPRINE HCL 5 MG
5 TABLET ORAL 3 TIMES DAILY PRN
COMMUNITY
End: 2021-08-04 | Stop reason: SDUPTHER

## 2021-08-04 NOTE — PROGRESS NOTES
"Subjective   Jarad Barragan is a 35 y.o. female.     Chief Complaint   Patient presents with   • Weight Loss       History of Present Illness     Weight loss observation- Noted 4# loss.  She has \"quit pop\".  She reports that has changed to diet.  She reports she has been trying to work shah on choices.  She reports that she is watching portion size.  She is only taking 1/2 of Adipex 37.5 mg.   Vitamin D deficiency-chronic and ongoing.  Patient is presently taking vitamin D 5000 units supplement.  No negative side effects of medication are reported.  Vitamin D level is stable with medication use.  Back pain-chronic and ongoing.  Patient is currently taking tramadol only when necessary.  She does at times use as needed NSAIDs.  She is also taking Flexeril 5 mg as needed.  She continues to have back pain but it varies based on her activity.  She does not have any new or worsening symptoms.  GERD-stable with AcipHex 20 mg.  No concerns today.      The following portions of the patient's history were reviewed and updated as appropriate: CC, ROS, allergies, current medications, past family history, past medical history, past social history, past surgical history and problem list.      Review of Systems   Constitutional: Negative for appetite change, fatigue and fever.   HENT: Negative for congestion, ear pain, nosebleeds, postnasal drip, rhinorrhea, sore throat, tinnitus, trouble swallowing and voice change.    Eyes: Negative for blurred vision, photophobia and visual disturbance.   Respiratory: Negative for cough, chest tightness, shortness of breath and wheezing.    Cardiovascular: Negative for chest pain and palpitations.   Gastrointestinal: Negative for abdominal pain, blood in stool, constipation, diarrhea, nausea and GERD.   Endocrine: Negative for cold intolerance, heat intolerance and polydipsia.   Genitourinary: Negative for decreased urine volume, difficulty urinating, dysuria and hematuria.   Musculoskeletal: " "Negative for arthralgias, back pain, gait problem and myalgias.   Skin: Negative for color change, pallor and bruise.   Allergic/Immunologic: Negative.    Neurological: Negative for dizziness, syncope, numbness and headache.   Hematological: Negative.    Psychiatric/Behavioral: Negative for decreased concentration, sleep disturbance, suicidal ideas and depressed mood. The patient is not nervous/anxious.    All other systems reviewed and are negative.      Objective     /88   Pulse 110   Temp 97.1 °F (36.2 °C) (Temporal)   Ht 167.6 cm (65.98\")   Wt 100 kg (221 lb)   SpO2 99%   BMI 35.69 kg/m²     Physical Exam  Vitals reviewed.   Constitutional:       Appearance: She is well-developed.   HENT:      Head: Normocephalic and atraumatic.      Right Ear: External ear normal.      Left Ear: External ear normal.      Nose: Nose normal.      Mouth/Throat:      Pharynx: No oropharyngeal exudate.   Eyes:      General: No scleral icterus.     Conjunctiva/sclera: Conjunctivae normal.      Pupils: Pupils are equal, round, and reactive to light.   Cardiovascular:      Rate and Rhythm: Normal rate and regular rhythm.      Heart sounds: Normal heart sounds.   Pulmonary:      Effort: Pulmonary effort is normal. No respiratory distress.      Breath sounds: Normal breath sounds.   Abdominal:      General: Bowel sounds are normal.      Palpations: Abdomen is soft.   Musculoskeletal:         General: Tenderness present. No deformity.      Cervical back: Normal range of motion and neck supple. No tenderness.      Thoracic back: Spasms and tenderness present.      Lumbar back: Spasms, tenderness and bony tenderness present. No edema or deformity. Decreased range of motion.   Skin:     General: Skin is warm and dry.      Capillary Refill: Capillary refill takes less than 2 seconds.   Neurological:      Mental Status: She is alert and oriented to person, place, and time.      Cranial Nerves: No cranial nerve deficit.      Motor: " No abnormal muscle tone.      Coordination: Coordination normal.      Gait: Gait abnormal (antalgia noted).   Psychiatric:         Behavior: Behavior normal. Behavior is cooperative.         Thought Content: Thought content normal.         Judgment: Judgment normal.       Assessment/Plan     Diagnoses and all orders for this visit:    1. Class 2 obesity due to excess calories without serious comorbidity with body mass index (BMI) of 37.0 to 37.9 in adult (Primary)  Overview:  Beginning weight at 240 on 02/23/2021    Assessment & Plan:  General weight loss/lifestyle modification strategies discussed (elicit support from others; identify saboteurs; non-food rewards, etc).  Behavioral treatment: stress management.  Diet interventions: cut CHO and snack foods from vending.  Increase Protein intake.  Meal planning encouraged.  Regular aerobic exercise program discussed.  Pharmacotherapy as ordered.    Orders:  -     phentermine (Adipex-P) 37.5 MG tablet; Take 1 tablet by mouth Every Morning Before Breakfast.  Dispense: 30 tablet; Refill: 0    2. Lumbar disc herniation  Assessment & Plan:  Continue as needed tramadol.  NSAIDs only as needed due to stomach concerns.  As needed heat and ice.  Avoid prolonged sitting or standing.  Be active as physically able    Orders:  -     cyclobenzaprine (FLEXERIL) 5 MG tablet; Take 1 tablet by mouth 3 (Three) Times a Day As Needed for Muscle Spasms.  Dispense: 90 tablet; Refill: 1  -     traMADol (ULTRAM) 50 MG tablet; Take 1 tablet by mouth 2 (Two) Times a Day As Needed for Moderate Pain . 1 po bid and 2 po qhs  Dispense: 60 tablet; Refill: 1    3. Vitamin D deficiency  Assessment & Plan:  Continue vitamin D as directed.  Report any negative side effects.  We will continue to monitor vitamin D labs and adjust supplement if necessary.    Orders:  -     vitamin D3 (Vitamin D) 125 MCG (5000 UT) capsule capsule; Take 1 capsule by mouth Daily.  Dispense: 30 capsule; Refill: 11    4.  Gastroesophageal reflux disease without esophagitis  Assessment & Plan:  Continue AcipHex 20 mg as directed.  Report any new GI concerns         Patient's Body mass index is 35.69 kg/m². indicating that she is obese (BMI >30). Obesity-related health conditions include the following: none. Obesity is improving with treatment. BMI is is above range. We discussed portion control, increasing exercise and pharmacologic options including Adipex..       Understands disease processes and need for medications.  Understands reasons for urgent and emergent care.  Patient (& family) verbalized agreement for treatment plan.   Emotional support and active listening provided.  Patient provided time to verbalize feelings.    P[facility]/PMDP reviewed today and consistent.  Will refill prescribed controlled medication today.  Patient is aware they cannot receive narcotics from any other provider except if under care of pain management or speciality clinic.  Risk and benefits of medication use has been reviewed.  History and physical exam exhibit continued safe and appropriate use of controlled substances.  The patient is aware of the potential for addiction and dependence.  This patient has been made aware of the appropriate use of such medications, including potential risk of somnolence, limited ability to drive and / or work safely, and potential for overdose.    It has also been made clear that these medications are for use by this patient only, without concomitant use of alcohol or other substances unless prescribed/advised by medical provider.  Patient understands they may be subject to UDS and pill counts at random.      Patient considered to be moderate risk for addiction due to use of multiple controlled medications.  Patient understands and accepts these risks.  Patient need for medication will be reassessed at each visit.  Doses will be adjusted according to patient need and findings.    Goal of TX: Patient will not have any  adverse reactions of medication.  Patient will have reduction in weight with use of Adipex as directed with simultaneous diet and lifestyle changes.  Patient will have reduction in there chronic back and joint pain symptoms with use of tramadol as needed as directed.  Patient will be able to remain active in an outside of their home with minimal to no interference from their pain symptoms.    Medication Dispense Information    Tramadol Hcl   Dispensed: 8/5/2021 12:00 AM   Written:  8/4/2021   Unit strength: 50MG/50MG/50MG/50MG/50MG/   Days supply: 30   Dispense Note: GivenName=Mike=NOELBirthDate=[id number]=7349 [address], [address]   Quantity: 60 each   Pharmacy: [facility]   Authorizing provider: VIPUL SOSA   Received from: [facility] (Fill History)   Brand or Generic:       Medication Dispense Information    Phentermine Hcl   Dispensed: 8/5/2021 12:00 AM   Written:  8/4/2021   Unit strength: 37.5MG   Days supply: 30   Dispense Note: GivenName=Mike=NOELBirthDate=[id number]=6606 [address], [address]   Quantity: 30 each   Pharmacy: [facility]   Authorizing provider: VIPUL SOSA   Received from: [facility] (Fill History)   Brand or Generic:         RTC 1 month, sooner if needed.           This document has been electronically signed by:  LANA Quinn FNP-C Dragon disclaimer:  Much of this encounter note is an electronic transcription/translation of spoken language to printed text. The electronic translation of spoken language may permit erroneous, or at times, nonsensical words or phrases to be inadvertently transcribed; Although I have reviewed the note for such errors, some may still exist.

## 2021-08-11 ENCOUNTER — TELEPHONE (OUTPATIENT)
Dept: FAMILY MEDICINE CLINIC | Facility: CLINIC | Age: 36
End: 2021-08-11

## 2021-08-11 RX ORDER — NITROFURANTOIN 25; 75 MG/1; MG/1
100 CAPSULE ORAL EVERY 12 HOURS SCHEDULED
Qty: 20 CAPSULE | Refills: 0 | Status: SHIPPED | OUTPATIENT
Start: 2021-08-11 | End: 2021-10-05

## 2021-08-11 NOTE — TELEPHONE ENCOUNTER
S/W patient tm     ----- Message from LANA Quinn sent at 8/11/2021 11:57 AM EDT -----  I sent meds to her pharmacy  ----- Message -----  From: Kamini Mcallister RegSched Rep  Sent: 8/11/2021  10:15 AM EDT  To: LANA Quinn    Requesting something for kidney infection     willard

## 2021-08-13 NOTE — ASSESSMENT & PLAN NOTE
General weight loss/lifestyle modification strategies discussed (elicit support from others; identify saboteurs; non-food rewards, etc).  Behavioral treatment: stress management.  Diet interventions: cut CHO and snack foods from vending.  Increase Protein intake.  Meal planning encouraged.  Regular aerobic exercise program discussed.  Pharmacotherapy as ordered.

## 2021-08-23 ENCOUNTER — TELEMEDICINE (OUTPATIENT)
Dept: PSYCHIATRY | Facility: CLINIC | Age: 36
End: 2021-08-23

## 2021-08-23 DIAGNOSIS — F41.0 PANIC DISORDER WITHOUT AGORAPHOBIA: ICD-10-CM

## 2021-08-23 DIAGNOSIS — Z79.899 HIGH RISK MEDICATION USE: Primary | ICD-10-CM

## 2021-08-23 DIAGNOSIS — F41.1 GENERALIZED ANXIETY DISORDER: ICD-10-CM

## 2021-08-23 PROCEDURE — 99213 OFFICE O/P EST LOW 20 MIN: CPT | Performed by: NURSE PRACTITIONER

## 2021-08-23 RX ORDER — CITALOPRAM 20 MG/1
30 TABLET ORAL DAILY
Qty: 45 TABLET | Refills: 0 | Status: SHIPPED | OUTPATIENT
Start: 2021-08-23 | End: 2021-09-07 | Stop reason: SDUPTHER

## 2021-08-23 RX ORDER — CLONAZEPAM 0.5 MG/1
.25-.5 TABLET ORAL DAILY PRN
Qty: 30 TABLET | Refills: 0 | Status: SHIPPED | OUTPATIENT
Start: 2021-08-23 | End: 2021-09-07 | Stop reason: SDUPTHER

## 2021-09-01 DIAGNOSIS — K21.9 GASTROESOPHAGEAL REFLUX DISEASE WITHOUT ESOPHAGITIS: ICD-10-CM

## 2021-09-01 DIAGNOSIS — E66.09 CLASS 2 OBESITY DUE TO EXCESS CALORIES WITHOUT SERIOUS COMORBIDITY WITH BODY MASS INDEX (BMI) OF 37.0 TO 37.9 IN ADULT: ICD-10-CM

## 2021-09-01 DIAGNOSIS — M51.26 LUMBAR DISC HERNIATION: ICD-10-CM

## 2021-09-01 RX ORDER — PHENTERMINE HYDROCHLORIDE 37.5 MG/1
37.5 TABLET ORAL
Qty: 30 TABLET | Refills: 0 | Status: SHIPPED | OUTPATIENT
Start: 2021-09-01 | End: 2021-12-07 | Stop reason: SDUPTHER

## 2021-09-01 RX ORDER — RABEPRAZOLE SODIUM 20 MG/1
20 TABLET, DELAYED RELEASE ORAL DAILY
Qty: 30 TABLET | Refills: 5 | Status: SHIPPED | OUTPATIENT
Start: 2021-09-01 | End: 2021-12-07 | Stop reason: SDUPTHER

## 2021-09-01 RX ORDER — PROMETHAZINE HYDROCHLORIDE 25 MG/1
25 TABLET ORAL EVERY 6 HOURS PRN
Qty: 60 TABLET | Refills: 1 | Status: SHIPPED | OUTPATIENT
Start: 2021-09-01

## 2021-09-01 RX ORDER — CYCLOBENZAPRINE HCL 5 MG
5 TABLET ORAL 3 TIMES DAILY PRN
Qty: 90 TABLET | Refills: 1 | Status: SHIPPED | OUTPATIENT
Start: 2021-09-01 | End: 2021-10-05 | Stop reason: SDUPTHER

## 2021-09-02 ENCOUNTER — TELEPHONE (OUTPATIENT)
Dept: FAMILY MEDICINE CLINIC | Facility: CLINIC | Age: 36
End: 2021-09-02

## 2021-09-02 ENCOUNTER — TELEPHONE (OUTPATIENT)
Dept: PSYCHIATRY | Facility: CLINIC | Age: 36
End: 2021-09-02

## 2021-09-02 NOTE — TELEPHONE ENCOUNTER
Trazodone may help but it's more for sleep than anxiety. It is an older antidepressant so there could be benefit to anxiety and she could take it if needed but we may want to consider increasing her Celexa

## 2021-09-02 NOTE — TELEPHONE ENCOUNTER
Patient sees Radha. She states that Radha is out of office this week. She says that her anxiety is increased and wondered if there is anything that someone here could add. She says she has Trazadone but did not know if she could take it. Please advise. Thank you!

## 2021-09-02 NOTE — TELEPHONE ENCOUNTER
Patient called and stated that she was having more panic attacks than usual. She wanted to know if Radha could prescribe her a medication to help with this. I explained to her that Radha is out of the office for the rest of the week and she would need to reach out to the [facility] clinic for assistance on this matter.     She is aware and has reach out to the [facility] clinic for further instruction.

## 2021-09-02 NOTE — TELEPHONE ENCOUNTER
If Celexa was just increased, it can take 4-6 weeks until improvement in mood and anxiety is achieved

## 2021-09-07 ENCOUNTER — TELEMEDICINE (OUTPATIENT)
Dept: PSYCHIATRY | Facility: CLINIC | Age: 36
End: 2021-09-07

## 2021-09-07 DIAGNOSIS — F41.1 GENERALIZED ANXIETY DISORDER: ICD-10-CM

## 2021-09-07 DIAGNOSIS — F41.0 PANIC DISORDER WITHOUT AGORAPHOBIA: ICD-10-CM

## 2021-09-07 PROCEDURE — 99213 OFFICE O/P EST LOW 20 MIN: CPT | Performed by: NURSE PRACTITIONER

## 2021-09-07 RX ORDER — CLONAZEPAM 0.5 MG/1
0.5 TABLET ORAL 2 TIMES DAILY PRN
Qty: 60 TABLET | Refills: 0 | Status: SHIPPED | OUTPATIENT
Start: 2021-09-07 | End: 2021-10-08 | Stop reason: SDUPTHER

## 2021-09-07 RX ORDER — TRAZODONE HYDROCHLORIDE 50 MG/1
50 TABLET ORAL NIGHTLY
Qty: 30 TABLET | Refills: 0 | Status: SHIPPED | OUTPATIENT
Start: 2021-09-07 | End: 2021-10-08

## 2021-09-07 RX ORDER — CITALOPRAM 40 MG/1
40 TABLET ORAL DAILY
Qty: 30 TABLET | Refills: 0 | Status: SHIPPED | OUTPATIENT
Start: 2021-09-07 | End: 2021-10-08 | Stop reason: SDUPTHER

## 2021-09-07 NOTE — PROGRESS NOTES
Subjective   Jraad Barragan is a 35 y.o. female is here today for medication management follow-up.    This provider is located at [facility] at [address] [place] [address]. The Patient is seen remotely at home, using [employer] karina. Patient is being seen via telehealth and stated they are in a secure environment for this session. The patient's condition being diagnosed/treated is appropriate for telemedicine. The provider identified him/herself as well as his or her credentials.   The patient and/or patients guardian consent to be seen remotely, and when consent is given they understand that the consent allows for patient identifiable information to be sent to a third party as needed.   They may refuse to be seen remotely at any time. The electronic data is encrypted and password protected, and the patient has been advised of the potential risks to privacy not withstanding such measures.      Chief Complaint:  Anxiety panic attacks     History of Present Illness:   Patient presents today for follow up for medication management for anxiety and panic attacks. Patient states she has been stressing over her dog and now having multiple panic attacks.  Patient reports currently depression is at a 5-6 on a 0-10 scale with 10 being the worst. Patient reports symptoms of depression of crying spells, depressed mood, overwhelmed, decreased appetite, and insomnia. Patient reports anxiety is at a 10 on a 0-10 scale with 10 being the worst. Patient reports having daily or twice daily panic attacks. Patient reports panic attacks lasting all day and during an attacks patient has shortness of breath, a lot of fear, and feeling drained. Patient states depending on the day taking a whole clonazepam. Patient states having a hard time falling asleep. Patient reports sleeping 4 hours a night and patient denies any nightmares. Patient reports appetite is decreased and eating once a day. Patient denies any auditory  or visual hallucinations. Patient adamantly denies any SI or HI. Patient denies any side effects to medications. Patient denies any medical changes since last visit. Patient states not had any adipex the past week. Patient states she had some trazodone left from last time I wrote it and has been trying to take it.   The following portions of the patient's history were reviewed and updated as appropriate: allergies, current medications, past family history, past medical history, past social history, past surgical history and problem list.    Review of Systems   Constitutional: Positive for appetite change.   Respiratory: Negative.    Cardiovascular: Negative.    Gastrointestinal: Negative.    Neurological: Negative.    Psychiatric/Behavioral: Positive for dysphoric mood and sleep disturbance. The patient is nervous/anxious.        Objective   Physical Exam  Constitutional:       Appearance: Normal appearance. She is well-developed and well-groomed.   Neurological:      Mental Status: She is alert.   Psychiatric:         Attention and Perception: Attention and perception normal.         Mood and Affect: Mood is depressed. Affect is tearful.         Speech: Speech normal.         Behavior: Behavior normal. Behavior is cooperative.         Thought Content: Thought content normal.         Cognition and Memory: Cognition and memory normal.         Judgment: Judgment normal.       There were no vitals taken for this visit. There is no height or weight on file to calculate BMI.  Unable to obtain vitals due to video visit    Allergies   Allergen Reactions   • Tape Unknown (See Comments)     Blisters on skin   • Codeine Rash   • Hydrocodone Itching       Medication List:   Current Outpatient Medications   Medication Sig Dispense Refill   • citalopram (CeleXA) 40 MG tablet Take 1 tablet by mouth Daily. 30 tablet 0   • clonazePAM (KlonoPIN) 0.5 MG tablet Take 1 tablet by mouth 2 (Two) Times a Day As Needed for Anxiety. 60  tablet 0   • cyclobenzaprine (FLEXERIL) 5 MG tablet Take 1 tablet by mouth 3 (Three) Times a Day As Needed for Muscle Spasms. 90 tablet 1   • levothyroxine (SYNTHROID, LEVOTHROID) 150 MCG tablet TAKE 1 TABLET BY MOUTH ONCE DAILY 30 tablet 5   • nitrofurantoin, macrocrystal-monohydrate, (Macrobid) 100 MG capsule Take 1 capsule by mouth Every 12 (Twelve) Hours. 20 capsule 0   • phentermine (Adipex-P) 37.5 MG tablet Take 1 tablet by mouth Every Morning Before Breakfast. 30 tablet 0   • promethazine (PHENERGAN) 25 MG tablet Take 1 tablet by mouth Every 6 (Six) Hours As Needed for Nausea or Vomiting. 60 tablet 1   • RABEprazole (Aciphex) 20 MG EC tablet Take 1 tablet by mouth Daily. 30 tablet 5   • traMADol (ULTRAM) 50 MG tablet Take 1 tablet by mouth 2 (Two) Times a Day As Needed for Moderate Pain . 1 po bid and 2 po qhs 60 tablet 1   • traZODone (DESYREL) 50 MG tablet Take 1 tablet by mouth Every Night. 30 tablet 0   • vitamin D3 (Vitamin D) 125 MCG (5000 UT) capsule capsule Take 1 capsule by mouth Daily. 30 capsule 11     No current facility-administered medications for this visit.       Mental Status Exam:   Hygiene:   good  Cooperation:  Cooperative  Eye Contact:  Good  Psychomotor Behavior:  Appropriate  Affect:  Appropriate  Hopelessness: Denies  Speech:  Normal  Thought Process:  Goal directed and Linear  Thought Content:  Normal  Suicidal:  None  Homicidal:  None  Hallucinations:  None  Delusion:  None  Memory:  Intact  Orientation:  Person, Place, Time and Situation  Reliability:  fair  Insight:  Fair  Judgement:  Fair  Impulse Control:  Fair  Physical/Medical Issues:  No               Assessment/Plan   Diagnoses and all orders for this visit:    1. Panic disorder without agoraphobia  -     citalopram (CeleXA) 40 MG tablet; Take 1 tablet by mouth Daily.  Dispense: 30 tablet; Refill: 0  -     clonazePAM (KlonoPIN) 0.5 MG tablet; Take 1 tablet by mouth 2 (Two) Times a Day As Needed for Anxiety.  Dispense: 60  tablet; Refill: 0    2. Generalized anxiety disorder  -     citalopram (CeleXA) 40 MG tablet; Take 1 tablet by mouth Daily.  Dispense: 30 tablet; Refill: 0  -     clonazePAM (KlonoPIN) 0.5 MG tablet; Take 1 tablet by mouth 2 (Two) Times a Day As Needed for Anxiety.  Dispense: 60 tablet; Refill: 0    Other orders  -     traZODone (DESYREL) 50 MG tablet; Take 1 tablet by mouth Every Night.  Dispense: 30 tablet; Refill: 0            Discussed medication options with patient.  Increase Celexa to 40 mg daily for worsening anxiety, panic and depression.  Increase clonazepam to 0.5 twice a day as needed for worsening anxiety.  Reviewed the risks, benefits, and side effects of the medications; patient acknowledged and verbally consented. Patient is being prescribed a controlled substance as part of treatment plan. Patient has been educated of appropriate use of the medications, including risk of somnolence, limited ability to drive and/or work safely, and potential for dependence, respiratory depression and overdose. Patient is also informed that the medication are to be used by the patient only- avoid any combined use of ETOH or other substances unless prescribed.   [facility] Patient Controlled Substance Report (from 9/7/2020 to 9/7/2021)    Dispensed  Strength Quantity Days Supply Provider Pharmacy   08/11/2021 Clonazepam 0.5MG 30 each 30 CLOUD, PATRICIA [facility]   08/05/2021 Phentermine Hcl 37.5MG 30 each 30 SHEILA, [facility]   08/05/2021 Tramadol Hcl 50MG/50MG/50MG/50MG/50MG/ 60 each 30 [facility], [facility]   07/12/2021 Phentermine Hcl 37.5MG 30 each 30 [facility]A, [facility]   07/10/2021 Clonazepam 0.5MG 30 each 30 CLOUD, PATRICIA [facility]   06/09/2021 Clonazepam 0.5MG 30 each 30 CLOUD, PATRICIAHER [facility]   06/03/2021 Phentermine Hcl 37.5MG 30 each 30 SHEILA, [facility]   05/10/2021 Clonazepam 0.5MG 30 each 30 [facility]   05/04/2021  Tramadol Hcl 50MG/50MG/50MG/50MG/50MG/ 60 each 15 [facility]A, [facility]   04/12/2021 Clonazepam 0.5MG 20 each 30 CLOUD, [facility]RICIA [facility]   03/24/2021 Phentermine Hcl 37.5MG 30 each 30 SHEIAL, [facility]   03/05/2021 Clonazepam 0.5MG 20 each 30 CLOUD, PAT[facility]IC[facility]   02/02/2021 Clonazepam 0.5MG 20 each 20 CLOUD, PATRICIA [facility]   01/21/2021 Tramadol Hcl 50MG/50MG/50MG/50MG/50MG/ 60 each 15 SHEILA, [facility]   12/28/2020 Clonazepam 0.5MG 20 each 20 ARETHA, OLMAN [facility]   11/25/2020 Clonazepam 0.5MG 20 each 20 ISBELL, YESSENIA [facility]   11/16/2020 Hydrocodone Bitartrate/Ac 325MG/5MG 12 each 3 SHEILA, [facility]   10/26/2020 Hydrocodone Bitartrate/Ac 325MG/5MG 12 each 3 SHEILA, [facility]   10/24/2020 Clonazepam 0.5MG 20 each 20 MARLADEYA [facility]   10/20/2020 Tramadol Hcl 50MG 30 each 30 ODILON, SIVAN [facility]   09/18/2020 Tramadol Hcl 50MG 60 each 15 ERICKSONBENIGNO [facility]   09/14/2020 Clonazepam 0.5MG 20 each 20 CLOUD, PATRICIA [facility]           Patient is agreeable to call the office with any questions, concerns, or worsening of symptoms.  Patient is aware to call 911 or go to the nearest ER should begin having SI/HI.          Follow-up in 2 weeks          Errors in dictation may reflect use of voice recognition software and not all errors in transcription may have been detected prior to signing.              This document has been electronically signed by LANA Siu   September 13, 2021 12:26 EDT

## 2021-09-13 ENCOUNTER — TELEPHONE (OUTPATIENT)
Dept: FAMILY MEDICINE CLINIC | Facility: CLINIC | Age: 36
End: 2021-09-13

## 2021-09-13 RX ORDER — CLARITHROMYCIN 500 MG/1
500 TABLET, COATED ORAL EVERY 12 HOURS SCHEDULED
Qty: 20 TABLET | Refills: 0 | Status: SHIPPED | OUTPATIENT
Start: 2021-09-13 | End: 2021-09-23

## 2021-09-13 RX ORDER — ALBUTEROL SULFATE 90 UG/1
2 AEROSOL, METERED RESPIRATORY (INHALATION) EVERY 4 HOURS PRN
Qty: 18 G | Refills: 1 | Status: SHIPPED | OUTPATIENT
Start: 2021-09-13 | End: 2022-01-12

## 2021-09-13 NOTE — TELEPHONE ENCOUNTER
Faxed paperwork to [facility] for the infusion to be set up.        ----- Message from LANA Quinn sent at 9/13/2021  9:38 AM EDT -----  That is fine  ----- Message -----  From: Betsy Hester MA  Sent: 9/13/2021   8:28 AM EDT  To: LANA Quinn    Patient called and stated that she tested positive for covid over the weekend. She wanted to know if we could set her up for the monoclonal antibody infusion?

## 2021-09-13 NOTE — TELEPHONE ENCOUNTER
----- Message from LANA Quinn sent at 9/13/2021  8:28 AM EDT -----  That is fine  ----- Message -----  From: Kendra Toscano MA  Sent: 9/13/2021   8:26 AM EDT  To: LANA Quinn    Patient calling, states that she tested positive for COVID. She is wanting to get the infusion done in [place].     Please advise.

## 2021-09-13 NOTE — TELEPHONE ENCOUNTER
Pt is aware of this information.     ----- Message from LANA Quinn sent at 9/13/2021 12:30 PM EDT -----  I have sent meds and an inhaler  ----- Message -----  From: Betsy Hester MA  Sent: 9/13/2021  11:59 AM EDT  To: LANA Quinn    Spoke to roxanne, she stated that she is having sob. States that she does have panic attacks but the sob has got worse since she was diagnosed with covid.  ----- Message -----  From: Christine Joaquin APRN  Sent: 9/13/2021  11:23 AM EDT  To: Betsy Hester MA    If she is not having any cough or SOA related symptoms, we may need to wait.  She may need them later if she becomes sicker.  ----- Message -----  From: Betsy Hester MA  Sent: 9/13/2021  11:19 AM EDT  To: LANA Quinn    Patient tested positive for covid on Friday of last week. I am sending paperwork to [facility] to set her up for the covid infusion. She wanted to know if you could send her in some antibiotics to prevent her from getting pneumonia?

## 2021-09-24 RX ORDER — AMOXICILLIN 500 MG/1
500 TABLET, FILM COATED ORAL 3 TIMES DAILY
Qty: 30 TABLET | Refills: 0 | Status: SHIPPED | OUTPATIENT
Start: 2021-09-24 | End: 2021-10-05

## 2021-09-24 RX ORDER — OFLOXACIN 3 MG/ML
5 SOLUTION AURICULAR (OTIC) 2 TIMES DAILY
Qty: 10 ML | Refills: 0 | Status: SHIPPED | OUTPATIENT
Start: 2021-09-24 | End: 2021-10-01

## 2021-09-27 ENCOUNTER — TELEPHONE (OUTPATIENT)
Dept: FAMILY MEDICINE CLINIC | Facility: CLINIC | Age: 36
End: 2021-09-27

## 2021-09-27 NOTE — TELEPHONE ENCOUNTER
----- Message from LANA Quinn sent at 9/24/2021  1:41 PM EDT -----  Sent  ----- Message -----  From: Kendra Toscano MA  Sent: 9/24/2021   8:37 AM EDT  To: LANA Quinn    Patient calling, wanting something for ear infection.     Please advise.

## 2021-10-05 ENCOUNTER — OFFICE VISIT (OUTPATIENT)
Dept: FAMILY MEDICINE CLINIC | Facility: CLINIC | Age: 36
End: 2021-10-05

## 2021-10-05 VITALS
WEIGHT: 207.4 LBS | HEART RATE: 96 BPM | SYSTOLIC BLOOD PRESSURE: 124 MMHG | HEIGHT: 66 IN | DIASTOLIC BLOOD PRESSURE: 80 MMHG | TEMPERATURE: 97.5 F | OXYGEN SATURATION: 99 % | BODY MASS INDEX: 33.33 KG/M2

## 2021-10-05 DIAGNOSIS — M51.26 LUMBAR DISC HERNIATION: ICD-10-CM

## 2021-10-05 DIAGNOSIS — E66.09 CLASS 2 OBESITY DUE TO EXCESS CALORIES WITHOUT SERIOUS COMORBIDITY WITH BODY MASS INDEX (BMI) OF 37.0 TO 37.9 IN ADULT: Primary | ICD-10-CM

## 2021-10-05 DIAGNOSIS — H93.8X2 IRRITATION OF EAR, LEFT: ICD-10-CM

## 2021-10-05 DIAGNOSIS — E03.4 HYPOTHYROIDISM DUE TO ACQUIRED ATROPHY OF THYROID: ICD-10-CM

## 2021-10-05 PROCEDURE — 99214 OFFICE O/P EST MOD 30 MIN: CPT | Performed by: NURSE PRACTITIONER

## 2021-10-05 RX ORDER — OFLOXACIN 3 MG/ML
5 SOLUTION AURICULAR (OTIC) 2 TIMES DAILY
Qty: 10 ML | Refills: 0 | Status: SHIPPED | OUTPATIENT
Start: 2021-10-05 | End: 2021-10-12

## 2021-10-05 RX ORDER — PREDNISONE 10 MG/1
10 TABLET ORAL DAILY
Qty: 20 TABLET | Refills: 0 | Status: SHIPPED | OUTPATIENT
Start: 2021-10-05 | End: 2021-12-07

## 2021-10-05 RX ORDER — TRAMADOL HYDROCHLORIDE 50 MG/1
50 TABLET ORAL 2 TIMES DAILY PRN
Qty: 60 TABLET | Refills: 1 | Status: SHIPPED | OUTPATIENT
Start: 2021-10-05 | End: 2021-12-07 | Stop reason: SDUPTHER

## 2021-10-05 RX ORDER — CYCLOBENZAPRINE HCL 5 MG
5 TABLET ORAL 3 TIMES DAILY PRN
Qty: 90 TABLET | Refills: 1 | Status: SHIPPED | OUTPATIENT
Start: 2021-10-05 | End: 2021-12-07

## 2021-10-05 NOTE — PROGRESS NOTES
"Subjective   Jarad Barragan is a 35 y.o. female.     Chief Complaint   Patient presents with   • Weight Loss       History of Present Illness     Weight loss observation-ongoing.   She reports that she has not been on meds for the last few weeks as she has been sick and more stressed.   Back pain-\"about the same\".  She reports no significant changes.  She reports she has noted some increase in restlessness of her legs and uses a pillow and heating pad.  She reports she has difficulty lying in bed.  She requests to have some steroids.   Fatigue-is post covid but reports she would like to have her thyroid checked.  She reports she is not sleeping well.  She has not had a recent thyroid panel.    Ears itching-reports she would like to have her ears checked.  She reports that she feels like they are itching.  She reports they are not painful.   Depression and anxiety-under the care of psych NP.  She is presently on Celexa 20 mg, cymbalta 20 mg and Klonopin 1 mg.  No negative side effects.     The following portions of the patient's history were reviewed and updated as appropriate: CC, ROS, allergies, current medications, past family history, past medical history, past social history, past surgical history and problem list.      Review of Systems   Constitutional: Positive for fatigue. Negative for activity change, appetite change and fever.   HENT: Negative for congestion, ear pain, nosebleeds, postnasal drip, rhinorrhea, sore throat, tinnitus, trouble swallowing and voice change.    Eyes: Negative for blurred vision, photophobia and visual disturbance.   Respiratory: Negative for cough, chest tightness, shortness of breath and wheezing.    Cardiovascular: Negative for chest pain and palpitations.   Gastrointestinal: Negative for abdominal pain, blood in stool, constipation, diarrhea, nausea and GERD.   Endocrine: Negative for cold intolerance, heat intolerance and polydipsia.   Genitourinary: Negative for decreased " Mahlon Cooks is a 7 month old female who was brought in for this visit. History was provided by the mother.   HPI:   Patient presents with:  Urgent Care F/u: seen on 6/7 - dx with otitis media Left ear; Rx amox - hard to give it to her  She seems bett "urine volume, difficulty urinating, dysuria and hematuria.   Musculoskeletal: Positive for arthralgias, back pain and gait problem. Negative for myalgias.   Skin: Negative for color change, pallor and bruise.   Allergic/Immunologic: Negative.    Neurological: Negative for dizziness, syncope, numbness and headache.   Hematological: Negative.    Psychiatric/Behavioral: Positive for stress. Negative for decreased concentration, self-injury, sleep disturbance, suicidal ideas and depressed mood. The patient is nervous/anxious.    All other systems reviewed and are negative.      Objective     /80   Pulse 96   Temp 97.5 °F (36.4 °C)   Ht 167.6 cm (65.98\")   Wt 94.1 kg (207 lb 6.4 oz)   SpO2 99%   BMI 33.49 kg/m²     Physical Exam  Vitals reviewed.   Constitutional:       General: She is not in acute distress.     Appearance: She is well-developed. She is not diaphoretic.   HENT:      Head: Normocephalic and atraumatic.      Comments: Oropharynx not examined.  Patient is presently wearing a face covering/mask due to COVID-19 pandemic.     Right Ear: Hearing, tympanic membrane, ear canal and external ear normal.      Left Ear: Hearing and external ear normal.      Ears:      Comments: Irritation of the left EAC.  TM dull poor cone of light no erythema to TM  Eyes:      General: Lids are normal. No scleral icterus.     Extraocular Movements:      Right eye: Normal extraocular motion and no nystagmus.      Left eye: Normal extraocular motion and no nystagmus.      Conjunctiva/sclera: Conjunctivae normal.      Pupils: Pupils are equal, round, and reactive to light.   Neck:      Thyroid: No thyromegaly.      Vascular: No carotid bruit or JVD.      Trachea: No tracheal tenderness.   Cardiovascular:      Rate and Rhythm: Normal rate and regular rhythm.      Heart sounds: Normal heart sounds, S1 normal and S2 normal. No murmur heard.      Pulmonary:      Effort: Pulmonary effort is normal.      Breath sounds: Normal " Visit:  No orders of the defined types were placed in this encounter.       Keyonna Hammonds MD  6/18/2019 breath sounds.   Chest:      Chest wall: No tenderness.   Abdominal:      General: Bowel sounds are normal.      Palpations: Abdomen is soft. There is no mass.      Tenderness: There is no abdominal tenderness.   Musculoskeletal:         General: No tenderness.      Cervical back: Normal range of motion and neck supple.      Right lower leg: No edema.      Left lower leg: No edema.      Comments: No muscular atrophy or flaccidity.   Lymphadenopathy:      Cervical: No cervical adenopathy.      Right cervical: No superficial cervical adenopathy.     Left cervical: No superficial cervical adenopathy.   Skin:     General: Skin is warm and dry.      Capillary Refill: Capillary refill takes less than 2 seconds.      Coloration: Skin is not pale.      Findings: No erythema.      Nails: There is no clubbing.   Neurological:      Mental Status: She is alert and oriented to person, place, and time.      Cranial Nerves: No cranial nerve deficit or facial asymmetry.      Sensory: No sensory deficit.      Motor: No tremor, atrophy or abnormal muscle tone.      Coordination: Coordination normal.      Deep Tendon Reflexes: Reflexes are normal and symmetric.   Psychiatric:         Attention and Perception: She is attentive.         Mood and Affect: Mood normal.         Speech: Speech normal.         Behavior: Behavior normal.         Thought Content: Thought content normal.         Judgment: Judgment normal.       Assessment/Plan     Diagnoses and all orders for this visit:    1. Class 2 obesity due to excess calories without serious comorbidity with body mass index (BMI) of 37.0 to 37.9 in adult (Primary)  Overview:  Beginning weight at 240 on 02/23/2021    Assessment & Plan:  Continue to work on weight reduction.  Adipex as directed      2. Lumbar disc herniation  Assessment & Plan:  Continue symptomatic care.  As needed tramadol for symptoms.  Avoid overuse activities.  Continue Flexeril for muscle spasms    Orders:  -      traMADol (ULTRAM) 50 MG tablet; Take 1 tablet by mouth 2 (Two) Times a Day As Needed for Moderate Pain .  Dispense: 60 tablet; Refill: 1  -     cyclobenzaprine (FLEXERIL) 5 MG tablet; Take 1 tablet by mouth 3 (Three) Times a Day As Needed for Muscle Spasms.  Dispense: 90 tablet; Refill: 1  -     predniSONE (DELTASONE) 10 MG tablet; Take 1 tablet by mouth Daily.  Dispense: 20 tablet; Refill: 0    3. Irritation of ear, left  Comments:  Ofloxacin for ear discomfort.  If not improved patient will report back to the office  Orders:  -     ofloxacin (Floxin Otic) 0.3 % otic solution; Administer 5 drops into both ears 2 (Two) Times a Day for 7 days.  Dispense: 10 mL; Refill: 0    4. Hypothyroidism due to acquired atrophy of thyroid  Assessment & Plan:  Continue current dose of Synthroid.  We will plan for updated TSH free T4.         Patient's Body mass index is 33.49 kg/m². indicating that she is obese (BMI >30). Obesity-related health conditions include the following: none. Obesity is improving with lifestyle modifications. BMI is is above average. We discussed portion control and increasing exercise..     Understands disease processes and need for medications.  Understands reasons for urgent and emergent care.  Patient (& family) verbalized agreement for treatment plan.   Emotional support and active listening provided.  Patient provided time to verbalize feelings.    PURVI/PMDP reviewed today and consistent.  Will refill prescribed controlled medication today.  Patient is aware they cannot receive narcotics from any other provider except if under care of pain management or speciality clinic.  Risk and benefits of medication use has been reviewed.  History and physical exam exhibit continued safe and appropriate use of controlled substances.  The patient is aware of the potential for addiction and dependence.  This patient has been made aware of the appropriate use of such medications, including potential risk of  somnolence, limited ability to drive and / or work safely, and potential for overdose.    It has also been made clear that these medications are for use by this patient only, without concomitant use of alcohol or other substances unless prescribed/advised by medical provider.  Patient understands they may be subject to UDS and pill counts at random.      Patient considered to be low risk for addiction due to use of single controlled medications.  Patient understands and accepts these risks.  Patient need for medication will be reassessed at each visit.  Doses will be adjusted according to patient need and findings.    Goal of TX: Patient will not have any adverse reactions of medication.  Patient will have reduction in there chronic back and joint pain symptoms with use of tramadol as needed as directed.  Patient will be able to remain active in an outside of their home with minimal to no interference from their pain symptoms.    Medication Dispense Information    Tramadol Hcl   Dispensed: 8/5/2021 12:00 AM   Written:  8/4/2021   Unit strength: 50MG/50MG/50MG/50MG/50MG/   Days supply: 30   Dispense Note: GivenName=Mike=NOELBirthDate=[id number]=49[birth year] [address], [address]   Quantity: 60 each   [facility]   Authorizing provider: VIPUL SOSA   Received from: [facility] (Fill History)   Brand or Generic:       Medication Dispense Information    Clonazepam   Dispensed: 9/7/2021 12:00 AM   Written:  9/7/2021   Unit strength: 0.5MG   Days supply: 30   Dispense Note: GivenName=Mike=NOELBirthDate=[id number]=12[birth year] [address], [address]   Quantity: 60 each   Pharmacy: [facility]   Authorizing provider: PATRICIA SEQUEIRA   Received from: [facility] (Fill History)   Brand or Generic:       RTC 1-2 month, sooner if needed.             This document has been electronically signed by:  LANA Quinn FNP-C Dragon  disclaimer:  Part of this note may be an electronic transcription/translation of spoken language to printed text using the Dragon Dictation System.

## 2021-10-08 ENCOUNTER — LAB (OUTPATIENT)
Dept: FAMILY MEDICINE CLINIC | Facility: CLINIC | Age: 36
End: 2021-10-08

## 2021-10-08 ENCOUNTER — TELEMEDICINE (OUTPATIENT)
Dept: PSYCHIATRY | Facility: CLINIC | Age: 36
End: 2021-10-08

## 2021-10-08 DIAGNOSIS — F33.0 MILD EPISODE OF RECURRENT MAJOR DEPRESSIVE DISORDER (HCC): Primary | ICD-10-CM

## 2021-10-08 DIAGNOSIS — F41.1 GENERALIZED ANXIETY DISORDER: ICD-10-CM

## 2021-10-08 DIAGNOSIS — Z00.00 HEALTHCARE MAINTENANCE: ICD-10-CM

## 2021-10-08 DIAGNOSIS — Z79.899 HIGH RISK MEDICATION USE: ICD-10-CM

## 2021-10-08 DIAGNOSIS — F41.0 PANIC DISORDER WITHOUT AGORAPHOBIA: ICD-10-CM

## 2021-10-08 LAB
25(OH)D3 SERPL-MCNC: 35.6 NG/ML (ref 30–100)
ALBUMIN SERPL-MCNC: 4.1 G/DL (ref 3.5–5.2)
ALBUMIN/GLOB SERPL: 2 G/DL
ALP SERPL-CCNC: 63 U/L (ref 39–117)
ALT SERPL W P-5'-P-CCNC: 18 U/L (ref 1–33)
ANION GAP SERPL CALCULATED.3IONS-SCNC: 10.2 MMOL/L (ref 5–15)
AST SERPL-CCNC: 17 U/L (ref 1–32)
BASOPHILS # BLD AUTO: 0.06 10*3/MM3 (ref 0–0.2)
BASOPHILS NFR BLD AUTO: 1.6 % (ref 0–1.5)
BILIRUB SERPL-MCNC: 0.7 MG/DL (ref 0–1.2)
BUN SERPL-MCNC: 13 MG/DL (ref 6–20)
BUN/CREAT SERPL: 17.1 (ref 7–25)
CALCIUM SPEC-SCNC: 9.1 MG/DL (ref 8.6–10.5)
CHLORIDE SERPL-SCNC: 107 MMOL/L (ref 98–107)
CHOLEST SERPL-MCNC: 141 MG/DL (ref 0–200)
CO2 SERPL-SCNC: 23.8 MMOL/L (ref 22–29)
CREAT SERPL-MCNC: 0.76 MG/DL (ref 0.57–1)
DEPRECATED RDW RBC AUTO: 41.2 FL (ref 37–54)
EOSINOPHIL # BLD AUTO: 0.09 10*3/MM3 (ref 0–0.4)
EOSINOPHIL NFR BLD AUTO: 2.4 % (ref 0.3–6.2)
ERYTHROCYTE [DISTWIDTH] IN BLOOD BY AUTOMATED COUNT: 12.6 % (ref 12.3–15.4)
GFR SERPL CREATININE-BSD FRML MDRD: 86 ML/MIN/1.73
GLOBULIN UR ELPH-MCNC: 2.1 GM/DL
GLUCOSE SERPL-MCNC: 84 MG/DL (ref 65–99)
HBA1C MFR BLD: 4.7 % (ref 4.8–5.6)
HCT VFR BLD AUTO: 37.6 % (ref 34–46.6)
HDLC SERPL-MCNC: 45 MG/DL (ref 40–60)
HGB BLD-MCNC: 12.4 G/DL (ref 12–15.9)
IMM GRANULOCYTES # BLD AUTO: 0 10*3/MM3 (ref 0–0.05)
IMM GRANULOCYTES NFR BLD AUTO: 0 % (ref 0–0.5)
LDLC SERPL CALC-MCNC: 87 MG/DL (ref 0–100)
LDLC/HDLC SERPL: 1.96 {RATIO}
LYMPHOCYTES # BLD AUTO: 1.76 10*3/MM3 (ref 0.7–3.1)
LYMPHOCYTES NFR BLD AUTO: 47.1 % (ref 19.6–45.3)
MCH RBC QN AUTO: 29.5 PG (ref 26.6–33)
MCHC RBC AUTO-ENTMCNC: 33 G/DL (ref 31.5–35.7)
MCV RBC AUTO: 89.3 FL (ref 79–97)
MONOCYTES # BLD AUTO: 0.3 10*3/MM3 (ref 0.1–0.9)
MONOCYTES NFR BLD AUTO: 8 % (ref 5–12)
NEUTROPHILS NFR BLD AUTO: 1.53 10*3/MM3 (ref 1.7–7)
NEUTROPHILS NFR BLD AUTO: 40.9 % (ref 42.7–76)
NRBC BLD AUTO-RTO: 0 /100 WBC (ref 0–0.2)
PLATELET # BLD AUTO: 251 10*3/MM3 (ref 140–450)
PMV BLD AUTO: 11.1 FL (ref 6–12)
POTASSIUM SERPL-SCNC: 3.8 MMOL/L (ref 3.5–5.2)
PROT SERPL-MCNC: 6.2 G/DL (ref 6–8.5)
RBC # BLD AUTO: 4.21 10*6/MM3 (ref 3.77–5.28)
SODIUM SERPL-SCNC: 141 MMOL/L (ref 136–145)
T4 FREE SERPL-MCNC: 1.82 NG/DL (ref 0.93–1.7)
TRIGL SERPL-MCNC: 40 MG/DL (ref 0–150)
TSH SERPL DL<=0.05 MIU/L-ACNC: 0.03 UIU/ML (ref 0.27–4.2)
VIT B12 BLD-MCNC: 366 PG/ML (ref 211–946)
VLDLC SERPL-MCNC: 9 MG/DL (ref 5–40)
WBC # BLD AUTO: 3.74 10*3/MM3 (ref 3.4–10.8)

## 2021-10-08 PROCEDURE — 83036 HEMOGLOBIN GLYCOSYLATED A1C: CPT | Performed by: NURSE PRACTITIONER

## 2021-10-08 PROCEDURE — 36415 COLL VENOUS BLD VENIPUNCTURE: CPT | Performed by: NURSE PRACTITIONER

## 2021-10-08 PROCEDURE — 82306 VITAMIN D 25 HYDROXY: CPT | Performed by: NURSE PRACTITIONER

## 2021-10-08 PROCEDURE — 80053 COMPREHEN METABOLIC PANEL: CPT | Performed by: NURSE PRACTITIONER

## 2021-10-08 PROCEDURE — 85025 COMPLETE CBC W/AUTO DIFF WBC: CPT | Performed by: NURSE PRACTITIONER

## 2021-10-08 PROCEDURE — 82607 VITAMIN B-12: CPT | Performed by: NURSE PRACTITIONER

## 2021-10-08 PROCEDURE — 80061 LIPID PANEL: CPT | Performed by: NURSE PRACTITIONER

## 2021-10-08 PROCEDURE — 84439 ASSAY OF FREE THYROXINE: CPT | Performed by: NURSE PRACTITIONER

## 2021-10-08 PROCEDURE — 84443 ASSAY THYROID STIM HORMONE: CPT | Performed by: NURSE PRACTITIONER

## 2021-10-08 PROCEDURE — 99213 OFFICE O/P EST LOW 20 MIN: CPT | Performed by: NURSE PRACTITIONER

## 2021-10-08 RX ORDER — CITALOPRAM 20 MG/1
TABLET ORAL
Qty: 14 TABLET | Refills: 0 | Status: SHIPPED | OUTPATIENT
Start: 2021-10-08 | End: 2021-11-08

## 2021-10-08 RX ORDER — CLONAZEPAM 1 MG/1
.5-1 TABLET ORAL 2 TIMES DAILY PRN
Qty: 45 TABLET | Refills: 0 | Status: SHIPPED | OUTPATIENT
Start: 2021-10-08 | End: 2021-11-08 | Stop reason: SDUPTHER

## 2021-10-08 RX ORDER — DULOXETIN HYDROCHLORIDE 20 MG/1
20 CAPSULE, DELAYED RELEASE ORAL DAILY
Qty: 30 CAPSULE | Refills: 0 | Status: SHIPPED | OUTPATIENT
Start: 2021-10-08 | End: 2021-11-08 | Stop reason: SDUPTHER

## 2021-10-08 NOTE — PROGRESS NOTES
Subjective   Jarad Barragan is a 35 y.o. female is here today for medication management follow-up.    This provider is located at [facility] at [address] [place] [address]. The Patient is seen remotely at home,  using [facility]. Patient is being seen via telehealth and stated they are in a secure environment for this session. The patient's condition being diagnosed/treated is appropriate for telemedicine. The provider identified him/herself as well as his or her credentials.   The patient and/or patients guardian consent to be seen remotely, and when consent is given they understand that the consent allows for patient identifiable information to be sent to a third party as needed. They may refuse to be seen remotely at any time. The electronic data is encrypted and password protected, and the patient has been advised of the potential risks to privacy not withstanding such measures.      Chief Complaint:  Panic attacks anxiety     History of Present Illness:   Patient presents today for follow up for medication management for panic attacks and anxiety. Patient states still having panic attacks everyday and they are bad. Patient states she was doing alright but mom having surgery and now dog is doing worse again. Patient states a lot of situational. Patient states still working but out of sick days. Patient reports currently depression is at a 7 on a 0-10 scale with 10 being the worst. Patient reports symptoms of depression of crying spells, depressed mood, irritability, insomnia, and decreased energy. Patient reports anxiety is at a 10 on a 0-10 scale with 10 being the worst. Patient reports having daily panic attacks. Patient reports panic attacks last all day and during an attacks patient has crying, irritable, panic feeling, heart racing, and can't breathe. Patient reports sleeping 4-5 hours a night and patient denies any nightmares. Patient states trazodone not helping much and waking  up out of sleep in a panic. Patient reports appetite is decreased and eating at least 1-2 meals a day. Patient states not taking the diet pill. Patient denies any auditory or visual hallucinations. Patient adamantly denies any SI or HI. Patient denies any side effects to medications. Patient denies any medical changes since last visit.   The following portions of the patient's history were reviewed and updated as appropriate: allergies, current medications, past family history, past medical history, past social history, past surgical history and problem list.    Review of Systems   Constitutional: Positive for appetite change.   Respiratory: Negative.    Cardiovascular: Negative.    Gastrointestinal: Negative.    Neurological: Negative.    Psychiatric/Behavioral: Positive for agitation, dysphoric mood and sleep disturbance. The patient is nervous/anxious.        Objective   Physical Exam  Constitutional:       Appearance: Normal appearance. She is well-developed and well-groomed.   Neurological:      Mental Status: She is alert.   Psychiatric:         Attention and Perception: Attention and perception normal.         Mood and Affect: Mood is anxious. Affect is tearful.         Speech: Speech normal.         Behavior: Behavior normal. Behavior is cooperative.         Thought Content: Thought content normal.         Cognition and Memory: Cognition and memory normal.         Judgment: Judgment normal.       There were no vitals taken for this visit. There is no height or weight on file to calculate BMI.  Unable to obtain vitals due to video visit.     Allergies   Allergen Reactions   • Tape Unknown (See Comments)     Blisters on skin   • Codeine Rash   • Hydrocodone Itching       Medication List:   Current Outpatient Medications   Medication Sig Dispense Refill   • albuterol sulfate  (90 Base) MCG/ACT inhaler Inhale 2 puffs Every 4 (Four) Hours As Needed for Shortness of Air. 18 g 1   • citalopram (CeleXA) 20 MG  tablet Take 1 tablet by mouth Daily for 7 days, THEN 0.5 tablets Daily for 7 days. Then discontinue 14 tablet 0   • clonazePAM (KlonoPIN) 1 MG tablet Take 0.5-1 tablets by mouth 2 (Two) Times a Day As Needed for Anxiety. 45 tablet 0   • cyclobenzaprine (FLEXERIL) 5 MG tablet Take 1 tablet by mouth 3 (Three) Times a Day As Needed for Muscle Spasms. 90 tablet 1   • DULoxetine (Cymbalta) 20 MG capsule Take 1 capsule by mouth Daily. 30 capsule 0   • levothyroxine (Synthroid) 125 MCG tablet Take 1 tablet by mouth Daily. 30 tablet 5   • phentermine (Adipex-P) 37.5 MG tablet Take 1 tablet by mouth Every Morning Before Breakfast. 30 tablet 0   • predniSONE (DELTASONE) 10 MG tablet Take 1 tablet by mouth Daily. 20 tablet 0   • promethazine (PHENERGAN) 25 MG tablet Take 1 tablet by mouth Every 6 (Six) Hours As Needed for Nausea or Vomiting. 60 tablet 1   • RABEprazole (Aciphex) 20 MG EC tablet Take 1 tablet by mouth Daily. 30 tablet 5   • traMADol (ULTRAM) 50 MG tablet Take 1 tablet by mouth 2 (Two) Times a Day As Needed for Moderate Pain . 60 tablet 1   • vitamin D3 (Vitamin D) 125 MCG (5000 UT) capsule capsule Take 1 capsule by mouth Daily. 30 capsule 11     No current facility-administered medications for this visit.       Mental Status Exam:   Hygiene:   good  Cooperation:  Cooperative  Eye Contact:  Good  Psychomotor Behavior:  Appropriate  Affect:  Appropriate  Hopelessness: Denies  Speech:  Normal  Thought Process:  Goal directed and Linear  Thought Content:  Normal  Suicidal:  None  Homicidal:  None  Hallucinations:  None  Delusion:  None  Memory:  Intact  Orientation:  Person, Place, Time and Situation  Reliability:  fair  Insight:  Fair  Judgement:  Fair  Impulse Control:  Fair  Physical/Medical Issues:  No               Assessment/Plan   Diagnoses and all orders for this visit:    1. Mild episode of recurrent major depressive disorder (HCC) (Primary)  -     DULoxetine (Cymbalta) 20 MG capsule; Take 1 capsule by  mouth Daily.  Dispense: 30 capsule; Refill: 0    2. Panic disorder without agoraphobia  -     citalopram (CeleXA) 20 MG tablet; Take 1 tablet by mouth Daily for 7 days, THEN 0.5 tablets Daily for 7 days. Then discontinue  Dispense: 14 tablet; Refill: 0  -     clonazePAM (KlonoPIN) 1 MG tablet; Take 0.5-1 tablets by mouth 2 (Two) Times a Day As Needed for Anxiety.  Dispense: 45 tablet; Refill: 0  -     DULoxetine (Cymbalta) 20 MG capsule; Take 1 capsule by mouth Daily.  Dispense: 30 capsule; Refill: 0    3. Generalized anxiety disorder  -     citalopram (CeleXA) 20 MG tablet; Take 1 tablet by mouth Daily for 7 days, THEN 0.5 tablets Daily for 7 days. Then discontinue  Dispense: 14 tablet; Refill: 0  -     clonazePAM (KlonoPIN) 1 MG tablet; Take 0.5-1 tablets by mouth 2 (Two) Times a Day As Needed for Anxiety.  Dispense: 45 tablet; Refill: 0  -     DULoxetine (Cymbalta) 20 MG capsule; Take 1 capsule by mouth Daily.  Dispense: 30 capsule; Refill: 0    4. High risk medication use  -     Urine Drug Screen - Urine, Clean Catch; Future            Discussed medication options with patient.  Start Cymbalta 20 mg daily for worsening depression, anxiety, and panic disorder.  Decrease Celexa to 20 mg daily for 1 week then 10 mg daily for 1 week then discontinue.  Continue clonazepam 1 mg 1/2 to 1 tablet twice a day as needed for anxiety.  Reviewed the risks, benefits, and side effects of the medications; patient acknowledged and verbally consented.  Reviewed [employer] testing results with patient. Patient is being prescribed a controlled substance as part of treatment plan. Patient has been educated of appropriate use of the medications, including risk of somnolence, limited ability to drive and/or work safely, and potential for dependence, respiratory depression and overdose. Patient is also informed that the medication are to be used by the patient only- avoid any combined use of ETOH or other substances unless prescribed.   UDS ordered.  PURVI Patient Controlled Substance Report (from 10/22/2020 to 10/11/2021)    Dispensed  Strength Quantity Days Supply Provider Pharmacy   10/08/2021 Clonazepam 1MG 45 each 30 CLOUD, PATRICIA [facility]   10/06/2021 Tramadol Hcl 50MG/50MG/50MG/50MG/50MG/ 60 each 30 SHEILA, VIP[facility]   09/07/2021 Clonazepam 0.5MG 60 each 30 CLOUD, PATRICIAHER [facility]   08/11/2021 [facility]ep[facility] 0.5MG 30 each [facility]   08/05/2021 Phentermine Hcl 37.5MG 30 each 30 SHEILA, VIPUL [facility]   08/05/2021 Tramadol Hcl 50MG/50MG/50MG/50MG/50MG/ 60 each 30 SHEILA, VIPUL [facility]   07/12/2021 Phentermine Hcl 37.5MG 30 each 30 [facility]   07/10/2021 Clonazepam 0.5MG 30 each 30 [facility]   06/09/2021 Cl[facility]epam 0.5MG 30 each [facility]   06/03/2021 Phentermine Hcl 37.5MG 30 each 30 [facility]   05/10/2021 Clonazepam 0.5MG 30 each 30 CLOUD, [facility]ICIA[facility]   05/04/2021 Tramadol Hcl 50MG/50MG/50MG/50MG/50MG/ 60 each 15 [facility]   04/12/2021 Clonazepam 0.5MG 20 each 30 [facility]   03/24/2021 Phentermine Hcl 37.5MG 30 each 30 [facility]   03/05/2021 Clonazepam 0.5MG 20 each 30 [facility]   02/02/2021 Clonazepam 0.5MG 20 each 20 CLOUD, PATRICIA [facility]   01/21/2021 Tramadol Hcl 50MG/50MG/50MG/50MG/50MG/ 60 each 15 SHEILAVIPUL [facility]   12/28/2020 Clonazepam 0.5MG 20 each 20 OLMAN CARI[facility]   11/25/2020 Clonazepam 0.5MG 20 each 20 YESSENIA ISBELL [facility]   11/16/2020 Hydrocodone Bitartrate/Ac 325MG/5MG 12 each 3 SHEILAVIPUL [facility]   10/26/2020 Hydrocodone Bitartrate/Ac 325MG/5MG 12 each 3 SHEILAVIPUL SMART [facility]   10/24/2020 Clonazepam 0.5MG 20 each 20 DEYA GUTIÉRREZ [facility]           Patient is agreeable to call the office with  any questions, concerns, or worsening of symptoms.  Patient is aware to call 911 or go to the nearest ER should begin having SI/HI.        Follow-up in 4 weeks            Errors in dictation may reflect use of voice recognition software and not all errors in transcription may have been detected prior to signing.              This document has been electronically signed by LANA Siu   October 22, 2021 10:16 EDT

## 2021-10-11 RX ORDER — LEVOTHYROXINE SODIUM 0.12 MG/1
125 TABLET ORAL DAILY
Qty: 30 TABLET | Refills: 5 | Status: SHIPPED | OUTPATIENT
Start: 2021-10-11 | End: 2021-12-10

## 2021-10-17 NOTE — ASSESSMENT & PLAN NOTE
Continue symptomatic care.  As needed tramadol for symptoms.  Avoid overuse activities.  Continue Flexeril for muscle spasms

## 2021-11-08 ENCOUNTER — OFFICE VISIT (OUTPATIENT)
Dept: PSYCHIATRY | Facility: CLINIC | Age: 36
End: 2021-11-08

## 2021-11-08 VITALS
DIASTOLIC BLOOD PRESSURE: 60 MMHG | BODY MASS INDEX: 32.94 KG/M2 | HEART RATE: 87 BPM | SYSTOLIC BLOOD PRESSURE: 128 MMHG | WEIGHT: 204 LBS

## 2021-11-08 DIAGNOSIS — Z79.899 HIGH RISK MEDICATION USE: Primary | ICD-10-CM

## 2021-11-08 DIAGNOSIS — F41.0 PANIC DISORDER WITHOUT AGORAPHOBIA: ICD-10-CM

## 2021-11-08 DIAGNOSIS — F41.1 GENERALIZED ANXIETY DISORDER: ICD-10-CM

## 2021-11-08 DIAGNOSIS — F33.0 MILD EPISODE OF RECURRENT MAJOR DEPRESSIVE DISORDER (HCC): ICD-10-CM

## 2021-11-08 PROCEDURE — 99213 OFFICE O/P EST LOW 20 MIN: CPT | Performed by: NURSE PRACTITIONER

## 2021-11-08 RX ORDER — CLONAZEPAM 1 MG/1
.5-1 TABLET ORAL 2 TIMES DAILY PRN
Qty: 45 TABLET | Refills: 0 | Status: SHIPPED | OUTPATIENT
Start: 2021-11-08 | End: 2021-12-09 | Stop reason: SDUPTHER

## 2021-11-08 RX ORDER — LEVOTHYROXINE SODIUM 0.15 MG/1
TABLET ORAL
COMMUNITY
Start: 2021-10-12 | End: 2021-12-10

## 2021-11-08 RX ORDER — DULOXETIN HYDROCHLORIDE 30 MG/1
30 CAPSULE, DELAYED RELEASE ORAL DAILY
Qty: 30 CAPSULE | Refills: 0 | Status: SHIPPED | OUTPATIENT
Start: 2021-11-08 | End: 2021-11-22 | Stop reason: SDUPTHER

## 2021-11-08 NOTE — PROGRESS NOTES
"      Subjective   Jarad Barragan is a 36 y.o. female is here today for medication management follow-up.    Chief Complaint:  Anxiety panic mild depression     History of Present Illness:    Patient presents today for follow up for medication management for anxiety, panic, and mild depression. Patient states everything is going better with the cymbalta. Patient denies any side effects to the cymbalta. Denies any problems coming off celexa. Patient states dog is still sick but doing a little better. Patient states doing ok but still dealing with mom after surgery. Patient states mom had another surgery and has drain tubes. Patient states her mom has been staying with her. Patient reports currently depression is at a 6-7 on a 0-10 scale with 10 being the worst. Patient reports symptoms of depression of crying spells, depressed mood, insomnia, decreased energy, and overwhelmed. Patient reports anxiety is at a 8-9 on a 0-10 scale with 10 being the worst. Patient reports having four panic attacks. Patient reports panic attacks last anywhere from 30 minutes to all day and during an attacks patient has shortness of breath, \"pure fear\", and heart racing. Patient reports sleeping 3-4 hours a night and patient denies any nightmares. Patient states waking up in a panic but don't remember dreams. Patient reports appetite is good and eating at least 1-2 meals a day. Patient denies any auditory or visual hallucinations. Patient adamantly denies any SI or HI. Patient denies any side effects to medications. Patient denies any medical changes since last visit.   The following portions of the patient's history were reviewed and updated as appropriate: allergies, current medications, past family history, past medical history, past social history, past surgical history and problem list.    Review of Systems   Constitutional: Negative.    Respiratory: Negative.    Cardiovascular: Negative.    Gastrointestinal: Negative.    Neurological: " Negative.    Psychiatric/Behavioral: Positive for dysphoric mood and sleep disturbance. The patient is nervous/anxious.        Objective   Physical Exam  Vitals reviewed.   Constitutional:       Appearance: Normal appearance. She is well-developed and well-groomed.   Neurological:      Mental Status: She is alert.   Psychiatric:         Attention and Perception: Attention and perception normal.         Mood and Affect: Mood is anxious. Affect is tearful.         Speech: Speech normal.         Behavior: Behavior normal. Behavior is cooperative.         Thought Content: Thought content normal.         Cognition and Memory: Cognition and memory normal.         Judgment: Judgment normal.       Blood pressure 128/60, pulse 87, weight 92.5 kg (204 lb). Body mass index is 32.94 kg/m².    Allergies   Allergen Reactions   • Tape Unknown (See Comments)     Blisters on skin   • Codeine Rash   • Hydrocodone Itching       Medication List:   Current Outpatient Medications   Medication Sig Dispense Refill   • albuterol sulfate  (90 Base) MCG/ACT inhaler Inhale 2 puffs Every 4 (Four) Hours As Needed for Shortness of Air. 18 g 1   • clonazePAM (KlonoPIN) 1 MG tablet Take 0.5-1 tablets by mouth 2 (Two) Times a Day As Needed for Anxiety. 45 tablet 0   • cyclobenzaprine (FLEXERIL) 5 MG tablet Take 1 tablet by mouth 3 (Three) Times a Day As Needed for Muscle Spasms. 90 tablet 1   • DULoxetine (Cymbalta) 30 MG capsule Take 1 capsule by mouth Daily. 30 capsule 0   • levothyroxine (Synthroid) 125 MCG tablet Take 1 tablet by mouth Daily. 30 tablet 5   • levothyroxine (SYNTHROID, LEVOTHROID) 150 MCG tablet      • phentermine (Adipex-P) 37.5 MG tablet Take 1 tablet by mouth Every Morning Before Breakfast. 30 tablet 0   • predniSONE (DELTASONE) 10 MG tablet Take 1 tablet by mouth Daily. 20 tablet 0   • promethazine (PHENERGAN) 25 MG tablet Take 1 tablet by mouth Every 6 (Six) Hours As Needed for Nausea or Vomiting. 60 tablet 1   •  RABEprazole (Aciphex) 20 MG EC tablet Take 1 tablet by mouth Daily. 30 tablet 5   • tiZANidine (ZANAFLEX) 2 MG tablet Take 1-2 tablets by mouth 3 (Three) Times a Day As Needed for Muscle Spasms. 90 tablet 0   • traMADol (ULTRAM) 50 MG tablet Take 1 tablet by mouth 2 (Two) Times a Day As Needed for Moderate Pain . 60 tablet 1   • vitamin D3 (Vitamin D) 125 MCG (5000 UT) capsule capsule Take 1 capsule by mouth Daily. 30 capsule 11     No current facility-administered medications for this visit.       Mental Status Exam:   Hygiene:   good  Cooperation:  Cooperative  Eye Contact:  Good  Psychomotor Behavior:  Restless  Affect:  Appropriate  Hopelessness: Denies  Speech:  Normal  Thought Process:  Goal directed and Linear  Thought Content:  Normal  Suicidal:  None  Homicidal:  None  Hallucinations:  None  Delusion:  None  Memory:  Intact  Orientation:  Person, Place, Time and Situation  Reliability:  fair  Insight:  Fair  Judgement:  Fair  Impulse Control:  Fair  Physical/Medical Issues:  No     PHQ-9 Depression Screening  Little interest or pleasure in doing things? 2   Feeling down, depressed, or hopeless? 1   Trouble falling or staying asleep, or sleeping too much? 3   Feeling tired or having little energy? 3   Poor appetite or overeating? 2   Feeling bad about yourself - or that you are a failure or have let yourself or your family down? 1   Trouble concentrating on things, such as reading the newspaper or watching television? 1   Moving or speaking so slowly that other people could have noticed? Or the opposite - being so fidgety or restless that you have been moving around a lot more than usual? 2   Thoughts that you would be better off dead, or of hurting yourself in some way? 0   PHQ-9 Total Score 15   If you checked off any problems, how difficult have these problems made it for you to do your work, take care of things at home, or get along with other people?                     Assessment/Plan   Diagnoses and  all orders for this visit:    1. High risk medication use (Primary)  -     Urine Drug Screen - Urine, Clean Catch; Future    2. Panic disorder without agoraphobia  -     clonazePAM (KlonoPIN) 1 MG tablet; Take 0.5-1 tablets by mouth 2 (Two) Times a Day As Needed for Anxiety.  Dispense: 45 tablet; Refill: 0  -     DULoxetine (Cymbalta) 30 MG capsule; Take 1 capsule by mouth Daily.  Dispense: 30 capsule; Refill: 0    3. Generalized anxiety disorder  -     clonazePAM (KlonoPIN) 1 MG tablet; Take 0.5-1 tablets by mouth 2 (Two) Times a Day As Needed for Anxiety.  Dispense: 45 tablet; Refill: 0  -     DULoxetine (Cymbalta) 30 MG capsule; Take 1 capsule by mouth Daily.  Dispense: 30 capsule; Refill: 0    4. Mild episode of recurrent major depressive disorder (HCC)  -     DULoxetine (Cymbalta) 30 MG capsule; Take 1 capsule by mouth Daily.  Dispense: 30 capsule; Refill: 0            Discussed medication options with patient. Increase Cymbalta to 30mg daily for worsening depression and anxiety. Cont. Clonazepam 1mg 0.5-1 tab twice daily as needed for anxiety. Reviewed the risks, benefits, and side effects of the medications; patient acknowledged and verbally consented. Patient is being prescribed a controlled substance as part of treatment plan. Patient has been educated of appropriate use of the medications, including risk of somnolence, limited ability to drive and/or work safely, and potential for dependence, respiratory depression and overdose. Patient is also informed that the medication are to be used by the patient only- avoid any combined use of ETOH or other substances unless prescribed. UDS ordered.   PURVI Patient Controlled Substance Report (from 11/8/2020 to 11/8/2021)    Dispensed  Strength Quantity Days Supply Provider Pharmacy   10/08/2021 Clonazepam 1MG 45 each 30 PATRICIA SEQUEIRA [facility]   10/06/2021 Tramadol Hcl 50MG/50MG/50MG/50MG/50MG/ 60 each 30 VIPUL SOSA [facility]   09/07/2021  Clonazepam 0.5MG 60 each 30 CLOUD, PATRICIA [facility] [place]   08/11/2021 Clonazepam 0.5MG 30 each 30 CLOUD, PATRICIAHER [facility] [place]   08/05/2021 Phentermine Hcl 37.5MG 30 each 30 SHEILA, [facility]UL [facility] [place]   08/05/2021 Tramadol Hcl 50MG/50MG/50MG/50MG/50MG/ 60 each 30 SHEILA, VIPUL [facility] [place]   07/12/2021 Phentermine Hcl 37.5MG 30 each [facility]   07/10/2021 Clonazepam 0.5MG 30 each 30 [facility]   06/09/2021 Clonazepam 0.5MG 30 each 30 [facility]   06/03/2021 Phentermine Hcl 37.5MG 30 each 30 [facility]   05/10/2021 Clonazepam 0.5MG 30 each 30 CLOUD, PATRICIAHER [facility] [place]   05/04/2021 Tramadol Hcl 50MG/50MG/50MG/50MG/50MG/ 60 each 15 [facility]   04/12/2021 Clonazepam 0.5MG 20 each 30 [facility]   03/24/2021 Phentermine Hcl 37.5MG 30 each 30 [facility]   03/05/2021 Clonazepam 0.5MG 20 each 30 [facility]   02/02/2021 Clonazepam 0.5MG 20 each 20 [facility]   01/21/2021 Tramadol Hcl 50MG/50MG/50MG/50MG/50MG/ 60 each 15 [facility]   12/28/2020 Clonazepam 0.5MG 20 each 20 [facility]   11/25/2020 Clonazepam 0.5MG 20 each 20 [facility]   11/16/2020 Hydrocodone Bitartrate/Ac 325MG/5MG 12 each 3 VIPUL SOSA [place]           Patient is agreeable to call the office with any questions, concerns, or worsening of symptoms.  Patient is aware to call 911 or go to the nearest ER should begin having SI/HI.          Follow-up in 4 weeks        Errors in dictation may reflect use of voice recognition software and not all errors in transcription may have been detected prior to signing.              This document has been electronically signed by LANA Siu   November 22, 2021 09:56 EST

## 2021-11-19 ENCOUNTER — TELEPHONE (OUTPATIENT)
Dept: FAMILY MEDICINE CLINIC | Facility: CLINIC | Age: 36
End: 2021-11-19

## 2021-11-19 RX ORDER — TIZANIDINE 2 MG/1
2-4 TABLET ORAL 3 TIMES DAILY PRN
Qty: 90 TABLET | Refills: 0 | Status: SHIPPED | OUTPATIENT
Start: 2021-11-19 | End: 2021-12-07 | Stop reason: DRUGHIGH

## 2021-11-22 DIAGNOSIS — F41.1 GENERALIZED ANXIETY DISORDER: ICD-10-CM

## 2021-11-22 DIAGNOSIS — F33.0 MILD EPISODE OF RECURRENT MAJOR DEPRESSIVE DISORDER (HCC): ICD-10-CM

## 2021-11-22 DIAGNOSIS — F41.0 PANIC DISORDER WITHOUT AGORAPHOBIA: ICD-10-CM

## 2021-11-22 RX ORDER — DULOXETIN HYDROCHLORIDE 20 MG/1
20 CAPSULE, DELAYED RELEASE ORAL 2 TIMES DAILY
Qty: 60 CAPSULE | Refills: 0 | Status: SHIPPED | OUTPATIENT
Start: 2021-11-22 | End: 2021-12-09 | Stop reason: SDUPTHER

## 2021-12-07 ENCOUNTER — OFFICE VISIT (OUTPATIENT)
Dept: FAMILY MEDICINE CLINIC | Facility: CLINIC | Age: 36
End: 2021-12-07

## 2021-12-07 VITALS
OXYGEN SATURATION: 98 % | BODY MASS INDEX: 31.82 KG/M2 | HEART RATE: 108 BPM | SYSTOLIC BLOOD PRESSURE: 120 MMHG | TEMPERATURE: 97.3 F | DIASTOLIC BLOOD PRESSURE: 78 MMHG | HEIGHT: 66 IN | WEIGHT: 198 LBS

## 2021-12-07 DIAGNOSIS — F41.8 DEPRESSION WITH ANXIETY: ICD-10-CM

## 2021-12-07 DIAGNOSIS — E03.4 HYPOTHYROIDISM DUE TO ACQUIRED ATROPHY OF THYROID: ICD-10-CM

## 2021-12-07 DIAGNOSIS — M51.26 LUMBAR DISC HERNIATION: ICD-10-CM

## 2021-12-07 DIAGNOSIS — K21.9 GASTROESOPHAGEAL REFLUX DISEASE WITHOUT ESOPHAGITIS: ICD-10-CM

## 2021-12-07 DIAGNOSIS — E66.09 CLASS 2 OBESITY DUE TO EXCESS CALORIES WITHOUT SERIOUS COMORBIDITY WITH BODY MASS INDEX (BMI) OF 37.0 TO 37.9 IN ADULT: Primary | ICD-10-CM

## 2021-12-07 PROCEDURE — 99214 OFFICE O/P EST MOD 30 MIN: CPT | Performed by: NURSE PRACTITIONER

## 2021-12-07 PROCEDURE — 36415 COLL VENOUS BLD VENIPUNCTURE: CPT | Performed by: NURSE PRACTITIONER

## 2021-12-07 RX ORDER — RABEPRAZOLE SODIUM 20 MG/1
20 TABLET, DELAYED RELEASE ORAL DAILY
Qty: 30 TABLET | Refills: 5 | Status: SHIPPED | OUTPATIENT
Start: 2021-12-07 | End: 2022-04-12 | Stop reason: SDUPTHER

## 2021-12-07 RX ORDER — TIZANIDINE 4 MG/1
4 TABLET ORAL 3 TIMES DAILY PRN
Qty: 90 TABLET | Refills: 5 | Status: SHIPPED | OUTPATIENT
Start: 2021-12-07 | End: 2022-02-08 | Stop reason: SDUPTHER

## 2021-12-07 RX ORDER — PHENTERMINE HYDROCHLORIDE 37.5 MG/1
37.5 TABLET ORAL
Qty: 30 TABLET | Refills: 0 | Status: SHIPPED | OUTPATIENT
Start: 2021-12-07 | End: 2022-03-22

## 2021-12-07 RX ORDER — TRAMADOL HYDROCHLORIDE 50 MG/1
50 TABLET ORAL 2 TIMES DAILY PRN
Qty: 60 TABLET | Refills: 1 | Status: SHIPPED | OUTPATIENT
Start: 2021-12-07 | End: 2022-01-05 | Stop reason: SDUPTHER

## 2021-12-07 NOTE — PROGRESS NOTES
Subjective   Jarad Barragan is a 36 y.o. female.     Chief Complaint   Patient presents with   • Back Pain       History of Present Illness     Back Pain-chronic and ongoing.  She reports she has noted some improvement in the spasms in her back with zanaflex.   She reports she is taking 2 caps mostly in the evening.     Thyroid concern-reports that she has always felt better when she was taking 150 mcg.  She is concerned she may not be getting enough meds.   She has not had any antibody testing.  She was overactive on her last labs and meds were decreased to 112 mcg.  She is due for an updated thyroid panel today.  Weight loss observation-on Adipex.  She reports she is taking meds mostly as needed.  She is taking 1/2 tablet.  She is trying to watch her diet.   Depression and anxiety-ongoing.  Patient is currently taking Klonopin 1 mg half to 1 tab twice daily as needed for anxiety.  She is also on Cymbalta 20 mg.  She is under the care of psych NP.  She does not have any new concerns today.  GERD-stable with AcipHex use.  Patient denies any concerns.  She is not having any breakthrough symptoms.    The following portions of the patient's history were reviewed and updated as appropriate: CC, ROS, allergies, current medications, past family history, past medical history, past social history, past surgical history and problem list.      Review of Systems   Constitutional: Negative for activity change, appetite change, fatigue and fever.   HENT: Negative for congestion, ear pain, nosebleeds, postnasal drip, rhinorrhea, sore throat, tinnitus, trouble swallowing and voice change.    Eyes: Negative for blurred vision, photophobia and visual disturbance.   Respiratory: Negative for cough, chest tightness, shortness of breath and wheezing.    Cardiovascular: Negative for chest pain, palpitations and leg swelling.   Gastrointestinal: Negative for abdominal pain, blood in stool, constipation, diarrhea, nausea and GERD.  "  Endocrine: Negative for cold intolerance, heat intolerance and polydipsia.   Genitourinary: Negative for decreased urine volume, difficulty urinating, dysuria and hematuria.   Musculoskeletal: Positive for back pain. Negative for arthralgias, gait problem and myalgias.   Skin: Negative for color change, pallor and bruise.   Allergic/Immunologic: Negative.    Neurological: Positive for headache. Negative for dizziness, syncope and numbness.   Hematological: Negative.    Psychiatric/Behavioral: Negative for decreased concentration, sleep disturbance, suicidal ideas and depressed mood. The patient is not nervous/anxious.    All other systems reviewed and are negative.      Objective     /78   Pulse 108   Temp 97.3 °F (36.3 °C)   Ht 167.6 cm (65.98\")   Wt 89.8 kg (198 lb)   SpO2 98%   BMI 31.97 kg/m²     Physical Exam  Vitals reviewed.   Constitutional:       General: She is not in acute distress.     Appearance: She is well-developed. She is not diaphoretic.   HENT:      Head: Normocephalic and atraumatic.      Jaw: No tenderness.      Comments: Oropharynx not examined.  Patient is presently wearing a face covering/mask due to COVID-19 pandemic.     Right Ear: Hearing, tympanic membrane, ear canal and external ear normal.      Left Ear: Hearing, tympanic membrane, ear canal and external ear normal.   Eyes:      General: Lids are normal. No scleral icterus.     Extraocular Movements:      Right eye: Normal extraocular motion and no nystagmus.      Left eye: Normal extraocular motion and no nystagmus.      Conjunctiva/sclera: Conjunctivae normal.      Pupils: Pupils are equal, round, and reactive to light.   Neck:      Thyroid: No thyromegaly or thyroid tenderness.      Vascular: No carotid bruit or JVD.      Trachea: No tracheal tenderness.   Cardiovascular:      Rate and Rhythm: Normal rate and regular rhythm.      Pulses:           Dorsalis pedis pulses are 2+ on the right side and 2+ on the left side. "        Posterior tibial pulses are 2+ on the right side and 2+ on the left side.      Heart sounds: Normal heart sounds, S1 normal and S2 normal. No murmur heard.      Pulmonary:      Effort: Pulmonary effort is normal. No accessory muscle usage, prolonged expiration or respiratory distress.      Breath sounds: Normal breath sounds.   Chest:      Chest wall: No tenderness.   Abdominal:      General: Bowel sounds are normal.      Palpations: Abdomen is soft. There is no mass.      Tenderness: There is no abdominal tenderness.   Musculoskeletal:      Cervical back: Normal range of motion and neck supple.      Thoracic back: Tenderness present.      Lumbar back: Tenderness present. Decreased range of motion.      Right upper leg: Tenderness present.      Left upper leg: Tenderness present.      Right lower leg: No edema.      Left lower leg: No edema.      Comments: No muscular atrophy or flaccidity.   Lymphadenopathy:      Head:      Right side of head: No submental or submandibular adenopathy.      Left side of head: No submental or submandibular adenopathy.      Cervical: No cervical adenopathy.      Right cervical: No superficial cervical adenopathy.     Left cervical: No superficial cervical adenopathy.   Skin:     General: Skin is warm and dry.      Capillary Refill: Capillary refill takes less than 2 seconds.      Coloration: Skin is not jaundiced or pale.      Findings: No erythema.      Nails: There is no clubbing.   Neurological:      Mental Status: She is alert and oriented to person, place, and time.      Cranial Nerves: No cranial nerve deficit or facial asymmetry.      Sensory: No sensory deficit.      Motor: No tremor, atrophy or abnormal muscle tone.      Coordination: Coordination normal.      Deep Tendon Reflexes: Reflexes are normal and symmetric.   Psychiatric:         Attention and Perception: She is attentive.         Mood and Affect: Mood is anxious and depressed. Affect is flat.         Speech:  Speech normal.         Behavior: Behavior normal. Behavior is cooperative.         Thought Content: Thought content normal.         Cognition and Memory: Cognition normal.         Judgment: Judgment normal.       Assessment/Plan     Diagnoses and all orders for this visit:    1. Class 2 obesity due to excess calories without serious comorbidity with body mass index (BMI) of 37.0 to 37.9 in adult (Primary)  Overview:  Beginning weight at 240 on 02/23/2021    Assessment & Plan:  Continue to work on diet changes.  Continue Adipex as tolerated.    Orders:  -     phentermine (Adipex-P) 37.5 MG tablet; Take 1 tablet by mouth Every Morning Before Breakfast.  Dispense: 30 tablet; Refill: 0    2. Lumbar disc herniation  -     tiZANidine (ZANAFLEX) 4 MG tablet; Take 1 tablet by mouth 3 (Three) Times a Day As Needed for Muscle Spasms.  Dispense: 90 tablet; Refill: 5  -     traMADol (ULTRAM) 50 MG tablet; Take 1 tablet by mouth 2 (Two) Times a Day As Needed for Moderate Pain .  Dispense: 60 tablet; Refill: 1    3. Hypothyroidism due to acquired atrophy of thyroid  Assessment & Plan:  Labs today.  We will adjust medications as needed.  We will consider an endocrinology referral if warranted    Orders:  -     TSH; Future  -     T4, Free; Future  -     T3, Free; Future  -     Thyroid Antibodies; Future  -     Thyroid Stimulating Immunoglobulin; Future    4. Gastroesophageal reflux disease without esophagitis  Assessment & Plan:  Continue AcipHex.  Advised to avoid known GI triggers such as spicy foods.  Upright 30 minutes after meals and avoid eating large meals.  Several small meals daily as able to avoid overfilling stomach.    Orders:  -     RABEprazole (Aciphex) 20 MG EC tablet; Take 1 tablet by mouth Daily.  Dispense: 30 tablet; Refill: 5    5. Depression with anxiety  Assessment & Plan:  Patient screened positive for depression based on a PHQ-9 score of 10 on 12/9/2021. Follow-up recommendations include: Patient is under the  care of psych NP with medication adjustments ongoing.    Continue Cymbalta and as needed Klonopin as directed.  Continue to work on stress reduction           Patient's Body mass index is 31.97 kg/m². indicating that she is obese (BMI >30). Obesity-related health conditions include the following: GERD. Obesity is improving with lifestyle modifications. BMI is Is above average. We discussed portion control, increasing exercise and pharmacologic options including Adipex..     Understands disease processes and need for medications.  Understands reasons for urgent and emergent care.  Patient (& family) verbalized agreement for treatment plan.   Emotional support and active listening provided.  Patient provided time to verbalize feelings.    PURVI/PMDP reviewed today and consistent.  Will refill prescribed controlled medication today.  Patient is aware they cannot receive narcotics from any other provider except if under care of pain management or speciality clinic.  Risk and benefits of medication use has been reviewed.  History and physical exam exhibit continued safe and appropriate use of controlled substances.  The patient is aware of the potential for addiction and dependence.  This patient has been made aware of the appropriate use of such medications, including potential risk of somnolence, limited ability to drive and / or work safely, and potential for overdose.    It has also been made clear that these medications are for use by this patient only, without concomitant use of alcohol or other substances unless prescribed/advised by medical provider.  Patient understands they may be subject to UDS and pill counts at random.      Patient considered to be moderate risk for addiction due to use of multiple controlled medications.  Patient understands and accepts these risks.  Patient need for medication will be reassessed at each visit.  Doses will be adjusted according to patient need and findings.    Goal of TX:  Patient will not have any adverse reactions of medication.  Patient will have reduction in anxiety symptoms with use of PRN Klonopin.  Patient will be able to remain active in an outside of her home without interference from anxiety symptoms.Patient will have reduction in there chronic back and joint pain symptoms with use of tramadol as needed as directed.  Patient will be able to remain active in an outside of their home with minimal to no interference from their pain symptoms.      Medication Dispense Information    Phentermine Hcl   Dispensed: 8/5/2021 12:00 AM   Written:  8/4/2021   Unit strength: 37.5MG   Days supply: 30   Dispense Note: GivenName=Mike=NOELBirthDate=[id number]=[address][birth year] [address], [address]   Quantity: 30 each   [facility]   Authorizing provider: VIPUL SOSA   [facility]   Brand or Generic:         Medication Dispense Information    Tramadol Hcl   Dispensed: 10/6/2021 12:00 AM   Written:  8/4/2021   Unit strength: 50MG/50MG/50MG/50MG/50MG/   Days supply: 30   Dispense Note: GivenName=Mike=NOELBirthDate=[id number]=[address]26 [address], [address]   Quantity: 60 each   [facility]   Authorizing provider: VIPUL SOSA   [facility]   Brand or Generic:       Medication Dispense Information    Clonazepam   Dispensed: 11/8/2021 12:00 AM   Written:  11/8/2021   Unit strength: 1MG   Days supply: 30   Dispense Note: GivenName=Mike=NOELBirthDate=[id number]=[address], [address]   Quantity: 45 each   Pharmacy: [facility]   Authorizing provider: PATRICIA SEQUEIRA   [facility]   Brand or Generic:       RTC 2 months, sooner if needed.             This document has been electronically signed by:  LANA Quinn, FNP-C    Dragon disclaimer:  Part of this note may be an  electronic transcription/translation of spoken language to printed text using the Dragon Dictation System.

## 2021-12-09 ENCOUNTER — OFFICE VISIT (OUTPATIENT)
Dept: PSYCHIATRY | Facility: CLINIC | Age: 36
End: 2021-12-09

## 2021-12-09 ENCOUNTER — LAB (OUTPATIENT)
Dept: FAMILY MEDICINE CLINIC | Facility: CLINIC | Age: 36
End: 2021-12-09

## 2021-12-09 VITALS
WEIGHT: 200 LBS | DIASTOLIC BLOOD PRESSURE: 60 MMHG | BODY MASS INDEX: 32.3 KG/M2 | SYSTOLIC BLOOD PRESSURE: 118 MMHG | HEART RATE: 84 BPM

## 2021-12-09 DIAGNOSIS — F41.1 GENERALIZED ANXIETY DISORDER: ICD-10-CM

## 2021-12-09 DIAGNOSIS — F41.0 PANIC DISORDER WITHOUT AGORAPHOBIA: ICD-10-CM

## 2021-12-09 DIAGNOSIS — R21 RASH: Primary | ICD-10-CM

## 2021-12-09 DIAGNOSIS — E03.4 HYPOTHYROIDISM DUE TO ACQUIRED ATROPHY OF THYROID: ICD-10-CM

## 2021-12-09 DIAGNOSIS — Z79.899 HIGH RISK MEDICATION USE: Primary | ICD-10-CM

## 2021-12-09 DIAGNOSIS — F33.0 MILD EPISODE OF RECURRENT MAJOR DEPRESSIVE DISORDER (HCC): ICD-10-CM

## 2021-12-09 LAB
T3FREE SERPL-MCNC: 3.42 PG/ML (ref 2–4.4)
T4 FREE SERPL-MCNC: 1.99 NG/DL (ref 0.93–1.7)
TSH SERPL DL<=0.05 MIU/L-ACNC: 0.01 UIU/ML (ref 0.27–4.2)

## 2021-12-09 PROCEDURE — 84443 ASSAY THYROID STIM HORMONE: CPT | Performed by: NURSE PRACTITIONER

## 2021-12-09 PROCEDURE — 99213 OFFICE O/P EST LOW 20 MIN: CPT | Performed by: NURSE PRACTITIONER

## 2021-12-09 PROCEDURE — 86800 THYROGLOBULIN ANTIBODY: CPT | Performed by: NURSE PRACTITIONER

## 2021-12-09 PROCEDURE — 84481 FREE ASSAY (FT-3): CPT | Performed by: NURSE PRACTITIONER

## 2021-12-09 PROCEDURE — 84445 ASSAY OF TSI GLOBULIN: CPT | Performed by: NURSE PRACTITIONER

## 2021-12-09 PROCEDURE — 86376 MICROSOMAL ANTIBODY EACH: CPT | Performed by: NURSE PRACTITIONER

## 2021-12-09 PROCEDURE — 84439 ASSAY OF FREE THYROXINE: CPT | Performed by: NURSE PRACTITIONER

## 2021-12-09 RX ORDER — PREDNISONE 20 MG/1
TABLET ORAL
Qty: 10 TABLET | Refills: 0 | Status: SHIPPED | OUTPATIENT
Start: 2021-12-09 | End: 2021-12-19

## 2021-12-09 RX ORDER — CLONAZEPAM 1 MG/1
.5-1 TABLET ORAL 2 TIMES DAILY PRN
Qty: 45 TABLET | Refills: 0 | Status: SHIPPED | OUTPATIENT
Start: 2021-12-09 | End: 2021-12-23 | Stop reason: SDUPTHER

## 2021-12-09 RX ORDER — DULOXETIN HYDROCHLORIDE 20 MG/1
20 CAPSULE, DELAYED RELEASE ORAL 2 TIMES DAILY
Qty: 60 CAPSULE | Refills: 0
Start: 2021-12-09 | End: 2021-12-23 | Stop reason: SDUPTHER

## 2021-12-09 NOTE — PROGRESS NOTES
Subjective   Jarad Barragan is a 36 y.o. female is here today for medication management follow-up.    Chief Complaint:  Anxiety depression     History of Present Illness:   Patient presents today for follow up for medication management for depression and anxiety. Patient states her eyes are really puffy and has a rash on chest for about a week or two. Patient states she has seen her PCP regarding everything going on and they are checking her thyroid level and antibodies. Patient states nothing has changed besides starting a muscle relaxer. Patient reports currently depression is at a 6 on a 0-10 scale with 10 being the worst. Patient reports symptoms of depression of crying spells, depressed mood, decreased energy, and dwelling on things. Patient reports anxiety is at a 10 on a 0-10 scale with 10 being the worst. Patient states a lot of stress and worry with dog at home being sick. Patient states feeling more panicky. Patient reports having a lot of panic attacks. Patient reports panic attacks last all day and during an attacks patient has heart racing and shortness of breath. Patient states the muscle relaxer she is getting helps her sleep. Patient reports sleeping about 4 hours a night and patient denies any nightmares. Patient reports appetite is good and eating 2-3 meals a day. Patient denies any auditory or visual hallucinations. Patient adamantly denies any SI or HI. Patient denies any side effects to medications. Patient states she has still been taking the 30mg of the Cymbalta and never started the 40mg.   The following portions of the patient's history were reviewed and updated as appropriate: allergies, current medications, past family history, past medical history, past social history, past surgical history and problem list.    Review of Systems   Constitutional: Negative.    Respiratory: Negative.    Cardiovascular: Negative.    Gastrointestinal: Negative.    Neurological: Negative.     Psychiatric/Behavioral: Positive for dysphoric mood and sleep disturbance. The patient is nervous/anxious.        Objective   Physical Exam  Vitals reviewed.   Constitutional:       Appearance: Normal appearance. She is well-developed and well-groomed.   Neurological:      Mental Status: She is alert.   Psychiatric:         Attention and Perception: Attention and perception normal.         Mood and Affect: Affect normal. Mood is anxious.         Speech: Speech normal.         Behavior: Behavior normal. Behavior is cooperative.         Thought Content: Thought content normal.         Cognition and Memory: Cognition and memory normal.         Judgment: Judgment normal.       Blood pressure 118/60, pulse 84, weight 90.7 kg (200 lb). Body mass index is 32.3 kg/m².    Allergies   Allergen Reactions   • Tape Unknown (See Comments)     Blisters on skin   • Codeine Rash   • Hydrocodone Itching       Medication List:   Current Outpatient Medications   Medication Sig Dispense Refill   • albuterol sulfate  (90 Base) MCG/ACT inhaler Inhale 2 puffs Every 4 (Four) Hours As Needed for Shortness of Air. 18 g 1   • clonazePAM (KlonoPIN) 1 MG tablet Take 0.5-1 tablets by mouth 2 (Two) Times a Day As Needed for Anxiety. 45 tablet 0   • DULoxetine (Cymbalta) 20 MG capsule Take 1 capsule by mouth 2 (Two) Times a Day. 60 capsule 0   • levothyroxine (Synthroid) 112 MCG tablet Take 1 tablet by mouth Daily. 30 tablet 5   • phentermine (Adipex-P) 37.5 MG tablet Take 1 tablet by mouth Every Morning Before Breakfast. 30 tablet 0   • predniSONE (DELTASONE) 20 MG tablet 2 daily 10 tablet 0   • promethazine (PHENERGAN) 25 MG tablet Take 1 tablet by mouth Every 6 (Six) Hours As Needed for Nausea or Vomiting. 60 tablet 1   • RABEprazole (Aciphex) 20 MG EC tablet Take 1 tablet by mouth Daily. 30 tablet 5   • tiZANidine (ZANAFLEX) 4 MG tablet Take 1 tablet by mouth 3 (Three) Times a Day As Needed for Muscle Spasms. 90 tablet 5   • traMADol  (ULTRAM) 50 MG tablet Take 1 tablet by mouth 2 (Two) Times a Day As Needed for Moderate Pain . 60 tablet 1   • triamcinolone (KENALOG) 0.1 % ointment Apply 1 application topically to the appropriate area as directed 2 (Two) Times a Day. 30 g 1   • vitamin D3 (Vitamin D) 125 MCG (5000 UT) capsule capsule Take 1 capsule by mouth Daily. 30 capsule 11     No current facility-administered medications for this visit.       Mental Status Exam:   Hygiene:   good  Cooperation:  Cooperative  Eye Contact:  Good  Psychomotor Behavior:  Appropriate  Affect:  Appropriate  Hopelessness: Denies  Speech:  Normal  Thought Process:  Goal directed and Linear  Thought Content:  Normal  Suicidal:  None  Homicidal:  None  Hallucinations:  None  Delusion:  None  Memory:  Intact  Orientation:  Person, Place, Time and Situation  Reliability:  fair  Insight:  Fair  Judgement:  Fair  Impulse Control:  Fair  Physical/Medical Issues:  No       PHQ-9 Depression Screening  Little interest or pleasure in doing things? 1   Feeling down, depressed, or hopeless? 1   Trouble falling or staying asleep, or sleeping too much? 3   Feeling tired or having little energy? 1   Poor appetite or overeating? 1   Feeling bad about yourself - or that you are a failure or have let yourself or your family down? 0   Trouble concentrating on things, such as reading the newspaper or watching television? 1   Moving or speaking so slowly that other people could have noticed? Or the opposite - being so fidgety or restless that you have been moving around a lot more than usual? 2   Thoughts that you would be better off dead, or of hurting yourself in some way? 0   PHQ-9 Total Score 10   If you checked off any problems, how difficult have these problems made it for you to do your work, take care of things at home, or get along with other people?                     Assessment/Plan   Diagnoses and all orders for this visit:    1. High risk medication use (Primary)  -      Urine Drug Screen - Urine, Clean Catch; Future    2. Panic disorder without agoraphobia  -     clonazePAM (KlonoPIN) 1 MG tablet; Take 0.5-1 tablets by mouth 2 (Two) Times a Day As Needed for Anxiety.  Dispense: 45 tablet; Refill: 0  -     DULoxetine (Cymbalta) 20 MG capsule; Take 1 capsule by mouth 2 (Two) Times a Day.  Dispense: 60 capsule; Refill: 0    3. Generalized anxiety disorder  -     clonazePAM (KlonoPIN) 1 MG tablet; Take 0.5-1 tablets by mouth 2 (Two) Times a Day As Needed for Anxiety.  Dispense: 45 tablet; Refill: 0  -     DULoxetine (Cymbalta) 20 MG capsule; Take 1 capsule by mouth 2 (Two) Times a Day.  Dispense: 60 capsule; Refill: 0    4. Mild episode of recurrent major depressive disorder (HCC)  -     DULoxetine (Cymbalta) 20 MG capsule; Take 1 capsule by mouth 2 (Two) Times a Day.  Dispense: 60 capsule; Refill: 0            Discussed medication options with patient. Increase Cymbalta to 20mg twice daily for worsening depression and anxiety. Cont. Clonazepam 1mg 0.5-1tab twice daily as needed for anxiety. Reviewed the risks, benefits, and side effects of the medications; patient acknowledged and verbally consented. Patient is being prescribed a controlled substance as part of treatment plan. Patient has been educated of appropriate use of the medications, including risk of somnolence, limited ability to drive and/or work safely, and potential for dependence, respiratory depression and overdose. Patient is also informed that the medication are to be used by the patient only- avoid any combined use of ETOH or other substances unless prescribed. UDS ordered.  PURVI Patient Controlled Substance Report (from 12/9/2020 to 12/9/2021)    Dispensed  Strength Quantity Days Supply Provider Pharmacy   11/08/2021 Clonazepam 1MG 45 each 30 CLOUD, PATRICIA [facility] [place]   10/08/2021 Clonazepam 1MG 45 each 30 CLOUD, PATRICIA [facility] [place]   10/06/2021 Tramadol Hcl 50MG/50MG/50MG/50MG/50MG/ 60 each 30 SHEILA  VIPUL [facility] [place]   09/07/2021 Clonazepam 0.5MG 60 each 30 CLOUD, PATRICIAHER [facility] [place]   08/11/2021 Clonazepam 0.5MG 30 each 30 CLOUD, PATRICIAHER [facility] [place]   08/05/2021 Phentermine Hcl 37.5MG 30 each 30 SHEILA, VIPUL [facility] [place]   08/05/2021 Tramadol Hcl 50MG/50MG/50MG/50MG/50MG/ 60 each 30 SHEILA, VIPUL [facility] [place]   07/12/2021 Phentermine Hcl 37.5MG 30 each 30 SHEILA, VIPUL [facility] [place]   07/10/2021 Clonazepam 0.5MG 30 each 30 CLOUD, PATRICIAHER [facility] [place]   06/09/2021 Clonazepam 0.5MG 30 each 30 CLOUD, PATRICIA [facility] [place]   06/03/2021 Phentermine Hcl 37.5MG 30 each 30 SHEILA, VIPUL [facility] [place]   05/10/2021 Clonazepam 0.5MG 30 each 30 CLOUD, PATRICIAHER [facility] [place]   05/04/2021 Tramadol Hcl 50MG/50MG/50MG/50MG/50MG/ 60 each 15 SHEILA, VIPUL [facility] [place]   04/12/2021 Clonazepam 0.5MG 20 each 30 CLOUD, PATRICIAHER [facility] [place]   03/24/2021 Phentermine Hcl 37.5MG 30 each 30 SHEILA, VIPUL [facility] [place]   03/05/2021 Clonazepam 0.5MG 20 each 30 [facility]   02/02/2021 Clonazepam 0.5MG 20 each 20 CLOUD, PATRICIAHER [facility] [place]   01/21/2021 Tramadol Hcl 50MG/50MG/50MG/50MG/50MG/ 60 each 15 SHEILA, VIPUL [facility] [place]   12/28/2020 Clonazepam 0.5MG 20 each 20 ARETHAOLMAN [facility] [place]           Patient is agreeable to call the office with any questions, concerns, or worsening of symptoms. Patient is aware to call 911 or go to the nearest ER should begin having SI/HI.          Follow up in four weeks        Errors in dictation may reflect use of voice recognition software and not all errors in transcription may have been detected prior to signing.              This document has been electronically signed by LANA Siu   December 16, 2021 16:00 EST

## 2021-12-10 DIAGNOSIS — E03.4 HYPOTHYROIDISM DUE TO ACQUIRED ATROPHY OF THYROID: Primary | ICD-10-CM

## 2021-12-10 LAB
THYROGLOB AB SERPL-ACNC: <1 IU/ML (ref 0–0.9)
THYROPEROXIDASE AB SERPL-ACNC: 345 IU/ML (ref 0–34)

## 2021-12-10 RX ORDER — LEVOTHYROXINE SODIUM 112 UG/1
112 TABLET ORAL DAILY
Qty: 30 TABLET | Refills: 5 | Status: SHIPPED | OUTPATIENT
Start: 2021-12-10 | End: 2022-02-04 | Stop reason: SDUPTHER

## 2021-12-11 LAB — TSI SER-ACNC: <0.1 IU/L (ref 0–0.55)

## 2021-12-13 ENCOUNTER — TELEPHONE (OUTPATIENT)
Dept: FAMILY MEDICINE CLINIC | Facility: CLINIC | Age: 36
End: 2021-12-13

## 2021-12-13 NOTE — TELEPHONE ENCOUNTER
----- Message from LANA Quinn sent at 12/10/2021  4:40 PM EST -----  Regarding: RE:  I will send her a MyChart message  ----- Message -----  From: Kendra Toscano MA  Sent: 12/10/2021   4:27 PM EST  To: LANA Quinn  Subject: FW:                                              Patient is requesting more information regarding her thyroid being abnormal.   ----- Message -----  From: Christine Joaquin APRN  Sent: 12/10/2021   4:18 PM EST  To: Kendra Toscano MA  Subject: RE:                                              Her thyroid is even more Abnormal. I will be sending her a medication dose reduction. All of her labs are not back yet however. I am still waiting for the antibodies test  ----- Message -----  From: Kendra Toscano MA  Sent: 12/10/2021  11:21 AM EST  To: LANA Quinn    Patient calling, requesting results of labs. Please advise.

## 2021-12-16 NOTE — ASSESSMENT & PLAN NOTE
Labs today.  We will adjust medications as needed.  We will consider an endocrinology referral if warranted

## 2021-12-16 NOTE — ASSESSMENT & PLAN NOTE
Patient screened positive for depression based on a PHQ-9 score of 10 on 12/9/2021. Follow-up recommendations include: Patient is under the care of psych NP with medication adjustments ongoing.    Continue Cymbalta and as needed Klonopin as directed.  Continue to work on stress reduction

## 2021-12-23 ENCOUNTER — TELEPHONE (OUTPATIENT)
Dept: PSYCHIATRY | Facility: CLINIC | Age: 36
End: 2021-12-23

## 2021-12-23 DIAGNOSIS — F41.0 PANIC DISORDER WITHOUT AGORAPHOBIA: ICD-10-CM

## 2021-12-23 DIAGNOSIS — F33.0 MILD EPISODE OF RECURRENT MAJOR DEPRESSIVE DISORDER (HCC): ICD-10-CM

## 2021-12-23 DIAGNOSIS — F41.1 GENERALIZED ANXIETY DISORDER: ICD-10-CM

## 2021-12-23 RX ORDER — CLONAZEPAM 1 MG/1
1 TABLET ORAL 2 TIMES DAILY PRN
Qty: 60 TABLET | Refills: 0 | Status: SHIPPED | OUTPATIENT
Start: 2021-12-23 | End: 2021-12-30 | Stop reason: SDUPTHER

## 2021-12-23 RX ORDER — DULOXETIN HYDROCHLORIDE 30 MG/1
30 CAPSULE, DELAYED RELEASE ORAL 2 TIMES DAILY
Qty: 60 CAPSULE | Refills: 0 | Status: SHIPPED | OUTPATIENT
Start: 2021-12-23 | End: 2022-01-24 | Stop reason: SDUPTHER

## 2021-12-23 NOTE — TELEPHONE ENCOUNTER
----- Message from LANA Siu sent at 12/23/2021  1:25 PM EST -----  Ok will you let the patient know she should have enough medication to last at least 11 days and it will be too early for the pharmacy to fill. I will send in the new prescription when it is due to be filled. Can you call the pharmacy and have them cancel the prescription I sent in earlier for Clonazepam please?  ----- Message -----  From: Rose Espinoza  Sent: 12/23/2021  11:59 AM EST  To: LANA Siu    Patient called and stated she had went home to count and had 23 Klonopin pills left.

## 2021-12-23 NOTE — PROGRESS NOTES
12/23/21 at 10:55am Returned patient's phone call regarding increased anxiety. Patient states having multiple panic attacks that are more severe. Patient states constant anxiety is also a lot worse. Patient states after last visit did increase Cymbalta to 20mg twice daily. Patient denies any thoughts to harm self or others. Discussed with patient increased anxiety could be related to thyroid levels and medications. Patient states she is scheduled to see Endocrinologist the beginning of January regarding thyroid levels and antibodies. Discussed with patient will increase Cymbalta to 30mg twice daily for worsening anxiety and panic attacks. Discussed with patient will temporarily increase Clonazepam to 1mg twice daily as needed for worsening anxiety. Discussed with patient at next office visit will decrease Clonazepam back to original dose. Patient acknowledged and agreed. Discussed with patient risks, benefits, and side effects of medications. Patient is being prescribed a controlled substance as part of treatment plan. Patient has been educated of appropriate use of the medications, including risk of somnolence, limited ability to drive and/or work safely, and potential for dependence, respiratory depression and overdose. Patient is also informed that the medication are to be used by the patient only- avoid any combined use of ETOH or other substances unless prescribed.   PURVI Patient Controlled Substance Report (from 12/23/2020 to 12/23/2021)    Dispensed  Strength Quantity Days Supply Provider Pharmacy   12/09/2021 Clonazepam 1MG 45 each 23 CLOUD, PATRICIA [place]   12/09/2021 Phentermine Hcl 37.5MG 30 each 30 SHEILAVIPUL [place]   12/09/2021 Tramadol Hcl 50MG/50MG/50MG/50MG/50MG/ 60 each 30 SHEILAVIPUL [place]   11/08/2021 Clonazepam 1MG 45 each 30 CLOUD, PATRICIA [facility] [place]   10/08/2021 Clonazepam 1MG 45 each 30 CLOUD, PATRICIA [facility] [place]   10/06/2021 Tramadol Hcl  50MG/50MG/50MG/50MG/50MG/ 60 each 30 SHEILA, VIPUL [facility] [place]   09/07/2021 Clonazepam 0.5MG 60 each 30 CLOUD, PATRICIA [place]   08/11/2021 Clonazepam 0.5MG 30 each 30 [facility]   08/05/2021 Phentermine Hcl 37.5MG 30 each 30 SHEILA, VIPUL [facility] [place]   08/05/2021 Tramadol Hcl 50MG/50MG/50MG/50MG/50MG/ 60 each 30 SHEILA, VIPUL [place]   07/12/2021 Phentermine Hcl 37.5MG 30 each 30 SHEILA, VIPUL [place]   07/10/2021 Clonazepam 0.5MG 30 each 30 CLOUD, PATRICIA [facility]   06/09/2021 Clonazepam 0.5MG 30 each 30 [facility]   06/03/2021 Phentermine Hcl 37.5MG 30 each 30 [facility]   05/10/2021 Clonazepam 0.5MG 30 each 30 CLOUD, PATRICIAHER [place]   05/04/2021 Tramadol Hcl 50MG/50MG/50MG/50MG/50MG/ 60 each 15 SHEILA, VIPUL [place]   04/12/2021 Clonazepam 0.5MG 20 each 30 [facility]   03/24/2021 Phentermine Hcl 37.5MG 30 each 30 SHEILA, VIPUL [place]   03/05/2021 Clonazepam 0.5MG 20 each 30 [facility]OUD, [facility]   02/02/2021 Clonazepam 0.5MG 20 each 20 CLOUD, [facility]   01/21/2021 Tramadol Hcl 50MG/50MG/50MG/50MG/50MG/ 60 each 15 SHEILA, VIPUL [place]   12/28/2020 Clonazepam 0.5MG 20 each 20 ARETHA, OLMAN [facility] [place]          Discussed with patient if having any thoughts to harm self or others to call 911 or go to nearest ER.       12/23/21 at 11:06am Called and spoke with patient regarding Adipex. Patient states she did not  or start the adipex due to increased anxiety.

## 2021-12-23 NOTE — TELEPHONE ENCOUNTER
Called patient at 1:33 pm and left v mail to call office back.     Called patient at 2:44 pm. Did not get an answer.

## 2021-12-27 RX ORDER — AZITHROMYCIN 250 MG/1
TABLET, FILM COATED ORAL
Qty: 6 TABLET | Refills: 0 | Status: SHIPPED | OUTPATIENT
Start: 2021-12-27 | End: 2022-01-12

## 2021-12-29 ENCOUNTER — TELEPHONE (OUTPATIENT)
Dept: FAMILY MEDICINE CLINIC | Facility: CLINIC | Age: 36
End: 2021-12-29

## 2021-12-29 NOTE — TELEPHONE ENCOUNTER
----- Message from LANA Siu sent at 12/23/2021 10:52 AM EST -----  I will call her back regarding medications  Thanks   ----- Message -----  From: Kamini Mcallister RegSched Rep  Sent: 12/22/2021   1:14 PM EST  To: Radha Horne APRN    Struggling with her anxiety, requesting to increase her Klonopin and anxiety medicine

## 2021-12-30 DIAGNOSIS — F41.1 GENERALIZED ANXIETY DISORDER: ICD-10-CM

## 2021-12-30 DIAGNOSIS — F41.0 PANIC DISORDER WITHOUT AGORAPHOBIA: ICD-10-CM

## 2021-12-30 RX ORDER — CLONAZEPAM 1 MG/1
1 TABLET ORAL 2 TIMES DAILY PRN
Qty: 60 TABLET | Refills: 0 | Status: SHIPPED | OUTPATIENT
Start: 2021-12-30 | End: 2022-02-03 | Stop reason: SDUPTHER

## 2022-01-05 DIAGNOSIS — M51.26 LUMBAR DISC HERNIATION: ICD-10-CM

## 2022-01-05 RX ORDER — TRAMADOL HYDROCHLORIDE 50 MG/1
50 TABLET ORAL 2 TIMES DAILY PRN
Qty: 60 TABLET | Refills: 1 | Status: SHIPPED | OUTPATIENT
Start: 2022-01-05 | End: 2022-02-08 | Stop reason: SDUPTHER

## 2022-01-12 ENCOUNTER — OFFICE VISIT (OUTPATIENT)
Dept: ENDOCRINOLOGY | Facility: CLINIC | Age: 37
End: 2022-01-12

## 2022-01-12 VITALS
HEIGHT: 66 IN | OXYGEN SATURATION: 99 % | BODY MASS INDEX: 31.57 KG/M2 | DIASTOLIC BLOOD PRESSURE: 73 MMHG | TEMPERATURE: 97.8 F | HEART RATE: 103 BPM | SYSTOLIC BLOOD PRESSURE: 119 MMHG | WEIGHT: 196.4 LBS

## 2022-01-12 DIAGNOSIS — E03.4 HYPOTHYROIDISM DUE TO ACQUIRED ATROPHY OF THYROID: Primary | ICD-10-CM

## 2022-01-12 PROCEDURE — 99213 OFFICE O/P EST LOW 20 MIN: CPT | Performed by: NURSE PRACTITIONER

## 2022-01-12 NOTE — ASSESSMENT & PLAN NOTE
Due to her most recent medication adjustment being 3 weeks ago, she needs to continue for another 3 weeks on the current dose of 112 mcg daily and then have repeat TFT's.  Discussed in length with the patient the underlying cause of her thyroid issues and that since she has had COVID, it could be the reason that her medication is now not at an adequate dose.  Her TPO antibodies were elevated indicating that she has autoimmune involvement.  Will adjust her medication as indicated pending upcoming TFT's.  Follow-up in 3 months.

## 2022-01-12 NOTE — PROGRESS NOTES
Chief Complaint   Patient presents with   • Thyroid Problem     Initial encounter        Referring Provider  Christine Joaquin APRN HPI   Jarad Barragan is a 36 y.o. female had concerns including Thyroid Problem (Initial encounter).     She saw a dermatologist and noticed something abnormal and recommended that she be checked for thyroid problems.  She was checked then and had some imaging and labs then.  She hasn't had an US Thyroid in many years.  Her last US was over 5 years ago and was normal.  She has been having labs drawn at her PCP often for medication adjustments. She has been having problems getting her medication at an adequate dosage.  She has had COVID recently in Sept 2021.    Birth state:[place]  Previous history of radiation to face/neck: none  Consuming foods high in iodine: none  Family history of thyroid complications: cousins-hyperthyroid, Hashimotos, no hx of thyroid cancer  Original diagnosis of hypothyroidism: many years ago  Compressive s/sx: none    She has a lot of bilateral hip pain.   The following portions of the patient's history were reviewed and updated as appropriate: allergies, current medications, past family history, past medical history, past social history, past surgical history and problem list.    Past Medical History:   Diagnosis Date   • Broken ankle    • Hypothyroidism      Past Surgical History:   Procedure Laterality Date   • BACK SURGERY        Family History   Problem Relation Age of Onset   • Anxiety disorder Mother    • Anxiety disorder Sister    • Anxiety disorder Maternal Aunt       Social History     Socioeconomic History   • Marital status:    Tobacco Use   • Smoking status: Never Smoker   • Smokeless tobacco: Never Used   Vaping Use   • Vaping Use: Never used   Substance and Sexual Activity   • Alcohol use: No   • Drug use: No   • Sexual activity: Defer      Allergies   Allergen Reactions   • Tape Unknown (See Comments)     Blisters on skin   • Codeine Rash    • Hydrocodone Itching      Current Outpatient Medications on File Prior to Visit   Medication Sig Dispense Refill   • clonazePAM (KlonoPIN) 1 MG tablet Take 1 tablet by mouth 2 (Two) Times a Day As Needed for Anxiety. 60 tablet 0   • DULoxetine (Cymbalta) 30 MG capsule Take 1 capsule by mouth 2 (Two) Times a Day. 60 capsule 0   • levothyroxine (Synthroid) 112 MCG tablet Take 1 tablet by mouth Daily. 30 tablet 5   • phentermine (Adipex-P) 37.5 MG tablet Take 1 tablet by mouth Every Morning Before Breakfast. (Patient taking differently: Take 37.5 mg by mouth Every Morning Before Breakfast. Patient takes sometimes, not every day.) 30 tablet 0   • promethazine (PHENERGAN) 25 MG tablet Take 1 tablet by mouth Every 6 (Six) Hours As Needed for Nausea or Vomiting. 60 tablet 1   • RABEprazole (Aciphex) 20 MG EC tablet Take 1 tablet by mouth Daily. 30 tablet 5   • tiZANidine (ZANAFLEX) 4 MG tablet Take 1 tablet by mouth 3 (Three) Times a Day As Needed for Muscle Spasms. 90 tablet 5   • traMADol (ULTRAM) 50 MG tablet Take 1 tablet by mouth 2 (Two) Times a Day As Needed for Moderate Pain . 60 tablet 1   • triamcinolone (KENALOG) 0.1 % ointment Apply 1 application topically to the appropriate area as directed 2 (Two) Times a Day. 30 g 1   • vitamin D3 (Vitamin D) 125 MCG (5000 UT) capsule capsule Take 1 capsule by mouth Daily. 30 capsule 11   • [DISCONTINUED] albuterol sulfate  (90 Base) MCG/ACT inhaler Inhale 2 puffs Every 4 (Four) Hours As Needed for Shortness of Air. 18 g 1   • [DISCONTINUED] azithromycin (Zithromax Z-Nikita) 250 MG tablet Take 2 tablets by mouth on day 1, then 1 tablet daily on days 2-5 6 tablet 0     No current facility-administered medications on file prior to visit.     Review of Systems   Constitutional: Positive for chills, diaphoresis, fatigue and unexpected weight loss.   Eyes: Negative.    Cardiovascular: Positive for palpitations.   Gastrointestinal: Positive for diarrhea.   Endocrine:  "Positive for cold intolerance and heat intolerance.   Skin: Positive for dry skin.        Hair thinning/losing   Neurological: Positive for tremors and headache.   Psychiatric/Behavioral: Positive for agitation. The patient is nervous/anxious.    All other systems reviewed and are negative.     /73 (BP Location: Left arm, Patient Position: Sitting, Cuff Size: Adult)   Pulse 103   Temp 97.8 °F (36.6 °C) (Oral)   Ht 167.6 cm (66\")   Wt 89.1 kg (196 lb 6.4 oz)   LMP 12/22/2021 (LMP Unknown)   SpO2 99%   Breastfeeding No   BMI 31.70 kg/m²      Physical Exam  Vitals reviewed.   Constitutional:       Appearance: Normal appearance.   Eyes:      Extraocular Movements: Extraocular movements intact.   Neck:      Thyroid: No thyroid mass, thyromegaly or thyroid tenderness.   Cardiovascular:      Rate and Rhythm: Regular rhythm. Tachycardia present.      Pulses: Normal pulses.      Heart sounds: Normal heart sounds.   Pulmonary:      Effort: Pulmonary effort is normal.      Breath sounds: Normal breath sounds.   Skin:     General: Skin is warm.   Neurological:      Mental Status: She is alert and oriented to person, place, and time.   Psychiatric:         Mood and Affect: Mood normal.         Behavior: Behavior normal.         Thought Content: Thought content normal.         Judgment: Judgment normal.       Labs/Imaging  CMP  Lab Results   Component Value Date    GLUCOSE 84 10/08/2021    BUN 13 10/08/2021    CREATININE 0.76 10/08/2021    EGFRIFNONA 86 10/08/2021    BCR 17.1 10/08/2021    K 3.8 10/08/2021    CO2 23.8 10/08/2021    CALCIUM 9.1 10/08/2021    ALBUMIN 4.10 10/08/2021    AST 17 10/08/2021    ALT 18 10/08/2021        CBC w/DIFF   Lab Results   Component Value Date    WBC 3.74 10/08/2021    RBC 4.21 10/08/2021    HGB 12.4 10/08/2021    HCT 37.6 10/08/2021    MCV 89.3 10/08/2021    MCH 29.5 10/08/2021    MCHC 33.0 10/08/2021    RDW 12.6 10/08/2021    RDWSD 41.2 10/08/2021    MPV 11.1 10/08/2021     " 10/08/2021    NEUTRORELPCT 40.9 (L) 10/08/2021    LYMPHORELPCT 47.1 (H) 10/08/2021    MONORELPCT 8.0 10/08/2021    EOSRELPCT 2.4 10/08/2021    BASORELPCT 1.6 (H) 10/08/2021    AUTOIGPER 0.0 10/08/2021    NEUTROABS 1.53 (L) 10/08/2021    LYMPHSABS 1.76 10/08/2021    MONOSABS 0.30 10/08/2021    EOSABS 0.09 10/08/2021    BASOSABS 0.06 10/08/2021    AUTOIGNUM 0.00 10/08/2021    NRBC 0.0 10/08/2021       TSH  Lab Results   Component Value Date    TSH 0.006 (L) 12/09/2021    TSH 0.028 (L) 10/08/2021    TSH 4.520 (H) 12/23/2020       T4  Lab Results   Component Value Date    FREET4 1.99 (H) 12/09/2021    FREET4 1.82 (H) 10/08/2021    FREET4 1.18 12/23/2020     No results found for: M1HEMTP    T3  Lab Results   Component Value Date    T3FREE 3.42 12/09/2021     No results found for: G6LWJQP    TRAb  No results found for: TSURCPAB    TPO  Lab Results   Component Value Date    THYROIDAB 345 (H) 12/09/2021       No valid procedures specified.    Assessment and Plan    Diagnoses and all orders for this visit:    1. Hypothyroidism due to acquired atrophy of thyroid (Primary)  Assessment & Plan:  Due to her most recent medication adjustment being 3 weeks ago, she needs to continue for another 3 weeks on the current dose of 112 mcg daily and then have repeat TFT's.  Discussed in length with the patient the underlying cause of her thyroid issues and that since she has had COVID, it could be the reason that her medication is now not at an adequate dose.  Her TPO antibodies were elevated indicating that she has autoimmune involvement.  Will adjust her medication as indicated pending upcoming TFT's.  Follow-up in 3 months.      Orders:  -     Cancel: TSH; Future  -     TSH; Future       Return in about 3 months (around 4/12/2022). The patient was instructed to contact the clinic with any interval questions or concerns.    LANA Ramirez   Endocrinology

## 2022-01-24 ENCOUNTER — CLINICAL SUPPORT (OUTPATIENT)
Dept: FAMILY MEDICINE CLINIC | Facility: CLINIC | Age: 37
End: 2022-01-24

## 2022-01-24 DIAGNOSIS — F33.0 MILD EPISODE OF RECURRENT MAJOR DEPRESSIVE DISORDER: ICD-10-CM

## 2022-01-24 DIAGNOSIS — F41.1 GENERALIZED ANXIETY DISORDER: ICD-10-CM

## 2022-01-24 DIAGNOSIS — R53.83 OTHER FATIGUE: Primary | ICD-10-CM

## 2022-01-24 DIAGNOSIS — F41.0 PANIC DISORDER WITHOUT AGORAPHOBIA: ICD-10-CM

## 2022-01-24 PROCEDURE — 96372 THER/PROPH/DIAG INJ SC/IM: CPT | Performed by: NURSE PRACTITIONER

## 2022-01-24 RX ORDER — DULOXETIN HYDROCHLORIDE 30 MG/1
30 CAPSULE, DELAYED RELEASE ORAL 2 TIMES DAILY
Qty: 60 CAPSULE | Refills: 0 | Status: SHIPPED | OUTPATIENT
Start: 2022-01-24 | End: 2022-01-24 | Stop reason: SDUPTHER

## 2022-01-24 RX ORDER — CYANOCOBALAMIN 1000 UG/ML
1000 INJECTION, SOLUTION INTRAMUSCULAR; SUBCUTANEOUS ONCE
Status: COMPLETED | OUTPATIENT
Start: 2022-01-24 | End: 2022-01-24

## 2022-01-24 RX ORDER — DULOXETIN HYDROCHLORIDE 30 MG/1
30 CAPSULE, DELAYED RELEASE ORAL 2 TIMES DAILY
Qty: 60 CAPSULE | Refills: 0 | Status: SHIPPED | OUTPATIENT
Start: 2022-01-24 | End: 2022-01-27 | Stop reason: SDUPTHER

## 2022-01-24 RX ADMIN — CYANOCOBALAMIN 1000 MCG: 1000 INJECTION, SOLUTION INTRAMUSCULAR; SUBCUTANEOUS at 12:03

## 2022-01-27 DIAGNOSIS — F33.0 MILD EPISODE OF RECURRENT MAJOR DEPRESSIVE DISORDER: ICD-10-CM

## 2022-01-27 DIAGNOSIS — F41.1 GENERALIZED ANXIETY DISORDER: ICD-10-CM

## 2022-01-27 DIAGNOSIS — F41.0 PANIC DISORDER WITHOUT AGORAPHOBIA: ICD-10-CM

## 2022-01-27 RX ORDER — DULOXETIN HYDROCHLORIDE 60 MG/1
60 CAPSULE, DELAYED RELEASE ORAL DAILY
Qty: 30 CAPSULE | Refills: 0 | Status: SHIPPED | OUTPATIENT
Start: 2022-01-27 | End: 2022-02-03 | Stop reason: SDUPTHER

## 2022-02-03 ENCOUNTER — OFFICE VISIT (OUTPATIENT)
Dept: PSYCHIATRY | Facility: CLINIC | Age: 37
End: 2022-02-03

## 2022-02-03 ENCOUNTER — LAB (OUTPATIENT)
Dept: FAMILY MEDICINE CLINIC | Facility: CLINIC | Age: 37
End: 2022-02-03

## 2022-02-03 ENCOUNTER — CLINICAL SUPPORT (OUTPATIENT)
Dept: FAMILY MEDICINE CLINIC | Facility: CLINIC | Age: 37
End: 2022-02-03

## 2022-02-03 VITALS
BODY MASS INDEX: 31.89 KG/M2 | DIASTOLIC BLOOD PRESSURE: 78 MMHG | OXYGEN SATURATION: 99 % | HEART RATE: 97 BPM | WEIGHT: 197.6 LBS | SYSTOLIC BLOOD PRESSURE: 108 MMHG

## 2022-02-03 DIAGNOSIS — F41.0 PANIC DISORDER WITHOUT AGORAPHOBIA: ICD-10-CM

## 2022-02-03 DIAGNOSIS — F41.1 GENERALIZED ANXIETY DISORDER: ICD-10-CM

## 2022-02-03 DIAGNOSIS — Z00.00 HEALTHCARE MAINTENANCE: Primary | ICD-10-CM

## 2022-02-03 DIAGNOSIS — F33.0 MILD EPISODE OF RECURRENT MAJOR DEPRESSIVE DISORDER: ICD-10-CM

## 2022-02-03 DIAGNOSIS — E53.8 B12 DEFICIENCY: ICD-10-CM

## 2022-02-03 DIAGNOSIS — E03.4 HYPOTHYROIDISM DUE TO ACQUIRED ATROPHY OF THYROID: ICD-10-CM

## 2022-02-03 LAB
AMPHET+METHAMPHET UR QL: POSITIVE
AMPHETAMINES UR QL: NEGATIVE
BARBITURATES UR QL SCN: NEGATIVE
BENZODIAZ UR QL SCN: NEGATIVE
BUPRENORPHINE SERPL-MCNC: NEGATIVE NG/ML
CANNABINOIDS SERPL QL: NEGATIVE
COCAINE UR QL: NEGATIVE
METHADONE UR QL SCN: NEGATIVE
OPIATES UR QL: NEGATIVE
OXYCODONE UR QL SCN: NEGATIVE
PCP UR QL SCN: NEGATIVE
PROPOXYPH UR QL: NEGATIVE
TRICYCLICS UR QL SCN: NEGATIVE
TSH SERPL DL<=0.05 MIU/L-ACNC: 0.91 UIU/ML (ref 0.27–4.2)

## 2022-02-03 PROCEDURE — 99213 OFFICE O/P EST LOW 20 MIN: CPT | Performed by: NURSE PRACTITIONER

## 2022-02-03 PROCEDURE — 96372 THER/PROPH/DIAG INJ SC/IM: CPT | Performed by: NURSE PRACTITIONER

## 2022-02-03 PROCEDURE — 80306 DRUG TEST PRSMV INSTRMNT: CPT | Performed by: NURSE PRACTITIONER

## 2022-02-03 PROCEDURE — 84443 ASSAY THYROID STIM HORMONE: CPT | Performed by: NURSE PRACTITIONER

## 2022-02-03 RX ORDER — CLONAZEPAM 1 MG/1
1 TABLET ORAL 2 TIMES DAILY PRN
Qty: 60 TABLET | Refills: 0 | Status: SHIPPED | OUTPATIENT
Start: 2022-02-03 | End: 2022-03-03 | Stop reason: SDUPTHER

## 2022-02-03 RX ORDER — CYANOCOBALAMIN 1000 UG/ML
1000 INJECTION, SOLUTION INTRAMUSCULAR; SUBCUTANEOUS
Status: DISCONTINUED | OUTPATIENT
Start: 2022-02-03 | End: 2022-05-12

## 2022-02-03 RX ORDER — DULOXETIN HYDROCHLORIDE 60 MG/1
60 CAPSULE, DELAYED RELEASE ORAL DAILY
Qty: 30 CAPSULE | Refills: 0
Start: 2022-02-03 | End: 2022-03-03 | Stop reason: SDUPTHER

## 2022-02-03 RX ADMIN — CYANOCOBALAMIN 1000 MCG: 1000 INJECTION, SOLUTION INTRAMUSCULAR; SUBCUTANEOUS at 12:11

## 2022-02-03 NOTE — PROGRESS NOTES
Injection  Injection performed in right deltoid by Krupa Wen MA. Patient tolerated the procedure well without complications.  02/03/22   Krupa Wen MA

## 2022-02-03 NOTE — PROGRESS NOTES
Subjective   Jarad Barragan is a 36 y.o. female is here today for medication management follow-up.    Chief Complaint:  Anxiety depression     History of Present Illness:    Patient presents today for follow up for medication management for depression and anxiety. Patient states she saw a provider for endcrinology for her thyroid. Patient states she did more blood work today to check lab work. Patient states her PCP thinks the issues are related to covid. Patient states dog is doing better. Patient states doing alright but worrying about mom or dog sets her off. Patient states still working. Patient reports currently depression is at a 5 on a 0-10 scale with 10 being the worst. Patient reports symptoms of depression of depressed mood, feeling tired, wore out, crying spells, and irritability at times. Patient reports anxiety is at a 8-9 on a 0-10 scale with 10 being the worst. Patient reports having quite a few panic attacks mainly triggered by mom or dog. Patient states if anything happens with them then have panic attack. Patient states sleeping ok if take medicine. Patient reports sleeping at least 5-6 hours a night and patient denies any nightmares. Patient reports appetite is good and eating 2-3 meals a day. Patient denies any auditory or visual hallucinations. Patient adamantly denies any SI or HI. Patient denies any side effects to medications. Patient denies any medical changes since last visit.   The following portions of the patient's history were reviewed and updated as appropriate: allergies, current medications, past family history, past medical history, past social history, past surgical history and problem list.    Review of Systems   Constitutional: Negative.    Respiratory: Negative.    Cardiovascular: Negative.    Gastrointestinal: Negative.    Neurological: Negative.    Psychiatric/Behavioral: Positive for agitation and dysphoric mood. The patient is nervous/anxious.        Objective    Physical Exam  Vitals reviewed.   Constitutional:       Appearance: Normal appearance. She is well-developed and well-groomed.   Neurological:      Mental Status: She is alert.   Psychiatric:         Attention and Perception: Attention and perception normal.         Mood and Affect: Mood is anxious.         Speech: Speech normal.         Behavior: Behavior normal. Behavior is cooperative.         Thought Content: Thought content normal.         Cognition and Memory: Cognition and memory normal.         Judgment: Judgment normal.       Blood pressure 108/78, pulse 97, weight 89.6 kg (197 lb 9.6 oz), last menstrual period 12/22/2021, SpO2 99 %, not currently breastfeeding. Body mass index is 31.89 kg/m².    Allergies   Allergen Reactions   • Tape Unknown (See Comments)     Blisters on skin   • Codeine Rash   • Hydrocodone Itching       Medication List:   Current Outpatient Medications   Medication Sig Dispense Refill   • clonazePAM (KlonoPIN) 1 MG tablet Take 1 tablet by mouth 2 (Two) Times a Day As Needed for Anxiety. 60 tablet 0   • DULoxetine (Cymbalta) 60 MG capsule Take 1 capsule by mouth Daily. 30 capsule 0   • levothyroxine (Synthroid) 112 MCG tablet Take 1 tablet by mouth Daily. 30 tablet 5   • phentermine (Adipex-P) 37.5 MG tablet Take 1 tablet by mouth Every Morning Before Breakfast. (Patient taking differently: Take 37.5 mg by mouth Every Morning Before Breakfast. Patient takes sometimes, not every day.) 30 tablet 0   • promethazine (PHENERGAN) 25 MG tablet Take 1 tablet by mouth Every 6 (Six) Hours As Needed for Nausea or Vomiting. 60 tablet 1   • RABEprazole (Aciphex) 20 MG EC tablet Take 1 tablet by mouth Daily. 30 tablet 5   • tiZANidine (ZANAFLEX) 4 MG tablet Take 1 tablet by mouth 3 (Three) Times a Day As Needed for Muscle Spasms. 90 tablet 5   • traMADol (ULTRAM) 50 MG tablet Take 1 tablet by mouth 2 (Two) Times a Day As Needed for Moderate Pain . 60 tablet 1   • triamcinolone (KENALOG) 0.1 %  ointment Apply 1 application topically to the appropriate area as directed 2 (Two) Times a Day. 30 g 1   • vitamin D3 (Vitamin D) 125 MCG (5000 UT) capsule capsule Take 1 capsule by mouth Daily. 30 capsule 11     Current Facility-Administered Medications   Medication Dose Route Frequency Provider Last Rate Last Admin   • cyanocobalamin injection 1,000 mcg  1,000 mcg Intramuscular Q28 Days Christine Joaquin APRN   1,000 mcg at 02/03/22 1211       Mental Status Exam:   Hygiene:   good  Cooperation:  Cooperative  Eye Contact:  Good  Psychomotor Behavior:  Appropriate  Affect:  Appropriate  Hopelessness: Denies  Speech:  Normal  Thought Process:  Goal directed and Linear  Thought Content:  Normal  Suicidal:  None  Homicidal:  None  Hallucinations:  None  Delusion:  None  Memory:  Intact  Orientation:  Person, Place, Time and Situation  Reliability:  fair  Insight:  Fair  Judgement:  Fair  Impulse Control:  Fair  Physical/Medical Issues:  No               Assessment/Plan   Diagnoses and all orders for this visit:    1. Panic disorder without agoraphobia  -     clonazePAM (KlonoPIN) 1 MG tablet; Take 1 tablet by mouth 2 (Two) Times a Day As Needed for Anxiety.  Dispense: 60 tablet; Refill: 0  -     DULoxetine (Cymbalta) 60 MG capsule; Take 1 capsule by mouth Daily.  Dispense: 30 capsule; Refill: 0    2. Generalized anxiety disorder  -     clonazePAM (KlonoPIN) 1 MG tablet; Take 1 tablet by mouth 2 (Two) Times a Day As Needed for Anxiety.  Dispense: 60 tablet; Refill: 0  -     DULoxetine (Cymbalta) 60 MG capsule; Take 1 capsule by mouth Daily.  Dispense: 30 capsule; Refill: 0    3. Mild episode of recurrent major depressive disorder (HCC)  -     DULoxetine (Cymbalta) 60 MG capsule; Take 1 capsule by mouth Daily.  Dispense: 30 capsule; Refill: 0            Discussed medication options with patient. Cont. Cymbalta 60mg daily for depression, anxiety, and panic disorder. Cont. Clonazepam 1mg twice daily as needed for anxiety.  Reviewed the risks, benefits, and side effects of the medications; patient acknowledged and verbally consented. Patient is being prescribed a controlled substance as part of treatment plan. Patient has been educated of appropriate use of the medications, including risk of somnolence, limited ability to drive and/or work safely, and potential for dependence, respiratory depression and overdose. Patient is also informed that the medication are to be used by the patient only- avoid any combined use of ETOH or other substances unless prescribed. UDS ordered  [facility] Patient Controlled Substance Report (from 2/3/2021 to 2/3/2022)    Dispensed  Strength Quantity Days Supply Provider Pharmacy   01/24/2022 Phentermine Hcl 37.5MG 30 each 30 SHEILA, VIPUL [facility] [place]   01/06/2022 Tramadol Hcl 50MG/50MG/50MG/50MG/50MG/ 60 each 30 SHEILA, VIPUL [facility] [place]   12/31/2021 Clonazepam 1MG 60 each 30 CLOUD, PATRICIA [facility] [place]   12/23/2021 Phentermine Hcl 37.5MG 30 each 30 [facility]ILA, VIPUL [facility] [place]   12/09/2021 Clonazepam 1MG 45 each 23 CLOUD, PATRICIAHER [facility] [place]   12/09/2021 Phentermine Hcl 37.5MG 30 each 30 SHEILA, VIPUL [facility] [place]   12/09/2021 Tramadol Hcl 50MG/50MG/50MG/50MG/50MG/ 60 each 30 SHEILA, VIPUL [facility] [place]   11/08/2021 Clonazepam 1MG 45 each 30 CLOUD, PATRICIA [facility] [place]   10/08/2021 Clonazepam 1MG 45 each 30 CLOUD, PATRICIAHER [facility] [place]   10/06/2021 Tramadol Hcl 50MG/50MG/50MG/50MG/50MG/ 60 each 30 SHEILA, VIPUL [facility] [place]   09/07/2021 Clonazepam 0.5MG 60 each 30 CLOUD, PATRICIA [facility] [place]   08/11/2021 Clonazepam 0.5MG 30 each 30 CLOUD, PATRICIA [facility] [place]   08/05/2021 Phentermine Hcl 37.5MG 30 each 30 SHEILA, VIPUL [facility] [place]   08/05/2021 Tramadol Hcl 50MG/50MG/50MG/50MG/50MG/ 60 each 30 SHEILA, VIPUL [facility] [place]   07/12/2021 Phentermine Hcl 37.5MG 30 each 30 SHEILA, VIPUL [facility] [place]   07/10/2021 Clonazepam 0.5MG 30 each 30 CLOUD, PATRICIA  [facility] [place]   06/09/2021 Clonazepam 0.5MG 30 each 30 CLOUD, PATRICIA [facility] [place]   06/03/2021 Phentermine Hcl 37.5MG 30 each 30 SHEILA, VIPUL [facility] [place]   05/10/2021 Clonazepam 0.5MG 30 each 30 CLOUD, PATRICIA [facility] [place]   05/04/2021 Tramadol Hcl 50MG/50MG/50MG/50MG/50MG/ 60 each 15 SHEILA, VIPUL [facility] [place]   04/12/2021 Clonazepam 0.5MG 20 each 30 CLOUD, PATRICIA [facility] [place]   03/24/2021 Phentermine Hcl 37.5MG 30 each 30 SHEILA, VIPUL [facility] [place]   03/05/2021 [facility]am 0.5MG 20 each [facility]           Patient is agreeable to call the office with any questions, concerns, or worsening of symptoms. Patient is aware to call 911 or go to the nearest ER should begin having SI/HI.          Follow up in four weeks      Errors in dictation may reflect use of voice recognition software and not all errors in transcription may have been detected prior to signing.              This document has been electronically signed by LANA Siu   February 3, 2022 15:51 EST

## 2022-02-04 ENCOUNTER — TELEPHONE (OUTPATIENT)
Dept: ADMINISTRATIVE | Facility: HOSPITAL | Age: 37
End: 2022-02-04

## 2022-02-04 ENCOUNTER — LAB (OUTPATIENT)
Dept: FAMILY MEDICINE CLINIC | Facility: CLINIC | Age: 37
End: 2022-02-04

## 2022-02-04 ENCOUNTER — DOCUMENTATION (OUTPATIENT)
Dept: ENDOCRINOLOGY | Facility: CLINIC | Age: 37
End: 2022-02-04

## 2022-02-04 ENCOUNTER — TELEPHONE (OUTPATIENT)
Dept: FAMILY MEDICINE CLINIC | Facility: CLINIC | Age: 37
End: 2022-02-04

## 2022-02-04 DIAGNOSIS — Z79.899 ENCOUNTER FOR LONG-TERM (CURRENT) USE OF OTHER MEDICATIONS: Primary | ICD-10-CM

## 2022-02-04 LAB
AMPHET+METHAMPHET UR QL: POSITIVE
AMPHETAMINES UR QL: NEGATIVE
BARBITURATES UR QL SCN: NEGATIVE
BENZODIAZ UR QL SCN: NEGATIVE
BUPRENORPHINE SERPL-MCNC: NEGATIVE NG/ML
CANNABINOIDS SERPL QL: NEGATIVE
COCAINE UR QL: NEGATIVE
METHADONE UR QL SCN: NEGATIVE
OPIATES UR QL: NEGATIVE
OXYCODONE UR QL SCN: NEGATIVE
PCP UR QL SCN: NEGATIVE
PROPOXYPH UR QL: NEGATIVE
TRICYCLICS UR QL SCN: NEGATIVE

## 2022-02-04 PROCEDURE — 80306 DRUG TEST PRSMV INSTRMNT: CPT | Performed by: NURSE PRACTITIONER

## 2022-02-04 PROCEDURE — 80307 DRUG TEST PRSMV CHEM ANLYZR: CPT | Performed by: NURSE PRACTITIONER

## 2022-02-04 RX ORDER — LEVOTHYROXINE SODIUM 112 UG/1
112 TABLET ORAL DAILY
Qty: 30 TABLET | Refills: 5 | Status: SHIPPED | OUTPATIENT
Start: 2022-02-04 | End: 2022-05-12

## 2022-02-04 NOTE — TELEPHONE ENCOUNTER
Left message for patient making her aware of labs and medication refill. Gave her our number to call back for any questions.

## 2022-02-04 NOTE — PROGRESS NOTES
2-4-22 at 3:02 PM attempted to contact patient and no answer.  Left voicemail.        2/4/2022 at 3:04 PM patient returned phone call and stated she has took the prescribed Adipex every now and then but only taking half a pill.  Discussed with patient results from UDS and possibility of false positive. Discussed with patient reordered UDS to verify results and will notify patient with blood results.  Patient acknowledged and agreed.

## 2022-02-04 NOTE — TELEPHONE ENCOUNTER
----- Message from LANA Siu sent at 2/4/2022 11:57 AM EST -----  Ok thank you  ----- Message -----  From: Betsy Hester MA  Sent: 2/4/2022  11:53 AM EST  To: LANA Siu    She is going to come in.  ----- Message -----  From: Radha Nicole APRN  Sent: 2/4/2022   8:48 AM EST  To: Betsy Hester MA    Can you call her and have her come in to redo a urine test please?

## 2022-02-07 NOTE — PROGRESS NOTES
02/07/22 at 1:01pm Spoke with patient via telephone regarding bloodwork for drug screen. Discussed with patient results from bloodwork. Discussed with patient the urine screen was most likely a false positive due to adipex use. Patient denies any illicit drug use and states only takes what is prescribed. Discussed with patient no changes to medications.

## 2022-02-08 ENCOUNTER — OFFICE VISIT (OUTPATIENT)
Dept: FAMILY MEDICINE CLINIC | Facility: CLINIC | Age: 37
End: 2022-02-08

## 2022-02-08 VITALS
WEIGHT: 198.2 LBS | HEIGHT: 66 IN | HEART RATE: 104 BPM | RESPIRATION RATE: 20 BRPM | TEMPERATURE: 98 F | BODY MASS INDEX: 31.85 KG/M2 | DIASTOLIC BLOOD PRESSURE: 74 MMHG | OXYGEN SATURATION: 98 % | SYSTOLIC BLOOD PRESSURE: 116 MMHG

## 2022-02-08 DIAGNOSIS — R30.0 DYSURIA: Primary | ICD-10-CM

## 2022-02-08 DIAGNOSIS — M51.26 LUMBAR DISC HERNIATION: ICD-10-CM

## 2022-02-08 DIAGNOSIS — F41.8 DEPRESSION WITH ANXIETY: ICD-10-CM

## 2022-02-08 DIAGNOSIS — E66.09 CLASS 2 OBESITY DUE TO EXCESS CALORIES WITHOUT SERIOUS COMORBIDITY WITH BODY MASS INDEX (BMI) OF 37.0 TO 37.9 IN ADULT: ICD-10-CM

## 2022-02-08 PROCEDURE — 99214 OFFICE O/P EST MOD 30 MIN: CPT | Performed by: NURSE PRACTITIONER

## 2022-02-08 PROCEDURE — 96372 THER/PROPH/DIAG INJ SC/IM: CPT | Performed by: NURSE PRACTITIONER

## 2022-02-08 RX ORDER — TRAMADOL HYDROCHLORIDE 50 MG/1
50 TABLET ORAL 2 TIMES DAILY PRN
Qty: 60 TABLET | Refills: 1 | Status: SHIPPED | OUTPATIENT
Start: 2022-02-08 | End: 2022-04-12 | Stop reason: SDUPTHER

## 2022-02-08 RX ORDER — NITROFURANTOIN 25; 75 MG/1; MG/1
100 CAPSULE ORAL EVERY 12 HOURS SCHEDULED
Qty: 20 CAPSULE | Refills: 0 | Status: SHIPPED | OUTPATIENT
Start: 2022-02-08 | End: 2022-04-12

## 2022-02-08 RX ORDER — MENTHOL 40 MG/ML
1 GEL TOPICAL 2 TIMES DAILY
Qty: 89 ML | Refills: 2 | Status: SHIPPED | OUTPATIENT
Start: 2022-02-08 | End: 2022-04-12 | Stop reason: CLARIF

## 2022-02-08 RX ORDER — TIZANIDINE 4 MG/1
4 TABLET ORAL 3 TIMES DAILY PRN
Qty: 90 TABLET | Refills: 5 | Status: SHIPPED | OUTPATIENT
Start: 2022-02-08 | End: 2022-08-16 | Stop reason: SDUPTHER

## 2022-02-08 RX ADMIN — CYANOCOBALAMIN 1000 MCG: 1000 INJECTION, SOLUTION INTRAMUSCULAR; SUBCUTANEOUS at 17:16

## 2022-02-08 NOTE — PROGRESS NOTES
Injection  Injection performed in Left Deltoid  by Kendra Toscano MA. Patient tolerated the procedure well without complications.  02/08/22   Kendra Toscano MA

## 2022-02-08 NOTE — PROGRESS NOTES
Subjective   Jarad Barragan is a 36 y.o. female.     Chief Complaint   Patient presents with   • Anxiety       History of Present Illness     Anxiety-chronic and ongoing.   Continues to have multiple stressors.  She reports that she does feel cymbalta and Klonopin help.   She continues to worry about her mom's health and a possible upcoming surgery.  Obesity-ongoing.  Using Adipex mostly PRN.    Weight is essentially unchanged since her last appointment.  B12 concern-reports she would like to do her B12 today.  This will be her 3rd injection.  She will have one more weekly then will move to monthly.  She reports she cannot tell that she has any significant increase in her energy.  Injection site reactions.  Dysuria-reports burning today.  She has started her menses.   She reports that sensation of pressure.  She request to have an antibiotic.   She reports this is her first full day of having bleeding.  She reports she has had UTIs in the past and the symptoms feel similar to her current concern.  Back and hip pain-hip pain occurs bilaterally but is prominently on the right.   Some occasional leg numbness and groin pain.  She reports it varies daily based on activity.  She is taking Zanaflex as well has as needed tramadol.  She reports that the medications help some.  She would like to have a prescription for Biofreeze if possible.    The following portions of the patient's history were reviewed and updated as appropriate: CC, ROS, allergies, current medications, past family history, past medical history, past social history, past surgical history and problem list.      Review of Systems   Constitutional: Positive for fatigue. Negative for activity change, appetite change and fever.   HENT: Negative for congestion, ear pain, nosebleeds, postnasal drip, rhinorrhea, sore throat, tinnitus, trouble swallowing and voice change.    Eyes: Negative for blurred vision, photophobia and visual disturbance.   Respiratory:  "Negative for cough, chest tightness, shortness of breath and wheezing.    Cardiovascular: Negative for chest pain, palpitations and leg swelling.   Gastrointestinal: Negative for abdominal pain, blood in stool, constipation, diarrhea, nausea and GERD.   Endocrine: Negative for cold intolerance, heat intolerance and polydipsia.   Genitourinary: Positive for dysuria, pelvic pressure and urgency. Negative for decreased urine volume, difficulty urinating and hematuria.   Musculoskeletal: Positive for arthralgias and back pain. Negative for gait problem and myalgias.   Skin: Negative for color change, pallor and wound.   Allergic/Immunologic: Negative.    Neurological: Negative for dizziness, syncope, numbness and headache.   Hematological: Negative.    Psychiatric/Behavioral: Positive for depressed mood and stress. Negative for decreased concentration, self-injury, sleep disturbance and suicidal ideas. The patient is nervous/anxious.    All other systems reviewed and are negative.      Objective     /74   Pulse 104   Temp 98 °F (36.7 °C)   Resp 20   Ht 167.6 cm (65.98\")   Wt 89.9 kg (198 lb 3.2 oz)   LMP 12/22/2021 (LMP Unknown)   SpO2 98%   BMI 32.01 kg/m²     Physical Exam  Vitals reviewed.   Constitutional:       General: She is not in acute distress.     Appearance: She is well-developed. She is not diaphoretic.   HENT:      Head: Normocephalic and atraumatic.      Jaw: No tenderness.      Comments: Oropharynx not examined.  Patient is presently wearing a face covering/mask due to COVID-19 pandemic.     Right Ear: Hearing, tympanic membrane, ear canal and external ear normal.      Left Ear: Hearing, tympanic membrane, ear canal and external ear normal.   Eyes:      General: Lids are normal. No scleral icterus.     Extraocular Movements:      Right eye: Normal extraocular motion and no nystagmus.      Left eye: Normal extraocular motion and no nystagmus.      Conjunctiva/sclera: Conjunctivae normal.     "  Pupils: Pupils are equal, round, and reactive to light.   Neck:      Thyroid: No thyromegaly or thyroid tenderness.      Vascular: No carotid bruit or JVD.      Trachea: No tracheal tenderness.   Cardiovascular:      Rate and Rhythm: Normal rate and regular rhythm.      Pulses:           Dorsalis pedis pulses are 2+ on the right side and 2+ on the left side.        Posterior tibial pulses are 2+ on the right side and 2+ on the left side.      Heart sounds: Normal heart sounds, S1 normal and S2 normal. No murmur heard.      Pulmonary:      Effort: Pulmonary effort is normal. No accessory muscle usage, prolonged expiration or respiratory distress.      Breath sounds: Normal breath sounds.   Chest:      Chest wall: No tenderness.   Abdominal:      General: Bowel sounds are normal.      Palpations: Abdomen is soft. There is no mass.      Tenderness: There is abdominal tenderness in the suprapubic area. There is no right CVA tenderness or left CVA tenderness.   Musculoskeletal:      Cervical back: Normal range of motion and neck supple.      Thoracic back: Spasms and tenderness present.      Lumbar back: Spasms and tenderness present. Decreased range of motion. Positive right straight leg raise test and positive left straight leg raise test.      Right upper leg: Tenderness present.      Left upper leg: Tenderness present.      Right lower leg: No edema.      Left lower leg: No edema.      Comments: No muscular atrophy or flaccidity.   Lymphadenopathy:      Head:      Right side of head: No submental or submandibular adenopathy.      Left side of head: No submental or submandibular adenopathy.      Cervical: No cervical adenopathy.      Right cervical: No superficial cervical adenopathy.     Left cervical: No superficial cervical adenopathy.   Skin:     General: Skin is warm and dry.      Capillary Refill: Capillary refill takes less than 2 seconds.      Coloration: Skin is not jaundiced or pale.      Findings: No  erythema.      Nails: There is no clubbing.   Neurological:      Mental Status: She is alert and oriented to person, place, and time.      Cranial Nerves: No cranial nerve deficit or facial asymmetry.      Sensory: No sensory deficit.      Motor: No tremor, atrophy or abnormal muscle tone.      Coordination: Coordination normal.      Deep Tendon Reflexes: Reflexes are normal and symmetric.   Psychiatric:         Attention and Perception: She is attentive.         Mood and Affect: Mood is anxious and depressed. Affect is flat.         Speech: Speech normal.         Behavior: Behavior normal. Behavior is cooperative.         Thought Content: Thought content normal.         Cognition and Memory: Cognition normal.         Judgment: Judgment normal.       Assessment/Plan     Diagnoses and all orders for this visit:    1. Dysuria (Primary)  -     nitrofurantoin, macrocrystal-monohydrate, (Macrobid) 100 MG capsule; Take 1 capsule by mouth Every 12 (Twelve) Hours.  Dispense: 20 capsule; Refill: 0    2. Class 2 obesity due to excess calories without serious comorbidity with body mass index (BMI) of 37.0 to 37.9 in adult  Overview:  Beginning weight at 240 on 02/23/2021    Assessment & Plan:  Continue to work on weight reduction.  Continue to take Adipex as tolerated.  Increase activity as tolerated      3. Lumbar disc herniation  Assessment & Plan:  Continue as needed tramadol.  NSAIDs only as needed due to stomach concerns.  As needed heat and ice.  Avoid prolonged sitting or standing.  Be active as physically able    Orders:  -     Menthol, Topical Analgesic, (Biofreeze) 4 % gel; Apply 1 application topically 2 (Two) Times a Day.  Dispense: 89 mL; Refill: 2  -     traMADol (ULTRAM) 50 MG tablet; Take 1 tablet by mouth 2 (Two) Times a Day As Needed for Moderate Pain .  Dispense: 60 tablet; Refill: 1  -     tiZANidine (ZANAFLEX) 4 MG tablet; Take 1 tablet by mouth 3 (Three) Times a Day As Needed for Muscle Spasms.  Dispense:  90 tablet; Refill: 5    4. Depression with anxiety  Assessment & Plan:  Continue Cymbalta and Klonopin.  Continue under the care of psych NP         Patient's Body mass index is 32.01 kg/m². indicating that she is obese (BMI >30). Obesity-related health conditions include the following: GERD. Obesity is unchanged. BMI is Is above average. We discussed portion control, increasing exercise and pharmacologic options including Adipex..       Understands disease processes and need for medications.  Understands reasons for urgent and emergent care.  Patient (& family) verbalized agreement for treatment plan.   Emotional support and active listening provided.  Patient provided time to verbalize feelings.    PURVI/PMDP reviewed today and consistent.  Will refill prescribed controlled medication today.  Patient is aware they cannot receive narcotics from any other provider except if under care of pain management or speciality clinic.  Risk and benefits of medication use has been reviewed.  History and physical exam exhibit continued safe and appropriate use of controlled substances.  The patient is aware of the potential for addiction and dependence.  This patient has been made aware of the appropriate use of such medications, including potential risk of somnolence, limited ability to drive and / or work safely, and potential for overdose.    It has also been made clear that these medications are for use by this patient only, without concomitant use of alcohol or other substances unless prescribed/advised by medical provider.  Patient understands they may be subject to UDS and pill counts at random.      Patient considered to be moderate risk for addiction due to use of multiple controlled medications.  Patient understands and accepts these risks.  Patient need for medication will be reassessed at each visit.  Doses will be adjusted according to patient need and findings.    Goal of TX: Patient will not have any adverse  reactions of medication.  Patient will have reduction in weight with use of Adipex as directed with simultaneous diet and lifestyle changes.  Patient will have reduction in there chronic back and joint pain symptoms with use of tramadol as needed as directed.  Patient will be able to remain active in an outside of their home with minimal to no interference from their pain symptoms.    Continue under the care of psych NP for Klonopin.    Medication Dispense Information    Phentermine Hcl   Dispensed: 1/24/2022 12:00 AM   Written:  12/7/2021   Unit strength: 37.5MG   Days supply: 30   Dispense Note: GivenName=Mike=NOELBirthDate=[id number]=[address][birth year] [address], [address]   Quantity: 30 each   Pharmacy: [facility]   Authorizing provider: VIPUL SOSA   Received from: [facility] (Fill History)   Brand or Generic:       Medication Dispense Information    Clonazepam   Dispensed: 2/4/2022 12:00 AM   Written:  2/3/2022   Unit strength: 1MG   Days supply: 30   Dispense Note: GivenName=Mike=NOELBirthDate=[id number]=9355 [address], [address]   Quantity: 60 each   Pharmacy: [facility]   Authorizing provider: PATRICIA SEQUEIRA   Received from: [facility] (Fill History)   Brand or Generic:          RTC 1 month, sooner if needed.             This document has been electronically signed by:  LANA Quinn FNP-C Dragon disclaimer:  Part of this note may be an electronic transcription/translation of spoken language to printed text using the Dragon Dictation System.

## 2022-02-16 NOTE — ASSESSMENT & PLAN NOTE
Continue to work on weight reduction.  Continue to take Adipex as tolerated.  Increase activity as tolerated

## 2022-03-03 ENCOUNTER — OFFICE VISIT (OUTPATIENT)
Dept: PSYCHIATRY | Facility: CLINIC | Age: 37
End: 2022-03-03

## 2022-03-03 VITALS
BODY MASS INDEX: 31.97 KG/M2 | HEART RATE: 84 BPM | DIASTOLIC BLOOD PRESSURE: 70 MMHG | WEIGHT: 198 LBS | SYSTOLIC BLOOD PRESSURE: 120 MMHG

## 2022-03-03 DIAGNOSIS — F41.0 PANIC DISORDER WITHOUT AGORAPHOBIA: ICD-10-CM

## 2022-03-03 DIAGNOSIS — F41.1 GENERALIZED ANXIETY DISORDER: ICD-10-CM

## 2022-03-03 DIAGNOSIS — F33.0 MILD EPISODE OF RECURRENT MAJOR DEPRESSIVE DISORDER: ICD-10-CM

## 2022-03-03 PROCEDURE — 99213 OFFICE O/P EST LOW 20 MIN: CPT | Performed by: NURSE PRACTITIONER

## 2022-03-03 RX ORDER — DULOXETIN HYDROCHLORIDE 60 MG/1
60 CAPSULE, DELAYED RELEASE ORAL DAILY
Qty: 30 CAPSULE | Refills: 1 | Status: SHIPPED | OUTPATIENT
Start: 2022-03-03 | End: 2022-04-01 | Stop reason: SDUPTHER

## 2022-03-03 RX ORDER — CLONAZEPAM 1 MG/1
1 TABLET ORAL 2 TIMES DAILY PRN
Qty: 60 TABLET | Refills: 0 | Status: SHIPPED | OUTPATIENT
Start: 2022-03-03 | End: 2022-04-01 | Stop reason: SDUPTHER

## 2022-03-03 NOTE — PROGRESS NOTES
Subjective   Jarad Barragan is a 36 y.o. female is here today for medication management follow-up.    Chief Complaint:  Anxiety depression     History of Present Illness:    Patient presents today for follow up for medication management for depression and anxiety. Patient states lab work came back on her thyroid but still feeling bad. Patient states she doesn't have any energy at all. Patient states feeling really drained all the time. Patient states she is on her menstrual cycle and feeling really weak. Patient states in a lot of pain with back. Patient states waiting to see if mom has to have surgery or not. Patient reports currently depression is at a 5 on a 0-10 scale with 10 being the worst. Patient reports symptoms of depression of crying spells, irritability, depressed mood, no energy, and feeling tired. Patient reports anxiety is at a 8 on a 0-10 scale with 10 being the worst. Patient reports having three panic attacks in the last week. Patient states panic is alright unless a lot going on. Patient reports panic attacks last an hour  and during an attacks patient has anxious feeling, shortness of breath, and heart racing. Patient states not getting a lot of sleep due to pain. Patient states if move at all get sharp pain. Patient reports sleeping at least 5 hours a night and patient denies any nightmares. Patient reports appetite is good and eating at least 2-3 meals a day. Denies any nausea or vomiting. Patient denies any auditory or visual hallucinations. Patient adamantly denies any SI or HI. Patient denies any side effects to medications. Patient denies any medical changes since last visit.   The following portions of the patient's history were reviewed and updated as appropriate: allergies, current medications, past family history, past medical history, past social history, past surgical history and problem list.    Review of Systems   Constitutional: Negative.    Respiratory: Negative.     Cardiovascular: Negative.    Gastrointestinal: Negative.    Neurological: Negative.    Psychiatric/Behavioral: Positive for agitation, dysphoric mood and sleep disturbance. The patient is nervous/anxious.        Objective   Physical Exam  Vitals reviewed.   Constitutional:       Appearance: Normal appearance. She is well-developed and well-groomed.   Neurological:      Mental Status: She is alert.   Psychiatric:         Attention and Perception: Attention and perception normal.         Mood and Affect: Affect normal. Mood is anxious.         Speech: Speech normal.         Behavior: Behavior normal. Behavior is cooperative.         Thought Content: Thought content normal.         Cognition and Memory: Cognition and memory normal.         Judgment: Judgment normal.       Blood pressure 120/70, pulse 84, weight 89.8 kg (198 lb), not currently breastfeeding. Body mass index is 31.97 kg/m².    Allergies   Allergen Reactions   • Tape Unknown (See Comments)     Blisters on skin   • Codeine Rash   • Hydrocodone Itching       Medication List:   Current Outpatient Medications   Medication Sig Dispense Refill   • clonazePAM (KlonoPIN) 1 MG tablet Take 1 tablet by mouth 2 (Two) Times a Day As Needed for Anxiety. 60 tablet 0   • DULoxetine (Cymbalta) 60 MG capsule Take 1 capsule by mouth Daily. 30 capsule 1   • levothyroxine (Synthroid) 112 MCG tablet Take 1 tablet by mouth Daily. 30 tablet 5   • Menthol, Topical Analgesic, (Biofreeze) 4 % gel Apply 1 application topically 2 (Two) Times a Day. 89 mL 2   • nitrofurantoin, macrocrystal-monohydrate, (Macrobid) 100 MG capsule Take 1 capsule by mouth Every 12 (Twelve) Hours. 20 capsule 0   • phentermine (Adipex-P) 37.5 MG tablet Take 1 tablet by mouth Every Morning Before Breakfast. (Patient taking differently: Take 37.5 mg by mouth Every Morning Before Breakfast. Patient takes sometimes, not every day.) 30 tablet 0   • promethazine (PHENERGAN) 25 MG tablet Take 1 tablet by mouth  Every 6 (Six) Hours As Needed for Nausea or Vomiting. 60 tablet 1   • RABEprazole (Aciphex) 20 MG EC tablet Take 1 tablet by mouth Daily. 30 tablet 5   • tiZANidine (ZANAFLEX) 4 MG tablet Take 1 tablet by mouth 3 (Three) Times a Day As Needed for Muscle Spasms. 90 tablet 5   • traMADol (ULTRAM) 50 MG tablet Take 1 tablet by mouth 2 (Two) Times a Day As Needed for Moderate Pain . 60 tablet 1   • triamcinolone (KENALOG) 0.1 % ointment Apply 1 application topically to the appropriate area as directed 2 (Two) Times a Day. 30 g 1   • vitamin D3 (Vitamin D) 125 MCG (5000 UT) capsule capsule Take 1 capsule by mouth Daily. 30 capsule 11     Current Facility-Administered Medications   Medication Dose Route Frequency Provider Last Rate Last Admin   • cyanocobalamin injection 1,000 mcg  1,000 mcg Intramuscular Q28 Days Christine Joaquin, APRN   1,000 mcg at 02/08/22 1716   • cyanocobalamin injection 1,000 mcg  1,000 mcg Intramuscular Q30 Days Sarath Joaquina, APRN   1,000 mcg at 03/08/22 1745       Mental Status Exam:   Hygiene:   good  Cooperation:  Cooperative  Eye Contact:  Good  Psychomotor Behavior:  Appropriate  Affect:  Appropriate  Hopelessness: Denies  Speech:  Normal  Thought Process:  Goal directed and Linear  Thought Content:  Normal  Suicidal:  None  Homicidal:  None  Hallucinations:  None  Delusion:  None  Memory:  Intact  Orientation:  Person, Place, Time and Situation  Reliability:  fair  Insight:  Fair  Judgement:  Fair  Impulse Control:  Fair  Physical/Medical Issues:  No                 Assessment/Plan   Diagnoses and all orders for this visit:    1. Panic disorder without agoraphobia  -     clonazePAM (KlonoPIN) 1 MG tablet; Take 1 tablet by mouth 2 (Two) Times a Day As Needed for Anxiety.  Dispense: 60 tablet; Refill: 0  -     DULoxetine (Cymbalta) 60 MG capsule; Take 1 capsule by mouth Daily.  Dispense: 30 capsule; Refill: 1    2. Generalized anxiety disorder  -     clonazePAM (KlonoPIN) 1 MG tablet; Take 1  tablet by mouth 2 (Two) Times a Day As Needed for Anxiety.  Dispense: 60 tablet; Refill: 0  -     DULoxetine (Cymbalta) 60 MG capsule; Take 1 capsule by mouth Daily.  Dispense: 30 capsule; Refill: 1    3. Mild episode of recurrent major depressive disorder (HCC)  -     DULoxetine (Cymbalta) 60 MG capsule; Take 1 capsule by mouth Daily.  Dispense: 30 capsule; Refill: 1            Discussed medication options with patient. Cont. Cymbalta 60mg daily for depression, anxiety, and panic. Cont. Clonazepam 1mg twice daily as needed for anxiety. Reviewed the risks, benefits, and side effects of the medications; patient acknowledged and verbally consented. Patient is being prescribed a controlled substance as part of treatment plan. Patient has been educated of appropriate use of the medications, including risk of somnolence, limited ability to drive and/or work safely, and potential for dependence, respiratory depression and overdose. Patient is also informed that the medication are to be used by the patient only- avoid any combined use of ETOH or other substances unless prescribed.   P[facility] Patient Controlled Substance Report (from 3/22/2021 to 3/9/2022)    Dispensed  Strength Quantity Days Supply Provider Pharmacy   03/07/2022 Clonazepam 1MG 60 each 30 [facility]OU[facility], [facility]RICIA [facility] [place]   02/08/2022 Tramadol Hcl 50MG/50MG/50MG/50MG/50MG/ 60 each 30 SHEILA, [facility] [place]   02/04/2022 Clonazepam 1MG 60 each 30 CLOUD, [facility]RICIA [facility]   01/24/2022 Phentermine Hcl 37.5MG 30 each 30 [facility]A, VIPUL [facility]   01/06/2022 Tramadol Hcl 50MG/50MG/50MG/50MG/50MG/ 60 each 30 SHEILA, [facility]   12/31/2021 Clonazepam 1MG 60 each 30 CLOUD, PATRICIAHER [facility]   12/23/2021 Phentermine Hcl 37.5MG 30 each 30 SHEILA, [facility]   12/09/2021 [facility] 45 each [facility]   12/09/2021 Phentermine Hcl 37.5MG 30 each 30 VIPUL SOSA [place]   12/09/2021  Tramadol Hcl 50MG/50MG/50MG/50MG/50MG/ 60 each 30 SHEILA, [facility] [place]   11/08/2021 Clonazepam 1MG 45 each 30 CLOUD, PAT[facility]ICIA[facility] [place]   10/08/2021 Clonazepam 1MG 45 each 30 [facility]D, [facility] [place]   10/06/2021 Tramadol Hcl 50MG/50MG/50MG/50MG/50MG/ 60 each [facility]   09/[facility].5MG 60 each [facility]   08/11/2021 Clonazepam 0.5MG 30 each 30 CLOUD, [facility]   08/05/2021 Phentermine Hcl 37.5MG 30 each 30 SHEILA, [facility] [place]   08/05/2021 Tramadol Hcl 50MG/50MG/50MG/50MG/50MG/ 60 each 30 SHEILA, [facility] [place]   07/12/2021 Phentermine Hcl 37.5MG 30 each 30 [facility]   07/10/2021 Clonazepam 0.5MG 30 each 30 [facility], [facility]   06/09/2021 Clonazepam 0.5MG 30 each 30 [facility]D, [facility]   06/03/2021 Phentermine Hcl 37.5MG 30 each 30 [facility]   05/10/2021 Clonazepam 0.5MG 30 each 30 [facility]   05/04/2021 Tramadol Hcl 50MG/50MG/50MG/50MG/50MG/ 60 each 15 SHEILA, [facility] [place]   04/12/2021 Clonazepam 0.5MG 20 each 30 [facility]   03/24/2021 Phentermine Hcl 37.5MG 30 each 30 SHEILA, [facility]           Patient is agreeable to call the office with any questions, concerns, or worsening of symptoms. Patient is aware to call 911 or go to the nearest ER should begin having SI/HI.          Follow up in four weeks        Errors in dictation may reflect use of voice recognition software and not all errors in transcription may have been detected prior to signing.               This document has been electronically signed by LANA Siu   March 22, 2022 11:09 EDT

## 2022-03-08 ENCOUNTER — OFFICE VISIT (OUTPATIENT)
Dept: FAMILY MEDICINE CLINIC | Facility: CLINIC | Age: 37
End: 2022-03-08

## 2022-03-08 VITALS
WEIGHT: 201 LBS | DIASTOLIC BLOOD PRESSURE: 78 MMHG | HEART RATE: 102 BPM | OXYGEN SATURATION: 98 % | TEMPERATURE: 98 F | RESPIRATION RATE: 20 BRPM | SYSTOLIC BLOOD PRESSURE: 118 MMHG | HEIGHT: 66 IN | BODY MASS INDEX: 32.3 KG/M2

## 2022-03-08 DIAGNOSIS — E53.8 B12 DEFICIENCY: Chronic | ICD-10-CM

## 2022-03-08 DIAGNOSIS — E03.4 HYPOTHYROIDISM DUE TO ACQUIRED ATROPHY OF THYROID: Primary | ICD-10-CM

## 2022-03-08 DIAGNOSIS — K21.9 GASTROESOPHAGEAL REFLUX DISEASE WITHOUT ESOPHAGITIS: ICD-10-CM

## 2022-03-08 DIAGNOSIS — E66.09 CLASS 2 OBESITY DUE TO EXCESS CALORIES WITHOUT SERIOUS COMORBIDITY WITH BODY MASS INDEX (BMI) OF 37.0 TO 37.9 IN ADULT: ICD-10-CM

## 2022-03-08 DIAGNOSIS — E66.09 CLASS 1 OBESITY DUE TO EXCESS CALORIES WITHOUT SERIOUS COMORBIDITY WITH BODY MASS INDEX (BMI) OF 32.0 TO 32.9 IN ADULT: ICD-10-CM

## 2022-03-08 PROCEDURE — 96372 THER/PROPH/DIAG INJ SC/IM: CPT | Performed by: NURSE PRACTITIONER

## 2022-03-08 PROCEDURE — 99214 OFFICE O/P EST MOD 30 MIN: CPT | Performed by: NURSE PRACTITIONER

## 2022-03-08 RX ORDER — CYANOCOBALAMIN 1000 UG/ML
1000 INJECTION, SOLUTION INTRAMUSCULAR; SUBCUTANEOUS
Status: DISCONTINUED | OUTPATIENT
Start: 2022-03-08 | End: 2022-05-12

## 2022-03-08 RX ADMIN — CYANOCOBALAMIN 1000 MCG: 1000 INJECTION, SOLUTION INTRAMUSCULAR; SUBCUTANEOUS at 17:45

## 2022-03-09 ENCOUNTER — LAB (OUTPATIENT)
Dept: FAMILY MEDICINE CLINIC | Facility: CLINIC | Age: 37
End: 2022-03-09

## 2022-03-09 DIAGNOSIS — E03.4 HYPOTHYROIDISM DUE TO ACQUIRED ATROPHY OF THYROID: ICD-10-CM

## 2022-03-09 PROCEDURE — 36415 COLL VENOUS BLD VENIPUNCTURE: CPT | Performed by: NURSE PRACTITIONER

## 2022-03-09 PROCEDURE — 84443 ASSAY THYROID STIM HORMONE: CPT | Performed by: NURSE PRACTITIONER

## 2022-03-09 PROCEDURE — 86800 THYROGLOBULIN ANTIBODY: CPT | Performed by: NURSE PRACTITIONER

## 2022-03-09 PROCEDURE — 84481 FREE ASSAY (FT-3): CPT | Performed by: NURSE PRACTITIONER

## 2022-03-09 PROCEDURE — 84439 ASSAY OF FREE THYROXINE: CPT | Performed by: NURSE PRACTITIONER

## 2022-03-09 PROCEDURE — 86376 MICROSOMAL ANTIBODY EACH: CPT | Performed by: NURSE PRACTITIONER

## 2022-03-10 LAB
T3FREE SERPL-MCNC: 2.55 PG/ML (ref 2–4.4)
T4 FREE SERPL-MCNC: 1.17 NG/DL (ref 0.93–1.7)
THYROGLOB AB SERPL-ACNC: <1 IU/ML (ref 0–0.9)
THYROPEROXIDASE AB SERPL-ACNC: 334 IU/ML (ref 0–34)
TSH SERPL DL<=0.05 MIU/L-ACNC: 1.01 UIU/ML (ref 0.27–4.2)

## 2022-03-16 NOTE — ASSESSMENT & PLAN NOTE
Increase to 1 whole tablet daily  Dietary counseling provided:  Healthy food choices including fruits and vegetables, limit soda and junk foods, adequate water intake.  Be active as physically able

## 2022-03-22 DIAGNOSIS — E66.09 CLASS 2 OBESITY DUE TO EXCESS CALORIES WITHOUT SERIOUS COMORBIDITY WITH BODY MASS INDEX (BMI) OF 37.0 TO 37.9 IN ADULT: ICD-10-CM

## 2022-03-22 RX ORDER — PHENTERMINE HYDROCHLORIDE 37.5 MG/1
37.5 TABLET ORAL
Qty: 30 TABLET | Refills: 0 | Status: SHIPPED | OUTPATIENT
Start: 2022-03-22 | End: 2022-07-12 | Stop reason: SDUPTHER

## 2022-04-01 ENCOUNTER — OFFICE VISIT (OUTPATIENT)
Dept: PSYCHIATRY | Facility: CLINIC | Age: 37
End: 2022-04-01

## 2022-04-01 VITALS
DIASTOLIC BLOOD PRESSURE: 64 MMHG | WEIGHT: 200 LBS | BODY MASS INDEX: 32.3 KG/M2 | SYSTOLIC BLOOD PRESSURE: 116 MMHG | HEART RATE: 72 BPM

## 2022-04-01 DIAGNOSIS — F41.0 PANIC DISORDER WITHOUT AGORAPHOBIA: ICD-10-CM

## 2022-04-01 DIAGNOSIS — F41.1 GENERALIZED ANXIETY DISORDER: ICD-10-CM

## 2022-04-01 DIAGNOSIS — F33.0 MILD EPISODE OF RECURRENT MAJOR DEPRESSIVE DISORDER: ICD-10-CM

## 2022-04-01 PROCEDURE — 99213 OFFICE O/P EST LOW 20 MIN: CPT | Performed by: NURSE PRACTITIONER

## 2022-04-01 RX ORDER — CLONAZEPAM 1 MG/1
1 TABLET ORAL 2 TIMES DAILY PRN
Qty: 60 TABLET | Refills: 0 | Status: SHIPPED | OUTPATIENT
Start: 2022-04-01 | End: 2022-05-03 | Stop reason: SDUPTHER

## 2022-04-01 RX ORDER — DULOXETIN HYDROCHLORIDE 60 MG/1
60 CAPSULE, DELAYED RELEASE ORAL DAILY
Qty: 30 CAPSULE | Refills: 1 | Status: SHIPPED | OUTPATIENT
Start: 2022-04-01 | End: 2022-05-03 | Stop reason: SDUPTHER

## 2022-04-05 DIAGNOSIS — E66.09 CLASS 2 OBESITY DUE TO EXCESS CALORIES WITHOUT SERIOUS COMORBIDITY WITH BODY MASS INDEX (BMI) OF 37.0 TO 37.9 IN ADULT: Primary | ICD-10-CM

## 2022-04-12 ENCOUNTER — OFFICE VISIT (OUTPATIENT)
Dept: FAMILY MEDICINE CLINIC | Facility: CLINIC | Age: 37
End: 2022-04-12

## 2022-04-12 VITALS
DIASTOLIC BLOOD PRESSURE: 84 MMHG | WEIGHT: 202 LBS | SYSTOLIC BLOOD PRESSURE: 120 MMHG | HEART RATE: 104 BPM | RESPIRATION RATE: 20 BRPM | HEIGHT: 66 IN | OXYGEN SATURATION: 98 % | TEMPERATURE: 98 F | BODY MASS INDEX: 32.47 KG/M2

## 2022-04-12 DIAGNOSIS — E53.8 B12 DEFICIENCY: ICD-10-CM

## 2022-04-12 DIAGNOSIS — K21.9 GASTROESOPHAGEAL REFLUX DISEASE WITHOUT ESOPHAGITIS: ICD-10-CM

## 2022-04-12 DIAGNOSIS — F41.1 GAD (GENERALIZED ANXIETY DISORDER): ICD-10-CM

## 2022-04-12 DIAGNOSIS — M51.26 LUMBAR DISC HERNIATION: ICD-10-CM

## 2022-04-12 DIAGNOSIS — E66.09 CLASS 2 OBESITY DUE TO EXCESS CALORIES WITHOUT SERIOUS COMORBIDITY WITH BODY MASS INDEX (BMI) OF 37.0 TO 37.9 IN ADULT: Primary | ICD-10-CM

## 2022-04-12 PROCEDURE — 96372 THER/PROPH/DIAG INJ SC/IM: CPT | Performed by: NURSE PRACTITIONER

## 2022-04-12 PROCEDURE — 99214 OFFICE O/P EST MOD 30 MIN: CPT | Performed by: NURSE PRACTITIONER

## 2022-04-12 RX ORDER — RABEPRAZOLE SODIUM 20 MG/1
20 TABLET, DELAYED RELEASE ORAL DAILY
Qty: 30 TABLET | Refills: 5 | Status: SHIPPED | OUTPATIENT
Start: 2022-04-12 | End: 2022-08-23

## 2022-04-12 RX ORDER — KETOROLAC TROMETHAMINE 30 MG/ML
60 INJECTION, SOLUTION INTRAMUSCULAR; INTRAVENOUS ONCE
Status: COMPLETED | OUTPATIENT
Start: 2022-04-12 | End: 2022-04-12

## 2022-04-12 RX ORDER — LIDOCAINE 50 MG/G
1 PATCH TOPICAL EVERY 24 HOURS
Qty: 30 EACH | Refills: 5 | Status: SHIPPED | OUTPATIENT
Start: 2022-04-12 | End: 2022-07-12

## 2022-04-12 RX ORDER — TRAMADOL HYDROCHLORIDE 50 MG/1
50 TABLET ORAL 2 TIMES DAILY PRN
Qty: 60 TABLET | Refills: 1 | Status: SHIPPED | OUTPATIENT
Start: 2022-04-12 | End: 2022-06-07

## 2022-04-12 RX ORDER — DICLOFENAC EPOLAMINE 0.01 G/1
1 SYSTEM TOPICAL 2 TIMES DAILY
Qty: 60 PATCH | Refills: 2 | Status: SHIPPED | OUTPATIENT
Start: 2022-04-12 | End: 2022-05-27 | Stop reason: SDUPTHER

## 2022-04-12 RX ADMIN — CYANOCOBALAMIN 1000 MCG: 1000 INJECTION, SOLUTION INTRAMUSCULAR; SUBCUTANEOUS at 16:23

## 2022-04-12 RX ADMIN — KETOROLAC TROMETHAMINE 60 MG: 30 INJECTION, SOLUTION INTRAMUSCULAR; INTRAVENOUS at 17:31

## 2022-04-12 NOTE — PROGRESS NOTES
"Subjective   Jarad Barragan is a 36 y.o. female.     Chief Complaint   Patient presents with   • Weight Loss       History of Present Illness     Weight loss observation-here for weight loss management.   She has not been able to be active.  She reports that she has not been taking adipex consistently because of this.    Low back pain-with some upper back pain in the middle near her bra line.  She denies that she has been carrying anything.  She reports throbbing in nature.  She has not slipped or had any falls.  She reports that her low back \"hurts\" but more in hips.  She reports that it is painful to sneeze or cough.   She reports some radiation to the front along her groin line.   Has been \"on and off\" but certain movements cause either a shock like sensation or \"like a catch\".   She reports not new or worsening leg symptoms.   She is not doing any stretches.   She reports that even bending over is painful.   She has been doing some heat but still \"hurts\".  Pain is worse in the AM.   She would be interested in any pain relief patches that she may be able to use.  She is taking tramadol and Zanaflex.    GERD-Chronic and ongoing.  Doing good on Aciphex.  No negative side effects.  No negative side effects of medication.  Patient does avoid foods that trigger reflux symptoms.  Denies any recent exacerbations.  Depression with anxiety-chronic and ongoing.  On Cymbalta 60 mg.  She is also on Klonopin 1 mg BID PRN.  She is under the care of psych.  She does feel she is mostly stable.      The following portions of the patient's history were reviewed and updated as appropriate: CC, ROS, allergies, current medications, past family history, past medical history, past social history, past surgical history and problem list.      Review of Systems   Constitutional: Positive for fatigue. Negative for activity change, appetite change and fever.   HENT: Negative for congestion, ear pain, nosebleeds, postnasal drip, rhinorrhea, " "sore throat, tinnitus, trouble swallowing and voice change.    Eyes: Negative for blurred vision, photophobia and visual disturbance.   Respiratory: Negative for cough, chest tightness, shortness of breath and wheezing.    Cardiovascular: Negative for chest pain, palpitations and leg swelling.   Gastrointestinal: Negative for abdominal pain, blood in stool, constipation, diarrhea, nausea, vomiting and GERD.   Endocrine: Negative for cold intolerance, heat intolerance and polydipsia.   Genitourinary: Negative for decreased urine volume, difficulty urinating, dysuria and hematuria.   Musculoskeletal: Positive for arthralgias and back pain. Negative for gait problem and myalgias.   Skin: Negative for color change, pallor and wound.   Allergic/Immunologic: Negative.    Neurological: Positive for headache (not more than usual). Negative for dizziness, syncope and numbness.   Hematological: Negative.    Psychiatric/Behavioral: Positive for stress. Negative for decreased concentration, self-injury, sleep disturbance, suicidal ideas and depressed mood. The patient is not nervous/anxious.    All other systems reviewed and are negative.      Objective     /84   Pulse 104   Temp 98 °F (36.7 °C)   Resp 20   Ht 167.6 cm (65.98\")   Wt 91.6 kg (202 lb)   SpO2 98%   BMI 32.62 kg/m²     Physical Exam  Vitals reviewed.   Constitutional:       General: She is not in acute distress.     Appearance: She is well-developed. She is obese. She is not diaphoretic.   HENT:      Head: Normocephalic and atraumatic.      Jaw: No tenderness.      Comments: Oropharynx not examined.  Patient is presently wearing a face covering/mask due to COVID-19 pandemic.     Right Ear: Hearing, tympanic membrane, ear canal and external ear normal.      Left Ear: Hearing, tympanic membrane, ear canal and external ear normal.   Eyes:      General: Lids are normal. No scleral icterus.     Extraocular Movements:      Right eye: Normal extraocular " motion and no nystagmus.      Left eye: Normal extraocular motion and no nystagmus.      Conjunctiva/sclera: Conjunctivae normal.      Pupils: Pupils are equal, round, and reactive to light.   Neck:      Thyroid: No thyromegaly or thyroid tenderness.      Vascular: No carotid bruit or JVD.      Trachea: No tracheal tenderness.   Cardiovascular:      Rate and Rhythm: Normal rate and regular rhythm.      Pulses:           Dorsalis pedis pulses are 2+ on the right side and 2+ on the left side.        Posterior tibial pulses are 2+ on the right side and 2+ on the left side.      Heart sounds: Normal heart sounds, S1 normal and S2 normal. No murmur heard.  Pulmonary:      Effort: Pulmonary effort is normal. No accessory muscle usage, prolonged expiration or respiratory distress.      Breath sounds: Normal breath sounds.   Chest:      Chest wall: No tenderness.   Abdominal:      General: Bowel sounds are normal. There is no distension.      Palpations: Abdomen is soft. There is no mass.      Tenderness: There is no abdominal tenderness.   Musculoskeletal:         General: Tenderness present.      Cervical back: Normal range of motion and neck supple.      Lumbar back: Tenderness present. Decreased range of motion.      Right hip: Tenderness present. Decreased range of motion.      Left hip: Tenderness present. Decreased range of motion.      Right lower leg: No edema.      Left lower leg: No edema.      Comments: No muscular atrophy or flaccidity.   Lymphadenopathy:      Head:      Right side of head: No submental or submandibular adenopathy.      Left side of head: No submental or submandibular adenopathy.      Cervical: No cervical adenopathy.      Right cervical: No superficial cervical adenopathy.     Left cervical: No superficial cervical adenopathy.   Skin:     General: Skin is warm and dry.      Capillary Refill: Capillary refill takes less than 2 seconds.      Coloration: Skin is not jaundiced or pale.       Findings: No erythema.      Nails: There is no clubbing.   Neurological:      Mental Status: She is alert and oriented to person, place, and time.      Cranial Nerves: No cranial nerve deficit or facial asymmetry.      Sensory: No sensory deficit.      Motor: No weakness, tremor, atrophy or abnormal muscle tone.      Coordination: Coordination normal.      Deep Tendon Reflexes: Reflexes are normal and symmetric.   Psychiatric:         Attention and Perception: She is attentive.         Mood and Affect: Mood normal.         Speech: Speech normal.         Behavior: Behavior normal. Behavior is cooperative.         Thought Content: Thought content normal.         Judgment: Judgment normal.       Assessment/Plan     Diagnoses and all orders for this visit:    1. Class 2 obesity due to excess calories without serious comorbidity with body mass index (BMI) of 37.0 to 37.9 in adult (Primary)  Overview:  Beginning weight at 240 on 02/23/2021    Assessment & Plan:  Continue to work on diet changes.   Be active as physically able.      2. B12 deficiency    3. Gastroesophageal reflux disease without esophagitis  Assessment & Plan:  Continue Acipex.   Advised to avoid known GI triggers such as spicy foods.  Upright 30 minutes after meals and avoid eating large meals.  Several small meals daily as able to avoid overfilling stomach.        Orders:  -     RABEprazole (Aciphex) 20 MG EC tablet; Take 1 tablet by mouth Daily.  Dispense: 30 tablet; Refill: 5    4. Lumbar disc herniation  -     lidocaine (Lidoderm) 5 %; Place 1 patch on the skin as directed by provider Daily. Remove & Discard patch within 12 hours or as directed by MD  Dispense: 30 each; Refill: 5  -     Diclofenac Epolamine (FLECTOR) 1.3 % patch patch; Apply 1 patch topically to the appropriate area as directed 2 (Two) Times a Day.  Dispense: 60 patch; Refill: 2  -     traMADol (ULTRAM) 50 MG tablet; Take 1 tablet by mouth 2 (Two) Times a Day As Needed for Moderate  Pain .  Dispense: 60 tablet; Refill: 1  -     ketorolac (TORADOL) injection 60 mg    5. VALENTINA (generalized anxiety disorder)  Assessment & Plan:  Continue under care of psych  Continue meds as directed         Patient's Body mass index is 32.62 kg/m². indicating that she is obese (BMI >30). Obesity-related health conditions include the following: GERD. Obesity is unchanged. BMI is is above average. We discussed portion control and increasing exercise..     Understands disease processes and need for medications.  Understands reasons for urgent and emergent care.  Patient (& family) verbalized agreement for treatment plan.   Emotional support and active listening provided.  Patient provided time to verbalize feelings.    PURVI/PMDP reviewed today and consistent.  Will refill prescribed controlled medication today.  Patient is aware they cannot receive narcotics from any other provider except if under care of pain management or speciality clinic.  Risk and benefits of medication use has been reviewed.  History and physical exam exhibit continued safe and appropriate use of controlled substances.  The patient is aware of the potential for addiction and dependence.  This patient has been made aware of the appropriate use of such medications, including potential risk of somnolence, limited ability to drive and / or work safely, and potential for overdose.    It has also been made clear that these medications are for use by this patient only, without concomitant use of alcohol or other substances unless prescribed/advised by medical provider.  Patient understands they may be subject to UDS and pill counts at random.      Patient considered to be moderate risk for addiction due to use of multiple controlled medications.  Patient understands and accepts these risks.  Patient need for medication will be reassessed at each visit.  Doses will be adjusted according to patient need and findings.    Goal of TX: Patient will not  have any adverse reactions of medication.  Patient will have reduction in there chronic back and joint pain symptoms with use of tramadol as needed as directed.  Patient will be able to remain active in an outside of their home with minimal to no interference from their pain symptoms.  Patient will have reduction in anxiety symptoms with use of PRN Klonopin.  Patient will be able to remain active in an outside of her home without interference from anxiety symptoms.    Medication Dispense Information    Clonazepam   Dispensed: 4/4/2022 12:00 AM   Written:  4/1/2022   Unit strength: 1MG   Days supply: 30   Dispense Note: GivenName=Mike=NOELBirthDate=[id number]=[birth year] [address], [address]   Quantity: 60 each   Pharmacy: [facility]   Authorizing provider: PATRICIA SEQUEIRA   Received from: [facility] (Fill History)   Brand or Generic:       Medication Dispense Information    Phentermine Hcl   Dispensed: 3/26/2022 12:00 AM   Written:  3/22/2022   Unit strength: 37.5MG   Days supply: 30   Dispense Note: GivenName=Mike=NOELBirthDate=[id number]=[address]36 [address], [address]   Quantity: 30 each   Pharmacy: [facility]   Authorizing provider: VIPUL SOSA   Received from: [facility] (Fill History)   Brand or Generic:  Unknown     Medication Dispense Information    Tramadol Hcl   Dispensed: 3/9/2022 12:00 AM   Written:  2/8/2022   Unit strength: 50MG/50MG/50MG/50MG/50MG/   Days supply: 30   Dispense Note: GivenName=Mike=NOELBirthDate=[id number]=[address][birth year] [address], [address]   Quantity: 60 each   Pharmacy: [facility]   Authorizing provider: VIPUL SOSA   Received from: [facility] (Fill History)   Brand or Generic:       RTC 1 month, sooner if needed.             This document has been electronically signed by:  LANA Quinn FNP-C Dragon disclaimer:  Part of this note may be an electronic  transcription/translation of spoken language to printed text using the Dragon Dictation System.

## 2022-04-19 NOTE — ASSESSMENT & PLAN NOTE
Continue Acipex.   Advised to avoid known GI triggers such as spicy foods.  Upright 30 minutes after meals and avoid eating large meals.  Several small meals daily as able to avoid overfilling stomach.

## 2022-04-28 RX ORDER — NITROFURANTOIN 25; 75 MG/1; MG/1
100 CAPSULE ORAL EVERY 12 HOURS SCHEDULED
Qty: 20 CAPSULE | Refills: 0 | Status: SHIPPED | OUTPATIENT
Start: 2022-04-28 | End: 2022-05-12

## 2022-05-03 ENCOUNTER — OFFICE VISIT (OUTPATIENT)
Dept: PSYCHIATRY | Facility: CLINIC | Age: 37
End: 2022-05-03

## 2022-05-03 VITALS
WEIGHT: 195 LBS | DIASTOLIC BLOOD PRESSURE: 84 MMHG | SYSTOLIC BLOOD PRESSURE: 130 MMHG | HEART RATE: 60 BPM | BODY MASS INDEX: 31.49 KG/M2

## 2022-05-03 DIAGNOSIS — Z79.899 HIGH RISK MEDICATION USE: Primary | ICD-10-CM

## 2022-05-03 DIAGNOSIS — F41.1 GENERALIZED ANXIETY DISORDER: ICD-10-CM

## 2022-05-03 DIAGNOSIS — F41.0 PANIC DISORDER WITHOUT AGORAPHOBIA: ICD-10-CM

## 2022-05-03 DIAGNOSIS — F33.0 MILD EPISODE OF RECURRENT MAJOR DEPRESSIVE DISORDER: ICD-10-CM

## 2022-05-03 PROCEDURE — 99213 OFFICE O/P EST LOW 20 MIN: CPT | Performed by: NURSE PRACTITIONER

## 2022-05-03 RX ORDER — DULOXETIN HYDROCHLORIDE 60 MG/1
60 CAPSULE, DELAYED RELEASE ORAL DAILY
Qty: 30 CAPSULE | Refills: 1 | Status: SHIPPED | OUTPATIENT
Start: 2022-05-03 | End: 2022-06-02 | Stop reason: SDUPTHER

## 2022-05-03 RX ORDER — LEVOTHYROXINE AND LIOTHYRONINE 19; 4.5 UG/1; UG/1
30 TABLET ORAL DAILY
Qty: 30 TABLET | Refills: 1 | Status: SHIPPED | OUTPATIENT
Start: 2022-05-03 | End: 2022-05-12 | Stop reason: DRUGHIGH

## 2022-05-03 RX ORDER — CLONAZEPAM 1 MG/1
1 TABLET ORAL 2 TIMES DAILY PRN
Qty: 60 TABLET | Refills: 0 | Status: SHIPPED | OUTPATIENT
Start: 2022-05-03 | End: 2022-06-02 | Stop reason: SDUPTHER

## 2022-05-03 NOTE — PROGRESS NOTES
"      Subjective   Jarad Barragan is a 36 y.o. female is here today for medication management follow-up.    Chief Complaint:  Anxiety depression     History of Present Illness:    Patient presents today for follow up for medication management for depression and anxiety. Patient states had a stomach virus and it lasted five days. Patient states it drained her and couldn't eat. Patient states depression is never really bad and barely notice got it. Patient reports currently depression is at a 5 on a 0-10 scale with 10 being the worst. Patient reports anxiety is at a 8 on a 0-10 scale with 10 being the worst. Patient states anxiety has been \"so-so\" due to everything going on. Patient reports having three panic attacks in a week. Patient reports panic attacks last no more 30 minutes and during an attacks patient has dread, fear, can't breathe, and then feeling drained. Patient states not sleeping the best. Patient reports sleeping 5 hours a night and patient denies any nightmares. Patient reports appetite is better and eating 2-3 meals a day. Patient denies any auditory or visual hallucinations. Patient adamantly denies any SI or HI. Patient denies any side effects to medications. Patient denies any medical changes since last visit.   The following portions of the patient's history were reviewed and updated as appropriate: allergies, current medications, past family history, past medical history, past social history, past surgical history and problem list.    Review of Systems   Constitutional: Negative.    Respiratory: Negative.    Cardiovascular: Negative.    Gastrointestinal: Negative.    Neurological: Negative.    Psychiatric/Behavioral: Positive for dysphoric mood. The patient is nervous/anxious.        Objective   Physical Exam  Vitals reviewed.   Constitutional:       Appearance: Normal appearance. She is well-developed and well-groomed.   Neurological:      Mental Status: She is alert.   Psychiatric:         " Attention and Perception: Attention and perception normal.         Mood and Affect: Affect normal. Mood is anxious.         Speech: Speech normal.         Behavior: Behavior normal. Behavior is cooperative.         Thought Content: Thought content normal.         Cognition and Memory: Cognition and memory normal.         Judgment: Judgment normal.       Blood pressure 130/84, pulse 60, weight 88.5 kg (195 lb), not currently breastfeeding. Body mass index is 31.49 kg/m².    Allergies   Allergen Reactions   • Tape Unknown (See Comments)     Blisters on skin   • Codeine Rash   • Hydrocodone Itching       Medication List:   Current Outpatient Medications   Medication Sig Dispense Refill   • clonazePAM (KlonoPIN) 1 MG tablet Take 1 tablet by mouth 2 (Two) Times a Day As Needed for Anxiety. 60 tablet 0   • DULoxetine (Cymbalta) 60 MG capsule Take 1 capsule by mouth Daily. 30 capsule 1   • Diclofenac Epolamine (FLECTOR) 1.3 % patch patch Apply 1 patch topically to the appropriate area as directed 2 (Two) Times a Day. 60 patch 2   • levothyroxine (Synthroid) 112 MCG tablet Take 1 tablet by mouth Daily. 30 tablet 5   • lidocaine (Lidoderm) 5 % Place 1 patch on the skin as directed by provider Daily. Remove & Discard patch within 12 hours or as directed by MD 30 each 5   • nitrofurantoin, macrocrystal-monohydrate, (Macrobid) 100 MG capsule Take 1 capsule by mouth Every 12 (Twelve) Hours. 20 capsule 0   • phentermine (ADIPEX-P) 37.5 MG tablet Take 1 tablet by mouth Every Morning Before Breakfast. Patient takes sometimes, not every day. 30 tablet 0   • promethazine (PHENERGAN) 25 MG tablet Take 1 tablet by mouth Every 6 (Six) Hours As Needed for Nausea or Vomiting. 60 tablet 1   • RABEprazole (Aciphex) 20 MG EC tablet Take 1 tablet by mouth Daily. 30 tablet 5   • Thyroid (ARMOUR THYROID) 30 MG PO tablet Take 1 tablet by mouth Daily. 30 tablet 1   • tiZANidine (ZANAFLEX) 4 MG tablet Take 1 tablet by mouth 3 (Three) Times a Day  As Needed for Muscle Spasms. 90 tablet 5   • traMADol (ULTRAM) 50 MG tablet Take 1 tablet by mouth 2 (Two) Times a Day As Needed for Moderate Pain . 60 tablet 1   • triamcinolone (KENALOG) 0.1 % ointment Apply 1 application topically to the appropriate area as directed 2 (Two) Times a Day. 30 g 1   • vitamin D3 (Vitamin D) 125 MCG (5000 UT) capsule capsule Take 1 capsule by mouth Daily. 30 capsule 11     Current Facility-Administered Medications   Medication Dose Route Frequency Provider Last Rate Last Admin   • cyanocobalamin injection 1,000 mcg  1,000 mcg Intramuscular Q28 Days Christine Joaquin APRN   1,000 mcg at 02/08/22 1716   • cyanocobalamin injection 1,000 mcg  1,000 mcg Intramuscular Q30 Days Christine Joaquin APRN   1,000 mcg at 04/12/22 1623       Mental Status Exam:   Hygiene:   good  Cooperation:  Cooperative  Eye Contact:  Good  Psychomotor Behavior:  Appropriate  Affect:  Appropriate  Hopelessness: Denies  Speech:  Normal  Thought Process:  Goal directed and Linear  Thought Content:  Normal  Suicidal:  None  Homicidal:  None  Hallucinations:  None  Delusion:  None  Memory:  Intact  Orientation:  Person, Place, Time and Situation  Reliability:  fair  Insight:  Fair  Judgement:  Fair  Impulse Control:  Fair  Physical/Medical Issues:  No               Assessment & Plan   Diagnoses and all orders for this visit:    1. High risk medication use (Primary)  -     Urine Drug Screen - Urine, Clean Catch; Future    2. Panic disorder without agoraphobia  -     clonazePAM (KlonoPIN) 1 MG tablet; Take 1 tablet by mouth 2 (Two) Times a Day As Needed for Anxiety.  Dispense: 60 tablet; Refill: 0  -     DULoxetine (Cymbalta) 60 MG capsule; Take 1 capsule by mouth Daily.  Dispense: 30 capsule; Refill: 1    3. Generalized anxiety disorder  -     clonazePAM (KlonoPIN) 1 MG tablet; Take 1 tablet by mouth 2 (Two) Times a Day As Needed for Anxiety.  Dispense: 60 tablet; Refill: 0  -     DULoxetine (Cymbalta) 60 MG capsule;  Take 1 capsule by mouth Daily.  Dispense: 30 capsule; Refill: 1    4. Mild episode of recurrent major depressive disorder (HCC)  -     DULoxetine (Cymbalta) 60 MG capsule; Take 1 capsule by mouth Daily.  Dispense: 30 capsule; Refill: 1            Discussed medication options with patient. Cont. Cymbalta 60mg daily for depression and anxiety. Cont. Clonazepam 1mg twice daily as needed for anxiety. Reviewed the risks, benefits, and side effects of the medications; patient acknowledged and verbally consented. Patient is being prescribed a controlled substance as part of treatment plan. Patient has been educated of appropriate use of the medications, including risk of somnolence, limited ability to drive and/or work safely, and potential for dependence, respiratory depression and overdose. Patient is also informed that the medication are to be used by the patient only- avoid any combined use of ETOH or other substances unless prescribed. UDS ordered.   P[facility] Patient Controlled Substance Report (from 5/12/2021 to 5/12/2022)    Dispensed  Strength Quantity Days Supply Provider Pharmacy   05/03/2022 Clonazepam 1MG 60 each 30 CLOUD, PATRICIA [facility]   04/13/2022 Tramadol Hcl 50MG/50MG/50MG/50MG/50MG/ 60 each 30 SHEILA, VIPUL [facility]   04/04/2022 Clonazepam 1MG 60 each 30 CLOUD, PATRICIAHER [facility]   03/26/2022 Phentermine Hcl 37.5MG 30 each 30 SHEILA, VIPUL [facility]   03/09/2022 Tramadol Hcl 50MG/50MG/50MG/50MG/50MG/ 60 each 30 SHEILA, VIPUL [facility]   03/07/2022 Clonazepam 1MG 60 each 30 CLOUD, PATRICIAHER [facility]   02/08/2022 Tramadol Hcl 50MG/50MG/50MG/50MG/50MG/ 60 each 30 SHEILA, VIPUL [facility]   02/04/2022 Clonazepam 1MG 60 each 30 CLOUD, PATRICIAHER [facility]   01/24/2022 Phentermine Hcl 37.5MG 30 each [facility]   01/06/2022 Tramadol Hcl 50MG/50MG/50MG/50MG/50MG/ 60 each 30 SHEILA, VIPUL [facility]   12/31/2021 Clonazepam 1MG 60  each 30 CLOUD, PATRICIA [facility]   12/23/2021 Phentermine Hcl 37.5MG 30 each 30 SHEILA, VIPUL [facility]   12/09/2021 Clonazepam 1MG 45 each 23 CLOUD, PATRICIAHER [facility]   12/09/2021 Phentermine Hcl 37.5MG 30 each 30 SHEILA, VIPUL [facility]   12/09/2021 Tramadol Hcl 50MG/50MG/50MG/50MG/50MG/ 60 each 30 [facility]   11/08/2021 Clonazepam 1MG 45 each 30 CLOUD, PATRICIA [facility]   10/08/2021 Clonazepam 1MG 45 each 30 CLOUD, PATRICIAHER [facility]   10/06/2021 Tramadol Hcl 50MG/50MG/50MG/50MG/50MG/ 60 each 30 [facility]   09/07/2021 Clonazepam 0.5MG 60 each 30 [facility]   08/11/2021 [facility] 0.5MG 30 each [facility]   08/05/2021 Phentermine Hcl 37.5MG 30 each 30 SHEILA, VIPUL [facility]   08/05/2021 Tramadol Hcl 50MG/50MG/50MG/50MG/50MG/ 60 each 30 SHEILA, VIPUL [facility]   07/12/2021 Phentermine Hcl 37.5MG 30 each 30 SHEILA, VIPUL [facility]   07/10/2021 Clonazepam 0.5MG 30 each 30 [facility]   06/09/2021 Clonazepam 0.5MG 30 each 30 [facility]   06/03/2021 Phentermine Hcl 37.5MG 30 each 30 SHEILA, VIPUL [facility]           Patient is agreeable to call the office with any questions, concerns, or worsening of symptoms. Patient is aware to call 911 or go to the nearest ER should begin having SI/HI.          Follow up in four weeks        Errors in dictation may reflect use of voice recognition software and not all errors in transcription may have been detected prior to signing.              This document has been electronically signed by LANA Siu   May 12, 2022 09:15 EDT

## 2022-05-12 ENCOUNTER — OFFICE VISIT (OUTPATIENT)
Dept: FAMILY MEDICINE CLINIC | Facility: CLINIC | Age: 37
End: 2022-05-12

## 2022-05-12 VITALS
WEIGHT: 196 LBS | HEART RATE: 99 BPM | OXYGEN SATURATION: 98 % | TEMPERATURE: 97.1 F | SYSTOLIC BLOOD PRESSURE: 110 MMHG | HEIGHT: 66 IN | BODY MASS INDEX: 31.5 KG/M2 | DIASTOLIC BLOOD PRESSURE: 70 MMHG

## 2022-05-12 DIAGNOSIS — F41.8 DEPRESSION WITH ANXIETY: ICD-10-CM

## 2022-05-12 DIAGNOSIS — K21.9 GASTROESOPHAGEAL REFLUX DISEASE WITHOUT ESOPHAGITIS: ICD-10-CM

## 2022-05-12 DIAGNOSIS — M51.26 LUMBAR DISC HERNIATION: ICD-10-CM

## 2022-05-12 DIAGNOSIS — R53.82 CHRONIC FATIGUE: Primary | ICD-10-CM

## 2022-05-12 DIAGNOSIS — E03.4 HYPOTHYROIDISM DUE TO ACQUIRED ATROPHY OF THYROID: ICD-10-CM

## 2022-05-12 PROCEDURE — 99214 OFFICE O/P EST MOD 30 MIN: CPT | Performed by: NURSE PRACTITIONER

## 2022-05-12 PROCEDURE — 96372 THER/PROPH/DIAG INJ SC/IM: CPT | Performed by: NURSE PRACTITIONER

## 2022-05-12 RX ORDER — LEVOTHYROXINE AND LIOTHYRONINE 38; 9 UG/1; UG/1
60 TABLET ORAL DAILY
Qty: 30 TABLET | Refills: 5 | Status: SHIPPED | OUTPATIENT
Start: 2022-05-12 | End: 2022-07-15

## 2022-05-12 RX ORDER — CYANOCOBALAMIN 1000 UG/ML
1000 INJECTION, SOLUTION INTRAMUSCULAR; SUBCUTANEOUS
Status: DISCONTINUED | OUTPATIENT
Start: 2022-05-12 | End: 2022-09-29

## 2022-05-12 RX ORDER — LIDOCAINE 50 MG/G
1 OINTMENT TOPICAL
Qty: 100 G | Refills: 5 | Status: SHIPPED | OUTPATIENT
Start: 2022-05-12 | End: 2022-07-12

## 2022-05-12 RX ADMIN — CYANOCOBALAMIN 1000 MCG: 1000 INJECTION, SOLUTION INTRAMUSCULAR; SUBCUTANEOUS at 17:21

## 2022-05-12 NOTE — PROGRESS NOTES
Subjective   Jarad Barragan is a 36 y.o. female.     Chief Complaint   Patient presents with   • Back Pain   • Hip Pain       History of Present Illness     Fatigue-reports she feels so tired and sluggish.  Her Thyroid meds were changed per her request to Emmalena from synthroid.   She has also been off Adipex and had her last B12 weekly injection last month.  She will start monthly B12 injections now.  Back and hip pain-reports that she is having more pain.  She has been trying to get lidoderm or flector patches but they have been unavailable at her pharmacy.  She would be interested in trying a compound cream or a different pharmacy.  She continues to use tramadol sparingly.  She is also using Zanaflex but mostly at night due to drowsiness.  She is on Cymbalta for mental health and she reports that she does feel that helps some with her pain symptoms.  GERD-chronic and ongoing.  Stable with Aciphex use.  Patient does not have any concerns today.  Anxiety and depression-under the care of psych NP.  She is currently taking Klonopin 1 mg as directed and Cymbalta 60 mg.  She is not having any negative side effects.  She continues to have daily anxiety as well as stressors.  She reports with medications they are manageable    The following portions of the patient's history were reviewed and updated as appropriate: CC, ROS, allergies, current medications, past family history, past medical history, past social history, past surgical history and problem list.      Review of Systems   Constitutional: Positive for fatigue. Negative for appetite change and fever.   HENT: Negative for congestion, ear pain, nosebleeds, postnasal drip, rhinorrhea, sore throat, tinnitus, trouble swallowing and voice change.    Eyes: Negative for blurred vision, photophobia and visual disturbance.   Respiratory: Negative for cough, chest tightness, shortness of breath and wheezing.    Cardiovascular: Negative for chest pain and palpitations.  "  Gastrointestinal: Negative for abdominal pain, blood in stool, constipation, diarrhea, nausea and GERD.   Endocrine: Negative for cold intolerance, heat intolerance and polydipsia.        Increase dryness of skin   Genitourinary: Negative for decreased urine volume, difficulty urinating, dysuria and hematuria.   Musculoskeletal: Positive for arthralgias, back pain and myalgias. Negative for gait problem.   Skin: Negative for color change, pallor and wound.   Allergic/Immunologic: Negative.    Neurological: Negative for dizziness, syncope, numbness and headache.   Hematological: Negative.    Psychiatric/Behavioral: Positive for stress. Negative for decreased concentration, sleep disturbance, suicidal ideas and depressed mood. The patient is nervous/anxious.    All other systems reviewed and are negative.      Objective     /70 (BP Location: Left arm, Patient Position: Sitting, Cuff Size: Adult)   Pulse 99   Temp 97.1 °F (36.2 °C) (Temporal)   Ht 167.6 cm (65.98\")   Wt 88.9 kg (196 lb)   SpO2 98%   BMI 31.65 kg/m²     Physical Exam  Vitals reviewed.   Constitutional:       General: She is not in acute distress.     Appearance: She is well-developed. She is obese. She is not diaphoretic.   HENT:      Head: Normocephalic and atraumatic.      Jaw: No tenderness.      Comments: Oropharynx not examined.  Patient is presently wearing a face covering/mask due to COVID-19 pandemic.     Right Ear: Hearing, tympanic membrane, ear canal and external ear normal.      Left Ear: Hearing, tympanic membrane, ear canal and external ear normal.   Eyes:      General: Lids are normal. No scleral icterus.     Extraocular Movements:      Right eye: Normal extraocular motion and no nystagmus.      Left eye: Normal extraocular motion and no nystagmus.      Conjunctiva/sclera: Conjunctivae normal.      Pupils: Pupils are equal, round, and reactive to light.   Neck:      Thyroid: No thyromegaly or thyroid tenderness.      " Vascular: No carotid bruit or JVD.      Trachea: No tracheal tenderness.   Cardiovascular:      Rate and Rhythm: Normal rate and regular rhythm.      Pulses:           Dorsalis pedis pulses are 2+ on the right side and 2+ on the left side.        Posterior tibial pulses are 2+ on the right side and 2+ on the left side.      Heart sounds: Normal heart sounds, S1 normal and S2 normal. No murmur heard.  Pulmonary:      Effort: Pulmonary effort is normal. No accessory muscle usage, prolonged expiration or respiratory distress.      Breath sounds: Normal breath sounds.   Chest:      Chest wall: No tenderness.   Abdominal:      General: Bowel sounds are normal. There is no distension.      Palpations: Abdomen is soft. There is no mass.      Tenderness: There is no abdominal tenderness.   Musculoskeletal:         General: Tenderness present.      Cervical back: Normal range of motion and neck supple.      Lumbar back: Tenderness present. Decreased range of motion.      Right hip: Tenderness present. Decreased range of motion.      Left hip: Tenderness present. Decreased range of motion.      Right lower leg: No edema.      Left lower leg: No edema.      Comments: No muscular atrophy or flaccidity.   Lymphadenopathy:      Head:      Right side of head: No submental or submandibular adenopathy.      Left side of head: No submental or submandibular adenopathy.      Cervical: No cervical adenopathy.      Right cervical: No superficial cervical adenopathy.     Left cervical: No superficial cervical adenopathy.   Skin:     General: Skin is warm and dry.      Capillary Refill: Capillary refill takes less than 2 seconds.      Coloration: Skin is not jaundiced or pale.      Findings: No erythema.      Nails: There is no clubbing.   Neurological:      Mental Status: She is alert and oriented to person, place, and time.      Cranial Nerves: No cranial nerve deficit or facial asymmetry.      Sensory: No sensory deficit.      Motor: No  weakness, tremor, atrophy or abnormal muscle tone.      Coordination: Coordination normal.      Deep Tendon Reflexes: Reflexes are normal and symmetric.   Psychiatric:         Attention and Perception: She is attentive.         Mood and Affect: Mood normal.         Speech: Speech normal.         Behavior: Behavior normal. Behavior is cooperative.         Thought Content: Thought content normal.         Judgment: Judgment normal.           Diagnoses and all orders for this visit:    1. Chronic fatigue (Primary)  -     cyanocobalamin injection 1,000 mcg    2. Depression with anxiety  Assessment & Plan:  Continue Cymbalta and Klonopin as directed.  Continue under the care of mental health provider      3. Gastroesophageal reflux disease without esophagitis  Assessment & Plan:  Continue Aciphex as directed.  Advised to avoid known GI triggers such as spicy foods.  Upright 30 minutes after meals and avoid eating large meals.  Several small meals daily as able to avoid overfilling stomach.      4. Hypothyroidism due to acquired atrophy of thyroid  -     Thyroid (ARMOUR THYROID) 60 MG PO tablet; Take 1 tablet by mouth Daily.  Dispense: 30 tablet; Refill: 5    5. Lumbar disc herniation  -     lidocaine (XYLOCAINE) 5 % ointment; Apply 1 application topically to the appropriate area as directed Every 2 (Two) Hours As Needed for Mild Pain .  Dispense: 100 g; Refill: 5       BMI is >= 30 and <= 34.9 (Class 1 obesity). The following options were offered after discussion: nutrition counseling/recommendations       Understands disease processes and need for medications.  Understands reasons for urgent and emergent care.  Patient (& family) verbalized agreement for treatment plan.   Emotional support and active listening provided.  Patient provided time to verbalize feelings.    RTC approximately 6 weeks, sooner if needed for problems or concerns.  We will plan for updated labs at that time for thyroid evaluation as well as routine  labs.           This document has been electronically signed by:  LANA Quinn, FNP-C    Dragon disclaimer:  Part of this note may be an electronic transcription/translation of spoken language to printed text using the Dragon Dictation System.

## 2022-05-12 NOTE — PROGRESS NOTES
Injection  Injection performed in left deltoid by Gina Vasquez MA. Patient tolerated the procedure well without complications.  05/12/22   Gina Vasquez MA

## 2022-05-23 PROBLEM — E66.811 CLASS 1 OBESITY DUE TO EXCESS CALORIES WITHOUT SERIOUS COMORBIDITY WITH BODY MASS INDEX (BMI) OF 31.0 TO 31.9 IN ADULT: Status: ACTIVE | Noted: 2019-03-06

## 2022-05-23 NOTE — ASSESSMENT & PLAN NOTE
Continue Aciphex as directed.  Advised to avoid known GI triggers such as spicy foods.  Upright 30 minutes after meals and avoid eating large meals.  Several small meals daily as able to avoid overfilling stomach.

## 2022-05-23 NOTE — ASSESSMENT & PLAN NOTE
Continue to work on dietary changes and be active as physically able.  We will hold Adipex at this time.

## 2022-05-27 DIAGNOSIS — M51.26 LUMBAR DISC HERNIATION: ICD-10-CM

## 2022-05-27 RX ORDER — DICLOFENAC EPOLAMINE 0.01 G/1
1 SYSTEM TOPICAL 2 TIMES DAILY
Qty: 60 PATCH | Refills: 2 | Status: SHIPPED | OUTPATIENT
Start: 2022-05-27 | End: 2022-09-29

## 2022-06-02 ENCOUNTER — OFFICE VISIT (OUTPATIENT)
Dept: PSYCHIATRY | Facility: CLINIC | Age: 37
End: 2022-06-02

## 2022-06-02 VITALS
WEIGHT: 202 LBS | HEART RATE: 95 BPM | SYSTOLIC BLOOD PRESSURE: 124 MMHG | BODY MASS INDEX: 32.62 KG/M2 | DIASTOLIC BLOOD PRESSURE: 60 MMHG

## 2022-06-02 DIAGNOSIS — F41.1 GENERALIZED ANXIETY DISORDER: ICD-10-CM

## 2022-06-02 DIAGNOSIS — Z79.899 HIGH RISK MEDICATION USE: Primary | ICD-10-CM

## 2022-06-02 DIAGNOSIS — F33.0 MILD EPISODE OF RECURRENT MAJOR DEPRESSIVE DISORDER: ICD-10-CM

## 2022-06-02 DIAGNOSIS — F41.0 PANIC DISORDER WITHOUT AGORAPHOBIA: ICD-10-CM

## 2022-06-02 PROCEDURE — 99213 OFFICE O/P EST LOW 20 MIN: CPT | Performed by: NURSE PRACTITIONER

## 2022-06-02 RX ORDER — DULOXETIN HYDROCHLORIDE 60 MG/1
60 CAPSULE, DELAYED RELEASE ORAL DAILY
Qty: 30 CAPSULE | Refills: 1 | Status: SHIPPED | OUTPATIENT
Start: 2022-06-02 | End: 2022-07-07 | Stop reason: SDUPTHER

## 2022-06-02 RX ORDER — CLONAZEPAM 1 MG/1
1 TABLET ORAL 2 TIMES DAILY PRN
Qty: 60 TABLET | Refills: 0 | Status: SHIPPED | OUTPATIENT
Start: 2022-06-02 | End: 2022-07-07 | Stop reason: SDUPTHER

## 2022-06-02 NOTE — PROGRESS NOTES
Subjective   Jarad Barragan is a 36 y.o. female is here today for medication management follow-up.    Chief Complaint:  Anxiety depression     History of Present Illness:    Patient presents today for follow up for medication management for depression and anxiety. Patient reports doing about the same but still having some stress. Patient states stress with mom's dog now due to him being sick. Patient states she started adipex yesterday and only taking a half. Patient states right now taking it everyday. Patient states she increased the dose of the thyroid medicine. Patient states not really having any depression. Patient reports currently depression is at a 0 on a 0-10 scale with 10 being the worst. Patient reports anxiety is at a 7-8 on a 0-10 scale with 10 being the worst. Patient reports having some panic attacks but not like used to be. Patient states having more mini nerve attacks during the day. Patient states only lasting 5-10 mins and during them her heart is racing. Patient states sleeping good. Patient reports sleeping 5 hours a night and patient denies any nightmares. Patient reports appetite is good and eating at least three meals a day. Patient denies any auditory or visual hallucinations. Patient adamantly denies any SI or HI. Patient denies any side effects to medications. Patient denies any other medical changes since last visit.   The following portions of the patient's history were reviewed and updated as appropriate: allergies, current medications, past family history, past medical history, past social history, past surgical history and problem list.    Review of Systems   Constitutional: Negative.    Respiratory: Negative.    Cardiovascular: Negative.    Gastrointestinal: Negative.    Neurological: Negative.    Psychiatric/Behavioral: The patient is nervous/anxious.        Objective   Physical Exam  Vitals reviewed.   Constitutional:       Appearance: Normal appearance. She is well-developed  and well-groomed.   Neurological:      Mental Status: She is alert.   Psychiatric:         Attention and Perception: Attention and perception normal.         Mood and Affect: Affect normal. Mood is anxious.         Speech: Speech normal.         Behavior: Behavior normal. Behavior is cooperative.         Thought Content: Thought content normal.         Cognition and Memory: Cognition and memory normal.         Judgment: Judgment normal.       Blood pressure 124/60, pulse 95, weight 91.6 kg (202 lb), not currently breastfeeding. Body mass index is 32.62 kg/m².    Allergies   Allergen Reactions   • Tape Unknown (See Comments)     Blisters on skin   • Codeine Rash   • Hydrocodone Itching       Medication List:   Current Outpatient Medications   Medication Sig Dispense Refill   • clonazePAM (KlonoPIN) 1 MG tablet Take 1 tablet by mouth 2 (Two) Times a Day As Needed for Anxiety. 60 tablet 0   • DULoxetine (Cymbalta) 60 MG capsule Take 1 capsule by mouth Daily. 30 capsule 1   • Diclofenac Epolamine (FLECTOR) 1.3 % patch patch Apply 1 patch topically to the appropriate area as directed 2 (Two) Times a Day. 60 patch 2   • lidocaine (Lidoderm) 5 % Place 1 patch on the skin as directed by provider Daily. Remove & Discard patch within 12 hours or as directed by MD 30 each 5   • lidocaine (XYLOCAINE) 5 % ointment Apply 1 application topically to the appropriate area as directed Every 2 (Two) Hours As Needed for Mild Pain . 100 g 5   • phentermine (ADIPEX-P) 37.5 MG tablet Take 1 tablet by mouth Every Morning Before Breakfast. Patient takes sometimes, not every day. 30 tablet 0   • promethazine (PHENERGAN) 25 MG tablet Take 1 tablet by mouth Every 6 (Six) Hours As Needed for Nausea or Vomiting. 60 tablet 1   • RABEprazole (Aciphex) 20 MG EC tablet Take 1 tablet by mouth Daily. 30 tablet 5   • sulfamethoxazole-trimethoprim (BACTRIM DS,SEPTRA DS) 800-160 MG per tablet Take 1 tablet by mouth Every 12 (Twelve) Hours for 10 days. 20  tablet 0   • Thyroid (ARMOUR THYROID) 60 MG PO tablet Take 1 tablet by mouth Daily. 30 tablet 5   • tiZANidine (ZANAFLEX) 4 MG tablet Take 1 tablet by mouth 3 (Three) Times a Day As Needed for Muscle Spasms. 90 tablet 5   • traMADol (ULTRAM) 50 MG tablet TAKE ONE TABLET BY MOUTH TWICE DAILY AS NEEDED FOR MODERATE PAIN. 60 tablet 1   • triamcinolone (KENALOG) 0.1 % ointment Apply 1 application topically to the appropriate area as directed 2 (Two) Times a Day. 30 g 1   • vitamin D3 (Vitamin D) 125 MCG (5000 UT) capsule capsule Take 1 capsule by mouth Daily. 30 capsule 11     Current Facility-Administered Medications   Medication Dose Route Frequency Provider Last Rate Last Admin   • cyanocobalamin injection 1,000 mcg  1,000 mcg Intramuscular Q30 Days Christine Joaquin APRN   1,000 mcg at 05/12/22 1721       Mental Status Exam:   Hygiene:   good  Cooperation:  Cooperative  Eye Contact:  Good  Psychomotor Behavior:  Appropriate  Affect:  Appropriate  Hopelessness: Denies  Speech:  Normal  Thought Process:  Goal directed and Linear  Thought Content:  Normal  Suicidal:  None  Homicidal:  None  Hallucinations:  None  Delusion:  None  Memory:  Intact  Orientation:  Person, Place, Time and Situation  Reliability:  fair  Insight:  Fair  Judgement:  Fair  Impulse Control:  Fair  Physical/Medical Issues:  No               Assessment & Plan   Diagnoses and all orders for this visit:    1. High risk medication use (Primary)  -     Urine Drug Screen - Urine, Clean Catch; Future    2. Panic disorder without agoraphobia  -     clonazePAM (KlonoPIN) 1 MG tablet; Take 1 tablet by mouth 2 (Two) Times a Day As Needed for Anxiety.  Dispense: 60 tablet; Refill: 0  -     DULoxetine (Cymbalta) 60 MG capsule; Take 1 capsule by mouth Daily.  Dispense: 30 capsule; Refill: 1    3. Generalized anxiety disorder  -     clonazePAM (KlonoPIN) 1 MG tablet; Take 1 tablet by mouth 2 (Two) Times a Day As Needed for Anxiety.  Dispense: 60 tablet; Refill:  0  -     DULoxetine (Cymbalta) 60 MG capsule; Take 1 capsule by mouth Daily.  Dispense: 30 capsule; Refill: 1    4. Mild episode of recurrent major depressive disorder (HCC)  -     DULoxetine (Cymbalta) 60 MG capsule; Take 1 capsule by mouth Daily.  Dispense: 30 capsule; Refill: 1            Discussed medication options with patient. Cont. Cymbalta 60mg daily for depression, anxiety, and panic. Cont. Clonazepam 1mg twice daily as needed for anxiety. Reviewed the risks, benefits, and side effects of the medications; patient acknowledged and verbally consented. Patient is being prescribed a controlled substance as part of treatment plan. Patient has been educated of appropriate use of the medications, including risk of somnolence, limited ability to drive and/or work safely, and potential for dependence, respiratory depression and overdose. Patient is also informed that the medication are to be used by the patient only- avoid any combined use of ETOH or other substances unless prescribed.   UDS ordered.   PURVI Patient Controlled Substance Report (from 6/2/2021 to 6/2/2022)    Dispensed  Strength Quantity Days Supply Provider Pharmacy   05/19/2022 Tramadol Hcl 50MG/50MG/50MG/50MG/50MG/ 60 each 30 SHEILA, VIPUL [facility] [place]   05/03/2022 Clonazepam 1MG 60 each 30 CLOUD, PATRICIA [facility] [place]   04/13/2022 Tramadol Hcl 50MG/50MG/50MG/50MG/50MG/ 60 each 30 SHEILA, VIPUL [facility] [place]   04/04/2022 Clonazepam 1MG 60 each 30 CLOUD, PATRICIAHER [facility] [place]   03/26/2022 Phentermine Hcl 37.5MG 30 each 30 SHEILA, VIPUL [facility] [place]   03/09/2022 Tramadol Hcl 50MG/50MG/50MG/50MG/50MG/ 60 each 30 SHEILA, VIPUL [facility] [place]   03/07/2022 Clonazepam 1MG 60 each 30 CLOUD, PATRICIA [facility] [place]   02/08/2022 Tramadol Hcl 50MG/50MG/50MG/50MG/50MG/ 60 each 30 SHEILA, VIPUL [facility] [place]   02/04/2022 Clonazepam 1MG 60 each 30 CLOUD, PATRICIA [facility] [place]   01/24/2022 Phentermine Hcl 37.5MG 30 each 30 SHEILA,  VIPUL [facility] [place]   01/06/2022 Tramadol Hcl 50MG/50MG/50MG/50MG/50MG/ 60 each 30 SHEILA, VIPUL [facility] [place]   12/31/2021 Clonazepam 1MG 60 each 30 CLOUD, PATRICIAHER [facility] [place]   12/23/2021 Phentermine Hcl 37.5MG 30 each 30 SHEILA, VIPUL [facility] [place]   12/09/2021 Clonazepam 1MG 45 each 23 CLOUD, PATRICIA [facility] [place]   12/09/2021 Phentermine Hcl 37.5MG 30 each 30 SHEILA, VIPUL [facility] [place]   12/09/2021 Tramadol Hcl 50MG/50MG/50MG/50MG/50MG/ 60 each [facility]   11/08/2021 [facility] 45 [facility]   10/08[facility]2021 [facility]   10/06/2021 Tramadol Hcl 50MG/50MG/50MG/50MG/50MG/ 60 each 30 SHEILA, VIPUL [facility] [place]   09/07/2021 Clonazepam 0.5MG 60 each 30 CLOUD, PATRICIA [facility] [place]   08/11/2021 Clonazepam 0.5MG 30 each [facility]   08/05/2021 Phentermine Hcl 37.5MG 30 each 30 SHEILA, VIPUL [facility] [place]   08/05/2021 Tramadol Hcl 50MG/50MG/50MG/50MG/50MG/ 60 each 30 SHEILA, VIPUL [facility] [place]   07/12/2021 Phentermine Hcl 37.5MG 30 each 30 SHEILA, VIPUL [facility] [place]   07/10/2021 Clonazepam 0.5MG 30 each 30 [facility], [facility]   06/09/2021 Clonaz[facility]am 0.5MG 30 each [facility]   06/03/2021 Phentermine Hcl 37.5MG 30 each 30 VIPUL [facility] [place]           Patient is agreeable to call the office with any questions, concerns, or worsening of symptoms. Patient is aware to call 911 or go to the nearest ER should begin having SI/HI.          Follow up in four weeks      Errors in dictation may reflect use of voice recognition software and not all errors in transcription may have been detected prior to signing.              This document has been electronically signed by LANA Siu   June 13, 2022 11:18 EDT

## 2022-06-07 DIAGNOSIS — M51.26 LUMBAR DISC HERNIATION: ICD-10-CM

## 2022-06-07 RX ORDER — TRAMADOL HYDROCHLORIDE 50 MG/1
TABLET ORAL
Qty: 60 TABLET | Refills: 1 | Status: SHIPPED | OUTPATIENT
Start: 2022-06-07 | End: 2022-06-13

## 2022-06-08 RX ORDER — SULFAMETHOXAZOLE AND TRIMETHOPRIM 800; 160 MG/1; MG/1
1 TABLET ORAL EVERY 12 HOURS
Qty: 20 TABLET | Refills: 0 | Status: SHIPPED | OUTPATIENT
Start: 2022-06-08 | End: 2022-06-18

## 2022-06-13 DIAGNOSIS — M51.26 LUMBAR DISC HERNIATION: ICD-10-CM

## 2022-06-13 RX ORDER — TRAMADOL HYDROCHLORIDE 50 MG/1
TABLET ORAL
Qty: 60 TABLET | Refills: 1 | Status: SHIPPED | OUTPATIENT
Start: 2022-06-13 | End: 2022-07-12 | Stop reason: SDUPTHER

## 2022-06-14 ENCOUNTER — CLINICAL SUPPORT (OUTPATIENT)
Dept: FAMILY MEDICINE CLINIC | Facility: CLINIC | Age: 37
End: 2022-06-14

## 2022-06-14 DIAGNOSIS — E53.8 B12 DEFICIENCY: ICD-10-CM

## 2022-06-14 PROCEDURE — 96372 THER/PROPH/DIAG INJ SC/IM: CPT | Performed by: NURSE PRACTITIONER

## 2022-06-14 RX ADMIN — CYANOCOBALAMIN 1000 MCG: 1000 INJECTION, SOLUTION INTRAMUSCULAR; SUBCUTANEOUS at 12:18

## 2022-07-07 ENCOUNTER — TELEMEDICINE (OUTPATIENT)
Dept: PSYCHIATRY | Facility: CLINIC | Age: 37
End: 2022-07-07

## 2022-07-07 DIAGNOSIS — F41.0 PANIC DISORDER WITHOUT AGORAPHOBIA: ICD-10-CM

## 2022-07-07 DIAGNOSIS — F33.0 MILD EPISODE OF RECURRENT MAJOR DEPRESSIVE DISORDER: ICD-10-CM

## 2022-07-07 DIAGNOSIS — F41.1 GENERALIZED ANXIETY DISORDER: ICD-10-CM

## 2022-07-07 PROCEDURE — 99213 OFFICE O/P EST LOW 20 MIN: CPT | Performed by: NURSE PRACTITIONER

## 2022-07-07 RX ORDER — CLONAZEPAM 1 MG/1
1 TABLET ORAL 2 TIMES DAILY PRN
Qty: 60 TABLET | Refills: 0 | Status: SHIPPED | OUTPATIENT
Start: 2022-07-07 | End: 2022-08-15 | Stop reason: SDUPTHER

## 2022-07-07 RX ORDER — DULOXETIN HYDROCHLORIDE 60 MG/1
60 CAPSULE, DELAYED RELEASE ORAL DAILY
Qty: 30 CAPSULE | Refills: 1 | Status: SHIPPED | OUTPATIENT
Start: 2022-07-07 | End: 2022-08-15 | Stop reason: SDUPTHER

## 2022-07-07 NOTE — PROGRESS NOTES
Subjective   Jarad Barragan is a 36 y.o. female is here today for medication management follow-up.    This provider is located at [facility] at [address] [place] [address]. The Patient is seen remotely at home, using [employer] karina. Patient is being seen via telehealth and stated they are in a secure environment for this session. The patient's condition being diagnosed/treated is appropriate for telemedicine. The provider identified him/herself as well as his or her credentials. The patient and/or patients guardian consent to be seen remotely, and when consent is given they understand that the consent allows for patient identifiable information to be sent to a third party as needed. They may refuse to be seen remotely at any time. The electronic data is encrypted and password protected, and the patient has been advised of the potential risks to privacy not withstanding such measures.      Chief Complaint:  Anxiety depression     History of Present Illness:  Patient presents today for follow up for medication management for depression and anxiety. Patient states helping with mom right now after surgery. Patient states her mom is recovering ok but the medicine they gave her has been too much. Patient states she is helping taking her medicine. Patient states the nurse comes out once a week to help along with physical therapy. Patient states depression has been doing alright. Patient states feel like anxiety is higher due to situation right now. Patient reports anxiety is at a 8-9 on a 0-10 scale with 10 being the worst. Patient reports having 3-4 panic attacks due to situation. Patient states having to wake up throughout the night to help with mom. Patient reports sleeping 4-5 hours a night and patient denies any nightmares. Patient reports appetite is good and eating 2-3 meals a day. Patient states when really nervous can't eat much. Patient denies any auditory or visual hallucinations. Patient  adamantly denies any SI or HI. Patient denies any side effects to medications. Patient denies any medical changes since last visit.   The following portions of the patient's history were reviewed and updated as appropriate: allergies, current medications, past family history, past medical history, past social history, past surgical history and problem list.    Review of Systems   Constitutional: Negative.    Respiratory: Negative.    Cardiovascular: Negative.    Gastrointestinal: Negative.    Neurological: Negative.    Psychiatric/Behavioral: Positive for dysphoric mood and sleep disturbance. The patient is nervous/anxious.        Objective   Physical Exam  Constitutional:       Appearance: Normal appearance. She is well-developed and well-groomed.   Neurological:      Mental Status: She is alert.   Psychiatric:         Attention and Perception: Attention and perception normal.         Mood and Affect: Affect normal. Mood is anxious.         Speech: Speech normal.         Behavior: Behavior normal. Behavior is cooperative.         Thought Content: Thought content normal.         Cognition and Memory: Cognition and memory normal.         Judgment: Judgment normal.       not currently breastfeeding. There is no height or weight on file to calculate BMI.  Unable to obtain vitals due to video visit.     Allergies   Allergen Reactions   • Tape Unknown (See Comments)     Blisters on skin   • Codeine Rash   • Hydrocodone Itching       Medication List:   Current Outpatient Medications   Medication Sig Dispense Refill   • clonazePAM (KlonoPIN) 1 MG tablet Take 1 tablet by mouth 2 (Two) Times a Day As Needed for Anxiety. 60 tablet 0   • DULoxetine (Cymbalta) 60 MG capsule Take 1 capsule by mouth Daily. 30 capsule 1   • Diclofenac Epolamine (FLECTOR) 1.3 % patch patch Apply 1 patch topically to the appropriate area as directed 2 (Two) Times a Day. 60 patch 2   • phentermine (ADIPEX-P) 37.5 MG tablet Take 1 tablet by mouth  Every Morning Before Breakfast. Patient takes sometimes, not every day. 30 tablet 0   • promethazine (PHENERGAN) 25 MG tablet Take 1 tablet by mouth Every 6 (Six) Hours As Needed for Nausea or Vomiting. 60 tablet 1   • RABEprazole (Aciphex) 20 MG EC tablet Take 1 tablet by mouth Daily. 30 tablet 5   • Thyroid (ARMOUR THYROID) 90 MG PO tablet Take 1 tablet by mouth Daily. 30 tablet 5   • tiZANidine (ZANAFLEX) 4 MG tablet Take 1 tablet by mouth 3 (Three) Times a Day As Needed for Muscle Spasms. 90 tablet 5   • traMADol (ULTRAM) 50 MG tablet Take 1 tablet by mouth 2 (Two) Times a Day As Needed for Moderate Pain . 60 tablet 1   • tretinoin (RETIN-A) 0.1 % cream Apply 1 application topically to the appropriate area as directed Every Night. 45 g 5   • triamcinolone (KENALOG) 0.1 % ointment Apply 1 application topically to the appropriate area as directed 2 (Two) Times a Day. 30 g 1   • vitamin D3 (Vitamin D) 125 MCG (5000 UT) capsule capsule Take 1 capsule by mouth Daily. 30 capsule 11     Current Facility-Administered Medications   Medication Dose Route Frequency Provider Last Rate Last Admin   • cyanocobalamin injection 1,000 mcg  1,000 mcg Intramuscular Q30 Days Christine Joaquin APRN   1,000 mcg at 06/14/22 1218       Mental Status Exam:   Hygiene:   good  Cooperation:  Cooperative  Eye Contact:  Good  Psychomotor Behavior:  Appropriate  Affect:  Appropriate  Hopelessness: Denies  Speech:  Normal  Thought Process:  Goal directed and Linear  Thought Content:  Normal  Suicidal:  None  Homicidal:  None  Hallucinations:  None  Delusion:  None  Memory:  Intact  Orientation:  Person, Place, Time and Situation  Reliability:  fair  Insight:  Fair  Judgement:  Fair  Impulse Control:  Fair  Physical/Medical Issues:  No               Assessment & Plan   Diagnoses and all orders for this visit:    1. Panic disorder without agoraphobia  -     clonazePAM (KlonoPIN) 1 MG tablet; Take 1 tablet by mouth 2 (Two) Times a Day As Needed  for Anxiety.  Dispense: 60 tablet; Refill: 0  -     DULoxetine (Cymbalta) 60 MG capsule; Take 1 capsule by mouth Daily.  Dispense: 30 capsule; Refill: 1    2. Generalized anxiety disorder  -     clonazePAM (KlonoPIN) 1 MG tablet; Take 1 tablet by mouth 2 (Two) Times a Day As Needed for Anxiety.  Dispense: 60 tablet; Refill: 0  -     DULoxetine (Cymbalta) 60 MG capsule; Take 1 capsule by mouth Daily.  Dispense: 30 capsule; Refill: 1    3. Mild episode of recurrent major depressive disorder (HCC)  -     DULoxetine (Cymbalta) 60 MG capsule; Take 1 capsule by mouth Daily.  Dispense: 30 capsule; Refill: 1            Discussed medication options with patient. Cont. Cymbalta 60mg daily for depression, anxiety, and panic disorder. Cont. Clonazepam 1mg twice daily as needed for anxiety. Reviewed the risks, benefits, and side effects of the medications; patient acknowledged and verbally consented. Patient is being prescribed a controlled substance as part of treatment plan. Patient has been educated of appropriate use of the medications, including risk of somnolence, limited ability to drive and/or work safely, and potential for dependence, respiratory depression and overdose. Patient is also informed that the medication are to be used by the patient only- avoid any combined use of ETOH or other substances unless prescribed.   Will obtain UDS at next in office visit.   PURVI Patient Controlled Substance Report (from 7/7/2021 to 7/7/2022)    Dispensed  Strength Quantity Days Supply Provider Pharmacy   06/15/2022 Tramadol Hcl 50MG/50MG/50MG/50MG/50MG/ 60 each 30 [facility]   06/02/2022 Clonazepam 1MG 60 each 30 [facility]OUD, PATR[facility]   05/19/2022 Tramadol Hcl 50MG/50MG/50MG/50MG/50MG/ 60 each 30 [facility]   05/03/2022 Clonazepam 1MG 60 each 30 [facility]D, [facility]   04/13/2022 Tramadol Hcl 50MG/50MG/50MG/50MG/50MG/ 60 each 30 [facility]    04/04/2022 Clonazepam 1MG 60 each 30 [facility]   03/26/2022 Phentermine Hcl 37.5MG 30 each 30 [facility]ILA, [facility]   03/09/2022 Tramadol Hcl 50MG/50MG/50MG/50MG/50MG/ 60 each 30 [facility], [facility]   03/07/2022 Clonazepam 1MG 60 each 30 [facility], [facility]   02/08/2022 Tramadol Hcl 50MG/50MG/50MG/50MG/50MG/ 60 each 30 [facility], [facility]   02/04/2022 Clonazepam 1MG 60 each 30 [facility], [facility]   01/24/2022 Phentermine Hcl 37.5MG 30 each 30 [facility], [facility]   01/06/2022 Tramadol Hcl 50MG/50MG/50MG/50MG/50MG/ 60 each 30 [facility]A, [facility]   12/31/2021 Clonazepam 1MG 60 each 30 [facility]   12/23/2021 Phentermine Hcl 37.5MG 30 each 30 [facility]A, [facility]   12/09/2021 Clonazepam 1MG 45 each 23 [facility], [facility]   12/09/2021 Phentermine Hcl 37.5MG 30 each 30 [facility]A, [facility]   12/09/2021 Tramadol Hcl 50MG/50MG/50MG/50MG/50MG/ 60 each 30 [facility]ILA, [facility]   11/08/2021 Clonazepam 1MG 45 each 30 [facility]D, [facility]   10/08/2021 Clonazepam 1MG 45 each 30 CLOUD, [facility]   10/06/2021 Tramadol Hcl 50MG/50MG/50MG/50MG/50MG/ 60 each 30 SHEILA, [facility]   09/07/2021 Clonazepam 0.5MG 60 each 30 CLOUD, PATRICIA [facility]   08/11/2021 Clonazepam 0.5MG 30 each 30 CLOUD, [facility]   08/05/2021 Phentermine Hcl 37.5MG 30 each 30 [facility]ILA, [facility]   08/05/2021 Tramadol Hcl 50MG/50MG/50MG/50MG/50MG/ 60 each 30 [facility], [facility]   07/12/2021 Phentermine Hcl 37.5MG 30 each 30 [facility]A, [facility]   07/10/2021 Clonazepam 0.5MG 30 each 30 CLOUD, [facility]          Patient is agreeable to call the office with any questions, concerns, or worsening of symptoms. Patient is aware to call 911 or go to the nearest ER should begin having SI/HI.           Follow up in four weeks        Errors in dictation may reflect use of voice recognition software and not all errors in transcription may have been detected prior to signing.              This document has been electronically signed by LANA Siu   July 26, 2022 10:13 EDT

## 2022-07-12 ENCOUNTER — OFFICE VISIT (OUTPATIENT)
Dept: FAMILY MEDICINE CLINIC | Facility: CLINIC | Age: 37
End: 2022-07-12

## 2022-07-12 VITALS
RESPIRATION RATE: 22 BRPM | BODY MASS INDEX: 33.27 KG/M2 | OXYGEN SATURATION: 98 % | HEART RATE: 110 BPM | WEIGHT: 207 LBS | HEIGHT: 66 IN | SYSTOLIC BLOOD PRESSURE: 118 MMHG | DIASTOLIC BLOOD PRESSURE: 72 MMHG | TEMPERATURE: 98.2 F

## 2022-07-12 DIAGNOSIS — M51.26 LUMBAR DISC HERNIATION: ICD-10-CM

## 2022-07-12 DIAGNOSIS — F41.8 DEPRESSION WITH ANXIETY: ICD-10-CM

## 2022-07-12 DIAGNOSIS — E03.4 HYPOTHYROIDISM DUE TO ACQUIRED ATROPHY OF THYROID: Primary | ICD-10-CM

## 2022-07-12 DIAGNOSIS — E66.09 CLASS 2 OBESITY DUE TO EXCESS CALORIES WITHOUT SERIOUS COMORBIDITY WITH BODY MASS INDEX (BMI) OF 37.0 TO 37.9 IN ADULT: ICD-10-CM

## 2022-07-12 DIAGNOSIS — E53.8 B12 DEFICIENCY: ICD-10-CM

## 2022-07-12 DIAGNOSIS — E55.9 VITAMIN D DEFICIENCY: ICD-10-CM

## 2022-07-12 DIAGNOSIS — Z13.220 SCREENING CHOLESTEROL LEVEL: ICD-10-CM

## 2022-07-12 DIAGNOSIS — Z13.1 DIABETES MELLITUS SCREENING: ICD-10-CM

## 2022-07-12 DIAGNOSIS — R53.82 CHRONIC FATIGUE: ICD-10-CM

## 2022-07-12 PROCEDURE — 99214 OFFICE O/P EST MOD 30 MIN: CPT | Performed by: NURSE PRACTITIONER

## 2022-07-12 PROCEDURE — 96372 THER/PROPH/DIAG INJ SC/IM: CPT | Performed by: NURSE PRACTITIONER

## 2022-07-12 RX ORDER — TRETINOIN 1 MG/G
1 CREAM TOPICAL NIGHTLY
Qty: 45 G | Refills: 5 | Status: SHIPPED | OUTPATIENT
Start: 2022-07-12 | End: 2022-07-13 | Stop reason: SDUPTHER

## 2022-07-12 RX ORDER — PHENTERMINE HYDROCHLORIDE 37.5 MG/1
37.5 TABLET ORAL
Qty: 30 TABLET | Refills: 0 | Status: SHIPPED | OUTPATIENT
Start: 2022-07-12 | End: 2022-09-14 | Stop reason: SDUPTHER

## 2022-07-12 RX ORDER — TRAMADOL HYDROCHLORIDE 50 MG/1
50 TABLET ORAL 2 TIMES DAILY PRN
Qty: 60 TABLET | Refills: 1 | Status: SHIPPED | OUTPATIENT
Start: 2022-07-12 | End: 2022-09-14 | Stop reason: SDUPTHER

## 2022-07-12 RX ORDER — CYANOCOBALAMIN 1000 UG/ML
1000 INJECTION, SOLUTION INTRAMUSCULAR; SUBCUTANEOUS ONCE
Status: COMPLETED | OUTPATIENT
Start: 2022-07-12 | End: 2022-07-12

## 2022-07-12 RX ADMIN — CYANOCOBALAMIN 1000 MCG: 1000 INJECTION, SOLUTION INTRAMUSCULAR; SUBCUTANEOUS at 17:57

## 2022-07-12 NOTE — PROGRESS NOTES
"Subjective   Jarad Barragan is a 36 y.o. female.     Chief Complaint   Patient presents with   • Hypothyroidism       History of Present Illness     Hypothyroidism-chronic and ongoing.  She is presently on [place] Thyroid 60 mg.  She has noted some increase in fatigue.  She would like to have updated labs.  No specific hair changes.  She has noted an increase in skin dryness.  She reports some palpitations.  She reports she feels \"cold\".  No increase in diaphoresis.     Weight loss observation-reports that she would like to resume her diet.    B12 def-has not had a injection in a month.  She would like to obtain today.  She has not had any negative side effects of the injection.    Dark spots-would like to do a retin cream to help clear her face. She has also noted some acne.   Back pain-\"is horrible\".  She reports she has noted an increase in pain.  She reports the more activity she has the more her pain is.   She reports she is noting a sudden \"shock feeling\".   She reports that the shock feeling in her spine and across to her hips.  She is having leg pain is pain.  She denies radicular symptoms in her legs.  She reports she is having difficulty turning over in bed and when she tries to go from sit to stand she gets the shock feeling.  She reports at times it does occur at rest and when she bends forward and raises back up.  Some occasional pain near her shoulders but is intermittent.  She can only tolerate zanaflex at night.      The following portions of the patient's history were reviewed and updated as appropriate: CC, ROS, allergies, current medications, past family history, past medical history, past social history, past surgical history and problem list.      Review of Systems   Constitutional: Positive for fatigue. Negative for activity change, appetite change and fever.   HENT: Negative for congestion, ear pain, nosebleeds, postnasal drip, rhinorrhea, sore throat, tinnitus, trouble swallowing and voice " "change.    Eyes: Negative for blurred vision, photophobia and visual disturbance.   Respiratory: Negative for cough, chest tightness, shortness of breath and wheezing.    Cardiovascular: Negative for chest pain, palpitations and leg swelling.   Gastrointestinal: Negative for abdominal pain, blood in stool, constipation, diarrhea, nausea, vomiting and GERD.   Endocrine: Negative for cold intolerance, heat intolerance and polydipsia.        Skin changes   Genitourinary: Negative for decreased urine volume, difficulty urinating, dysuria and hematuria.   Musculoskeletal: Positive for back pain and myalgias. Negative for arthralgias and gait problem.   Skin: Positive for dry skin. Negative for color change, pallor and wound.   Allergic/Immunologic: Negative.    Neurological: Negative for dizziness, syncope, numbness and headache.   Hematological: Negative.    Psychiatric/Behavioral: Positive for depressed mood and stress. Negative for decreased concentration, self-injury, sleep disturbance and suicidal ideas. The patient is nervous/anxious.    All other systems reviewed and are negative.      Objective     /72   Pulse 110   Temp 98.2 °F (36.8 °C)   Resp 22   Ht 167.6 cm (65.98\")   Wt 93.9 kg (207 lb)   SpO2 98%   BMI 33.43 kg/m²     Physical Exam  Vitals reviewed.   Constitutional:       General: She is not in acute distress.     Appearance: She is well-developed. She is obese. She is not diaphoretic.   HENT:      Head: Normocephalic and atraumatic.      Jaw: No tenderness.      Comments: Oropharynx not examined.  Patient is presently wearing a face covering/mask due to COVID-19 pandemic.     Right Ear: Hearing, tympanic membrane, ear canal and external ear normal.      Left Ear: Hearing, tympanic membrane, ear canal and external ear normal.   Eyes:      General: Lids are normal. No scleral icterus.     Extraocular Movements:      Right eye: Normal extraocular motion and no nystagmus.      Left eye: Normal " extraocular motion and no nystagmus.      Conjunctiva/sclera: Conjunctivae normal.      Pupils: Pupils are equal, round, and reactive to light.   Neck:      Thyroid: No thyromegaly or thyroid tenderness.      Vascular: No carotid bruit or JVD.      Trachea: No tracheal tenderness.   Cardiovascular:      Rate and Rhythm: Normal rate and regular rhythm.      Pulses:           Dorsalis pedis pulses are 2+ on the right side and 2+ on the left side.        Posterior tibial pulses are 2+ on the right side and 2+ on the left side.      Heart sounds: Normal heart sounds, S1 normal and S2 normal. No murmur heard.  Pulmonary:      Effort: Pulmonary effort is normal. No accessory muscle usage, prolonged expiration or respiratory distress.      Breath sounds: Normal breath sounds.   Chest:      Chest wall: No tenderness.   Abdominal:      General: Bowel sounds are normal. There is no distension.      Palpations: Abdomen is soft. There is no mass.      Tenderness: There is no abdominal tenderness.   Musculoskeletal:      Cervical back: Normal range of motion and neck supple. Tenderness present.      Thoracic back: Tenderness present.      Lumbar back: Spasms and tenderness present. Decreased range of motion. Positive right straight leg raise test and positive left straight leg raise test.      Right lower leg: No edema.      Left lower leg: No edema.      Comments: No muscular atrophy or flaccidity.   Lymphadenopathy:      Head:      Right side of head: No submental or submandibular adenopathy.      Left side of head: No submental or submandibular adenopathy.      Cervical: No cervical adenopathy.      Right cervical: No superficial cervical adenopathy.     Left cervical: No superficial cervical adenopathy.   Skin:     General: Skin is warm and dry.      Capillary Refill: Capillary refill takes less than 2 seconds.      Coloration: Skin is not jaundiced or pale.      Findings: No erythema.      Nails: There is no clubbing.    Neurological:      Mental Status: She is alert and oriented to person, place, and time.      Cranial Nerves: No cranial nerve deficit or facial asymmetry.      Sensory: No sensory deficit.      Motor: No weakness, tremor, atrophy or abnormal muscle tone.      Coordination: Coordination normal.      Deep Tendon Reflexes: Reflexes are normal and symmetric.   Psychiatric:         Attention and Perception: She is attentive.         Mood and Affect: Mood is anxious and depressed.         Speech: Speech normal.         Behavior: Behavior normal. Behavior is cooperative.         Thought Content: Thought content normal.         Judgment: Judgment normal.           Diagnoses and all orders for this visit:    1. Hypothyroidism due to acquired atrophy of thyroid (Primary)  Comments:  Continue Gordon Thyroid.  We will plan for updated thyroid labs including antibodies.  Orders:  -     TSH  -     T4, Free  -     Thyroid Antibodies  -     T3, Free    2. Class 2 obesity due to excess calories without serious comorbidity with body mass index (BMI) of 37.0 to 37.9 in adult  Comments:  Continue to work on diet changes.  Be active as physically able.  We will continue Adipex x1 more month  Orders:  -     phentermine (ADIPEX-P) 37.5 MG tablet; Take 1 tablet by mouth Every Morning Before Breakfast. Patient takes sometimes, not every day.  Dispense: 30 tablet; Refill: 0    3. B12 deficiency  Comments:  Injection today  Orders:  -     Vitamin B12  -     cyanocobalamin injection 1,000 mcg    4. Chronic fatigue  Comments:  Suspect multifactorial including her depression and anxiety as well as her hypothyroidism  Orders:  -     CBC & Differential  -     Comprehensive Metabolic Panel    5. Depression with anxiety    6. Lumbar disc herniation  Comments:  Continue tramadol.  Continue to avoid overuse activities.  Frequent position changes as able at work  Orders:  -     traMADol (ULTRAM) 50 MG tablet; Take 1 tablet by mouth 2 (Two) Times a  Day As Needed for Moderate Pain .  Dispense: 60 tablet; Refill: 1    7. Vitamin D deficiency  -     Vitamin D 25 Hydroxy    8. Diabetes mellitus screening  -     Hemoglobin A1c    9. Screening cholesterol level  -     Lipid Panel    Other orders  -     Discontinue: tretinoin (RETIN-A) 0.1 % cream; Apply 1 application topically to the appropriate area as directed Every Night.  Dispense: 45 g; Refill: 5         BMI is >= 30 and <35. (Class 1 Obesity). The following options were offered after discussion;: exercise counseling/recommendations and continue Adipex       Understands disease processes and need for medications.  Understands reasons for urgent and emergent care.  Patient (& family) verbalized agreement for treatment plan.   Emotional support and active listening provided.  Patient provided time to verbalize feelings.    PURVI/PMDP reviewed today and consistent.  Will refill prescribed controlled medication today.  Patient is aware they cannot receive narcotics from any other provider except if under care of pain management or speciality clinic.  Risk and benefits of medication use has been reviewed.  History and physical exam exhibit continued safe and appropriate use of controlled substances.  The patient is aware of the potential for addiction and dependence.  This patient has been made aware of the appropriate use of such medications, including potential risk of somnolence, limited ability to drive and / or work safely, and potential for overdose.    It has also been made clear that these medications are for use by this patient only, without concomitant use of alcohol or other substances unless prescribed/advised by medical provider.  Patient understands they may be subject to UDS and pill counts at random.      Patient considered to be moderate risk for addiction due to use of multiple controlled medications.  Patient understands and accepts these risks.  Patient need for medication will be reassessed at  each visit.  Doses will be adjusted according to patient need and findings.    Goal of TX: Patient will not have any adverse reactions of medication.  Patient will have reduction in there chronic back and joint pain symptoms with use of tramadol as needed as directed.  Patient will be able to remain active in an outside of their home with minimal to no interference from their pain symptoms.  Patient will have reduction in weight with use of Adipex as directed with simultaneous diet and lifestyle changes.    Patient presently taking benzodiazepines and opioids.  Patient understands to stagger doses medication.  Discussed with patient the risk for respiratory depression including respiratory related deaths.  Patient verbalizes understanding and understands not to take medications simultaneously.    Medication Dispense Information    Phentermine Hcl   Dispensed: 7/13/2022 12:00 AM   Written:  7/12/2022   Unit strength: 37.5MG   Days supply: 30   Dispense Note: GivenName=Yaelellen=NOELBirthDate=[id number] [address], [address]   Quantity: 30 each   Pharmacy: [facility]   Authorizing provider: VIPUL SOSA   Received from: [facility] (Fill History)   Brand or Generic:           Medication Dispense Information    Tramadol Hcl   Dispensed: 7/13/2022 12:00 AM   Written:  6/13/2022   Unit strength: 50MG/50MG/50MG/50MG/50MG/   Days supply: 30   Dispense Note: GivenName=Mike=NOELBirthDate=[id number]=0529 [address], [address]   Quantity: 60 each   [facility]   Authorizing provider: VIPUL SOSA   Received from: [facility] (Fill History)   Brand or Generic:         Patient will return to the clinic to have fasting labs done  RTC 1 month, sooner if needed.           This document has been electronically signed by:  LANA Quinn FNP-C Dragon disclaimer:  Part of this note may be an electronic transcription/translation  of spoken language to printed text using the Dragon Dictation System.

## 2022-07-13 RX ORDER — TRETINOIN 1 MG/G
1 CREAM TOPICAL NIGHTLY
Qty: 45 G | Refills: 5 | Status: SHIPPED | OUTPATIENT
Start: 2022-07-13

## 2022-07-14 ENCOUNTER — LAB (OUTPATIENT)
Dept: FAMILY MEDICINE CLINIC | Facility: CLINIC | Age: 37
End: 2022-07-14

## 2022-07-14 LAB
25(OH)D3 SERPL-MCNC: 49.6 NG/ML (ref 30–100)
ALBUMIN SERPL-MCNC: 4 G/DL (ref 3.5–5.2)
ALBUMIN/GLOB SERPL: 1.6 G/DL
ALP SERPL-CCNC: 55 U/L (ref 39–117)
ALT SERPL W P-5'-P-CCNC: 9 U/L (ref 1–33)
ANION GAP SERPL CALCULATED.3IONS-SCNC: 9.8 MMOL/L (ref 5–15)
AST SERPL-CCNC: 12 U/L (ref 1–32)
BASOPHILS # BLD AUTO: 0.05 10*3/MM3 (ref 0–0.2)
BASOPHILS NFR BLD AUTO: 1 % (ref 0–1.5)
BILIRUB SERPL-MCNC: 0.5 MG/DL (ref 0–1.2)
BUN SERPL-MCNC: 12 MG/DL (ref 6–20)
BUN/CREAT SERPL: 14.8 (ref 7–25)
CALCIUM SPEC-SCNC: 9 MG/DL (ref 8.6–10.5)
CHLORIDE SERPL-SCNC: 104 MMOL/L (ref 98–107)
CHOLEST SERPL-MCNC: 150 MG/DL (ref 0–200)
CO2 SERPL-SCNC: 26.2 MMOL/L (ref 22–29)
CREAT SERPL-MCNC: 0.81 MG/DL (ref 0.57–1)
DEPRECATED RDW RBC AUTO: 39.8 FL (ref 37–54)
EGFRCR SERPLBLD CKD-EPI 2021: 95.4 ML/MIN/1.73
EOSINOPHIL # BLD AUTO: 0.12 10*3/MM3 (ref 0–0.4)
EOSINOPHIL NFR BLD AUTO: 2.4 % (ref 0.3–6.2)
ERYTHROCYTE [DISTWIDTH] IN BLOOD BY AUTOMATED COUNT: 12.2 % (ref 12.3–15.4)
GLOBULIN UR ELPH-MCNC: 2.5 GM/DL
GLUCOSE SERPL-MCNC: 89 MG/DL (ref 65–99)
HBA1C MFR BLD: 5 % (ref 4.8–5.6)
HCT VFR BLD AUTO: 37 % (ref 34–46.6)
HDLC SERPL-MCNC: 56 MG/DL (ref 40–60)
HGB BLD-MCNC: 12.3 G/DL (ref 12–15.9)
IMM GRANULOCYTES # BLD AUTO: 0.01 10*3/MM3 (ref 0–0.05)
IMM GRANULOCYTES NFR BLD AUTO: 0.2 % (ref 0–0.5)
LDLC SERPL CALC-MCNC: 84 MG/DL (ref 0–100)
LDLC/HDLC SERPL: 1.52 {RATIO}
LYMPHOCYTES # BLD AUTO: 1.92 10*3/MM3 (ref 0.7–3.1)
LYMPHOCYTES NFR BLD AUTO: 39 % (ref 19.6–45.3)
MCH RBC QN AUTO: 29.9 PG (ref 26.6–33)
MCHC RBC AUTO-ENTMCNC: 33.2 G/DL (ref 31.5–35.7)
MCV RBC AUTO: 89.8 FL (ref 79–97)
MONOCYTES # BLD AUTO: 0.44 10*3/MM3 (ref 0.1–0.9)
MONOCYTES NFR BLD AUTO: 8.9 % (ref 5–12)
NEUTROPHILS NFR BLD AUTO: 2.38 10*3/MM3 (ref 1.7–7)
NEUTROPHILS NFR BLD AUTO: 48.5 % (ref 42.7–76)
NRBC BLD AUTO-RTO: 0 /100 WBC (ref 0–0.2)
PLATELET # BLD AUTO: 290 10*3/MM3 (ref 140–450)
PMV BLD AUTO: 10.4 FL (ref 6–12)
POTASSIUM SERPL-SCNC: 4.2 MMOL/L (ref 3.5–5.2)
PROT SERPL-MCNC: 6.5 G/DL (ref 6–8.5)
RBC # BLD AUTO: 4.12 10*6/MM3 (ref 3.77–5.28)
SODIUM SERPL-SCNC: 140 MMOL/L (ref 136–145)
T3FREE SERPL-MCNC: 3.61 PG/ML (ref 2–4.4)
T4 FREE SERPL-MCNC: 0.82 NG/DL (ref 0.93–1.7)
TRIGL SERPL-MCNC: 45 MG/DL (ref 0–150)
TSH SERPL DL<=0.05 MIU/L-ACNC: 12.9 UIU/ML (ref 0.27–4.2)
VIT B12 BLD-MCNC: 741 PG/ML (ref 211–946)
VLDLC SERPL-MCNC: 10 MG/DL (ref 5–40)
WBC NRBC COR # BLD: 4.92 10*3/MM3 (ref 3.4–10.8)

## 2022-07-14 PROCEDURE — 80061 LIPID PANEL: CPT | Performed by: NURSE PRACTITIONER

## 2022-07-14 PROCEDURE — 84481 FREE ASSAY (FT-3): CPT | Performed by: NURSE PRACTITIONER

## 2022-07-14 PROCEDURE — 86376 MICROSOMAL ANTIBODY EACH: CPT | Performed by: NURSE PRACTITIONER

## 2022-07-14 PROCEDURE — 83036 HEMOGLOBIN GLYCOSYLATED A1C: CPT | Performed by: NURSE PRACTITIONER

## 2022-07-14 PROCEDURE — 82306 VITAMIN D 25 HYDROXY: CPT | Performed by: NURSE PRACTITIONER

## 2022-07-14 PROCEDURE — 80053 COMPREHEN METABOLIC PANEL: CPT | Performed by: NURSE PRACTITIONER

## 2022-07-14 PROCEDURE — 85025 COMPLETE CBC W/AUTO DIFF WBC: CPT | Performed by: NURSE PRACTITIONER

## 2022-07-14 PROCEDURE — 84439 ASSAY OF FREE THYROXINE: CPT | Performed by: NURSE PRACTITIONER

## 2022-07-14 PROCEDURE — 82607 VITAMIN B-12: CPT | Performed by: NURSE PRACTITIONER

## 2022-07-14 PROCEDURE — 86800 THYROGLOBULIN ANTIBODY: CPT | Performed by: NURSE PRACTITIONER

## 2022-07-14 PROCEDURE — 84443 ASSAY THYROID STIM HORMONE: CPT | Performed by: NURSE PRACTITIONER

## 2022-07-15 LAB
THYROGLOB AB SERPL-ACNC: <1 IU/ML (ref 0–0.9)
THYROPEROXIDASE AB SERPL-ACNC: 365 IU/ML (ref 0–34)

## 2022-07-15 RX ORDER — LEVOTHYROXINE AND LIOTHYRONINE 57; 13.5 UG/1; UG/1
90 TABLET ORAL DAILY
Qty: 30 TABLET | Refills: 5 | Status: SHIPPED | OUTPATIENT
Start: 2022-07-15 | End: 2022-08-16

## 2022-07-15 NOTE — PROGRESS NOTES
Patient notified of labs via [employer].  Patient message is as follows:      Thyroid is up to 12.  Your antibiodies are NOT back as they usually take a few days.  You need Medication adjustment.  Do you still prefer the Jenkins thyroid?

## 2022-07-15 NOTE — PROGRESS NOTES
The thyroid antibodies continue to be positive.  They are slightly higher than they were 4 months ago.

## 2022-08-09 DIAGNOSIS — M54.50 CHRONIC BILATERAL LOW BACK PAIN WITHOUT SCIATICA: Primary | ICD-10-CM

## 2022-08-09 DIAGNOSIS — G89.29 CHRONIC BILATERAL LOW BACK PAIN WITHOUT SCIATICA: Primary | ICD-10-CM

## 2022-08-09 DIAGNOSIS — M25.552 BILATERAL HIP PAIN: ICD-10-CM

## 2022-08-09 DIAGNOSIS — M25.551 BILATERAL HIP PAIN: ICD-10-CM

## 2022-08-15 DIAGNOSIS — F41.1 GENERALIZED ANXIETY DISORDER: ICD-10-CM

## 2022-08-15 DIAGNOSIS — F33.0 MILD EPISODE OF RECURRENT MAJOR DEPRESSIVE DISORDER: ICD-10-CM

## 2022-08-15 DIAGNOSIS — F41.0 PANIC DISORDER WITHOUT AGORAPHOBIA: ICD-10-CM

## 2022-08-15 RX ORDER — CLONAZEPAM 1 MG/1
1 TABLET ORAL 2 TIMES DAILY PRN
Qty: 60 TABLET | Refills: 0 | Status: SHIPPED | OUTPATIENT
Start: 2022-08-15 | End: 2022-09-14 | Stop reason: SDUPTHER

## 2022-08-15 RX ORDER — DULOXETIN HYDROCHLORIDE 60 MG/1
60 CAPSULE, DELAYED RELEASE ORAL DAILY
Qty: 30 CAPSULE | Refills: 1 | Status: CANCELLED | OUTPATIENT
Start: 2022-08-15 | End: 2023-08-15

## 2022-08-15 RX ORDER — DULOXETIN HYDROCHLORIDE 60 MG/1
60 CAPSULE, DELAYED RELEASE ORAL DAILY
Qty: 30 CAPSULE | Refills: 0 | Status: SHIPPED | OUTPATIENT
Start: 2022-08-15 | End: 2022-09-14 | Stop reason: SDUPTHER

## 2022-08-16 ENCOUNTER — OFFICE VISIT (OUTPATIENT)
Dept: FAMILY MEDICINE CLINIC | Facility: CLINIC | Age: 37
End: 2022-08-16

## 2022-08-16 VITALS
SYSTOLIC BLOOD PRESSURE: 118 MMHG | DIASTOLIC BLOOD PRESSURE: 70 MMHG | HEART RATE: 104 BPM | WEIGHT: 210 LBS | BODY MASS INDEX: 33.75 KG/M2 | OXYGEN SATURATION: 98 % | HEIGHT: 66 IN | TEMPERATURE: 96.8 F

## 2022-08-16 DIAGNOSIS — M51.26 LUMBAR DISC HERNIATION: ICD-10-CM

## 2022-08-16 DIAGNOSIS — E55.9 VITAMIN D DEFICIENCY: ICD-10-CM

## 2022-08-16 DIAGNOSIS — E03.4 HYPOTHYROIDISM DUE TO ACQUIRED ATROPHY OF THYROID: Primary | ICD-10-CM

## 2022-08-16 DIAGNOSIS — K21.9 GASTROESOPHAGEAL REFLUX DISEASE WITHOUT ESOPHAGITIS: ICD-10-CM

## 2022-08-16 PROCEDURE — 96372 THER/PROPH/DIAG INJ SC/IM: CPT | Performed by: NURSE PRACTITIONER

## 2022-08-16 PROCEDURE — 99214 OFFICE O/P EST MOD 30 MIN: CPT | Performed by: NURSE PRACTITIONER

## 2022-08-16 RX ORDER — LEVOTHYROXINE SODIUM 0.07 MG/1
75 TABLET ORAL DAILY
Qty: 30 TABLET | Refills: 5 | Status: SHIPPED | OUTPATIENT
Start: 2022-08-16 | End: 2022-10-19 | Stop reason: DRUGHIGH

## 2022-08-16 RX ORDER — TIZANIDINE 4 MG/1
4 TABLET ORAL 3 TIMES DAILY PRN
Qty: 90 TABLET | Refills: 5 | Status: SHIPPED | OUTPATIENT
Start: 2022-08-16 | End: 2023-01-30 | Stop reason: SDUPTHER

## 2022-08-16 RX ADMIN — CYANOCOBALAMIN 1000 MCG: 1000 INJECTION, SOLUTION INTRAMUSCULAR; SUBCUTANEOUS at 18:05

## 2022-08-16 NOTE — PROGRESS NOTES
"Subjective   Jarad Barragan is a 36 y.o. female.     Chief Complaint   Patient presents with   • Anxiety       History of Present Illness      Anxiety-acute on chronic today.  She has been caring for her mother as well as her mother's dog and she reports today that the dog is passed away.  Her own dog remains sick and on medication she worries consistently that something is going to happen to it.  Back pain-reports \"is horrible\" and she \"does not know what I am going to do\".  She has been to chiropractor about 6 visits but it was not helpful.  She had a PT session today.  She reports her hips are the most painful.  She was noted to have spasm across her back.  She reports she will be having 2 times per week   Thyroid concern-reports that she would like to have synthroid back.  Her Copay is more with [place] and the copay has continued to increase.  She reports she continues to feel fatigued.  No significant hair or skin changes are reported.  GERD-chronic and ongoing.  Patient is currently taking Aciphex 20 mg.  No negative side effects.  No negative side effects of medication.  Patient does avoid foods that trigger reflux symptoms.  Denies any recent exacerbations.      The following portions of the patient's history were reviewed and updated as appropriate: CC, ROS, allergies, current medications, past family history, past medical history, past social history, past surgical history and problem list.      Review of Systems   Constitutional: Positive for fatigue. Negative for appetite change and fever.   HENT: Negative for congestion, ear pain, nosebleeds, postnasal drip, rhinorrhea, sore throat, tinnitus, trouble swallowing and voice change.    Eyes: Negative for blurred vision, photophobia and visual disturbance.   Respiratory: Negative for cough, chest tightness, shortness of breath and wheezing.    Cardiovascular: Negative for chest pain and palpitations.   Gastrointestinal: Negative for abdominal pain, blood in " "stool, constipation, diarrhea, nausea and GERD.   Endocrine: Negative for cold intolerance, heat intolerance and polydipsia.   Genitourinary: Negative for decreased urine volume, difficulty urinating, dysuria and hematuria.   Musculoskeletal: Positive for arthralgias, back pain and gait problem. Negative for myalgias.   Skin: Negative for color change, pallor and wound.   Allergic/Immunologic: Negative.    Neurological: Negative for dizziness, syncope, numbness and headache.   Hematological: Negative.    Psychiatric/Behavioral: Positive for depressed mood and stress. Negative for decreased concentration, sleep disturbance and suicidal ideas. The patient is nervous/anxious.    All other systems reviewed and are negative.      Objective     /70   Pulse 104   Temp 96.8 °F (36 °C) (Temporal)   Ht 167.6 cm (65.98\")   Wt 95.3 kg (210 lb)   SpO2 98%   BMI 33.92 kg/m²     Physical Exam  Vitals reviewed.   Constitutional:       General: She is not in acute distress.     Appearance: She is well-developed. She is obese. She is not diaphoretic.   HENT:      Head: Normocephalic and atraumatic.      Jaw: No tenderness.      Comments: Oropharynx not examined.  Patient is presently wearing a face covering/mask due to COVID-19 pandemic.     Right Ear: Hearing, tympanic membrane, ear canal and external ear normal.      Left Ear: Hearing, tympanic membrane, ear canal and external ear normal.   Eyes:      General: Lids are normal. No scleral icterus.     Extraocular Movements:      Right eye: Normal extraocular motion and no nystagmus.      Left eye: Normal extraocular motion and no nystagmus.      Conjunctiva/sclera: Conjunctivae normal.      Pupils: Pupils are equal, round, and reactive to light.   Neck:      Thyroid: No thyromegaly or thyroid tenderness.      Vascular: No carotid bruit or JVD.      Trachea: No tracheal tenderness.   Cardiovascular:      Rate and Rhythm: Normal rate and regular rhythm.      Pulses:        "    Dorsalis pedis pulses are 2+ on the right side and 2+ on the left side.        Posterior tibial pulses are 2+ on the right side and 2+ on the left side.      Heart sounds: Normal heart sounds, S1 normal and S2 normal. No murmur heard.  Pulmonary:      Effort: Pulmonary effort is normal. No accessory muscle usage, prolonged expiration or respiratory distress.      Breath sounds: Normal breath sounds.   Chest:      Chest wall: No tenderness.   Abdominal:      General: Bowel sounds are normal. There is no distension.      Palpations: Abdomen is soft. There is no mass.      Tenderness: There is no abdominal tenderness.   Musculoskeletal:         General: No tenderness.      Cervical back: Normal range of motion and neck supple.      Lumbar back: Spasms present. Decreased range of motion. Positive right straight leg raise test and positive left straight leg raise test.      Right lower leg: No edema.      Left lower leg: No edema.      Comments: No muscular atrophy or flaccidity.   Lymphadenopathy:      Head:      Right side of head: No submental or submandibular adenopathy.      Left side of head: No submental or submandibular adenopathy.      Cervical: No cervical adenopathy.      Right cervical: No superficial cervical adenopathy.     Left cervical: No superficial cervical adenopathy.   Skin:     General: Skin is warm and dry.      Capillary Refill: Capillary refill takes less than 2 seconds.      Coloration: Skin is not jaundiced or pale.      Findings: No erythema.      Nails: There is no clubbing.   Neurological:      Mental Status: She is alert and oriented to person, place, and time.      Cranial Nerves: No cranial nerve deficit or facial asymmetry.      Sensory: No sensory deficit.      Motor: No weakness, tremor, atrophy or abnormal muscle tone.      Coordination: Coordination normal.      Deep Tendon Reflexes: Reflexes are normal and symmetric.   Psychiatric:         Attention and Perception: She is  attentive.         Mood and Affect: Mood is anxious and depressed.         Speech: Speech normal.         Behavior: Behavior normal. Behavior is cooperative.         Thought Content: Thought content normal.         Cognition and Memory: Cognition normal.         Judgment: Judgment normal.           Diagnoses and all orders for this visit:    1. Hypothyroidism due to acquired atrophy of thyroid (Primary)  Assessment & Plan:  We will resume Synthroid 75 mcg.  Continue Synthroid as directed.  Report any negative side effects.  We will continue to monitor labs and adjust medications based on findings.  Report any heat or cold intolerances.  Report any changes to hair, skin, or nails.    Orders:  -     levothyroxine (Synthroid) 75 MCG tablet; Take 1 tablet by mouth Daily.  Dispense: 30 tablet; Refill: 5    2. Lumbar disc herniation  Assessment & Plan:  Continue tramadol.  Continue Cymbalta 60 mg as directed.  Continue topical ointments as well as as needed heat and ice    Orders:  -     tiZANidine (ZANAFLEX) 4 MG tablet; Take 1 tablet by mouth 3 (Three) Times a Day As Needed for Muscle Spasms.  Dispense: 90 tablet; Refill: 5    3. Vitamin D deficiency  Assessment & Plan:  Continue vitamin D.  We will continue to monitor vitamin D levels    Orders:  -     vitamin D3 (Vitamin D) 125 MCG (5000 UT) capsule capsule; Take 1 capsule by mouth Daily.  Dispense: 30 capsule; Refill: 11    4. Gastroesophageal reflux disease without esophagitis  Assessment & Plan:  Continue Aciphex 20 mg.  Advised to avoid known GI triggers such as spicy foods.  Upright 30 minutes after meals and avoid eating large meals.  Several small meals daily as able to avoid overfilling stomach.         BMI is >= 30 and <35. (Class 1 Obesity). The following options were offered after discussion;: pharmacological intervention options       Understands disease processes and need for medications.  Understands reasons for urgent and emergent care.  Patient (&  family) verbalized agreement for treatment plan.   Emotional support and active listening provided.  Patient provided time to verbalize feelings.    [facility] reviewed today and consistent.  Will refill prescribed controlled medication today.  Patient is aware they cannot receive narcotics from any other provider except if under care of pain management or speciality clinic.  Risk and benefits of medication use has been reviewed.  History and physical exam exhibit continued safe and appropriate use of controlled substances.  The patient is aware of the potential for addiction and dependence.  This patient has been made aware of the appropriate use of such medications, including potential risk of somnolence, limited ability to drive and / or work safely, and potential for overdose.  Patient understands not to take medication and drive until they know how medication may affect their cognition/decision making.   It has also been made clear that these medications are for use by this patient only, without concomitant use of alcohol or other substances unless prescribed/advised by medical provider.  Patient understands they may be subject to UDS and pill counts at random.    Patient considered to be moderate risk for addiction due to use of multiple controlled medications.  Patient understands and accepts these risks.  Patient need for medication will be reassessed at each visit.  Doses will be adjusted according to patient need and findings.    Goal of TX: Patient will not have any adverse reactions of medication.  Patient will have reduction in weight with use of Adipex as directed with simultaneous diet and lifestyle changes.  Patient have reduction in anxiety symptoms with use of PRN Klonopin as directed.  Patient will be able to remain active in an outside of home with use of Klonopin for anxiety symptoms.      Medication Dispense Information    Clonazepam   Dispensed: 8/15/2022 12:00 AM   Written:  8/15/2022   Unit  strength: 1MG   Days supply: 30   Dispense Note: GivenName=Mike=NOELBirthDate=[id number]=5458 [address], [address]   Quantity: 60 each   [facility]   Authorizing provider: VIPUL SOSA   Received from: [facility] (Fill History)   Brand or Generic:       Medication Dispense Information    Tramadol Hcl   Dispensed: 8/18/2022 12:00 AM   Written:  8/18/2022   Unit strength: 50MG   Days supply: 30   Dispense Note: GivenName=Mike=NOELBirthDate=[id number]=4725 [address], [address]   Quantity: 60 each   [facility]   Authorizing provider: VIPUL OSSA   Received from: [facility] (Fill History)   Brand or Generic:       RTC 1 month, sooner if needed.             This document has been electronically signed by:  LANA Quinn FNP-C Dragon disclaimer:  Part of this note may be an electronic transcription/translation of spoken language to printed text using the Dragon Dictation System.

## 2022-08-23 DIAGNOSIS — K21.9 GASTROESOPHAGEAL REFLUX DISEASE WITHOUT ESOPHAGITIS: ICD-10-CM

## 2022-08-23 RX ORDER — RABEPRAZOLE SODIUM 20 MG/1
TABLET, DELAYED RELEASE ORAL
Qty: 30 TABLET | Refills: 5 | Status: SHIPPED | OUTPATIENT
Start: 2022-08-23 | End: 2022-09-29 | Stop reason: SDUPTHER

## 2022-08-29 NOTE — ASSESSMENT & PLAN NOTE
We will resume Synthroid 75 mcg.  Continue Synthroid as directed.  Report any negative side effects.  We will continue to monitor labs and adjust medications based on findings.  Report any heat or cold intolerances.  Report any changes to hair, skin, or nails.

## 2022-08-30 RX ORDER — OFLOXACIN 3 MG/ML
5 SOLUTION AURICULAR (OTIC) 2 TIMES DAILY
Qty: 10 ML | Refills: 0 | Status: SHIPPED | OUTPATIENT
Start: 2022-08-30 | End: 2022-09-29

## 2022-09-14 ENCOUNTER — OFFICE VISIT (OUTPATIENT)
Dept: FAMILY MEDICINE CLINIC | Facility: CLINIC | Age: 37
End: 2022-09-14

## 2022-09-14 VITALS — WEIGHT: 210 LBS | HEIGHT: 66 IN | BODY MASS INDEX: 33.75 KG/M2

## 2022-09-14 DIAGNOSIS — F41.1 GENERALIZED ANXIETY DISORDER: ICD-10-CM

## 2022-09-14 DIAGNOSIS — M51.26 LUMBAR DISC HERNIATION: ICD-10-CM

## 2022-09-14 DIAGNOSIS — E66.09 CLASS 2 OBESITY DUE TO EXCESS CALORIES WITHOUT SERIOUS COMORBIDITY WITH BODY MASS INDEX (BMI) OF 37.0 TO 37.9 IN ADULT: ICD-10-CM

## 2022-09-14 DIAGNOSIS — F41.0 PANIC DISORDER WITHOUT AGORAPHOBIA: ICD-10-CM

## 2022-09-14 DIAGNOSIS — F33.0 MILD EPISODE OF RECURRENT MAJOR DEPRESSIVE DISORDER: ICD-10-CM

## 2022-09-14 DIAGNOSIS — E03.4 HYPOTHYROIDISM DUE TO ACQUIRED ATROPHY OF THYROID: Primary | ICD-10-CM

## 2022-09-14 PROCEDURE — 99214 OFFICE O/P EST MOD 30 MIN: CPT | Performed by: NURSE PRACTITIONER

## 2022-09-14 RX ORDER — TRAMADOL HYDROCHLORIDE 50 MG/1
50 TABLET ORAL 2 TIMES DAILY PRN
Qty: 60 TABLET | Refills: 1 | Status: SHIPPED | OUTPATIENT
Start: 2022-09-14 | End: 2022-10-12

## 2022-09-14 RX ORDER — PHENTERMINE HYDROCHLORIDE 37.5 MG/1
37.5 TABLET ORAL
Qty: 30 TABLET | Refills: 0 | Status: SHIPPED | OUTPATIENT
Start: 2022-09-14 | End: 2022-09-29

## 2022-09-14 RX ORDER — CLONAZEPAM 1 MG/1
1 TABLET ORAL 2 TIMES DAILY PRN
Qty: 60 TABLET | Refills: 0 | Status: SHIPPED | OUTPATIENT
Start: 2022-09-14 | End: 2022-10-11 | Stop reason: SDUPTHER

## 2022-09-14 RX ORDER — DULOXETIN HYDROCHLORIDE 60 MG/1
60 CAPSULE, DELAYED RELEASE ORAL DAILY
Qty: 30 CAPSULE | Refills: 2 | Status: SHIPPED | OUTPATIENT
Start: 2022-09-14 | End: 2023-01-30 | Stop reason: SDUPTHER

## 2022-09-14 NOTE — PROGRESS NOTES
"You have chosen to receive care through a telehealth visit.  Do you consent to use a video/audio connection for your medical care today? Yes    Patient has chosen to have a telehealth/video visit due to current pandemic is not recommended to present to the office for face-to-face evaluation.   I did not complete this video visit with the patient using [employer].  This video visit was done with [employer] application.    Patient visit is for the following CC:     Chief Complaint   Patient presents with   • Med Refill       Brief HPI/ROS obtained as follows:    Medication refill-routine medication.   Fatigue-reports she continues to feel her thyroid is abnormal.  She continues to feel fatigued and wore down.  She reports throat hoarseness and general malaise.   She reports some tenderness over the base of her throat and a sensation that she has some neck fullness.  She is interested in adding Cytomel to see if it will help her function.   Anxiety-she reports she is doing ok.  She continues to take Cymbalta 60 mg and Klonopin 1 mg twice daily.  She denies any negative side effects.  She continues to \"worry about everything\".     Weight loss-continues on Adipex but does not feel it is as effective as it has been in the past.  She continues not to take very consistently she would be interested in a different weight loss option if she qualified.  She has heard about 1 shot and would be interested in diet.  She does not have any history currently of diabetes.  She reports in the past she has been told she had prediabetes.  She does plan to start taking Adipex daily as she often misses it on the weekends.  She would like to stop it if she does qualify for any weight loss injections`    The following portions of the patient's history were reviewed and updated as appropriate: CC, ROS, allergies, current medications, past family history, past medical history, past social history, past surgical history and problem list.    Review " of Systems   Constitutional: Positive for fatigue. Negative for activity change, appetite change and fever.   HENT: Negative for congestion, ear discharge, ear pain, facial swelling, postnasal drip, rhinorrhea, sinus pressure, sinus pain, sore throat, tinnitus and trouble swallowing.    Eyes: Negative.    Respiratory: Negative for apnea, cough, choking, chest tightness, shortness of breath, wheezing and stridor.    Cardiovascular: Negative for chest pain, palpitations and leg swelling.   Gastrointestinal: Negative for abdominal pain, blood in stool, constipation, diarrhea, nausea and vomiting.   Endocrine: Negative for cold intolerance and heat intolerance.   Genitourinary: Negative for dysuria, flank pain, frequency and hematuria.   Musculoskeletal: Positive for arthralgias, back pain and myalgias. Negative for gait problem and neck pain.   Allergic/Immunologic: Negative.    Neurological: Negative for dizziness, seizures, numbness and headaches.   Hematological: Negative.    Psychiatric/Behavioral: Positive for dysphoric mood. Negative for sleep disturbance and suicidal ideas. The patient is nervous/anxious.    All other systems reviewed and are negative.      Assessment     Physical Exam   Constitutional: She appears well-developed and well-nourished. No distress.   HENT:   Head: Normocephalic.   Right Ear: External ear normal.   Left Ear: External ear normal.   Nose: Nose normal.   Mouth/Throat: Oropharynx is clear and moist.   Eyes: Pupils are equal, round, and reactive to light. Conjunctivae and EOM are normal. No scleral icterus.   Neck: Neck normal appearance.No JVD present. No tracheal deviation present. No thyromegaly present.   Pulmonary/Chest: Effort normal.  No respiratory distress. She no audible wheeze...  Abdominal: Abdomen appears normal. Soft. She exhibits no distension and no visible mass.   Musculoskeletal: Normal range of motion.         General: Tenderness present.      Comments: Generalized low  tenderness of back and across her hips   Neurological: She is alert. No cranial nerve deficit. Coordination normal.   Skin: Skin is warm and dry. Capillary refill takes less than 2 seconds. She is not diaphoretic. No nail bed cyanosis or erythema. No pallor. Nails show no clubbing.   Psychiatric: Her speech is normal and behavior is normal. Judgment normal. She mood appears anxious. She mood appears normal. Her affect is flattened. Her affect is normal. Her behavior is normal. Thought content is normal. She does not express abnormal judgement. She exhibits a depressed mood. She is attentive.       Diagnoses and all orders for this visit:    1. Hypothyroidism due to acquired atrophy of thyroid (Primary)  -     liothyronine (Cytomel) 25 MCG tablet; Take 1 tablet by mouth Daily.  Dispense: 30 tablet; Refill: 5    2. Panic disorder without agoraphobia  -     clonazePAM (KlonoPIN) 1 MG tablet; Take 1 tablet by mouth 2 (Two) Times a Day As Needed for Anxiety.  Dispense: 60 tablet; Refill: 0  -     DULoxetine (Cymbalta) 60 MG capsule; Take 1 capsule by mouth Daily.  Dispense: 30 capsule; Refill: 2    3. Generalized anxiety disorder  -     clonazePAM (KlonoPIN) 1 MG tablet; Take 1 tablet by mouth 2 (Two) Times a Day As Needed for Anxiety.  Dispense: 60 tablet; Refill: 0  -     DULoxetine (Cymbalta) 60 MG capsule; Take 1 capsule by mouth Daily.  Dispense: 30 capsule; Refill: 2    4. Mild episode of recurrent major depressive disorder (HCC)  -     DULoxetine (Cymbalta) 60 MG capsule; Take 1 capsule by mouth Daily.  Dispense: 30 capsule; Refill: 2    5. Lumbar disc herniation  Comments:  Continue tramadol.  Continue to avoid overuse activities.  Frequent position changes as able at work  Orders:  -     traMADol (ULTRAM) 50 MG tablet; Take 1 tablet by mouth 2 (Two) Times a Day As Needed for Moderate Pain.  Dispense: 60 tablet; Refill: 1    6. Class 2 obesity due to excess calories without serious comorbidity with body mass  index (BMI) of 37.0 to 37.9 in adult  Comments:  Continue to work on diet changes.  Be active as physically able.  We will continue Adipex x1 more month  Orders:  -     phentermine (ADIPEX-P) 37.5 MG tablet; Take 1 tablet by mouth Every Morning Before Breakfast. Patient takes sometimes, not every day.  Dispense: 30 tablet; Refill: 0        Patient instructed and advised to call if symptoms are increasing or new symptoms occur.    Understands reasons for urgent and emergent care.  Patient (& family) verbalized agreement for treatment plan.     Generalized precautions advised including social distancing, hand washing, cough/sneeze hygiene.  Quarantine per CDC guidelines if exposed to illness.    [facility] reviewed today and consistent.  Will refill prescribed controlled medication today.  Patient is aware they cannot receive narcotics from any other provider except if under care of pain management or speciality clinic.  Risk and benefits of medication use has been reviewed.  History and physical exam exhibit continued safe and appropriate use of controlled substances.  The patient is aware of the potential for addiction and dependence.  This patient has been made aware of the appropriate use of such medications, including potential risk of somnolence, limited ability to drive and / or work safely, and potential for overdose.  Patient understands not to take medication and drive until they know how medication may affect their cognition/decision making.   It has also been made clear that these medications are for use by this patient only, without concomitant use of alcohol or other substances unless prescribed/advised by medical provider.  Patient understands they may be subject to UDS and pill counts at random.    Patient considered to be moderate risk for addiction due to use of multiple controlled medications.  Patient understands and accepts these risks.  Patient need for medication will be reassessed at each visit.   Doses will be adjusted according to patient need and findings.    Goal of TX: Patient will not have any adverse reactions of medication.  Patient have reduction in anxiety symptoms with use of PRN Klonopin as directed.  Patient will be able to remain active in an outside of home with use of Klonopin for anxiety symptoms.  Patient will have reduction in there chronic back and joint pain symptoms with use of tramadol as needed as directed.  Patient will be able to remain active in an outside of their home with minimal to no interference from their pain symptoms.    Medication Dispense Information    Phentermine Hcl   Dispensed: 7/13/2022 12:00 AM   Written:  7/12/2022   Unit strength: 37.5MG   Days supply: 30   Quantity: 30 each   Pharmacy: [facility]   Authorizing provider: VIPUL SOSA   Received from: [facility] (Fill History)   Brand or Generic:       Medication Dispense Information    Clonazepam   Dispensed: 8/15/2022 12:00 AM   Written:  8/15/2022   Unit strength: 1MG   Days supply: 30   Quantity: 60 each   Pharmacy: [facility]   Authorizing provider: VIPUL SOSA   Received from: [facility] (Fill History)   Brand or Generic:       Medication Dispense Information    Clonazepam   Dispensed: 8/15/2022 12:00 AM   Written:  8/15/2022   Unit strength: 1MG   Days supply: 30   Quantity: 60 each   Pharmacy: [facility]   Authorizing provider: VIPUL SOSA   Received from: [facility] (Fill History)   Brand or Generic:       RTC 1 month, sooner if needed.       This is an audio and video enabled enabled telehealth encounter.      This visit was conducted with the provider in the office at [facility] and the patient in their home in a private area.    This visit/video call lasted: 30 minutes      This document has been electronically signed by:  LANA Quinn FNP-C Dragon disclaimer:  Part of this note may be an electronic transcription/translation of spoken language to  printed text using the Dragon Dictation System.

## 2022-09-15 RX ORDER — LIOTHYRONINE SODIUM 25 UG/1
25 TABLET ORAL DAILY
Qty: 30 TABLET | Refills: 5 | Status: SHIPPED | OUTPATIENT
Start: 2022-09-15 | End: 2022-12-14

## 2022-09-19 ENCOUNTER — TELEPHONE (OUTPATIENT)
Dept: FAMILY MEDICINE CLINIC | Facility: CLINIC | Age: 37
End: 2022-09-19

## 2022-09-19 DIAGNOSIS — E66.09 CLASS 2 OBESITY DUE TO EXCESS CALORIES WITHOUT SERIOUS COMORBIDITY WITH BODY MASS INDEX (BMI) OF 37.0 TO 37.9 IN ADULT: Primary | ICD-10-CM

## 2022-09-19 NOTE — TELEPHONE ENCOUNTER
----- Message from LANA Quinn sent at 9/19/2022  9:10 AM EDT -----  I placed for speciality clinic for obesity    ----- Message -----  From: Kendra Toscano MA  Sent: 9/19/2022   9:08 AM EDT  To: LANA Quinn    Patient is interested in going to [facility].     Please place referral.   ----- Message -----  From: Christine Joaquin APRN  Sent: 9/17/2022   6:31 AM EDT  To: Kendra Toscano MA    Ok.  We can let her know.  See if she may be interested in going down to [facility] to the nutrition/weight loss clinic.  They may be able to get it approved for her.  ----- Message -----  From: Kendra Toscano MA  Sent: 9/16/2022   2:33 PM EDT  To: LANA Quinn    Insurance denied both weight loss medications.

## 2022-09-22 ENCOUNTER — CLINICAL SUPPORT (OUTPATIENT)
Dept: FAMILY MEDICINE CLINIC | Facility: CLINIC | Age: 37
End: 2022-09-22

## 2022-09-22 DIAGNOSIS — E53.8 B12 DEFICIENCY: ICD-10-CM

## 2022-09-22 PROCEDURE — 96372 THER/PROPH/DIAG INJ SC/IM: CPT | Performed by: NURSE PRACTITIONER

## 2022-09-22 RX ADMIN — CYANOCOBALAMIN 1000 MCG: 1000 INJECTION, SOLUTION INTRAMUSCULAR; SUBCUTANEOUS at 08:40

## 2022-09-28 ENCOUNTER — TELEPHONE (OUTPATIENT)
Dept: FAMILY MEDICINE CLINIC | Facility: CLINIC | Age: 37
End: 2022-09-28

## 2022-09-28 NOTE — TELEPHONE ENCOUNTER
Caller: ALEJANDRO WITH [employer]    Relationship: Other    Best call back number: [phone] REFERENCE MCGOWAN [id number]    What is the best time to reach you: ANYTIME     Who are you requesting to speak with: CLINICAL     Do you know the name of the person who called: ALEJANDRO WITH [employer]    What was the call regarding: WANTING TO KNOW IF THE PRACTICE IS GOING TO PURSUE AN APPEAL OF THE Semaglutide-Weight Management 0.25 MG/0.5ML solution auto-injector OR THE Liraglutide (SAXENDA) 18 MG/3ML injection pen       Do you require a callback:PLEASE CALL AND ADVISE

## 2022-09-29 ENCOUNTER — DISEASE STATE MANAGEMENT VISIT (OUTPATIENT)
Dept: PHARMACY | Facility: HOSPITAL | Age: 37
End: 2022-09-29

## 2022-09-29 ENCOUNTER — SPECIALTY PHARMACY (OUTPATIENT)
Dept: PHARMACY | Facility: HOSPITAL | Age: 37
End: 2022-09-29

## 2022-09-29 DIAGNOSIS — K21.9 GASTROESOPHAGEAL REFLUX DISEASE WITHOUT ESOPHAGITIS: ICD-10-CM

## 2022-09-29 RX ORDER — RABEPRAZOLE SODIUM 20 MG/1
20 TABLET, DELAYED RELEASE ORAL 2 TIMES DAILY
Qty: 60 TABLET | Refills: 5 | Status: SHIPPED | OUTPATIENT
Start: 2022-09-29 | End: 2023-01-30 | Stop reason: SDUPTHER

## 2022-09-29 RX ORDER — CYANOCOBALAMIN 1000 UG/ML
1000 INJECTION, SOLUTION INTRAMUSCULAR; SUBCUTANEOUS
COMMUNITY
End: 2022-11-14 | Stop reason: SDUPTHER

## 2022-09-29 RX ORDER — TIRZEPATIDE 2.5 MG/.5ML
2.5 INJECTION, SOLUTION SUBCUTANEOUS WEEKLY
Qty: 2 ML | Refills: 0 | Status: SHIPPED | OUTPATIENT
Start: 2022-09-29 | End: 2022-10-28

## 2022-09-29 RX ORDER — ONDANSETRON 4 MG/1
4 TABLET, FILM COATED ORAL EVERY 8 HOURS PRN
Qty: 30 TABLET | Refills: 0 | Status: SHIPPED | OUTPATIENT
Start: 2022-09-29

## 2022-09-29 NOTE — PROGRESS NOTES
[facility]ication Management Clinic  Weight Management Program        Jarad Barragan is a 36 y.o. female referred to the [facility]ication Management Clinic by Christine Joaquin for clinical pharmacy and specialty pharmacy management of Mounjaro for weight management.  Jarad Barragan has previously tried Adipex, keto diet, portion control and lifestyle changes for weight loss.  Current weight loss efforts include Adipex and portion control. Patient is discontinuing Adipex now that she is starting Mounjaro as she reports Adipex was not helping with weight loss to begin with. The patient denies a person history or family history of thyroid cancer and denies a personal history of pancreatitis.     Relevant Past Medical History and Co-morbidities  Past Medical History:   Diagnosis Date   • Broken ankle    • Hypothyroidism      Social History     Socioeconomic History   • Marital status:    Tobacco Use   • Smoking status: Never Smoker   • Smokeless tobacco: Never Used   Vaping Use   • Vaping Use: Never used   Substance and Sexual Activity   • Alcohol use: No   • Drug use: No   • Sexual activity: Defer       Allergies  Tape, Codeine, and Hydrocodone    Current Medication List    Current Outpatient Medications:   •  clonazePAM (KlonoPIN) 1 MG tablet, Take 1 tablet by mouth 2 (Two) Times a Day As Needed for Anxiety., Disp: 60 tablet, Rfl: 0  •  cyanocobalamin 1000 MCG/ML injection, Inject 1,000 mcg into the appropriate muscle as directed by prescriber Every 30 (Thirty) Days., Disp: , Rfl:   •  DULoxetine (Cymbalta) 60 MG capsule, Take 1 capsule by mouth Daily., Disp: 30 capsule, Rfl: 2  •  levothyroxine (Synthroid) 75 MCG tablet, Take 1 tablet by mouth Daily., Disp: 30 tablet, Rfl: 5  •  liothyronine (Cytomel) 25 MCG tablet, Take 1 tablet by mouth Daily., Disp: 30 tablet, Rfl: 5  •  ondansetron (Zofran) 4 MG tablet, Take 1 tablet by mouth Every 8 (Eight) Hours As Needed for Nausea or Vomiting., Disp: 30 tablet, Rfl: 0  •   "promethazine (PHENERGAN) 25 MG tablet, Take 1 tablet by mouth Every 6 (Six) Hours As Needed for Nausea or Vomiting., Disp: 60 tablet, Rfl: 1  •  RABEprazole (ACIPHEX) 20 MG EC tablet, TAKE 1 TABLET BY MOUTH ONCE DAILY, Disp: 30 tablet, Rfl: 5  •  Tirzepatide (Mounjaro) 2.5 MG/0.5ML solution pen-injector, Inject 2.5 mg under the skin into the appropriate area as directed 1 (One) Time Per Week., Disp: 2 mL, Rfl: 0  •  tiZANidine (ZANAFLEX) 4 MG tablet, Take 1 tablet by mouth 3 (Three) Times a Day As Needed for Muscle Spasms., Disp: 90 tablet, Rfl: 5  •  traMADol (ULTRAM) 50 MG tablet, Take 1 tablet by mouth 2 (Two) Times a Day As Needed for Moderate Pain., Disp: 60 tablet, Rfl: 1  •  tretinoin (RETIN-A) 0.1 % cream, Apply 1 application topically to the appropriate area as directed Every Night., Disp: 45 g, Rfl: 5  •  triamcinolone (KENALOG) 0.1 % ointment, Apply 1 application topically to the appropriate area as directed 2 (Two) Times a Day., Disp: 30 g, Rfl: 1  •  vitamin D3 (Vitamin D) 125 MCG (5000 UT) capsule capsule, Take 1 capsule by mouth Daily., Disp: 30 capsule, Rfl: 11  No current facility-administered medications for this visit.    Drug Interactions  Tramadol + Mounjaro: Tramadol can increase risk of hypoglycemia  Synthroid + Mounjaro: Mounjaro can increase concentration of Levothyroxine. Increase frequency of monitoring thyroid labs    Relevant Laboratory Values  Lab Results   Component Value Date    CHOL 150 07/14/2022    TRIG 45 07/14/2022    HDL 56 07/14/2022    LDL 84 07/14/2022     There is no height or weight on file to calculate BMI.    Vaccinations:   Patient recommended to keep up with routine vaccinations.     Goals of Therapy  • Clinical Goals or Therapeutic Targets: 10% weight loss goal      Date 09/29/22         Weight (lb) 217 lbs       BMI kg/ 35.04       Waist Circumference (in)  41.5\"         Patient Education      Medication Expectations   Why am I taking this medication? You are taking " Mounjaro for weight loss because you have excess weight and also have weight-related medical problems or obesity. It should be used along with a reduced calorie diet and increased physical activity.    What should I expect while on this medication? You should expect to lose approximately 15%-20% of body weight   How does the medication work? Mounjaro is an injection that addresses one of your body's natural responses to weight loss. It works like naturally produced hormones in the body called GLP-1 and GIP that regulates appetite which can lead to eating fewer calories and losing weight. GIP complements the effects of the GLP-1 increasing energy expenditure leading to weight loss. This medication also slows down food from leaving your stomach, making you feel moise for longer.   How long will I be on this medication for? The amount of time you will be on this medication will be determined by your doctor. Do not abruptly stop this medication without talking to your doctor first.    How do I take this medication? Take as directed on your prescription label. Mounjaro is injected under the skin (subcutaneously) of your stomach, thigh, or upper arm. This medication should be taken once weekly and can be given with or without food.     For each new prefilled pen, pull off the base cap on the pen. Place the base flat on the skin, then unlock. Press and hold the button for up to 10 seconds. Listen for the first click. It means the injection has started. The second click means that the injection is complete. Remove pen from skin and discard in a sharps container.     What are some possible side effects? You may notice you don't feel as hungry, especially when you first start using Mounjaro. Some common side effects include nausea, vomiting, diarrhea, vomiting, indigestion, constipation, and stomach (abdominal) pain. Redness, itching, and/or swelling can occur where the shot was given. You should also monitor for low blood  sugar (hypoglycemia), especially if you are taking Mounjaro with other medications that cause low blood sugar. Stop using Mounjaro and call your doctor immediately if you have severe pain in your stomach area that will not go away as this could be a sign of pancreatitis (inflammation of your pancreas). Gallbladder problems have happened in some people who use Mounjaro. Tell your healthcare provider right away if you get symptoms of gallbladder problems, which may include pain in your upper stomach (abdomen), fever, yellowing of skin or eyes (jaundice), and colby-colored stools.    What happens if I miss a dose? Missed dose should be administered as soon as possible within 4 days; resume usual schedule thereafter. If >4 days have elapsed, skip the missed dose and resume administration at the next scheduled weekly dose.     Mounjaro is currently FDA approved for the treatment of type 2 diabetes, using tirzepatide for weight loss is considered “off-label”      Medication Safety   What are things I should warn my doctor immediately about? Do not use Mounjaro if you or a family member have ever had medullary thyroid cancer (MTC) or Multiple Endocrine Neoplasia syndrome type 2 (MEN 2). Tell your doctor if you get a lump or swelling in your neck, hoarseness, difficulty swallowing, or feel short of breath (these may be symptoms of thyroid cancer). Talk to your doctor if you have or have had problems with your pancreas, kidneys, or liver. Tell your doctor if you have problems with digesting food or slowed emptying of your stomach (gastroparesis). Also tell your doctor if you notice any signs/symptoms of an allergic reaction (rash, hives, difficulty breathing, etc.).   What are things that I should be cautious of? Be cautious of any side effects from this medication. Talk to your doctor if any new ones develop or aren't getting better.   What are some medications that can interact with this one? Taking Mounjaro with other  medications that lower your blood sugar such as insulins and glipizide/glimepiride/glyburide may increase the risk of low blood sugar. It should not be taken with other medicines called GLP-1 receptor agonists, as these work the same way as Mounjaro. Because Mounjaro slows stomach emptying, it can affect the way some medicines work. Always tell your doctor or pharmacist immediately if you start taking any new medications, including over-the-counter medications, vitamins, and herbal supplements.          Resources/Support   How can I remind myself to take this medication? You can download reminder apps to help you manage your refills. You may also set an alarm on your phone to remind you.    Is financial support available?  [employer], the drug , can provide co-pay cards if you have commercial insurance.    Which vaccines are recommended for me? Talk to your doctor about these vaccines: Flu, Coronavirus (COVID-19), Pneumococcal (pneumonia), Tdap, Zoster (shingles).     Medication Storage/Handling   How should I handle this medication? Keep this medication out of reach of pets/children and keep the pen capped when not in use. Do not share your medicine pens with others.    How does this medication need to be stored? Store your new, unused pens in the original carton in the refrigerator. Protect it from light. Do not freeze. If needed, each single-dose pen can be stored unrefrigerated at temperatures for up to 21 days.   How should I dispose of this medication? Used Mounjaro pens should be thrown away after use. Place your used pen in an approved sharps container. If you do not have a sharps container, you may use a household container made of heavy-duty plastic with a tight-fitting lid that is leak resistant (e.g., heavy-duty plastic laundry detergent bottle). If your doctor decides to stop this medication, take to your local police station for proper disposal. Some pharmacies also have take-back bins for  "medication drop-off.       Females of Childbearing Age   Is there anything additionally I should know as a female of childbearing age?  Talk to your doctor if you are pregnant, planning to become pregnant, or breastfeeding. You should not take this medication for weight loss if you are pregnant, planning to become pregnant or breastfeeding.     Tirzepatide may decrease the efficacy of oral hormonal contraception due to changes in gastric emptying; contraceptives administered by a non-oral route are not affected. Because the changes in gastric emptying are largest following the first dose, patients using an oral contraceptive should add a barrier method after starting tirzepatide treatment. Alternately, patients can be switched to non-oral contraceptive methods.      Medication Assessment & Plan    Patient's current BMI is 35.04, which is considered Class II Obesity.    Will order Mounjaro 2.5mg SC weekly. Patient is aware that Mounjaro is not FDA approved for weight loss and use is \"off-label\". Patient is agreeable and would like to proceed with use.     Jarad Barragan is a female of childbearing age.  She denies pregnancy, planning to become pregnant or breastfeeding.  She has been educated that Mounjaro may decrease MYRNA efficacy and back-up contraception should be used while on medication.  If patient were to become pregnant while on medication, instructed her to stop drug immediately and inform her provider.      The following medications will need dose adjustments/closer monitoring once the GLP1 is started: Synthroid or Cytomel. Patient currently working with provider on thyroid medication and will clarify appropriate medication. Patient reports that she does blood work every 6 weeks for thyroid currently.     Patient also advised to monitor for s/sx of hypoglycemia, while not on other blood glucose lowering medications, tramadol can increase risk of hypoglycemia with Mounjaro. Patient aware of rule of 15 and " verbally acknowledged understanding.     Discussed lifestyle modifications, including diet and exercise.  Patient education provided.     Worked with patient to create SMART Goal(s):   1. Increase water intake to 40 oz a day   2. Be more mindful with portion size of carbohydrates, specifically bread.     Will follow-up with patient in clinic in 4 weeks.     Danita Araujo. Trevon, PharmD  9/29/2022  17:04 EDT

## 2022-10-06 ENCOUNTER — LAB (OUTPATIENT)
Dept: FAMILY MEDICINE CLINIC | Facility: CLINIC | Age: 37
End: 2022-10-06

## 2022-10-10 DIAGNOSIS — E03.4 HYPOTHYROIDISM DUE TO ACQUIRED ATROPHY OF THYROID: Primary | ICD-10-CM

## 2022-10-11 ENCOUNTER — OFFICE VISIT (OUTPATIENT)
Dept: FAMILY MEDICINE CLINIC | Facility: CLINIC | Age: 37
End: 2022-10-11

## 2022-10-11 DIAGNOSIS — F41.0 PANIC DISORDER WITHOUT AGORAPHOBIA: ICD-10-CM

## 2022-10-11 DIAGNOSIS — K21.9 GASTROESOPHAGEAL REFLUX DISEASE WITHOUT ESOPHAGITIS: ICD-10-CM

## 2022-10-11 DIAGNOSIS — E66.09 CLASS 1 OBESITY DUE TO EXCESS CALORIES WITHOUT SERIOUS COMORBIDITY WITH BODY MASS INDEX (BMI) OF 31.0 TO 31.9 IN ADULT: ICD-10-CM

## 2022-10-11 DIAGNOSIS — E03.4 HYPOTHYROIDISM DUE TO ACQUIRED ATROPHY OF THYROID: Primary | ICD-10-CM

## 2022-10-11 DIAGNOSIS — F41.1 GENERALIZED ANXIETY DISORDER: ICD-10-CM

## 2022-10-11 PROCEDURE — 99214 OFFICE O/P EST MOD 30 MIN: CPT | Performed by: NURSE PRACTITIONER

## 2022-10-11 RX ORDER — CLONAZEPAM 1 MG/1
1 TABLET ORAL 2 TIMES DAILY PRN
Qty: 60 TABLET | Refills: 0 | Status: SHIPPED | OUTPATIENT
Start: 2022-10-11 | End: 2022-11-14 | Stop reason: SDUPTHER

## 2022-10-11 NOTE — PROGRESS NOTES
"You have chosen to receive care through a telehealth visit.  Do you consent to use a video/audio connection for your medical care today? Yes    Patient has chosen to have a telehealth/video visit due to current pandemic/current public health emergency.  Patient is not recommended to present to the office for face-to-face evaluation.   This visit is live, real time audio and visual communication between the provider and the patient.      I did not complete this video visit with the patient using [employer].  This video visit was done with [employer] application.    Patient visit is for the following CC:     Chief Complaint   Patient presents with   • Back Pain   • Anxiety       Brief HPI/ROS obtained as follows:    Hypothyroidism-she is currently on cytomel and Synthroid 75 mcg.  Her TSH is noted to be 0.049 down from 12.9.  During this timeframe patient has changed from NP thyroid back to Synthroid.  Her antibody testing continues to be positive.  She continues to report that she is very fatigued.  Weight loss observation-she is going to weight management clinic.  She is on Mounjaru for weight management.  She is followed monthly.  She reports some mild nausea.  She feels she is fatigued.  She has noted some belching.  She is using zofran for nausea.  She reports that she is only needing zofran the first 2 days after injection.    Anxiety-ongoing.  She reports that she has been \"ok\".  She is taking Klonopin 1 mg twice daily.  She has not been to psych in several months.  She reports she was not finding the sessions very useful.  Back pain-reports that she has noted that she is having intense pain in her back and when it hits she \"nearly falls\".  She reports that she is having to grab hold of stuff to keep from falling.  Her last MRI was 2020.  She does not wish to have another at this time.  She continues to take her tramadol 50 mg as needed.  No negative side effects.  She does have a mostly sedentary job.    The " following portions of the patient's history were reviewed and updated as appropriate: CC, ROS, allergies, current medications, past family history, past medical history, past social history, past surgical history and problem list.    Review of Systems   Constitutional: Positive for fatigue. Negative for appetite change and unexpected weight change.   HENT: Negative for congestion, ear pain, nosebleeds, postnasal drip, rhinorrhea, sore throat, trouble swallowing and voice change.    Eyes: Negative for photophobia, pain and visual disturbance.   Respiratory: Negative for cough, chest tightness, shortness of breath and wheezing.    Cardiovascular: Negative for chest pain and palpitations.   Gastrointestinal: Negative for abdominal pain, blood in stool, constipation, diarrhea and nausea.   Endocrine: Negative for cold intolerance and polydipsia.   Genitourinary: Negative for difficulty urinating, flank pain, frequency and hematuria.   Musculoskeletal: Positive for arthralgias, back pain and myalgias. Negative for gait problem and joint swelling.   Skin: Negative for color change and rash.   Allergic/Immunologic: Negative.    Neurological: Negative for dizziness, syncope, numbness and headaches.   Hematological: Negative.    Psychiatric/Behavioral: Negative for dysphoric mood, sleep disturbance and suicidal ideas. The patient is nervous/anxious.    All other systems reviewed and are negative.      Assessment     Physical Exam   Constitutional: She appears well-developed and well-nourished. No distress.   HENT:   Head: Normocephalic.   Right Ear: External ear normal.   Left Ear: External ear normal.   Nose: Nose normal.   Mouth/Throat: Oropharynx is clear and moist.   Eyes: Pupils are equal, round, and reactive to light. Conjunctivae and EOM are normal. No scleral icterus.   Neck: Neck normal appearance.No JVD present. No tracheal deviation present. No thyromegaly present.   Pulmonary/Chest: Effort normal.  No respiratory  distress. She no audible wheeze...  Abdominal: Abdomen appears normal. Soft. She exhibits no distension and no visible mass.   Musculoskeletal: Normal range of motion.         General: Tenderness present.      Comments: Generalized low tenderness of back and across her hips   Neurological: She is alert. No cranial nerve deficit. Coordination normal.   Skin: Skin is warm and dry. Capillary refill takes less than 2 seconds. She is not diaphoretic. No nail bed cyanosis or erythema. No pallor. Nails show no clubbing.   Psychiatric: Her speech is normal and behavior is normal. Judgment normal. She mood appears anxious. She mood appears normal. Her affect is not flattened and normal. Her behavior is normal. Thought content is normal. She does not express abnormal judgement. She exhibits a depressed mood. She is attentive.       Diagnoses and all orders for this visit:    1. Hypothyroidism due to acquired atrophy of thyroid (Primary)  Assessment & Plan:  Reviewed thyroid labs.  Decrease in Synthroid from 75 mcg to 50 mcg.    Orders:  -     TSH  -     T4, Free  -     T3, Free  -     Thyroid Antibodies    2. Panic disorder without agoraphobia  -     clonazePAM (KlonoPIN) 1 MG tablet; Take 1 tablet by mouth 2 (Two) Times a Day As Needed for Anxiety.  Dispense: 60 tablet; Refill: 0    3. Generalized anxiety disorder  Comments:  Continue PRN Klonopin  Orders:  -     clonazePAM (KlonoPIN) 1 MG tablet; Take 1 tablet by mouth 2 (Two) Times a Day As Needed for Anxiety.  Dispense: 60 tablet; Refill: 0    4. Gastroesophageal reflux disease without esophagitis    5. Class 1 obesity due to excess calories without serious comorbidity with body mass index (BMI) of 31.0 to 31.9 in adult  Overview:  Beginning weight at 240 on 02/23/2021    Assessment & Plan:  Continue under the care BMI/weight loss clinic at [facility].  Report any negative side effects of medication        Patient instructed and advised to call if symptoms are increasing or new  symptoms occur.    Understands reasons for urgent and emergent care.  Patient (& family) verbalized agreement for treatment plan.     Generalized precautions advised including social distancing, hand washing, cough/sneeze hygiene.  Quarantine per CDC guidelines if exposed to illness.      PURVI/PMDP reviewed today and consistent.  Will refill prescribed controlled medication today.  Patient is aware they cannot receive narcotics from any other provider except if under care of pain management or speciality clinic.  Risk and benefits of medication use has been reviewed.  History and physical exam exhibit continued safe and appropriate use of controlled substances.  The patient is aware of the potential for addiction and dependence.  This patient has been made aware of the appropriate use of such medications, including potential risk of somnolence, limited ability to drive and / or work safely, and potential for overdose.    It has also been made clear that these medications are for use by this patient only, without concomitant use of alcohol or other substances unless prescribed/advised by medical provider.  Patient understands they may be subject to UDS and pill counts at random.      Patient considered to be low risk for addiction due to use of single controlled medications.  Patient understands and accepts these risks.  Patient need for medication will be reassessed at each visit.  Doses will be adjusted according to patient need and findings.    Goal of [place]: Patient will not have any adverse reactions of medication.   Patient will have reduction in anxiety symptoms with use of PRN Klonopin.  Patient will be able to remain active in an outside of her home without interference from anxiety symptoms.    Medication Dispense Information    Tramadol Hcl   Dispensed: 10/12/2022 12:00 AM   Written:  10/12/2022   Unit strength: 50MG   Days supply: 30   Quantity: 60 each   Ph[facility]acy: [facility]   Authorizing provider:  VIPUL SOSA   Received from: [facility] (Fill History)   Brand or Generic:       Medication Dispense Information    Clonazepam   Dispensed: 10/12/2022 12:00 AM   Written:  10/11/2022   Unit strength: 1MG   Days supply: 30   Quantity: 60 each   Ph[facility]   Authorizing provider: VIPUL SOSA   Received from: [facility] (Fill History)   Brand or Generic:       RTC 1 month, sooner if needed.       This is an audio and video enabled enabled telehealth encounter.      This visit was conducted with the provider in the office at [facility] and the patient in their home in a private area.    This visit/video call lasted:  17 minutes        This document has been electronically signed by:  LANA Quinn FNP-C Dragon disclaimer:  Part of this note may be an electronic transcription/translation of spoken language to printed text using the Dragon Dictation System.

## 2022-10-12 DIAGNOSIS — M51.26 LUMBAR DISC HERNIATION: ICD-10-CM

## 2022-10-12 RX ORDER — TRAMADOL HYDROCHLORIDE 50 MG/1
TABLET ORAL
Qty: 60 TABLET | Refills: 1 | Status: SHIPPED | OUTPATIENT
Start: 2022-10-12 | End: 2022-11-14 | Stop reason: SDUPTHER

## 2022-10-14 ENCOUNTER — TELEPHONE (OUTPATIENT)
Dept: PHARMACY | Facility: HOSPITAL | Age: 37
End: 2022-10-14

## 2022-10-14 NOTE — TELEPHONE ENCOUNTER
Reached out to patient to address side effects. Patient reports that she has had some mild constipation, sickness and headache especially within the first two days of administering her Mounjaro and it resolves after that. She states that the adverse effects has been manageable. Counseled patient if side effects gets to be persistent and severe, to discontinue the medication and follow up with PCP immediately. If adverse effects get worse and unmanageable, educated patient to go ED right away.     Patient reports to have Hashimoto's disease with TSH 12 (07/22), patient was referred to endocrinology and will have labs next week. Informed patient to monitor for any symptoms of thyroid tumor (lumps or swelling in your neck, hoarseness, difficulty swallowing) and follow up with provider.

## 2022-10-14 NOTE — TELEPHONE ENCOUNTER
----- Message from Jarad Barragan sent at 10/14/2022  9:41 AM EDT -----  Regarding: Weight Loss Management Program  Contact: [phone]  I'm doing alright with it. I've some sickness with it and constipation. I feel like it's just now starting some to help with controlling some of the eating as far as not being as hungry and craving things.

## 2022-10-18 ENCOUNTER — CLINICAL SUPPORT (OUTPATIENT)
Dept: FAMILY MEDICINE CLINIC | Facility: CLINIC | Age: 37
End: 2022-10-18

## 2022-10-18 DIAGNOSIS — E03.4 HYPOTHYROIDISM DUE TO ACQUIRED ATROPHY OF THYROID: ICD-10-CM

## 2022-10-18 LAB
T3FREE SERPL-MCNC: 5.08 PG/ML (ref 2–4.4)
T4 FREE SERPL-MCNC: 1.01 NG/DL (ref 0.93–1.7)
TSH SERPL DL<=0.05 MIU/L-ACNC: 0.05 UIU/ML (ref 0.27–4.2)

## 2022-10-18 PROCEDURE — 36415 COLL VENOUS BLD VENIPUNCTURE: CPT | Performed by: NURSE PRACTITIONER

## 2022-10-18 PROCEDURE — 86800 THYROGLOBULIN ANTIBODY: CPT | Performed by: NURSE PRACTITIONER

## 2022-10-18 PROCEDURE — 84439 ASSAY OF FREE THYROXINE: CPT | Performed by: NURSE PRACTITIONER

## 2022-10-18 PROCEDURE — 84481 FREE ASSAY (FT-3): CPT | Performed by: NURSE PRACTITIONER

## 2022-10-18 PROCEDURE — 84443 ASSAY THYROID STIM HORMONE: CPT | Performed by: NURSE PRACTITIONER

## 2022-10-18 PROCEDURE — 86376 MICROSOMAL ANTIBODY EACH: CPT | Performed by: NURSE PRACTITIONER

## 2022-10-19 LAB
THYROGLOB AB SERPL-ACNC: 1.4 IU/ML (ref 0–0.9)
THYROPEROXIDASE AB SERPL-ACNC: 279 IU/ML (ref 0–34)

## 2022-10-19 RX ORDER — LEVOTHYROXINE SODIUM 0.05 MG/1
50 TABLET ORAL DAILY
Qty: 30 TABLET | Refills: 5 | Status: SHIPPED | OUTPATIENT
Start: 2022-10-19 | End: 2022-11-14

## 2022-10-19 NOTE — PROGRESS NOTES
Patient notified of labs via [employer].  Patient message is as follows:      All of your labs are not back so that is why I had not notified you.      Your TSH is too low again.  This means you are getting too much medication.  I will be stopping 75 and sending 50.

## 2022-10-20 NOTE — PROGRESS NOTES
Patient notified of labs via [employer]  Patient message is as follows:      Your thyroid antibodies continue to be positive.

## 2022-10-24 NOTE — ASSESSMENT & PLAN NOTE
Continue under the care BMI/weight loss clinic at [facility].  Report any negative side effects of medication

## 2022-10-28 ENCOUNTER — DISEASE STATE MANAGEMENT VISIT (OUTPATIENT)
Dept: PHARMACY | Facility: HOSPITAL | Age: 37
End: 2022-10-28

## 2022-10-28 VITALS — HEIGHT: 66 IN | WEIGHT: 210 LBS | BODY MASS INDEX: 33.75 KG/M2

## 2022-10-28 RX ORDER — PEN NEEDLE, DIABETIC 30 GX3/16"
1 NEEDLE, DISPOSABLE MISCELLANEOUS DAILY
Qty: 100 EACH | Refills: 0 | Status: SHIPPED | OUTPATIENT
Start: 2022-10-28 | End: 2022-12-27

## 2022-10-28 NOTE — PROGRESS NOTES
[facility]  Weight Management Program        Jarad Barragan is a 36 y.o. female referred to the [facility] by Christine Joaquin for clinical pharmacy and specialty pharmacy management of Mounjaro for weight management.  Jarad Barragan has previously tried Adipex, keto diet, portion control and lifestyle changes for weight loss.  Current weight loss efforts include Mounjaro 2.5mg weekly. She denies missing any doses, side effects, or trouble giving herself th injection. The patient denies a person history or family history of thyroid cancer and denies a personal history of pancreatitis. She denies any lumps in throat/trouble swallowing.     Relevant Past Medical History and Co-morbidities  Past Medical History:   Diagnosis Date   • Broken ankle    • Hypothyroidism      Social History     Socioeconomic History   • Marital status:    Tobacco Use   • Smoking status: Never   • Smokeless tobacco: Never   Vaping Use   • Vaping Use: Never used   Substance and Sexual Activity   • Alcohol use: No   • Drug use: No   • Sexual activity: Defer       Allergies  Tape, Codeine, and Hydrocodone    Current Medication List    Current Outpatient Medications:   •  clonazePAM (KlonoPIN) 1 MG tablet, Take 1 tablet by mouth 2 (Two) Times a Day As Needed for Anxiety., Disp: 60 tablet, Rfl: 0  •  cyanocobalamin 1000 MCG/ML injection, Inject 1,000 mcg into the appropriate muscle as directed by prescriber Every 30 (Thirty) Days., Disp: , Rfl:   •  DULoxetine (Cymbalta) 60 MG capsule, Take 1 capsule by mouth Daily., Disp: 30 capsule, Rfl: 2  •  levothyroxine (Synthroid) 50 MCG tablet, Take 1 tablet by mouth Daily., Disp: 30 tablet, Rfl: 5  •  liothyronine (Cytomel) 25 MCG tablet, Take 1 tablet by mouth Daily., Disp: 30 tablet, Rfl: 5  •  ondansetron (Zofran) 4 MG tablet, Take 1 tablet by mouth Every 8 (Eight) Hours As Needed for Nausea or Vomiting., Disp: 30 tablet, Rfl: 0  •  promethazine (PHENERGAN) 25  "MG tablet, Take 1 tablet by mouth Every 6 (Six) Hours As Needed for Nausea or Vomiting., Disp: 60 tablet, Rfl: 1  •  RABEprazole (ACIPHEX) 20 MG EC tablet, Take 1 tablet by mouth 2 (Two) Times a Day., Disp: 60 tablet, Rfl: 5  •  Tirzepatide (Mounjaro) 2.5 MG/0.5ML solution pen-injector, Inject 2.5 mg under the skin into the appropriate area as directed 1 (One) Time Per Week., Disp: 2 mL, Rfl: 0  •  tiZANidine (ZANAFLEX) 4 MG tablet, Take 1 tablet by mouth 3 (Three) Times a Day As Needed for Muscle Spasms., Disp: 90 tablet, Rfl: 5  •  traMADol (ULTRAM) 50 MG tablet, TAKE ONE TABLET BY MOUTH TWICE DAILY., Disp: 60 tablet, Rfl: 1  •  tretinoin (RETIN-A) 0.1 % cream, Apply 1 application topically to the appropriate area as directed Every Night., Disp: 45 g, Rfl: 5  •  triamcinolone (KENALOG) 0.1 % ointment, Apply 1 application topically to the appropriate area as directed 2 (Two) Times a Day., Disp: 30 g, Rfl: 1  •  vitamin D3 (Vitamin D) 125 MCG (5000 UT) capsule capsule, Take 1 capsule by mouth Daily., Disp: 30 capsule, Rfl: 11    Drug Interactions  Tramadol + Mounjaro: Tramadol can increase risk of hypoglycemia  Synthroid + Mounjaro: Mounjaro can increase concentration of Levothyroxine. Increase frequency of monitoring thyroid labs    Relevant Laboratory Values  Lab Results   Component Value Date    CHOL 150 07/14/2022    TRIG 45 07/14/2022    HDL 56 07/14/2022    LDL 84 07/14/2022     There is no height or weight on file to calculate BMI.    Vaccinations:   Patient recommended to keep up with routine vaccinations.     Goals of Therapy  • Clinical Goals or Therapeutic Targets: 10% weight loss goal      Date 9/29/22 10/28/22      Weight (lb) 217 lbs 210 lb      BMI kg/ 35.04 33.9      Waist Circumference (in)  41.5\" 40.5 in          Medication Assessment & Plan    Patient's current BMI is 33.9, which is considered Class I Obesity.  She has lost 7 lbs, congratulated her on her success!     Will order Saxenda 0.6mg SC " daily, increasing by 0.6mg weekly. Medication counseling and injection training provided by Betsy Middleton PharmD Candidate.     Discussed lifestyle modifications, including diet and exercise.  Patient education provided.     Worked with patient to create SMART Goal(s):   1. Increase water intake to 40 oz a day   2. Be more mindful with portion size of carbohydrates, specifically bread.     Will follow-up with patient in clinic in 4 weeks.     Liv Perez PharmD  10/28/2022  15:32 EDT

## 2022-11-14 ENCOUNTER — OFFICE VISIT (OUTPATIENT)
Dept: FAMILY MEDICINE CLINIC | Facility: CLINIC | Age: 37
End: 2022-11-14

## 2022-11-14 VITALS
BODY MASS INDEX: 34.23 KG/M2 | SYSTOLIC BLOOD PRESSURE: 115 MMHG | HEIGHT: 66 IN | TEMPERATURE: 98.2 F | DIASTOLIC BLOOD PRESSURE: 70 MMHG | OXYGEN SATURATION: 98 % | WEIGHT: 213 LBS | HEART RATE: 103 BPM

## 2022-11-14 DIAGNOSIS — E66.09 CLASS 1 OBESITY DUE TO EXCESS CALORIES WITHOUT SERIOUS COMORBIDITY WITH BODY MASS INDEX (BMI) OF 31.0 TO 31.9 IN ADULT: Primary | ICD-10-CM

## 2022-11-14 DIAGNOSIS — Z79.899 HIGH RISK MEDICATION USE: ICD-10-CM

## 2022-11-14 DIAGNOSIS — F41.1 GENERALIZED ANXIETY DISORDER: ICD-10-CM

## 2022-11-14 DIAGNOSIS — M51.26 LUMBAR DISC HERNIATION: ICD-10-CM

## 2022-11-14 DIAGNOSIS — E03.4 HYPOTHYROIDISM DUE TO ACQUIRED ATROPHY OF THYROID: ICD-10-CM

## 2022-11-14 DIAGNOSIS — F41.0 PANIC DISORDER WITHOUT AGORAPHOBIA: ICD-10-CM

## 2022-11-14 DIAGNOSIS — E53.8 B12 DEFICIENCY: ICD-10-CM

## 2022-11-14 PROCEDURE — 99214 OFFICE O/P EST MOD 30 MIN: CPT | Performed by: NURSE PRACTITIONER

## 2022-11-14 PROCEDURE — 96372 THER/PROPH/DIAG INJ SC/IM: CPT | Performed by: NURSE PRACTITIONER

## 2022-11-14 RX ORDER — CYANOCOBALAMIN 1000 UG/ML
1000 INJECTION, SOLUTION INTRAMUSCULAR; SUBCUTANEOUS
Status: SHIPPED | OUTPATIENT
Start: 2022-11-14

## 2022-11-14 RX ORDER — LEVOTHYROXINE SODIUM 0.07 MG/1
TABLET ORAL
COMMUNITY
Start: 2022-10-20 | End: 2022-12-27

## 2022-11-14 RX ORDER — TRAMADOL HYDROCHLORIDE 50 MG/1
50 TABLET ORAL 2 TIMES DAILY
Qty: 60 TABLET | Refills: 1 | Status: SHIPPED | OUTPATIENT
Start: 2022-11-14 | End: 2022-12-14 | Stop reason: SDUPTHER

## 2022-11-14 RX ORDER — CYANOCOBALAMIN 1000 UG/ML
1000 INJECTION, SOLUTION INTRAMUSCULAR; SUBCUTANEOUS
Status: DISCONTINUED | OUTPATIENT
Start: 2022-11-14 | End: 2022-11-14

## 2022-11-14 RX ORDER — CLONAZEPAM 1 MG/1
1 TABLET ORAL 2 TIMES DAILY PRN
Qty: 60 TABLET | Refills: 0 | Status: SHIPPED | OUTPATIENT
Start: 2022-11-14 | End: 2022-12-14 | Stop reason: SDUPTHER

## 2022-11-14 RX ADMIN — CYANOCOBALAMIN 1000 MCG: 1000 INJECTION, SOLUTION INTRAMUSCULAR; SUBCUTANEOUS at 18:03

## 2022-11-14 NOTE — PROGRESS NOTES
"Subjective   Jarad Barragan is a 37 y.o. female.     Chief Complaint   Patient presents with   • Hypothyroidism       History of Present Illness     Hypothyroidism-ongoing.  She is on Cytomel and Synthroid 75 mcg.   She is tolerating well.   She reports she continues to not feel right.  She continues to be fatigued and run down.  No energy.  She has an appt on 12/27/2022 in [place].   She reports that the less medication she is on the worse she feels.   Anxiety-she reports doing \"ok\".  She is on Cymbalta 60 mg and Klonopin 1 mg BID PRN. She denies any negative side effects.  Anxiety symptoms are stable.  She does not have any concerns today.  Obesity-ongoing. She reports that mounjaro was doing well.  She has not been able to get ozempic or saxenda.  She reports that she noted 7# loss with medication and an inch around her waist.  She has been on Adipex but has reached maximum efficacy.  She also was not able to tolerate taking the whole pill daily for long periods of time.  Fatigue-would like to obtain her B12 injection today.  She reports that she forgot to get 1 last month at her appointment and has been very tired this month.  Back pain-reports it continues to be present but does seem to be less intense than at her appointment last month she reports that she had bent over to do something and had a loud popping in her back.  She reports that since that time her pain has been less intense.  She continues to use tramadol as needed she denies any negative side effects.    The following portions of the patient's history were reviewed and updated as appropriate: CC, ROS, allergies, current medications, past family history, past medical history, past social history, past surgical history and problem list.      Review of Systems   Constitutional: Positive for fatigue. Negative for appetite change and fever.   HENT: Negative for congestion, ear pain, nosebleeds, postnasal drip, rhinorrhea, sore throat, tinnitus, trouble " "swallowing and voice change.    Eyes: Negative for blurred vision, photophobia and visual disturbance.   Respiratory: Negative for cough, chest tightness, shortness of breath and wheezing.    Cardiovascular: Negative for chest pain and palpitations.   Gastrointestinal: Negative for abdominal pain, blood in stool, constipation, diarrhea, nausea and GERD.   Endocrine: Negative for cold intolerance, heat intolerance and polydipsia.   Genitourinary: Negative for decreased urine volume, difficulty urinating, dysuria and hematuria.   Musculoskeletal: Positive for arthralgias and back pain (but does seem less right now). Negative for gait problem and myalgias.   Skin: Negative for color change, pallor and wound.   Allergic/Immunologic: Negative.    Neurological: Negative for dizziness, syncope, numbness and headache.   Hematological: Negative.    Psychiatric/Behavioral: Positive for depressed mood and stress. Negative for decreased concentration, sleep disturbance and suicidal ideas. The patient is nervous/anxious.    All other systems reviewed and are negative.      Objective     /70   Pulse 103   Temp 98.2 °F (36.8 °C) (Temporal)   Ht 167.6 cm (65.98\")   Wt 96.6 kg (213 lb)   SpO2 98%   BMI 34.40 kg/m²     Physical Exam  Vitals reviewed.   Constitutional:       General: She is not in acute distress.     Appearance: She is well-developed. She is obese. She is not diaphoretic.   HENT:      Head: Normocephalic and atraumatic.      Jaw: No tenderness.      Comments: Oropharynx not examined.  Patient is presently wearing a face covering/mask due to COVID-19 pandemic.     Right Ear: Hearing, tympanic membrane, ear canal and external ear normal.      Left Ear: Hearing, tympanic membrane, ear canal and external ear normal.   Eyes:      General: Lids are normal. No scleral icterus.     Extraocular Movements:      Right eye: Normal extraocular motion and no nystagmus.      Left eye: Normal extraocular motion and no " nystagmus.      Conjunctiva/sclera: Conjunctivae normal.      Pupils: Pupils are equal, round, and reactive to light.   Neck:      Thyroid: No thyromegaly or thyroid tenderness.      Vascular: No carotid bruit or JVD.      Trachea: No tracheal tenderness.   Cardiovascular:      Rate and Rhythm: Normal rate and regular rhythm.      Pulses:           Dorsalis pedis pulses are 2+ on the right side and 2+ on the left side.        Posterior tibial pulses are 2+ on the right side and 2+ on the left side.      Heart sounds: Normal heart sounds, S1 normal and S2 normal. No murmur heard.  Pulmonary:      Effort: Pulmonary effort is normal. No accessory muscle usage, prolonged expiration or respiratory distress.      Breath sounds: Normal breath sounds.   Chest:      Chest wall: No tenderness.   Abdominal:      General: Bowel sounds are normal. There is no distension.      Palpations: Abdomen is soft. There is no mass.      Tenderness: There is no abdominal tenderness.   Musculoskeletal:         General: No tenderness.      Cervical back: Normal range of motion and neck supple.      Lumbar back: Spasms present. Decreased range of motion. Positive right straight leg raise test and positive left straight leg raise test.      Right lower leg: No edema.      Left lower leg: No edema.      Comments: No muscular atrophy or flaccidity.   Lymphadenopathy:      Head:      Right side of head: No submental or submandibular adenopathy.      Left side of head: No submental or submandibular adenopathy.      Cervical: No cervical adenopathy.      Right cervical: No superficial cervical adenopathy.     Left cervical: No superficial cervical adenopathy.   Skin:     General: Skin is warm and dry.      Capillary Refill: Capillary refill takes less than 2 seconds.      Coloration: Skin is not jaundiced or pale.      Findings: No erythema.      Nails: There is no clubbing.   Neurological:      Mental Status: She is alert and oriented to person,  place, and time.      Cranial Nerves: No cranial nerve deficit or facial asymmetry.      Sensory: No sensory deficit.      Motor: No weakness, tremor, atrophy or abnormal muscle tone.      Coordination: Coordination normal.      Gait: Gait abnormal (mildly antalgic).      Deep Tendon Reflexes: Reflexes are normal and symmetric.   Psychiatric:         Attention and Perception: She is attentive.         Mood and Affect: Mood is anxious and depressed.         Speech: Speech normal.         Behavior: Behavior normal. Behavior is cooperative.         Thought Content: Thought content normal.         Cognition and Memory: Cognition normal.         Judgment: Judgment normal.         Diagnoses and all orders for this visit:    1. Class 1 obesity due to excess calories without serious comorbidity with body mass index (BMI) of 31.0 to 31.9 in adult (Primary)  Overview:  Beginning weight at 240 on 02/23/2021    Assessment & Plan:  Discussion regarding dietary changes.  Patient reports her weakness is bread and carbs.  She will think about ways that she can decrease her carb intake while increasing her lean protein intake.  Encouraged  g of protein.  Encouraged to think about protein powders and how she may add them to foods or drinks that she is already doing.      2. Hypothyroidism due to acquired atrophy of thyroid  Assessment & Plan:  Continue Synthroid 75 mcg as directed.  Report any negative side effects.  We will continue to monitor labs and adjust medications based on findings.  Report any heat or cold intolerances.  Report any changes to hair, skin, or nails.      3. Generalized anxiety disorder  Comments:  Continue PRN Klonopin  Orders:  -     clonazePAM (KlonoPIN) 1 MG tablet; Take 1 tablet by mouth 2 (Two) Times a Day As Needed for Anxiety.  Dispense: 60 tablet; Refill: 0    4. Panic disorder without agoraphobia  -     clonazePAM (KlonoPIN) 1 MG tablet; Take 1 tablet by mouth 2 (Two) Times a Day As Needed for  Anxiety.  Dispense: 60 tablet; Refill: 0    5. Lumbar disc herniation  Comments:  Continue tramadol.  Continue to avoid overuse activities.  Frequent position changes as able at work  Orders:  -     traMADol (ULTRAM) 50 MG tablet; Take 1 tablet by mouth 2 (Two) Times a Day.  Dispense: 60 tablet; Refill: 1    6. B12 deficiency  -     Discontinue: cyanocobalamin injection 1,000 mcg  -     cyanocobalamin injection 1,000 mcg       BMI is >= 30 and <35. (Class 1 Obesity). The following options were offered after discussion;: nutrition counseling/recommendations       Understands disease processes and need for medications.  Understands reasons for urgent and emergent care.  Patient (& family) verbalized agreement for treatment plan.   Emotional support and active listening provided.  Patient provided time to verbalize feelings.     [facility] reviewed today and consistent.  Will refill prescribed controlled medication today.  Patient is aware they cannot receive narcotics from any other provider except if under care of pain management or speciality clinic.  Risk and benefits of medication use has been reviewed.  History and physical exam exhibit continued safe and appropriate use of controlled substances.  The patient is aware of the potential for addiction and dependence.  This patient has been made aware of the appropriate use of such medications, including potential risk of somnolence, limited ability to drive and / or work safely, and potential for overdose.  Patient understands not to take medication and drive until they know how medication may affect their cognition/decision making.   It has also been made clear that these medications are for use by this patient only, without concomitant use of alcohol or other substances unless prescribed/advised by medical provider.  Patient understands they may be subject to UDS and pill counts at random.    Patient considered to be moderate risk for addiction due to use of multiple  controlled medications.  Patient understands and accepts these risks.  Patient need for medication will be reassessed at each visit.  Doses will be adjusted according to patient need and findings.    Goal of TX: Patient will not have any adverse reactions of medication.  Patient will have reduction in there chronic back and joint pain symptoms with use of tramadol as needed as directed.  Patient will be able to remain active in an outside of their home with minimal to no interference from their pain symptoms.  Patient have reduction in anxiety symptoms with use of PRN Klonopin as directed.  Patient will be able to remain active in an outside of home with use of Klonopin for anxiety symptoms.    Patient presently taking benzodiazepines and opioids.  Patient understands to stagger doses medication.  Discussed with patient the risk for respiratory depression including respiratory related deaths.  Patient verbalizes understanding and understands not to take medications simultaneously.    Medication Dispense Information    Tramadol Hcl   Dispensed: 10/12/2022 12:00 AM   Written:  10/12/2022   Unit strength: 50MG   Days supply: 30   Quantity: 60 each   Pharmacy: [facility]   Authorizing provider: VIPUL SOSA   Received from: [facility] (Fill History)   Brand or Generic:  Unknown     Medication Dispense Information    Clonazepam   Dispensed: 10/12/2022 12:00 AM   Written:  10/11/2022   Unit strength: 1MG   Days supply: 30   Quantity: 60 each   Pharmacy: [facility]   Authorizing provider: VIPUL SOSA   Received from: [facility] (Fill History)   Brand or Generic:       RTC 1 month, sooner if needed.             This document has been electronically signed by:  LANA Quinn FNP-C Dragon disclaimer:  Part of this note may be an electronic transcription/translation of spoken language to printed text using the Dragon Dictation System.

## 2022-11-14 NOTE — PATIENT INSTRUCTIONS
Protein: 100 g/day.  Main protein foods including meat (like chicken and turkey), fish, cheese, eggs, nuts, and nut butters, seeds, tofu, and beans.  Protein shakes/drinks can help meet protein goal  Avoid lunch meat, hot dogs, sorenson, and sausage unless they do not contain sugar    Carbohydrates: 100 to 140 g/day, primarily from vegetables and fruits.  Focus on foods and rich complex carbohydrates such as vegetables, fruits, and beans.  Minimize breads, pasta, rice, and sweets.  These foods can expand and swell your stomach after you have eaten them.  Avoid high fructose corn syrup.    Good fats: Examples include salmon, avocado.  Choose olive oil, coconut oil, peanut oil, or real butter    Fluids/hydration: At least 64 fluid ounces daily  Water is the best choice  Green, black, or herbal tea and coffee.  Avoid adding sugar  Avoid carbohydrate and beverages to decrease expansion of your stomach including bloating and pain.  Use caution with artificial sweeteners and avoid if possible Stevia is the best option.    Foods to avoid:  Potatoes, corn, bread, rice, pasta, cookies, candy bars, chips, ice cream, oatmeal, calorie drinks, alcohol, breaded foods, fried foods

## 2022-11-15 NOTE — ASSESSMENT & PLAN NOTE
Discussion regarding dietary changes.  Patient reports her weakness is bread and carbs.  She will think about ways that she can decrease her carb intake while increasing her lean protein intake.  Encouraged  g of protein.  Encouraged to think about protein powders and how she may add them to foods or drinks that she is already doing.

## 2022-11-15 NOTE — ASSESSMENT & PLAN NOTE
Continue Synthroid 75 mcg as directed.  Report any negative side effects.  We will continue to monitor labs and adjust medications based on findings.  Report any heat or cold intolerances.  Report any changes to hair, skin, or nails.

## 2022-11-28 ENCOUNTER — APPOINTMENT (OUTPATIENT)
Dept: PHARMACY | Facility: HOSPITAL | Age: 37
End: 2022-11-28

## 2022-12-14 ENCOUNTER — OFFICE VISIT (OUTPATIENT)
Dept: FAMILY MEDICINE CLINIC | Facility: CLINIC | Age: 37
End: 2022-12-14

## 2022-12-14 VITALS — HEIGHT: 66 IN | BODY MASS INDEX: 34.23 KG/M2 | WEIGHT: 213 LBS

## 2022-12-14 DIAGNOSIS — F41.0 PANIC DISORDER WITHOUT AGORAPHOBIA: ICD-10-CM

## 2022-12-14 DIAGNOSIS — F41.1 GENERALIZED ANXIETY DISORDER: ICD-10-CM

## 2022-12-14 DIAGNOSIS — M51.26 LUMBAR DISC HERNIATION: ICD-10-CM

## 2022-12-14 DIAGNOSIS — E03.4 HYPOTHYROIDISM DUE TO ACQUIRED ATROPHY OF THYROID: Primary | ICD-10-CM

## 2022-12-14 PROCEDURE — 99214 OFFICE O/P EST MOD 30 MIN: CPT | Performed by: NURSE PRACTITIONER

## 2022-12-14 RX ORDER — CLONAZEPAM 1 MG/1
1 TABLET ORAL 2 TIMES DAILY PRN
Qty: 60 TABLET | Refills: 0 | Status: SHIPPED | OUTPATIENT
Start: 2022-12-14 | End: 2023-01-30 | Stop reason: SDUPTHER

## 2022-12-14 RX ORDER — TRAMADOL HYDROCHLORIDE 50 MG/1
50 TABLET ORAL 2 TIMES DAILY
Qty: 60 TABLET | Refills: 1 | Status: SHIPPED | OUTPATIENT
Start: 2022-12-14 | End: 2023-01-30 | Stop reason: SDUPTHER

## 2022-12-14 NOTE — PROGRESS NOTES
You have chosen to receive care through a telehealth visit.  Do you consent to use a video/audio connection for your medical care today? Yes    Patient has chosen to have a telehealth/video visit due to current pandemic/current public health emergency.  Patient is not recommended to present to the office for face-to-face evaluation.   This visit is live, real time audio and visual communication between the provider and the patient.      I did not complete this video visit with the patient using [employer].  This video visit was done with [employer] application.    Patient visit is for the following CC:     Chief Complaint   Patient presents with   • Anxiety       Brief HPI/ROS obtained as follows:    Sinus Complaint-reports that she has had an infection and she does feel it has improved but has been going at least a 1.5 weeks.  She reports some sneezing and PND.  No sore throat or ear pain.   She reports that she has the most symptoms in the AM.    Hair loss-reports that she is having extreme hair loss.  She has stopped Cytomel and is only taking Synthroid 75 mcg.  She will see the thyroid specialist and would like to have labs before her appt.  She reports she has been taking vitamins for nutrition.   She reports that she is taking Biotin and hair skin and nail vitamin.  She reports she is also using a liquid collagen.    Anxiety-chronic and ongoing.  Taking Klonopin 1 mg BID PRN.  No negative side effects.  She is also on Cymbalta.  No negative side effects.    GERD-stable with Aciphex.  No concerns today.     The following portions of the patient's history were reviewed and updated as appropriate: CC, ROS, allergies, current medications, past family history, past medical history, past social history, past surgical history and problem list.    Review of Systems   Constitutional: Positive for fatigue. Negative for appetite change and unexpected weight change.   HENT: Positive for congestion, postnasal drip and sneezing.  Negative for ear pain, nosebleeds, rhinorrhea, sore throat, trouble swallowing and voice change.    Eyes: Negative for photophobia, pain and visual disturbance.   Respiratory: Negative for cough, chest tightness, shortness of breath and wheezing.    Cardiovascular: Negative for chest pain and palpitations.   Gastrointestinal: Negative for abdominal pain, blood in stool, constipation, diarrhea and nausea.   Endocrine: Negative for cold intolerance and polydipsia.   Genitourinary: Negative for difficulty urinating, flank pain, frequency and hematuria.   Musculoskeletal: Positive for arthralgias, joint swelling and myalgias. Negative for back pain and gait problem.   Skin: Negative for color change and rash.   Allergic/Immunologic: Negative.    Neurological: Negative for dizziness, syncope, numbness and headaches.   Hematological: Negative.    Psychiatric/Behavioral: Negative for dysphoric mood, sleep disturbance and suicidal ideas. The patient is nervous/anxious.    All other systems reviewed and are negative.      Assessment     Physical Exam   Constitutional: She appears well-developed and well-nourished. No distress.   HENT:   Head: Normocephalic.   Right Ear: External ear normal.   Left Ear: External ear normal.   Nose: Nose normal.   Mouth/Throat: Oropharynx is clear and moist.   Eyes: Pupils are equal, round, and reactive to light. Conjunctivae and EOM are normal. No scleral icterus.   Neck: Neck normal appearance.No JVD present. No tracheal deviation present. No thyromegaly present.   Pulmonary/Chest: Effort normal.  No respiratory distress. She no audible wheeze...  Abdominal: Abdomen appears normal. Soft. She exhibits no distension and no visible mass.   Musculoskeletal: Normal range of motion.         General: Tenderness present.      Comments: Generalized low tenderness of back and across her hips   Neurological: She is alert. No cranial nerve deficit. Coordination normal.   Skin: Skin is warm and dry.  Capillary refill takes less than 2 seconds. She is not diaphoretic. No nail bed cyanosis or erythema. No pallor. Nails show no clubbing.   Psychiatric: Her speech is normal and behavior is normal. Judgment normal. She mood appears anxious. She mood appears normal. Her affect is not flattened and normal. Her behavior is normal. Thought content is normal. She does not express abnormal judgement. She exhibits a depressed mood. She is attentive.       Diagnoses and all orders for this visit:    1. Hypothyroidism due to acquired atrophy of thyroid (Primary)  Assessment & Plan:  Continue Synthroid 75 mcg as directed.  Report any negative side effects.  We will continue to monitor labs and adjust medications based on findings.  Report any heat or cold intolerances.  Report any changes to hair, skin, or nails.    Orders:  -     TSH  -     T4, Free  -     T3, Free  -     Thyroid Antibodies    2. Panic disorder without agoraphobia  Comments:  continue meds.  be active as able  Orders:  -     clonazePAM (KlonoPIN) 1 MG tablet; Take 1 tablet by mouth 2 (Two) Times a Day As Needed for Anxiety.  Dispense: 60 tablet; Refill: 0    3. Generalized anxiety disorder  Comments:  Continue PRN Klonopin  Orders:  -     clonazePAM (KlonoPIN) 1 MG tablet; Take 1 tablet by mouth 2 (Two) Times a Day As Needed for Anxiety.  Dispense: 60 tablet; Refill: 0    4. Lumbar disc herniation  Comments:  Continue tramadol.  Continue to avoid overuse activities.  Frequent position changes as able at work  Assessment & Plan:  Continue tramadol.  Continue Cymbalta 60 mg as directed.  Continue topical ointments as well as as needed heat and ice    Orders:  -     traMADol (ULTRAM) 50 MG tablet; Take 1 tablet by mouth 2 (Two) Times a Day.  Dispense: 60 tablet; Refill: 1      Patient instructed and advised to call if symptoms are increasing or new symptoms occur.    Understands reasons for urgent and emergent care.  Patient (& family) verbalized agreement for  treatment plan.     Generalized precautions advised including social distancing, hand washing, cough/sneeze hygiene.  Quarantine per CDC guidelines if exposed to illness.        PURVI/PMDP reviewed today and consistent.  Will refill prescribed controlled medication today.  Patient is aware they cannot receive narcotics from any other provider except if under care of pain management or speciality clinic.  Risk and benefits of medication use has been reviewed.  History and physical exam exhibit continued safe and appropriate use of controlled substances.  The patient is aware of the potential for addiction and dependence.  This patient has been made aware of the appropriate use of such medications, including potential risk of somnolence, limited ability to drive and / or work safely, and potential for overdose.    It has also been made clear that these medications are for use by this patient only, without concomitant use of alcohol or other substances unless prescribed/advised by medical provider.  Patient understands they may be subject to UDS and pill counts at random.      Patient considered to be moderate risk for addiction due to use of multiple controlled medications.  Patient understands and accepts these risks.  Patient need for medication will be reassessed at each visit.  Doses will be adjusted according to patient need and findings.    Goal of TX: Patient will not have any adverse reactions of medication.  Patient will have reduction in there chronic back and joint pain symptoms with use of tramadol as needed as directed.  Patient will be able to remain active in an outside of their home with minimal to no interference from their pain symptoms.  Patient will have reduction in anxiety symptoms with use of PRN Klonopin.  Patient will be able to remain active in an outside of her home without interference from anxiety symptoms.    Medication Dispense Information    Clonazepam   Dispensed: 11/15/2022 12:00 AM    Written:  2022   Unit strength: 1MG   Days supply: 30   Quantity: 60 each   Refills remainin   [facility]   Authorizing provider: VIPUL SOSA   Received from: [facility] PDM (Fill History)   Brand or Generic:       Medication Dispense Information    Tramadol Hcl   Dispensed: 11/15/2022 12:00 AM   Written:  10/12/2022   Unit strength: 50MG   Days supply: 30   Quantity: 60 each   Refills remainin   [facility]   Authorizing provider: VIPUL SOSA   Received from: [facility] PDM (Fill History)   Brand or Generic:       RTC 1 month, sooner if needed.       This is an audio and video enabled enabled telehealth encounter.      This visit was conducted with the provider in the office at [facility] and the patient in their home in a private area.    This visit/video call lasted:  20 minutes      This document has been electronically signed by:  LANA Quinn FNP-C    Dragon disclaimer:  Part of this note may be an electronic transcription/translation of spoken language to printed text using the Dragon Dictation System.

## 2022-12-16 ENCOUNTER — LAB (OUTPATIENT)
Dept: FAMILY MEDICINE CLINIC | Facility: CLINIC | Age: 37
End: 2022-12-16

## 2022-12-16 PROCEDURE — 84443 ASSAY THYROID STIM HORMONE: CPT | Performed by: NURSE PRACTITIONER

## 2022-12-16 PROCEDURE — 84481 FREE ASSAY (FT-3): CPT | Performed by: NURSE PRACTITIONER

## 2022-12-16 PROCEDURE — 86800 THYROGLOBULIN ANTIBODY: CPT | Performed by: NURSE PRACTITIONER

## 2022-12-16 PROCEDURE — 86376 MICROSOMAL ANTIBODY EACH: CPT | Performed by: NURSE PRACTITIONER

## 2022-12-16 PROCEDURE — 84439 ASSAY OF FREE THYROXINE: CPT | Performed by: NURSE PRACTITIONER

## 2022-12-17 LAB
T3FREE SERPL-MCNC: 2.46 PG/ML (ref 2–4.4)
T4 FREE SERPL-MCNC: 0.79 NG/DL (ref 0.93–1.7)
TSH SERPL DL<=0.05 MIU/L-ACNC: 4.05 UIU/ML (ref 0.27–4.2)

## 2022-12-19 LAB
THYROGLOB AB SERPL-ACNC: 1 IU/ML (ref 0–0.9)
THYROPEROXIDASE AB SERPL-ACNC: 300 IU/ML (ref 0–34)

## 2022-12-19 NOTE — PROGRESS NOTES
Patient notified of labs via [employer].  Patient message is as follows:      TSH is stable but T4 is abnormal.  Antibodies are not back yet.

## 2022-12-20 NOTE — PROGRESS NOTES
Patient notified of labs via [employer].  Patient message is as follows:      Thyroid antibiodies are elevated but stable.

## 2022-12-21 RX ORDER — LEVOTHYROXINE SODIUM 75 MCG
75 TABLET ORAL DAILY
Qty: 30 TABLET | Refills: 5 | Status: SHIPPED | OUTPATIENT
Start: 2022-12-21 | End: 2022-12-27

## 2022-12-27 ENCOUNTER — OFFICE VISIT (OUTPATIENT)
Dept: ENDOCRINOLOGY | Facility: CLINIC | Age: 37
End: 2022-12-27
Payer: COMMERCIAL

## 2022-12-27 VITALS
HEIGHT: 66 IN | HEART RATE: 96 BPM | DIASTOLIC BLOOD PRESSURE: 80 MMHG | BODY MASS INDEX: 35.68 KG/M2 | SYSTOLIC BLOOD PRESSURE: 128 MMHG | WEIGHT: 222 LBS | OXYGEN SATURATION: 98 %

## 2022-12-27 DIAGNOSIS — E03.8 HYPOTHYROIDISM DUE TO HASHIMOTO'S THYROIDITIS: Primary | ICD-10-CM

## 2022-12-27 DIAGNOSIS — E06.3 HYPOTHYROIDISM DUE TO HASHIMOTO'S THYROIDITIS: Primary | ICD-10-CM

## 2022-12-27 PROCEDURE — 99214 OFFICE O/P EST MOD 30 MIN: CPT | Performed by: INTERNAL MEDICINE

## 2022-12-27 RX ORDER — LEVOTHYROXINE SODIUM 100 MCG
100 TABLET ORAL EVERY MORNING
Qty: 90 TABLET | Refills: 1
Start: 2022-12-27 | End: 2023-02-21 | Stop reason: SDUPTHER

## 2022-12-27 NOTE — ASSESSMENT & PLAN NOTE
Continue tramadol.  Continue Cymbalta 60 mg as directed.  Continue topical ointments as well as as needed heat and ice

## 2022-12-27 NOTE — PROGRESS NOTES
Chief Complaint   Patient presents with   • Hypothyroidism     Follow up        HPI:   Jarad Barragan is a 37 y.o.female who presents to Endocrine Clinic for evaluation of her hypothyroidism due to Hashimoto's thyroiditis. Last clinic visit with  Endocrinology was 01/12/2022 with colleague LANA Ramirez in [place] who pt only saw once. Her history is as follows:    1) hypothyroidism due to Hashimoto's thyroiditis:  -Patient was initially diagnosed with hypothyroidism in approximately 2013 or 2014.  She recalled having her thyroid levels checked to further evaluate an abnormal EKG. her initial abnormal TSH was completed at [facility].  Records of that initial TSH were not available for review.   -She has been taking variable doses of thyroid hormone prescribed by her PCP over the last few years.  Part of the variation in dosing was related to a 50 pound weight loss in September 2021 after COVID.  She then gained that weight back.   FMHx: No known thyroid cancer, patient has multiple cousins with history of hypothyroidism    Current regimen prescribed by her PCP: Levothyroxine 75 mcg + liothyronine 5 mcg  - Patient takes in a.m. on an empty stomach with water  - She waits approximately 45 to 60 minutes before taking her PPI or eating.  She does not have hot liquids in the morning with her medication.   - Patient denies missed doses  - Patient started a biotin supplement only 3 weeks ago    On further review,   - She has not tried brand Synthroid yet. Synthroid 75 mcg was prescribed by her PCP but she did not  this prescription in anticipation of today's visit  - She has tried [place] [facility] in the past  - Patient stated she felt better when her TSH was below 2.00  - Patient has a history of intermittent nausea, diarrhea, and dyspepsia    Review of Systems   Constitutional: Positive for fatigue.   HENT: Negative.    Eyes: Negative.    Respiratory: Negative.    Cardiovascular: Negative.     Gastrointestinal: Positive for diarrhea (Intermittent) and nausea (Intermittent).        Heartburn   Endocrine: Negative.    Genitourinary: Negative.    Musculoskeletal: Positive for arthralgias.   Skin: Negative.    Allergic/Immunologic: Negative.    Neurological: Negative.    Hematological: Negative.    Psychiatric/Behavioral: Positive for decreased concentration.       The following portions of the patient's history were reviewed and updated as appropriate: allergies, current medications, past family history, past medical history, past social history, past surgical history and problem list.    /80   Pulse 96   Ht 167.6 cm (66\")   Wt 101 kg (222 lb)   SpO2 98%   BMI 35.83 kg/m²   Physical Exam  Vitals reviewed.   Constitutional:       General: She is not in acute distress.     Appearance: She is well-developed. She is not diaphoretic.   HENT:      Head: Normocephalic.   Eyes:      Conjunctiva/sclera: Conjunctivae normal.      Pupils: Pupils are equal, round, and reactive to light.   Neck:      Thyroid: No thyromegaly.      Trachea: No tracheal deviation.      Comments: No palpable thyroid nodules  Patient has a firm lobular gland consistent with Hashimoto's thyroiditis  Cardiovascular:      Rate and Rhythm: Normal rate and regular rhythm.      Heart sounds: Normal heart sounds. No murmur heard.  Pulmonary:      Effort: Pulmonary effort is normal. No respiratory distress.      Breath sounds: Normal breath sounds.   Abdominal:      Palpations: Abdomen is soft.   Lymphadenopathy:      Cervical: No cervical adenopathy.   Skin:     General: Skin is warm and dry.      Findings: No erythema.   Neurological:      Mental Status: She is alert and oriented to person, place, and time.      Cranial Nerves: No cranial nerve deficit.   Psychiatric:         Behavior: Behavior normal.         LABS/IMAGING:   Results for orders placed or performed in visit on 12/14/22   TSH    Specimen: Arm, Right; Blood   Result Value  Ref Range    TSH 4.050 0.270 - 4.200 uIU/mL   T4, Free    Specimen: Arm, Right; Blood   Result Value Ref Range    Free T4 0.79 (L) 0.93 - 1.70 ng/dL   T3, Free    Specimen: Arm, Right; Blood   Result Value Ref Range    T3, Free 2.46 2.00 - 4.40 pg/mL   Thyroid Antibodies    Specimen: Arm, Right; Blood   Result Value Ref Range    Thyroid Peroxidase Antibody 300 (H) 0 - 34 IU/mL    Thyroglobulin Ab 1.0 (H) 0.0 - 0.9 IU/mL       ASSESSMENT/PLAN:  1) hypothyroidism due to Hashimoto's thyroiditis:  - Although clinically euthyroid exam, patient has persistent fatigue  - Recent labs on her current thyroid regimen of levothyroxine 75 mcg plus liothyronine 5 mcg showed her TSH to be 4.050,  free T4 level low at 0.79, and free T3 level WNL.  -Discussed treatment options with patient and have made the following plan:   -Will d/c liothyronine at this time.  -Because of patient's GI issues, she may be experiencing variable absorption of her thyroid hormone tablets.   -Have given her samples of brand-name Synthroid 100 mcg tablets.  Advised her to take 7 tablets once weekly to see if this helps with absorption and keep her TSH level stable with a goal < 2.50.  Discussed with patient that due to the lying half-life of levothyroxine, once weekly dosing can be safely administered.   - Patient to have TFTs completed locally at [facility] on 2/17/2023.  I will adjust the dose as indicated.   - Discussed with patient to Tirosint which is a gelcap formulation as well however this is not covered on her insurance.  We may consider this option at a later date if out-of-pocket cost is feasible.    RTC 6 months    Signed: Zulma Cardoza MD    Counseling was given to patient for the following topics:  diagnostic results, instructions for management, impressions and risks and benefits of treatment options, see details in assessment/plan. Total face to face time of the encounter was 30 minutes and 20 minutes was spent  counseling.

## 2022-12-28 ENCOUNTER — CLINICAL SUPPORT (OUTPATIENT)
Dept: FAMILY MEDICINE CLINIC | Facility: CLINIC | Age: 37
End: 2022-12-28

## 2022-12-28 DIAGNOSIS — E53.8 B12 DEFICIENCY: ICD-10-CM

## 2022-12-28 PROCEDURE — 96372 THER/PROPH/DIAG INJ SC/IM: CPT | Performed by: NURSE PRACTITIONER

## 2022-12-28 RX ADMIN — CYANOCOBALAMIN 1000 MCG: 1000 INJECTION, SOLUTION INTRAMUSCULAR; SUBCUTANEOUS at 12:21

## 2023-01-30 ENCOUNTER — OFFICE VISIT (OUTPATIENT)
Dept: FAMILY MEDICINE CLINIC | Facility: CLINIC | Age: 38
End: 2023-01-30
Payer: COMMERCIAL

## 2023-01-30 VITALS — BODY MASS INDEX: 35.68 KG/M2 | HEIGHT: 66 IN | WEIGHT: 222 LBS

## 2023-01-30 DIAGNOSIS — E55.9 VITAMIN D DEFICIENCY: ICD-10-CM

## 2023-01-30 DIAGNOSIS — F33.0 MILD EPISODE OF RECURRENT MAJOR DEPRESSIVE DISORDER: ICD-10-CM

## 2023-01-30 DIAGNOSIS — K21.9 GASTROESOPHAGEAL REFLUX DISEASE WITHOUT ESOPHAGITIS: ICD-10-CM

## 2023-01-30 DIAGNOSIS — F41.0 PANIC DISORDER WITHOUT AGORAPHOBIA: ICD-10-CM

## 2023-01-30 DIAGNOSIS — F41.1 GENERALIZED ANXIETY DISORDER: ICD-10-CM

## 2023-01-30 DIAGNOSIS — M51.26 LUMBAR DISC HERNIATION: ICD-10-CM

## 2023-01-30 PROCEDURE — 99214 OFFICE O/P EST MOD 30 MIN: CPT | Performed by: NURSE PRACTITIONER

## 2023-01-30 RX ORDER — CLONAZEPAM 1 MG/1
1 TABLET ORAL 2 TIMES DAILY PRN
Qty: 60 TABLET | Refills: 0 | Status: SHIPPED | OUTPATIENT
Start: 2023-01-30 | End: 2023-02-27 | Stop reason: SDUPTHER

## 2023-01-30 RX ORDER — RABEPRAZOLE SODIUM 20 MG/1
20 TABLET, DELAYED RELEASE ORAL 2 TIMES DAILY
Qty: 60 TABLET | Refills: 5 | Status: SHIPPED | OUTPATIENT
Start: 2023-01-30 | End: 2023-03-27 | Stop reason: SDUPTHER

## 2023-01-30 RX ORDER — PREDNISONE 20 MG/1
TABLET ORAL
Qty: 10 TABLET | Refills: 0 | Status: SHIPPED | OUTPATIENT
Start: 2023-01-30 | End: 2023-02-09

## 2023-01-30 RX ORDER — DULOXETIN HYDROCHLORIDE 60 MG/1
60 CAPSULE, DELAYED RELEASE ORAL DAILY
Qty: 30 CAPSULE | Refills: 2 | Status: SHIPPED | OUTPATIENT
Start: 2023-01-30 | End: 2023-03-27 | Stop reason: SDUPTHER

## 2023-01-30 RX ORDER — TIZANIDINE 4 MG/1
4 TABLET ORAL 3 TIMES DAILY PRN
Qty: 90 TABLET | Refills: 5 | Status: SHIPPED | OUTPATIENT
Start: 2023-01-30

## 2023-01-30 RX ORDER — TRAMADOL HYDROCHLORIDE 50 MG/1
50 TABLET ORAL 2 TIMES DAILY
Qty: 60 TABLET | Refills: 1 | Status: SHIPPED | OUTPATIENT
Start: 2023-01-30

## 2023-01-30 NOTE — PROGRESS NOTES
You have chosen to receive care through a telehealth visit.  Do you consent to use a video/audio connection for your medical care today? Yes    Patient has chosen to have a telehealth/video visit due to current pandemic/current public health emergency.  Patient is not recommended to present to the office for face-to-face evaluation.   This visit is live, real time audio and visual communication between the provider and the patient.      I did not complete this video visit with the patient using [employer].  This video visit was done with [employer] application.    Patient visit is for the following CC:     Chief Complaint   Patient presents with   • Hypothyroidism       Brief HPI/ROS obtained as follows:    Hypothyroid-ongoing.  She is currently on on synthroid 100 mcg 7 tablets weekly. No negative side effects.   She has been seen by Endocrinology.  She has taken the first round of 7 tablets.  She will have labs on Feb 17.   Back pain-reports she is exacerbated today.  She has been trying to work around her home so she has been bending and squatting more.  She reports symptoms have been ongoing about 2 weeks.  She reports just across her low back and hips.  She has been taking some tramadol but it is helping minimally.  She has been taking zanaflex.    Headaches-reports she has noted some increase in migraine like headache. She reports pounding HA and light is bothersome.  She reports some nausea at times  With HA.  She reports Tylenol and Ibuprofen do not help much.  She has not noted any changes in her vision.   She would like to monitor for this time.  She denies any increase or concerns in neck pain.  Anxiety-reports she is stable.  She is taking Cymbalta 60 mg and Klonopin 1 mg BID.  No concerns today.     The following portions of the patient's history were reviewed and updated as appropriate: CC, ROS, allergies, current medications, past family history, past medical history, past social history, past surgical  history and problem list.    Review of Systems   Constitutional: Positive for fatigue. Negative for activity change, appetite change, chills and fever.   HENT: Negative for congestion, ear discharge, ear pain, facial swelling, postnasal drip, rhinorrhea, sinus pressure, sinus pain, sore throat, tinnitus and trouble swallowing.    Eyes: Negative.    Respiratory: Negative for apnea, cough, choking, chest tightness, shortness of breath, wheezing and stridor.    Cardiovascular: Negative for chest pain, palpitations and leg swelling.   Gastrointestinal: Negative for abdominal pain, anal bleeding, blood in stool, constipation, diarrhea, nausea and vomiting.   Endocrine: Negative.    Genitourinary: Negative for dysuria, flank pain, frequency and hematuria.   Musculoskeletal: Positive for arthralgias, back pain, gait problem and myalgias. Negative for joint swelling and neck pain.   Skin: Negative.    Allergic/Immunologic: Negative.    Neurological: Negative for dizziness, seizures, numbness and headaches.   Hematological: Negative.    Psychiatric/Behavioral: Negative for decreased concentration, dysphoric mood, self-injury, sleep disturbance and suicidal ideas. The patient is not nervous/anxious.    All other systems reviewed and are negative.      Assessment     Physical Exam   Constitutional: She appears well-developed and well-nourished. No distress.   Patient lying in bed on her side for duration of the visit.   HENT:   Head: Normocephalic.   Right Ear: External ear normal.   Left Ear: External ear normal.   Nose: Nose normal.   Mouth/Throat: Oropharynx is clear and moist.   Eyes: Pupils are equal, round, and reactive to light. Conjunctivae and EOM are normal. No scleral icterus.   Neck: Neck normal appearance.No JVD present. No tracheal deviation present. No thyromegaly present.   Pulmonary/Chest: Effort normal.  No respiratory distress. She no audible wheeze...  Abdominal: Abdomen appears normal. Soft. She exhibits  no distension and no visible mass.   Musculoskeletal:         General: Tenderness present.      Lumbar back: She exhibits tenderness and pain.   Neurological: She is alert. No cranial nerve deficit. Coordination normal.   Skin: Skin is warm and dry. Capillary refill takes less than 2 seconds. She is not diaphoretic. No nail bed cyanosis or erythema. No pallor. Nails show no clubbing.   Psychiatric: She has a normal mood and affect. She mood appears normal. Her behavior is normal. Thought content is normal.       Diagnoses and all orders for this visit:    1. Gastroesophageal reflux disease without esophagitis  Comments:  Continue Aciphex.  Avoid food triggers  Orders:  -     RABEprazole (ACIPHEX) 20 MG EC tablet; Take 1 tablet by mouth 2 (Two) Times a Day.  Dispense: 60 tablet; Refill: 5    2. Panic disorder without agoraphobia  -     DULoxetine (Cymbalta) 60 MG capsule; Take 1 capsule by mouth Daily for 90 days.  Dispense: 30 capsule; Refill: 2  -     clonazePAM (KlonoPIN) 1 MG tablet; Take 1 tablet by mouth 2 (Two) Times a Day As Needed for Anxiety.  Dispense: 60 tablet; Refill: 0    3. Generalized anxiety disorder  Comments:  Continue Cymbalta and Klonopin.  Continue to work on stress reduction.  Be active as able   Orders:  -     DULoxetine (Cymbalta) 60 MG capsule; Take 1 capsule by mouth Daily for 90 days.  Dispense: 30 capsule; Refill: 2    4. Mild episode of recurrent major depressive disorder (HCC)  -     DULoxetine (Cymbalta) 60 MG capsule; Take 1 capsule by mouth Daily for 90 days.  Dispense: 30 capsule; Refill: 2    5. Panic disorder without agoraphobia  Comments:  continue meds.  be active as able  Orders:  -     DULoxetine (Cymbalta) 60 MG capsule; Take 1 capsule by mouth Daily for 90 days.  Dispense: 30 capsule; Refill: 2  -     clonazePAM (KlonoPIN) 1 MG tablet; Take 1 tablet by mouth 2 (Two) Times a Day As Needed for Anxiety.  Dispense: 60 tablet; Refill: 0    6. Lumbar disc  herniation  Comments:  Currently exacerbated.  Continue Flexeril as needed.  Refill on tramadol.  Continue as directed.  Short supply of prednisone for inflammation  Orders:  -     tiZANidine (ZANAFLEX) 4 MG tablet; Take 1 tablet by mouth 3 (Three) Times a Day As Needed for Muscle Spasms.  Dispense: 90 tablet; Refill: 5  -     traMADol (ULTRAM) 50 MG tablet; Take 1 tablet by mouth 2 (Two) Times a Day.  Dispense: 60 tablet; Refill: 1  -     predniSONE (DELTASONE) 20 MG tablet; 2 daily  Dispense: 10 tablet; Refill: 0    7. Vitamin D deficiency  -     vitamin D3 (Vitamin D) 125 MCG (5000 UT) capsule capsule; Take 1 capsule by mouth Daily.  Dispense: 30 capsule; Refill: 11        Patient instructed and advised to call if symptoms are increasing or new symptoms occur.    Understands reasons for urgent and emergent care.  Patient (& family) verbalized agreement for treatment plan.     Generalized precautions advised including social distancing, hand washing, cough/sneeze hygiene.  Quarantine per CDC guidelines if exposed to illness.      P[facility]/PMDP reviewed today and consistent.  Will refill prescribed controlled medication today.  Patient is aware they cannot receive narcotics from any other provider except if under care of pain management or speciality clinic.  Risk and benefits of medication use has been reviewed.  History and physical exam exhibit continued safe and appropriate use of controlled substances.  The patient is aware of the potential for addiction and dependence.  This patient has been made aware of the appropriate use of such medications, including potential risk of somnolence, limited ability to drive and / or work safely, and potential for overdose.    It has also been made clear that these medications are for use by this patient only, without concomitant use of alcohol or other substances unless prescribed/advised by medical provider.  Patient understands they may be subject to UDS and pill counts at  random.      Patient considered to be moderate risk for addiction due to use of multiple controlled medications.  Patient understands and accepts these risks.  Patient need for medication will be reassessed at each visit.  Doses will be adjusted according to patient need and findings.    Goal of TX: Patient will not have any adverse reactions of medication.  Patient will have reduction in anxiety symptoms with use of PRN Klonopin.  Patient will be able to remain active in an outside of her home without interference from anxiety symptoms.  Patient will have reduction in weight with use of Adipex as directed with simultaneous diet and lifestyle changes.  Patient will have reduction in there chronic back and joint pain symptoms with use of tramadol as needed as directed.  Patient will be able to remain active in an outside of their home with minimal to no interference from their pain symptoms.    Medication Dispense Information    Phentermine Hcl   Dispensed: 9/15/2022 12:00 AM   Written:  2022   Unit strength: 37.5MG   Days supply: 30   Quantity: 30 each   Refills remainin   Pharmacy: [facility]   Authorizing provider: VIPUL SOSA   Received from: [facility] (Fill History)   Brand or Generic:       Medication Dispense Information    Tramadol Hcl   Dispensed: 2022 12:00 AM   Written:  2022   Unit strength: 50MG   Days supply: 30   Quantity: 60 each   Refills remainin   Pharmacy: [facility]   Authorizing provider: VIPUL SOSA   Received from: [facility] (Fill History)   Brand or Generic:       Medication Dispense Information    Clonazepam   Dispensed: 2022 12:00 AM   Written:  2022   Unit strength: 1MG   Days supply: 30   Quantity: 60 each   Refills remainin   Pharmacy: [facility]   Authorizing provider: VIPUL SOSA   Received from: [facility] (Fill History)   Brand or Generic:       RTC 1 month, sooner if needed.       This is an audio and video  enabled enabled telehealth encounter.      This visit was conducted with the provider in the office at [facility] and the patient in their home in a private area.    This visit/video call lasted:  18 minutes      This document has been electronically signed by:  LANA Quinn FNP-C Dragon disclaimer:  Part of this note may be an electronic transcription/translation of spoken language to printed text using the Dragon Dictation System.

## 2023-02-15 ENCOUNTER — CLINICAL SUPPORT (OUTPATIENT)
Dept: FAMILY MEDICINE CLINIC | Facility: CLINIC | Age: 38
End: 2023-02-15
Payer: COMMERCIAL

## 2023-02-15 ENCOUNTER — PATIENT MESSAGE (OUTPATIENT)
Dept: FAMILY MEDICINE CLINIC | Facility: CLINIC | Age: 38
End: 2023-02-15

## 2023-02-15 DIAGNOSIS — E53.8 B12 DEFICIENCY: ICD-10-CM

## 2023-02-15 PROCEDURE — 96372 THER/PROPH/DIAG INJ SC/IM: CPT | Performed by: NURSE PRACTITIONER

## 2023-02-15 RX ADMIN — CYANOCOBALAMIN 1000 MCG: 1000 INJECTION, SOLUTION INTRAMUSCULAR; SUBCUTANEOUS at 10:09

## 2023-02-15 NOTE — PROGRESS NOTES
Injection  Injection performed in left deltoid by Joya Mcallister MA. Patient tolerated the procedure well without complications.  02/15/23   Joya Mcallister MA

## 2023-02-16 ENCOUNTER — TELEPHONE (OUTPATIENT)
Dept: FAMILY MEDICINE CLINIC | Facility: CLINIC | Age: 38
End: 2023-02-16

## 2023-02-16 ENCOUNTER — TELEPHONE (OUTPATIENT)
Dept: FAMILY MEDICINE CLINIC | Facility: CLINIC | Age: 38
End: 2023-02-16
Payer: COMMERCIAL

## 2023-02-16 NOTE — TELEPHONE ENCOUNTER
PHONE CALL FROM PATIENT.  SHE RECEIVES INJECTIONS AT THE WEIGHT LOSS CLINIC AND NEEDS A REFERRAL FOR THIS.      PLEASE CALL [phone]

## 2023-02-21 ENCOUNTER — TELEPHONE (OUTPATIENT)
Dept: FAMILY MEDICINE CLINIC | Facility: CLINIC | Age: 38
End: 2023-02-21

## 2023-02-21 DIAGNOSIS — E06.3 HYPOTHYROIDISM DUE TO HASHIMOTO'S THYROIDITIS: ICD-10-CM

## 2023-02-21 DIAGNOSIS — E03.8 HYPOTHYROIDISM DUE TO HASHIMOTO'S THYROIDITIS: ICD-10-CM

## 2023-02-21 RX ORDER — LEVOTHYROXINE SODIUM 100 MCG
100 TABLET ORAL EVERY MORNING
Qty: 90 TABLET | Refills: 1 | Status: SHIPPED | OUTPATIENT
Start: 2023-02-21 | End: 2023-02-24 | Stop reason: DRUGHIGH

## 2023-02-21 NOTE — TELEPHONE ENCOUNTER
I just sent her a MyChart but it looks like the [facility] sent her a 90 day supply in December with refills.  Will you see if that is at the pharmacy?

## 2023-02-21 NOTE — TELEPHONE ENCOUNTER
Caller: Jarad Barragan    Relationship: Self    Best call back number:  [phone]     Who are you requesting to speak with (clinical staff, provider,  specific staff member): CLINICAL NURSE     What was the call regarding: PATIENT SAID SHE DOESN'T DO HER BLOOD WORK WITH THE SPECIALIST FOR ANOTHER WEEK AND THEY GAVE HER SAMPLES OF SYNTHROID AND SHE IS OUT OF THE MEDICATION   PLEASE ADVISE   SHE DOESN'T THINK SHE SHOULD BE OFF THE MEDICATION FOR A WEEK BEFORE HER BLOOD WORK     Do you require a callback:  YES

## 2023-02-22 ENCOUNTER — CLINICAL SUPPORT (OUTPATIENT)
Dept: FAMILY MEDICINE CLINIC | Facility: CLINIC | Age: 38
End: 2023-02-22
Payer: COMMERCIAL

## 2023-02-22 DIAGNOSIS — K21.9 GASTROESOPHAGEAL REFLUX DISEASE WITHOUT ESOPHAGITIS: ICD-10-CM

## 2023-02-22 DIAGNOSIS — Z00.00 HEALTHCARE MAINTENANCE: ICD-10-CM

## 2023-02-22 DIAGNOSIS — E03.8 HYPOTHYROIDISM DUE TO HASHIMOTO'S THYROIDITIS: ICD-10-CM

## 2023-02-22 DIAGNOSIS — Z13.1 DIABETES MELLITUS SCREENING: ICD-10-CM

## 2023-02-22 DIAGNOSIS — E53.8 B12 DEFICIENCY: ICD-10-CM

## 2023-02-22 DIAGNOSIS — E06.3 HYPOTHYROIDISM DUE TO HASHIMOTO'S THYROIDITIS: ICD-10-CM

## 2023-02-22 DIAGNOSIS — E55.9 VITAMIN D DEFICIENCY: ICD-10-CM

## 2023-02-22 DIAGNOSIS — E66.09 CLASS 1 OBESITY DUE TO EXCESS CALORIES WITHOUT SERIOUS COMORBIDITY WITH BODY MASS INDEX (BMI) OF 31.0 TO 31.9 IN ADULT: Primary | ICD-10-CM

## 2023-02-22 PROCEDURE — 84443 ASSAY THYROID STIM HORMONE: CPT | Performed by: INTERNAL MEDICINE

## 2023-02-22 PROCEDURE — 80053 COMPREHEN METABOLIC PANEL: CPT | Performed by: NURSE PRACTITIONER

## 2023-02-22 PROCEDURE — 84439 ASSAY OF FREE THYROXINE: CPT | Performed by: INTERNAL MEDICINE

## 2023-02-22 PROCEDURE — 83036 HEMOGLOBIN GLYCOSYLATED A1C: CPT | Performed by: NURSE PRACTITIONER

## 2023-02-22 PROCEDURE — 36415 COLL VENOUS BLD VENIPUNCTURE: CPT | Performed by: NURSE PRACTITIONER

## 2023-02-22 PROCEDURE — 82607 VITAMIN B-12: CPT | Performed by: NURSE PRACTITIONER

## 2023-02-22 PROCEDURE — 80061 LIPID PANEL: CPT | Performed by: NURSE PRACTITIONER

## 2023-02-22 PROCEDURE — 85025 COMPLETE CBC W/AUTO DIFF WBC: CPT | Performed by: NURSE PRACTITIONER

## 2023-02-22 PROCEDURE — 82306 VITAMIN D 25 HYDROXY: CPT | Performed by: NURSE PRACTITIONER

## 2023-02-23 ENCOUNTER — TELEPHONE (OUTPATIENT)
Dept: FAMILY MEDICINE CLINIC | Facility: CLINIC | Age: 38
End: 2023-02-23

## 2023-02-23 LAB
25(OH)D3 SERPL-MCNC: 27.7 NG/ML (ref 30–100)
ALBUMIN SERPL-MCNC: 4.2 G/DL (ref 3.5–5.2)
ALBUMIN/GLOB SERPL: 2.1 G/DL
ALP SERPL-CCNC: 62 U/L (ref 39–117)
ALT SERPL W P-5'-P-CCNC: 10 U/L (ref 1–33)
ANION GAP SERPL CALCULATED.3IONS-SCNC: 8 MMOL/L (ref 5–15)
AST SERPL-CCNC: 12 U/L (ref 1–32)
BASOPHILS # BLD AUTO: 0.05 10*3/MM3 (ref 0–0.2)
BASOPHILS NFR BLD AUTO: 1.1 % (ref 0–1.5)
BILIRUB SERPL-MCNC: 0.6 MG/DL (ref 0–1.2)
BUN SERPL-MCNC: 14 MG/DL (ref 6–20)
BUN/CREAT SERPL: 17.1 (ref 7–25)
CALCIUM SPEC-SCNC: 8.5 MG/DL (ref 8.6–10.5)
CHLORIDE SERPL-SCNC: 103 MMOL/L (ref 98–107)
CHOLEST SERPL-MCNC: 154 MG/DL (ref 0–200)
CO2 SERPL-SCNC: 26 MMOL/L (ref 22–29)
CREAT SERPL-MCNC: 0.82 MG/DL (ref 0.57–1)
DEPRECATED RDW RBC AUTO: 38.8 FL (ref 37–54)
EGFRCR SERPLBLD CKD-EPI 2021: 93.4 ML/MIN/1.73
EOSINOPHIL # BLD AUTO: 0.07 10*3/MM3 (ref 0–0.4)
EOSINOPHIL NFR BLD AUTO: 1.5 % (ref 0.3–6.2)
ERYTHROCYTE [DISTWIDTH] IN BLOOD BY AUTOMATED COUNT: 12.3 % (ref 12.3–15.4)
GLOBULIN UR ELPH-MCNC: 2 GM/DL
GLUCOSE SERPL-MCNC: 109 MG/DL (ref 65–99)
HBA1C MFR BLD: 5.2 % (ref 4.8–5.6)
HCT VFR BLD AUTO: 35.7 % (ref 34–46.6)
HDLC SERPL-MCNC: 48 MG/DL (ref 40–60)
HGB BLD-MCNC: 12 G/DL (ref 12–15.9)
IMM GRANULOCYTES # BLD AUTO: 0.01 10*3/MM3 (ref 0–0.05)
IMM GRANULOCYTES NFR BLD AUTO: 0.2 % (ref 0–0.5)
LDLC SERPL CALC-MCNC: 93 MG/DL (ref 0–100)
LDLC/HDLC SERPL: 1.94 {RATIO}
LYMPHOCYTES # BLD AUTO: 1.33 10*3/MM3 (ref 0.7–3.1)
LYMPHOCYTES NFR BLD AUTO: 29.2 % (ref 19.6–45.3)
MCH RBC QN AUTO: 28.9 PG (ref 26.6–33)
MCHC RBC AUTO-ENTMCNC: 33.6 G/DL (ref 31.5–35.7)
MCV RBC AUTO: 86 FL (ref 79–97)
MONOCYTES # BLD AUTO: 0.25 10*3/MM3 (ref 0.1–0.9)
MONOCYTES NFR BLD AUTO: 5.5 % (ref 5–12)
NEUTROPHILS NFR BLD AUTO: 2.84 10*3/MM3 (ref 1.7–7)
NEUTROPHILS NFR BLD AUTO: 62.5 % (ref 42.7–76)
NRBC BLD AUTO-RTO: 0.2 /100 WBC (ref 0–0.2)
PLATELET # BLD AUTO: 298 10*3/MM3 (ref 140–450)
PMV BLD AUTO: 10.6 FL (ref 6–12)
POTASSIUM SERPL-SCNC: 3.7 MMOL/L (ref 3.5–5.2)
PROT SERPL-MCNC: 6.2 G/DL (ref 6–8.5)
RBC # BLD AUTO: 4.15 10*6/MM3 (ref 3.77–5.28)
SODIUM SERPL-SCNC: 137 MMOL/L (ref 136–145)
T4 FREE SERPL-MCNC: 1.01 NG/DL (ref 0.93–1.7)
TRIGL SERPL-MCNC: 65 MG/DL (ref 0–150)
TSH SERPL DL<=0.05 MIU/L-ACNC: 5.88 UIU/ML (ref 0.27–4.2)
VIT B12 BLD-MCNC: 616 PG/ML (ref 211–946)
VLDLC SERPL-MCNC: 13 MG/DL (ref 5–40)
WBC NRBC COR # BLD: 4.55 10*3/MM3 (ref 3.4–10.8)

## 2023-02-23 NOTE — PROGRESS NOTES
Patient notified of labs via [employer] Back[employer].  Patient message is as follows:      Vit D is low.  Need to resume supplements.  Blood sugar and A1C is normal.  Thyroid is elevated but those results when to the Endocrinology office so you will have to wait for orders from there.   Kidney and liver function are good.   Cholesterol is good.

## 2023-02-23 NOTE — TELEPHONE ENCOUNTER
Caller: Jarad Barragan    Relationship: Self    Best call back number: [phone]    What test was performed: LABS    When was the test performed: 2/22/23    Additional notes:     PLEASE CALL PATIENT WITH RESULTS

## 2023-02-24 DIAGNOSIS — E03.8 HYPOTHYROIDISM DUE TO HASHIMOTO'S THYROIDITIS: Primary | ICD-10-CM

## 2023-02-24 DIAGNOSIS — E06.3 HYPOTHYROIDISM DUE TO HASHIMOTO'S THYROIDITIS: Primary | ICD-10-CM

## 2023-02-24 RX ORDER — LEVOTHYROXINE SODIUM 0.12 MG/1
125 TABLET ORAL EVERY MORNING
Qty: 90 TABLET | Refills: 1 | Status: SHIPPED | OUTPATIENT
Start: 2023-02-24

## 2023-02-27 ENCOUNTER — OFFICE VISIT (OUTPATIENT)
Dept: FAMILY MEDICINE CLINIC | Facility: CLINIC | Age: 38
End: 2023-02-27
Payer: COMMERCIAL

## 2023-02-27 ENCOUNTER — TELEPHONE (OUTPATIENT)
Dept: FAMILY MEDICINE CLINIC | Facility: CLINIC | Age: 38
End: 2023-02-27

## 2023-02-27 VITALS
HEIGHT: 66 IN | DIASTOLIC BLOOD PRESSURE: 82 MMHG | HEART RATE: 100 BPM | RESPIRATION RATE: 20 BRPM | OXYGEN SATURATION: 99 % | SYSTOLIC BLOOD PRESSURE: 124 MMHG | BODY MASS INDEX: 37.12 KG/M2 | TEMPERATURE: 98.4 F | WEIGHT: 231 LBS

## 2023-02-27 DIAGNOSIS — Z79.899 LONG-TERM USE OF HIGH-RISK MEDICATION: ICD-10-CM

## 2023-02-27 DIAGNOSIS — E03.8 HYPOTHYROIDISM DUE TO HASHIMOTO'S THYROIDITIS: Primary | ICD-10-CM

## 2023-02-27 DIAGNOSIS — F41.0 PANIC DISORDER WITHOUT AGORAPHOBIA: ICD-10-CM

## 2023-02-27 DIAGNOSIS — E06.3 HYPOTHYROIDISM DUE TO HASHIMOTO'S THYROIDITIS: Primary | ICD-10-CM

## 2023-02-27 DIAGNOSIS — M51.26 LUMBAR DISC HERNIATION: ICD-10-CM

## 2023-02-27 DIAGNOSIS — E66.09 CLASS 2 OBESITY DUE TO EXCESS CALORIES WITHOUT SERIOUS COMORBIDITY WITH BODY MASS INDEX (BMI) OF 37.0 TO 37.9 IN ADULT: ICD-10-CM

## 2023-02-27 PROCEDURE — 96372 THER/PROPH/DIAG INJ SC/IM: CPT | Performed by: NURSE PRACTITIONER

## 2023-02-27 PROCEDURE — 99214 OFFICE O/P EST MOD 30 MIN: CPT | Performed by: NURSE PRACTITIONER

## 2023-02-27 RX ORDER — SEMAGLUTIDE 0.25 MG/.5ML
0.25 INJECTION, SOLUTION SUBCUTANEOUS WEEKLY
Qty: 2 ML | Refills: 5 | Status: SHIPPED | OUTPATIENT
Start: 2023-02-27

## 2023-02-27 RX ORDER — CLONAZEPAM 1 MG/1
1 TABLET ORAL 2 TIMES DAILY PRN
Qty: 60 TABLET | Refills: 0 | Status: SHIPPED | OUTPATIENT
Start: 2023-02-27 | End: 2023-03-13

## 2023-02-27 RX ORDER — KETOROLAC TROMETHAMINE 30 MG/ML
60 INJECTION, SOLUTION INTRAMUSCULAR; INTRAVENOUS ONCE
Status: COMPLETED | OUTPATIENT
Start: 2023-02-27 | End: 2023-02-27

## 2023-02-27 RX ADMIN — KETOROLAC TROMETHAMINE 60 MG: 30 INJECTION, SOLUTION INTRAMUSCULAR; INTRAVENOUS at 17:57

## 2023-02-27 NOTE — ASSESSMENT & PLAN NOTE
Continue Synthroid 125 mcg as directed.  Report any negative side effects.  We will continue to monitor labs and adjust medications based on findings.  Report any heat or cold intolerances.  Report any changes to hair, skin, or nails.

## 2023-02-27 NOTE — PROGRESS NOTES
Subjective   Jarad Barragan is a 37 y.o. female.     Chief Complaint   Patient presents with   • Back Pain       History of Present Illness     Back Pain-ongoing.  She is currently on PRN tramadol and zanaflex.  She reports her low back and hips are painful today more than usual but she has been trying to work around her home.    Thyroid disorder-currently on Synthroid 125 mcg.  She has had recent thyroid labs.   She is taking 7 tablets weekly on Tuesday for the weeks supply.   She continues to be followed by Endo.    Worsening obesity-continues to pursue weight loss but is not able to be very active.  She has gained approx 10# since her last visit.   She has had to have several medication adjustments for her thyroid.  GERD-chronic and ongoing. On Aciphex 20 mg.  No negative side effects.  No negative side effects of medication.  Patient does avoid foods that trigger reflux symptoms.  Denies any recent exacerbations.    The following portions of the patient's history were reviewed and updated as appropriate: CC, ROS, allergies, current medications, past family history, past medical history, past social history, past surgical history and problem list.      Review of Systems   Constitutional: Positive for fatigue and unexpected weight gain (continued unexpected gain). Negative for activity change, appetite change and fever.   HENT: Negative for congestion, ear pain, nosebleeds, postnasal drip, rhinorrhea, sore throat, tinnitus, trouble swallowing and voice change.    Eyes: Negative for blurred vision, photophobia and visual disturbance.   Respiratory: Negative for cough, chest tightness, shortness of breath and wheezing.    Cardiovascular: Negative for chest pain and palpitations.   Gastrointestinal: Negative for abdominal pain, blood in stool, constipation, diarrhea, nausea and GERD.   Endocrine: Negative for cold intolerance, heat intolerance and polydipsia.   Genitourinary: Negative for decreased urine volume,  "difficulty urinating, dysuria and hematuria.   Musculoskeletal: Positive for back pain and gait problem. Negative for arthralgias and myalgias.   Skin: Negative for color change, pallor and wound.   Allergic/Immunologic: Negative.    Neurological: Positive for headache. Negative for dizziness, syncope and numbness.   Hematological: Negative.    Psychiatric/Behavioral: Negative for decreased concentration, self-injury, sleep disturbance, suicidal ideas and depressed mood. The patient is not nervous/anxious.    All other systems reviewed and are negative.      Objective     /82   Pulse 100   Temp 98.4 °F (36.9 °C)   Resp 20   Ht 167.6 cm (65.98\")   Wt 105 kg (231 lb)   SpO2 99%   BMI 37.30 kg/m²     Physical Exam  Vitals reviewed.   Constitutional:       General: She is not in acute distress.     Appearance: She is well-developed. She is obese. She is not diaphoretic.   HENT:      Head: Normocephalic and atraumatic.      Jaw: No tenderness.      Comments: Oropharynx not examined.  Patient is presently wearing a face covering/mask due to COVID-19 pandemic.     Right Ear: Hearing, tympanic membrane, ear canal and external ear normal.      Left Ear: Hearing, tympanic membrane, ear canal and external ear normal.   Eyes:      General: Lids are normal. No scleral icterus.     Extraocular Movements:      Right eye: Normal extraocular motion and no nystagmus.      Left eye: Normal extraocular motion and no nystagmus.      Conjunctiva/sclera: Conjunctivae normal.      Pupils: Pupils are equal, round, and reactive to light.   Neck:      Thyroid: No thyromegaly or thyroid tenderness.      Vascular: No carotid bruit or JVD.      Trachea: No tracheal tenderness.   Cardiovascular:      Rate and Rhythm: Normal rate and regular rhythm.      Pulses:           Dorsalis pedis pulses are 2+ on the right side and 2+ on the left side.        Posterior tibial pulses are 2+ on the right side and 2+ on the left side.      Heart " sounds: Normal heart sounds, S1 normal and S2 normal. No murmur heard.  Pulmonary:      Effort: Pulmonary effort is normal. No accessory muscle usage, prolonged expiration or respiratory distress.      Breath sounds: Normal breath sounds.   Chest:      Chest wall: No tenderness.   Abdominal:      General: Bowel sounds are normal. There is no distension.      Palpations: Abdomen is soft. There is no hepatomegaly, splenomegaly or mass.      Tenderness: There is no abdominal tenderness.   Musculoskeletal:         General: Tenderness present.      Cervical back: Normal range of motion and neck supple.      Lumbar back: Tenderness present. Decreased range of motion.      Right hip: Decreased range of motion.      Left hip: Decreased range of motion.      Right lower leg: No edema.      Left lower leg: No edema.      Comments: No muscular atrophy or flaccidity.   Lymphadenopathy:      Head:      Right side of head: No submental or submandibular adenopathy.      Left side of head: No submental or submandibular adenopathy.      Cervical: No cervical adenopathy.      Right cervical: No superficial cervical adenopathy.     Left cervical: No superficial cervical adenopathy.   Skin:     General: Skin is warm and dry.      Capillary Refill: Capillary refill takes less than 2 seconds.      Coloration: Skin is not jaundiced or pale.      Findings: No erythema.      Nails: There is no clubbing.   Neurological:      Mental Status: She is alert and oriented to person, place, and time.      Cranial Nerves: No cranial nerve deficit or facial asymmetry.      Sensory: No sensory deficit.      Motor: No weakness, tremor, atrophy or abnormal muscle tone.      Coordination: Coordination normal.      Gait: Gait abnormal (mild antalgic).      Deep Tendon Reflexes: Reflexes are normal and symmetric.   Psychiatric:         Attention and Perception: She is attentive.         Mood and Affect: Mood normal. Mood is not anxious or depressed.          Speech: Speech normal.         Behavior: Behavior normal. Behavior is cooperative.         Thought Content: Thought content normal.         Cognition and Memory: Cognition normal.         Judgment: Judgment normal.           Diagnoses and all orders for this visit:    1. Hypothyroidism due to Hashimoto's thyroiditis (Primary)  Assessment & Plan:  Continue Synthroid 125 mcg as directed.  Report any negative side effects.  We will continue to monitor labs and adjust medications based on findings.  Report any heat or cold intolerances.  Report any changes to hair, skin, or nails.      2. Class 2 obesity due to excess calories without serious comorbidity with body mass index (BMI) of 37.0 to 37.9 in adult  Overview:  Beginning weight at 240 on 02/23/2021    Orders:  -     Semaglutide-Weight Management (Wegovy) 0.25 MG/0.5ML solution auto-injector; Inject 0.25 mg under the skin into the appropriate area as directed 1 (One) Time Per Week.  Dispense: 2 mL; Refill: 5    3. Lumbar disc herniation  -     ketorolac (TORADOL) injection 60 mg    4. Panic disorder without agoraphobia  Comments:  continue meds.  be active as able  Orders:  -     clonazePAM (KlonoPIN) 1 MG tablet; Take 1 tablet by mouth 2 (Two) Times a Day As Needed for Anxiety.  Dispense: 60 tablet; Refill: 0    5. Long-term use of high-risk medication  -     Urine Drug Screen - Urine, Clean Catch; Future       Class 2 Severe Obesity (BMI >=35 and <=39.9). Obesity-related health conditions include the following: GERD. Obesity is worsening. BMI is is above average. We discussed portion control and increasing exercise.       Understands disease processes and need for medications.  Understands reasons for urgent and emergent care.  Patient (& family) verbalized agreement for treatment plan.   Emotional support and active listening provided.  Patient provided time to verbalize feelings.  PUR[facility]/PMDP reviewed today and consistent.  Will refill prescribed controlled  medication today.  Patient is aware they cannot receive narcotics from any other provider except if under care of pain management or speciality clinic.  Risk and benefits of medication use has been reviewed.  History and physical exam exhibit continued safe and appropriate use of controlled substances.  The patient is aware of the potential for addiction and dependence.  This patient has been made aware of the appropriate use of such medications, including potential risk of somnolence, limited ability to drive and / or work safely, and potential for overdose.    It has also been made clear that these medications are for use by this patient only, without concomitant use of alcohol or other substances unless prescribed/advised by medical provider.  Patient understands they may be subject to UDS and pill counts at random.      Patient considered to be moderate risk for addiction due to use of multiple controlled medications.  Patient understands and accepts these risks.  Patient need for medication will be reassessed at each visit.  Doses will be adjusted according to patient need and findings.    Goal of TX: Patient will not have any adverse reactions of medication.  Patient will have reduction in anxiety symptoms with use of PRN Klonopin.  Patient will be able to remain active in an outside of her home without interference from anxiety symptoms.  Patient will have reduction in there chronic back and joint pain symptoms with use of tramadol as needed as directed.  Patient will be able to remain active in an outside of their home with minimal to no interference from their pain symptoms.    Medication Dispense Information    Tramadol Hcl   Dispensed: 2023 12:00 AM   Written:  2022   Unit strength: 50MG   Days supply: 30   Quantity: 60 each   Refills remainin   Pharmacy: [facility]   Authorizing provider: VIPUL SOSA   Received from: [facility] (Fill History)   Brand or Generic:       Medication  Dispense Information    Tramadol Hcl   Dispensed: 2023 12:00 AM   Written:  2022   Unit strength: 50MG   Days supply: 30   Quantity: 60 each   Refills remainin   Pharmacy: [facility]   Authorizing provider: VIPUL SOSA   Received from: [facility] (Fill History)   Brand or Generic:         RTC 1 month, sooner if needed.           This document has been electronically signed by:  LANA Quinn FNP-C Dragon disclaimer:  Part of this note may be an electronic transcription/translation of spoken language to printed text using the Dragon Dictation System.

## 2023-03-01 ENCOUNTER — TELEPHONE (OUTPATIENT)
Dept: FAMILY MEDICINE CLINIC | Facility: CLINIC | Age: 38
End: 2023-03-01
Payer: COMMERCIAL

## 2023-03-01 RX ORDER — CEFDINIR 300 MG/1
300 CAPSULE ORAL 2 TIMES DAILY
Qty: 20 CAPSULE | Refills: 0 | Status: SHIPPED | OUTPATIENT
Start: 2023-03-01 | End: 2023-03-27

## 2023-03-01 NOTE — TELEPHONE ENCOUNTER
----- Message from LANA Quinn sent at 3/1/2023  3:56 PM EST -----  Sent   ----- Message -----  From: Kendra Toscano MA  Sent: 3/1/2023   3:46 PM EST  To: LANA Quinn    Want antibiotic. Having congestion (nose), ear pain, sore throat.

## 2023-03-13 DIAGNOSIS — F41.0 PANIC DISORDER WITHOUT AGORAPHOBIA: ICD-10-CM

## 2023-03-13 RX ORDER — CLONAZEPAM 1 MG/1
TABLET ORAL
Qty: 60 TABLET | Refills: 0 | Status: SHIPPED | OUTPATIENT
Start: 2023-03-13 | End: 2023-03-27 | Stop reason: SDUPTHER

## 2023-03-16 RX ORDER — TOPIRAMATE 25 MG/1
TABLET ORAL
Qty: 15 TABLET | Refills: 0 | Status: SHIPPED | OUTPATIENT
Start: 2023-03-16 | End: 2023-03-27 | Stop reason: DRUGHIGH

## 2023-03-27 ENCOUNTER — OFFICE VISIT (OUTPATIENT)
Dept: FAMILY MEDICINE CLINIC | Facility: CLINIC | Age: 38
End: 2023-03-27
Payer: COMMERCIAL

## 2023-03-27 VITALS — WEIGHT: 231 LBS | BODY MASS INDEX: 37.12 KG/M2 | HEIGHT: 66 IN

## 2023-03-27 DIAGNOSIS — G44.89 OTHER HEADACHE SYNDROME: ICD-10-CM

## 2023-03-27 DIAGNOSIS — F41.0 PANIC DISORDER WITHOUT AGORAPHOBIA: Primary | ICD-10-CM

## 2023-03-27 DIAGNOSIS — K21.9 GASTROESOPHAGEAL REFLUX DISEASE WITHOUT ESOPHAGITIS: ICD-10-CM

## 2023-03-27 DIAGNOSIS — F41.1 GENERALIZED ANXIETY DISORDER: ICD-10-CM

## 2023-03-27 DIAGNOSIS — E55.9 VITAMIN D DEFICIENCY: ICD-10-CM

## 2023-03-27 DIAGNOSIS — F33.0 MILD EPISODE OF RECURRENT MAJOR DEPRESSIVE DISORDER: ICD-10-CM

## 2023-03-27 PROCEDURE — 99214 OFFICE O/P EST MOD 30 MIN: CPT | Performed by: NURSE PRACTITIONER

## 2023-03-27 RX ORDER — RABEPRAZOLE SODIUM 20 MG/1
20 TABLET, DELAYED RELEASE ORAL 2 TIMES DAILY
Qty: 60 TABLET | Refills: 5 | Status: SHIPPED | OUTPATIENT
Start: 2023-03-27

## 2023-03-27 RX ORDER — TOPIRAMATE 50 MG/1
50 TABLET, FILM COATED ORAL 2 TIMES DAILY
Qty: 60 TABLET | Refills: 0 | Status: SHIPPED | OUTPATIENT
Start: 2023-03-27

## 2023-03-27 RX ORDER — CLONAZEPAM 1 MG/1
1 TABLET ORAL 2 TIMES DAILY PRN
Qty: 60 TABLET | Refills: 0 | Status: SHIPPED | OUTPATIENT
Start: 2023-03-27

## 2023-03-27 RX ORDER — DULOXETIN HYDROCHLORIDE 60 MG/1
60 CAPSULE, DELAYED RELEASE ORAL DAILY
Qty: 30 CAPSULE | Refills: 2 | Status: SHIPPED | OUTPATIENT
Start: 2023-03-27 | End: 2023-06-25

## 2023-03-27 NOTE — PROGRESS NOTES
You have chosen to receive care through a telehealth visit.  Do you consent to use a video/audio connection for your medical care today? Yes    Patient has chosen to have a telehealth/video visit due to current pandemic/current public health emergency.  Patient is not recommended to present to the office for face-to-face evaluation.   This visit is live, real time audio and visual communication between the provider and the patient.      I did not complete this video visit with the patient using [employer].  This video visit was done with [employer] application.    Patient visit is for the following CC:     Chief Complaint   Patient presents with   • Anxiety       Brief HPI/ROS obtained as follows:    Anxiety-Doing Ok.  She is taking Klonopin 1 tab BID.  No negative side effects.  She continues Cymbalta as directed.    Weight loss-was not able to obtain Wegovy.  She reports it needed a PA.  She is not on Adipex currently.     Migraine/Headache-she reports she is still having HA.  She reports she has tolerated Topamax well.  She reports that severity does seem to be minimally less. She would be interested in a dose adjustment.    Thyroid-currently on 125 mcg.  She takes all 7 tablets at the same time weekly.  She was advised to hold any biotin products at least a week prior to her labs. she has noted some hair thinning.  She has tried to do collagen powder.     The following portions of the patient's history were reviewed and updated as appropriate: CC, ROS, allergies, current medications, past family history, past medical history, past social history, past surgical history and problem list.    Review of Systems   Constitutional: Positive for fatigue. Negative for activity change, appetite change and fever.   HENT: Negative for congestion, facial swelling, sinus pressure, sinus pain, sore throat, tinnitus and trouble swallowing.    Respiratory: Negative for cough, chest tightness and shortness of breath.    Cardiovascular:  Negative for chest pain, palpitations and leg swelling.   Gastrointestinal: Negative for abdominal pain, blood in stool, constipation (using OTC Docusate) and vomiting.   Endocrine:        Thinning hair   Genitourinary: Negative for dysuria, flank pain, frequency and hematuria.   Musculoskeletal: Positive for arthralgias, back pain and myalgias. Negative for neck pain.   Neurological: Positive for headaches. Negative for dizziness (occasional), seizures and numbness.   Psychiatric/Behavioral: Positive for dysphoric mood. Negative for self-injury, sleep disturbance and suicidal ideas. The patient is nervous/anxious.    All other systems reviewed and are negative.      Assessment     Physical Exam   Constitutional: She appears well-developed and well-nourished. No distress.   HENT:   Head: Normocephalic.   Right Ear: External ear normal.   Left Ear: External ear normal.   Nose: Nose normal.   Mouth/Throat: Oropharynx is clear and moist.   Eyes: Pupils are equal, round, and reactive to light. Conjunctivae and EOM are normal. No scleral icterus.   Neck: Neck normal appearance.No JVD present. No tracheal deviation present. No thyromegaly present.   Pulmonary/Chest: Effort normal.  No respiratory distress. She no audible wheeze...  Abdominal: Abdomen appears normal. Soft. She exhibits no distension and no visible mass.   Musculoskeletal:         General: Tenderness present.      Lumbar back: She exhibits tenderness and pain.   Neurological: She is alert. No cranial nerve deficit. Coordination normal.   Skin: Skin is warm and dry. Capillary refill takes less than 2 seconds. She is not diaphoretic. No nail bed cyanosis or erythema. No pallor. Nails show no clubbing.   Psychiatric: She has a normal mood and affect. She mood appears normal. Her behavior is normal. Thought content is normal.       Diagnoses and all orders for this visit:    1. Panic disorder without agoraphobia (Primary)  Comments:  continue Klonopin and  cymbalta as directed.  Orders:  -     clonazePAM (KlonoPIN) 1 MG tablet; Take 1 tablet by mouth 2 (Two) Times a Day As Needed for Anxiety. for anxiety  Dispense: 60 tablet; Refill: 0  -     DULoxetine (Cymbalta) 60 MG capsule; Take 1 capsule by mouth Daily for 90 days.  Dispense: 30 capsule; Refill: 2    2. Generalized anxiety disorder  Comments:  Continue Cymbalta and Klonopin.  Continue to work on stress reduction.  Be active as able   Orders:  -     DULoxetine (Cymbalta) 60 MG capsule; Take 1 capsule by mouth Daily for 90 days.  Dispense: 30 capsule; Refill: 2    3. Mild episode of recurrent major depressive disorder (HCC)  Comments:  Continue Cymbalta  Orders:  -     DULoxetine (Cymbalta) 60 MG capsule; Take 1 capsule by mouth Daily for 90 days.  Dispense: 30 capsule; Refill: 2    4. Gastroesophageal reflux disease without esophagitis  Comments:  Continue Aciphex.  Avoid food triggers  Orders:  -     RABEprazole (ACIPHEX) 20 MG EC tablet; Take 1 tablet by mouth 2 (Two) Times a Day.  Dispense: 60 tablet; Refill: 5    5. Vitamin D deficiency  -     vitamin D3 (Vitamin D) 125 MCG (5000 UT) capsule capsule; Take 1 capsule by mouth Daily.  Dispense: 30 capsule; Refill: 5    6. Other headache syndrome  Comments:  continue Vit D  Orders:  -     topiramate (Topamax) 50 MG tablet; Take 1 tablet by mouth 2 (Two) Times a Day.  Dispense: 60 tablet; Refill: 0        Patient instructed and advised to call if symptoms are increasing or new symptoms occur.    Understands reasons for urgent and emergent care.  Patient (& family) verbalized agreement for treatment plan.     Generalized precautions advised including social distancing, hand washing, cough/sneeze hygiene.  Quarantine per CDC guidelines if exposed to illness.        PURVI/PMDP reviewed today and consistent.  Will refill prescribed controlled medication today.  Patient is aware they cannot receive narcotics from any other provider except if under care of pain  management or speciality clinic.  Risk and benefits of medication use has been reviewed.  History and physical exam exhibit continued safe and appropriate use of controlled substances.  The patient is aware of the potential for addiction and dependence.  This patient has been made aware of the appropriate use of such medications, including potential risk of somnolence, limited ability to drive and / or work safely, and potential for overdose.    It has also been made clear that these medications are for use by this patient only, without concomitant use of alcohol or other substances unless prescribed/advised by medical provider.  Patient understands they may be subject to UDS and pill counts at random.      Patient considered to be moderate risk for addiction due to use of multiple controlled medications.  Patient understands and accepts these risks.  Patient need for medication will be reassessed at each visit.  Doses will be adjusted according to patient need and findings.    Goal of TX: Patient will not have any adverse reactions of medication.  Patient will have reduction in anxiety symptoms with use of PRN Klonopin.  Patient will be able to remain active in an outside of her home without interference from anxiety symptoms.  Patient will have reduction in there chronic back and joint pain symptoms with use of tramadol as needed as directed.  Patient will be able to remain active in an outside of their home with minimal to no interference from their pain symptoms.  Patient will have reduction in weight with use of Adipex as directed with simultaneous diet and lifestyle changes.    Medication Dispense Information    Clonazepam   Dispensed: 3/13/2023 12:00 AM   Written:  3/13/2023   Unit strength: 1MG   Days supply: 30   Quantity: 60 each   Refills remainin   Pharmacy: [facility]   Authorizing provider: VIPUL SOSA   Received from: [facility] PDMP (Fill History)   Brand or Generic:       Medication Dispense  Information    Clonazepam   Dispensed: 3/13/2023 12:00 AM   Written:  3/13/2023   Unit strength: 1MG   Days supply: 30   Quantity: 60 each   Refills remainin   [facility]   Authorizing provider: VIPUL SOSA   Received from: [facility] PDMP (Fill History)   Brand or Generic:       RTC 1 month, sooner if needed.       This is an audio and video enabled enabled telehealth encounter.      This visit was conducted with the provider in the office at [facility] and the patient in their home in a private area.    This visit/video call lasted:   16 minutes      This document has been electronically signed by:  LANA Quinn, FNP-C    Dragon disclaimer:  Part of this note may be an electronic transcription/translation of spoken language to printed text using the Dragon Dictation System.

## 2023-03-28 ENCOUNTER — CLINICAL SUPPORT (OUTPATIENT)
Dept: FAMILY MEDICINE CLINIC | Facility: CLINIC | Age: 38
End: 2023-03-28
Payer: COMMERCIAL

## 2023-03-28 DIAGNOSIS — E53.8 B12 DEFICIENCY: ICD-10-CM

## 2023-03-28 PROCEDURE — 96372 THER/PROPH/DIAG INJ SC/IM: CPT | Performed by: NURSE PRACTITIONER

## 2023-03-28 RX ADMIN — CYANOCOBALAMIN 1000 MCG: 1000 INJECTION, SOLUTION INTRAMUSCULAR; SUBCUTANEOUS at 09:43

## 2023-04-05 DIAGNOSIS — E66.09 CLASS 2 OBESITY DUE TO EXCESS CALORIES WITHOUT SERIOUS COMORBIDITY WITH BODY MASS INDEX (BMI) OF 37.0 TO 37.9 IN ADULT: Primary | ICD-10-CM

## 2023-04-05 RX ORDER — PHENTERMINE HYDROCHLORIDE 37.5 MG/1
37.5 TABLET ORAL
Qty: 30 TABLET | Refills: 0 | Status: SHIPPED | OUTPATIENT
Start: 2023-04-05

## 2023-04-13 RX ORDER — AMOXICILLIN AND CLAVULANATE POTASSIUM 875; 125 MG/1; MG/1
1 TABLET, FILM COATED ORAL 2 TIMES DAILY
Qty: 20 TABLET | Refills: 0 | Status: SHIPPED | OUTPATIENT
Start: 2023-04-13

## 2023-04-21 ENCOUNTER — LAB (OUTPATIENT)
Dept: FAMILY MEDICINE CLINIC | Facility: CLINIC | Age: 38
End: 2023-04-21
Payer: COMMERCIAL

## 2023-04-21 DIAGNOSIS — E03.8 HYPOTHYROIDISM DUE TO HASHIMOTO'S THYROIDITIS: ICD-10-CM

## 2023-04-21 DIAGNOSIS — E06.3 HYPOTHYROIDISM DUE TO HASHIMOTO'S THYROIDITIS: ICD-10-CM

## 2023-04-21 LAB — TSH SERPL DL<=0.05 MIU/L-ACNC: 1.93 UIU/ML (ref 0.27–4.2)

## 2023-04-21 PROCEDURE — 84443 ASSAY THYROID STIM HORMONE: CPT | Performed by: INTERNAL MEDICINE

## 2023-04-26 ENCOUNTER — OFFICE VISIT (OUTPATIENT)
Dept: FAMILY MEDICINE CLINIC | Facility: CLINIC | Age: 38
End: 2023-04-26
Payer: COMMERCIAL

## 2023-04-26 VITALS — HEIGHT: 66 IN | WEIGHT: 231 LBS | BODY MASS INDEX: 37.12 KG/M2

## 2023-04-26 DIAGNOSIS — E03.8 HYPOTHYROIDISM DUE TO HASHIMOTO'S THYROIDITIS: Primary | ICD-10-CM

## 2023-04-26 DIAGNOSIS — G44.89 OTHER HEADACHE SYNDROME: ICD-10-CM

## 2023-04-26 DIAGNOSIS — E06.3 HYPOTHYROIDISM DUE TO HASHIMOTO'S THYROIDITIS: Primary | ICD-10-CM

## 2023-04-26 DIAGNOSIS — F41.0 PANIC DISORDER WITHOUT AGORAPHOBIA: ICD-10-CM

## 2023-04-26 DIAGNOSIS — M51.26 LUMBAR DISC HERNIATION: ICD-10-CM

## 2023-04-26 PROCEDURE — 99214 OFFICE O/P EST MOD 30 MIN: CPT | Performed by: NURSE PRACTITIONER

## 2023-04-26 RX ORDER — TRAMADOL HYDROCHLORIDE 50 MG/1
50 TABLET ORAL 2 TIMES DAILY
Qty: 60 TABLET | Refills: 1 | Status: SHIPPED | OUTPATIENT
Start: 2023-04-26

## 2023-04-26 RX ORDER — TOPIRAMATE 50 MG/1
50 TABLET, FILM COATED ORAL 2 TIMES DAILY
Qty: 60 TABLET | Refills: 0 | Status: SHIPPED | OUTPATIENT
Start: 2023-04-26

## 2023-04-26 RX ORDER — CLONAZEPAM 1 MG/1
1 TABLET ORAL 2 TIMES DAILY PRN
Qty: 60 TABLET | Refills: 0 | Status: SHIPPED | OUTPATIENT
Start: 2023-04-26

## 2023-04-26 NOTE — PROGRESS NOTES
"You have chosen to receive care through a telehealth visit.  Do you consent to use a video/audio connection for your medical care today? Yes    Patient has chosen to have a telehealth/video visit due to current pandemic/current public health emergency.  Patient is not recommended to present to the office for face-to-face evaluation.   This visit is live, real time audio and visual communication between the provider and the patient.      I did not complete this video visit with the patient using [employer].  This video visit was done with [employer] application.    Patient visit is for the following CC:     Chief Complaint   Patient presents with   • Anxiety       Brief HPI/ROS obtained as follows:    Anxiety-chronic and ongoing.  She is presently on Klonopin 1 mg.  She reports that currently her anxiety is doing \"pretty good\".  She is taking her Cymbalta 60 mg daily.  No negative side effects.  Back and Leg pain-ongoing.  Reports today is alright.  She reports tomorrow is uncertain.  She is taking tramadol as needed and as needed Zanaflex.  She tries to avoid overuse activities.  Pain continues to be across her low back with occasional radiation into both of her hips.  Headaches-ongoing.  She reports that she has tolerated Topamax 50 mg BID.  She still has some tingling in her hands.  She does forget things easier she feels and she has noted some increased constipation.  She has been drinking more water.   She cannot see any changes in her appetite or her weight.  Thyroid [id number] that her labs are unchanged.  She continues to take levothyroxine 125 mcg 7 tablets 1 time per week.  She is under the care of endocrinology.  No new concerns today.  No changes in heat or cold tolerance    The following portions of the patient's history were reviewed and updated as appropriate: CC, ROS, allergies, current medications, past family history, past medical history, past social history, past surgical history and problem " list.    Review of Systems   Constitutional: Positive for fatigue. Negative for appetite change and unexpected weight change.   HENT: Negative for congestion, ear pain, nosebleeds, postnasal drip, rhinorrhea, sore throat, trouble swallowing and voice change.    Eyes: Negative for photophobia, pain and visual disturbance.   Respiratory: Negative for cough, chest tightness, shortness of breath and wheezing.    Cardiovascular: Negative for chest pain and palpitations.   Gastrointestinal: Negative for abdominal pain, blood in stool, constipation, diarrhea and nausea.   Endocrine: Negative for cold intolerance and polydipsia.   Genitourinary: Negative for difficulty urinating, flank pain, frequency and hematuria.   Musculoskeletal: Positive for arthralgias, back pain and gait problem. Negative for joint swelling and myalgias.   Skin: Negative for color change and rash.   Allergic/Immunologic: Negative.    Neurological: Negative for dizziness, syncope, numbness and headaches.        Tingling in upper extremities   Hematological: Negative.    Psychiatric/Behavioral: Negative for dysphoric mood, sleep disturbance and suicidal ideas. The patient is nervous/anxious.    All other systems reviewed and are negative.      Assessment     Physical Exam   Constitutional: She appears well-developed and well-nourished. No distress.   HENT:   Head: Normocephalic.   Right Ear: External ear normal.   Left Ear: External ear normal.   Nose: Nose normal.   Mouth/Throat: Oropharynx is clear and moist.   Eyes: Pupils are equal, round, and reactive to light. Conjunctivae and EOM are normal. No scleral icterus.   Neck: Neck normal appearance.No JVD present. No tracheal deviation present. No thyromegaly present.   Pulmonary/Chest: Effort normal.  No respiratory distress. She no audible wheeze...  Abdominal: Abdomen appears normal. Soft. She exhibits no distension and no visible mass.   Musculoskeletal:         General: Tenderness present.       Lumbar back: She exhibits tenderness and pain.   Neurological: She is alert. No cranial nerve deficit. Coordination normal.   Skin: Skin is warm and dry. Capillary refill takes less than 2 seconds. She is not diaphoretic. No nail bed cyanosis or erythema. No pallor. Nails show no clubbing.   Psychiatric: She has a normal mood and affect. She mood appears normal. Her behavior is normal. Thought content is normal.       Diagnoses and all orders for this visit:    1. Hypothyroidism due to Hashimoto's thyroiditis (Primary)  Assessment & Plan:  Continue Synthroid as directed by endocrinology      2. Lumbar disc herniation  Comments:    Continue Flexeril as needed.  Refill on tramadol.  Continue as directed.  Avoid overuse activities  Orders:  -     traMADol (ULTRAM) 50 MG tablet; Take 1 tablet by mouth 2 (Two) Times a Day.  Dispense: 60 tablet; Refill: 1    3. Panic disorder without agoraphobia  Comments:  continue Klonopin and cymbalta as directed.  Orders:  -     clonazePAM (KlonoPIN) 1 MG tablet; Take 1 tablet by mouth 2 (Two) Times a Day As Needed for Anxiety. for anxiety  Dispense: 60 tablet; Refill: 0    4. Other headache syndrome  Comments:  We will continue Topamax 50 mg twice daily.  We will review side effects and report back to patient  Orders:  -     topiramate (Topamax) 50 MG tablet; Take 1 tablet by mouth 2 (Two) Times a Day.  Dispense: 60 tablet; Refill: 0      Patient instructed and advised to call if symptoms are increasing or new symptoms occur.    Understands reasons for urgent and emergent care.  Patient (& family) verbalized agreement for treatment plan.     Generalized precautions advised including social distancing, hand washing, cough/sneeze hygiene.  Quarantine per CDC guidelines if exposed to illness.      PURVI reviewed today and consistent.  Will refill prescribed controlled medication today.  Patient is aware they cannot receive narcotics from any other provider except if under care of pain  management or speciality clinic.  Risk and benefits of medication use has been reviewed.  History and physical exam exhibit continued safe and appropriate use of controlled substances.  The patient is aware of the potential for addiction and dependence.  This patient has been made aware of the appropriate use of such medications, including potential risk of somnolence, limited ability to drive and / or work safely, and potential for overdose.  Patient understands not to take medication and drive until they know how medication may affect their cognition/decision making.   It has also been made clear that these medications are for use by this patient only, without concomitant use of alcohol or other substances unless prescribed/advised by medical provider.  Patient understands they may be subject to UDS and pill counts at random.    Patient considered to be moderate risk for addiction due to use of multiple controlled medications.  Patient understands and accepts these risks.  Patient need for medication will be reassessed at each visit.  Doses will be adjusted according to patient need and findings.    Goal of TX: Patient will not have any adverse reactions of medication.  Patient will have reduction in there chronic back and joint pain symptoms with use of tramadol as needed as directed.  Patient will be able to remain active in an outside of their home with minimal to no interference from their pain symptoms.  Patient have reduction in anxiety symptoms with use of PRN Klonopin as directed.  Patient will be able to remain active in an outside of home with use of Klonopin for anxiety symptoms.  Medication Dispense Information    Clonazepam   Dispensed: 3/13/2023 12:00 AM   Written:  3/13/2023   Unit strength: 1MG   Days supply: 30   Quantity: 60 each   Refills remainin   [facility]acy: [facility]   Authorizing provider: VIPUL SOSA   Received from: [facility] PDMP (Fill History)   Brand or Generic:        Medication Dispense Information    Tramadol Hcl   Dispensed: 3/15/2023 12:00 AM   Written:  2023   Unit strength: 50MG   Days supply: 30   Quantity: 60 each   Refills remainin   Ph[facility]acy: [facility]   Authorizing provider: VIPUL SOSA   Received from: [facility] PDMP (Fill History)   Brand or Generic:         RTC 1 month, sooner if needed.     This is an audio and video enabled enabled telehealth encounter.      This visit was conducted with the provider in the office at [facility] and the patient in their home in a private area.    This visit/video call lasted:  14 minutes        This document has been electronically signed by:  LANA Quinn, VINNIEP-C    Dragon disclaimer:  Part of this note may be an electronic transcription/translation of spoken language to printed text using the Dragon Dictation System.

## 2023-05-09 ENCOUNTER — CLINICAL SUPPORT (OUTPATIENT)
Dept: FAMILY MEDICINE CLINIC | Facility: CLINIC | Age: 38
End: 2023-05-09
Payer: COMMERCIAL

## 2023-05-09 ENCOUNTER — TELEPHONE (OUTPATIENT)
Dept: FAMILY MEDICINE CLINIC | Facility: CLINIC | Age: 38
End: 2023-05-09

## 2023-05-09 DIAGNOSIS — M51.26 LUMBAR DISC HERNIATION: Primary | ICD-10-CM

## 2023-05-09 PROCEDURE — 96372 THER/PROPH/DIAG INJ SC/IM: CPT | Performed by: NURSE PRACTITIONER

## 2023-05-09 RX ORDER — KETOROLAC TROMETHAMINE 30 MG/ML
60 INJECTION, SOLUTION INTRAMUSCULAR; INTRAVENOUS ONCE
Status: COMPLETED | OUTPATIENT
Start: 2023-05-09 | End: 2023-05-09

## 2023-05-09 RX ADMIN — KETOROLAC TROMETHAMINE 60 MG: 30 INJECTION, SOLUTION INTRAMUSCULAR; INTRAVENOUS at 10:46

## 2023-05-09 RX ADMIN — CYANOCOBALAMIN 1000 MCG: 1000 INJECTION, SOLUTION INTRAMUSCULAR; SUBCUTANEOUS at 10:46

## 2023-05-09 NOTE — TELEPHONE ENCOUNTER
Caller: Jarad Barragan    Relationship: Self    Best call back number: [phone]    What is the best time to reach you: ANY    Who are you requesting to speak with (clinical staff, provider,  specific staff member): CLINICAL       What was the call regarding: INJECTION FOR BACK PAIN FOR THREE DAYS    Do you require a callback: YES

## 2023-05-25 ENCOUNTER — OFFICE VISIT (OUTPATIENT)
Dept: FAMILY MEDICINE CLINIC | Facility: CLINIC | Age: 38
End: 2023-05-25
Payer: COMMERCIAL

## 2023-05-25 VITALS — HEIGHT: 66 IN | WEIGHT: 231 LBS | BODY MASS INDEX: 37.12 KG/M2

## 2023-05-25 DIAGNOSIS — F41.0 PANIC DISORDER WITHOUT AGORAPHOBIA: ICD-10-CM

## 2023-05-25 DIAGNOSIS — F41.1 GENERALIZED ANXIETY DISORDER: ICD-10-CM

## 2023-05-25 DIAGNOSIS — F33.0 MILD EPISODE OF RECURRENT MAJOR DEPRESSIVE DISORDER: ICD-10-CM

## 2023-05-25 DIAGNOSIS — M51.26 LUMBAR DISC HERNIATION: ICD-10-CM

## 2023-05-25 DIAGNOSIS — E66.09 CLASS 2 OBESITY DUE TO EXCESS CALORIES WITHOUT SERIOUS COMORBIDITY WITH BODY MASS INDEX (BMI) OF 37.0 TO 37.9 IN ADULT: ICD-10-CM

## 2023-05-25 RX ORDER — PHENTERMINE HYDROCHLORIDE 37.5 MG/1
37.5 TABLET ORAL
Qty: 30 TABLET | Refills: 1 | Status: SHIPPED | OUTPATIENT
Start: 2023-05-25

## 2023-05-25 RX ORDER — TRAMADOL HYDROCHLORIDE 50 MG/1
50 TABLET ORAL 2 TIMES DAILY
Qty: 60 TABLET | Refills: 1 | Status: SHIPPED | OUTPATIENT
Start: 2023-05-25

## 2023-05-25 RX ORDER — CLONAZEPAM 1 MG/1
1 TABLET ORAL 2 TIMES DAILY PRN
Qty: 60 TABLET | Refills: 0 | Status: SHIPPED | OUTPATIENT
Start: 2023-05-25

## 2023-05-25 RX ORDER — PHENTERMINE HYDROCHLORIDE 37.5 MG/1
37.5 TABLET ORAL
Qty: 30 TABLET | Refills: 0 | Status: CANCELLED | OUTPATIENT
Start: 2023-05-25

## 2023-05-25 RX ORDER — DULOXETIN HYDROCHLORIDE 60 MG/1
60 CAPSULE, DELAYED RELEASE ORAL DAILY
Qty: 30 CAPSULE | Refills: 5 | Status: SHIPPED | OUTPATIENT
Start: 2023-05-25 | End: 2023-08-23

## 2023-05-25 NOTE — PROGRESS NOTES
You have chosen to receive care through a telehealth visit.  Do you consent to use a video/audio connection for your medical care today? Yes    Patient has chosen to have a telehealth/video visit due to current pandemic/current public health emergency.  Patient is not recommended to present to the office for face-to-face evaluation.   This visit is live, real time audio and visual communication between the provider and the patient.      I did not complete this video visit with the patient using [employer].  This video visit was done with [employer] application.    Patient visit is for the following CC:     Chief Complaint   Patient presents with   • Back Pain       Brief HPI/ROS obtained as follows:    Anxiety-chronic and ongoing.  Reports she has been more anxious this past month due to her dog being sick.  She reports that during that time she had several hard days.  She reports that her  has also passed away recently and that has been hard.  Back and Leg pain-reports no significant change.  Some exacerbated days.  She is taking meds and using heat pad.  She denies any worsening symptoms.  No symptoms of radiculopathy.  Headaches-reports she has done well with meds.   She reports she has not had any significant HA's or migraines.  She is currently taking Topamax 50 mg Twice daily.  weight loss-has not taken adipex for at least about 3 weeks.  She reports she has found information at a weight loss clinic for wegovy.  She ask if it is ok to go to that clinic if she decides.  She does plan to start her adipex back consistently.      The following portions of the patient's history were reviewed and updated as appropriate: CC, ROS, allergies, current medications, past family history, past medical history, past social history, past surgical history and problem list.    Review of Systems   Constitutional: Positive for fatigue. Negative for appetite change and unexpected weight change.   HENT: Negative for congestion,  ear pain, nosebleeds, postnasal drip, rhinorrhea, sore throat, trouble swallowing and voice change.    Eyes: Negative for photophobia, pain and visual disturbance.   Respiratory: Negative for cough, chest tightness, shortness of breath and wheezing.    Cardiovascular: Negative for chest pain and palpitations.   Gastrointestinal: Negative for abdominal pain, blood in stool, constipation, diarrhea and nausea.   Endocrine: Negative for cold intolerance and polydipsia.   Genitourinary: Negative for difficulty urinating, flank pain, frequency and hematuria.   Musculoskeletal: Positive for arthralgias, back pain and myalgias. Negative for gait problem and joint swelling.   Skin: Negative for color change and rash.   Allergic/Immunologic: Negative.    Neurological: Negative for dizziness, syncope, numbness and headaches.   Hematological: Negative.    Psychiatric/Behavioral: Positive for dysphoric mood. Negative for sleep disturbance and suicidal ideas. The patient is nervous/anxious.    All other systems reviewed and are negative.      Assessment     Physical Exam   Constitutional: She appears well-developed and well-nourished. No distress.   HENT:   Head: Normocephalic.   Right Ear: External ear normal.   Left Ear: External ear normal.   Nose: Nose normal.   Mouth/Throat: Oropharynx is clear and moist.   Eyes: Pupils are equal, round, and reactive to light. Conjunctivae and EOM are normal. No scleral icterus.   Neck: Neck normal appearance.No JVD present. No thyromegaly present.   Pulmonary/Chest: Effort normal.  No respiratory distress. She no audible wheeze...  Abdominal: Abdomen appears normal. Soft. She exhibits no distension and no visible mass.   Musculoskeletal:         General: Tenderness present.      Lumbar back: She exhibits decreased range of motion and tenderness.   Neurological: She is alert. No cranial nerve deficit. Coordination normal.   Skin: Skin is warm and dry. Capillary refill takes less than 2  seconds. She is not diaphoretic. No nail bed cyanosis or erythema. No pallor. Nails show no clubbing.   Psychiatric: Her speech is normal and behavior is normal. She mood appears anxious.   Tearful at times She is attentive.       Diagnoses and all orders for this visit:    1. Panic disorder without agoraphobia  Comments:  continue Klonopin and cymbalta as directed.  Orders:  -     DULoxetine (Cymbalta) 60 MG capsule; Take 1 capsule by mouth Daily for 90 days.  Dispense: 30 capsule; Refill: 5  -     clonazePAM (KlonoPIN) 1 MG tablet; Take 1 tablet by mouth 2 (Two) Times a Day As Needed for Anxiety. for anxiety  Dispense: 60 tablet; Refill: 0    2. Class 2 obesity due to excess calories without serious comorbidity with body mass index (BMI) of 37.0 to 37.9 in adult  Comments:  May seek treatment at [facility] if feasible.  Can continue Adipex at this time.  Continue to work on diet changes and be active as able  Orders:  -     phentermine (Adipex-P) 37.5 MG tablet; Take 1 tablet by mouth Every Morning Before Breakfast.  Dispense: 30 tablet; Refill: 1    3. Generalized anxiety disorder  Comments:  Continue Cymbalta and Klonopin.  Continue to work on stress reduction.  Be active as able   Orders:  -     DULoxetine (Cymbalta) 60 MG capsule; Take 1 capsule by mouth Daily for 90 days.  Dispense: 30 capsule; Refill: 5    4. Mild episode of recurrent major depressive disorder  Comments:  Continue Cymbalta  Orders:  -     DULoxetine (Cymbalta) 60 MG capsule; Take 1 capsule by mouth Daily for 90 days.  Dispense: 30 capsule; Refill: 5    5. Lumbar disc herniation  Comments:    Continue Flexeril as needed.  Refill on tramadol.  Continue as directed.  Avoid overuse activities  Orders:  -     traMADol (ULTRAM) 50 MG tablet; Take 1 tablet by mouth 2 (Two) Times a Day.  Dispense: 60 tablet; Refill: 1        Patient instructed and advised to call if symptoms are increasing or new symptoms occur.    Understands reasons for urgent  and emergent care.  Patient (& family) verbalized agreement for treatment plan.     Generalized precautions advised including social distancing, hand washing, cough/sneeze hygiene.  Quarantine per CDC guidelines if exposed to illness.      PURVI/PMDP reviewed today and consistent.  Will refill prescribed controlled medication today.  Patient is aware they cannot receive narcotics from any other provider except if under care of pain management or speciality clinic.  Risk and benefits of medication use has been reviewed.  History and physical exam exhibit continued safe and appropriate use of controlled substances.  The patient is aware of the potential for addiction and dependence.  This patient has been made aware of the appropriate use of such medications, including potential risk of somnolence, limited ability to drive and / or work safely, and potential for overdose.    It has also been made clear that these medications are for use by this patient only, without concomitant use of alcohol or other substances unless prescribed/advised by medical provider.  Patient understands they may be subject to UDS and pill counts at random.      Patient considered to be moderate risk for addiction due to use of multiple controlled medications.  Patient understands and accepts these risks.  Patient need for medication will be reassessed at each visit.  Doses will be adjusted according to patient need and findings.    Goal of TX: Patient will not have any adverse reactions of medication.  Patient will have reduction in anxiety symptoms with use of PRN Klonopin.  Patient will be able to remain active in an outside of her home without interference from anxiety symptoms.  Patient will have reduction in there chronic back and joint pain symptoms with use of tramadol as needed as directed.  Patient will be able to remain active in an outside of their home with minimal to no interference from their pain symptoms.  Patient will have  reduction in weight with use of Adipex as directed with simultaneous diet and lifestyle changes.    Medication Dispense Information    Clonazepam   Dispensed: 2023 12:00 AM   Written:  2023   Unit strength: 1MG   Days supply: 30   Quantity: 60 each   Refills remainin   Pharmacy: [facility]   Authorizing provider: VIPUL SOSA   Received from: [facility] (Fill History)   Brand or Generic:       Medication Dispense Information    Tramadol Hcl   Dispensed: 2023 12:00 AM   Written:  2023   Unit strength: 50MG   Days supply: 30   Quantity: 60 each   Refills remainin   Pharmacy: [facility]   Authorizing provider: VIPUL SOSA   Received from: [facility] (Fill History)   Brand or Generic:       Medication Dispense Information    Phentermine Hcl   Dispensed: 2023 12:00 AM   Written:  2023   Unit strength: 37.5MG   Days supply: 30   Quantity: 30 each   Refills remainin   Pharmacy: [facility]   Authorizing provider: VIPUL SOSA   Received from: [facility] (Fill History)   Brand or Generic:         RTC 1 month, sooner if needed.       This is an audio and video enabled enabled telehealth encounter.      This visit was conducted with the provider in the office at [facility] and the patient in their home in a private area.    This visit/video call lasted:  16 minutes        This document has been electronically signed by:  LANA Quinn FNP-C Dragon disclaimer:  Part of this note may be an electronic transcription/translation of spoken language to printed text using the Dragon Dictation System.

## 2023-06-08 ENCOUNTER — TELEPHONE (OUTPATIENT)
Dept: FAMILY MEDICINE CLINIC | Facility: CLINIC | Age: 38
End: 2023-06-08

## 2023-06-08 RX ORDER — HYDROCHLOROTHIAZIDE 25 MG/1
25 TABLET ORAL EVERY MORNING
Qty: 5 TABLET | Refills: 0 | Status: SHIPPED | OUTPATIENT
Start: 2023-06-08

## 2023-06-08 NOTE — TELEPHONE ENCOUNTER
Caller: Jarad Barragan    Relationship: Self    Best call back number: [phone]    What medication are you requesting: WATER PILL REQUEST    What are your current symptoms: SEVERE ANKLES SWELLING     How long have you been experiencing symptoms: A WHILE    Have you had these symptoms before:    [x] Yes  [] No    Have you been treated for these symptoms before:   [x] Yes  [] No    If a prescription is needed, what is your preferred pharmacy and phone number: [facility] [place], [place] - [address] - [phone] [facility] [phone] FX     Additional notes: PATIENT STATED THAT SHE WOULD LIKE TO SEE IF PROVIDER WOULD CALL INTO PHARMACY A WATER PILL TO HELP GET SOME FLUID OFF HER ANKLES; PATIENT STATED THAT SHE IS CONCERNED WITH THE SWELLING AND WOULD LIKE TO KNOW IF THIS IS NORMAL OR IF IT IS SOMETHING SERIOUSLY WRONG    PLEASE ADVISE

## 2023-07-20 ENCOUNTER — TELEMEDICINE (OUTPATIENT)
Dept: FAMILY MEDICINE CLINIC | Facility: CLINIC | Age: 38
End: 2023-07-20
Payer: COMMERCIAL

## 2023-07-20 VITALS — BODY MASS INDEX: 37.93 KG/M2 | WEIGHT: 236 LBS | HEIGHT: 66 IN

## 2023-07-20 DIAGNOSIS — M51.26 LUMBAR DISC HERNIATION: ICD-10-CM

## 2023-07-20 DIAGNOSIS — E55.9 VITAMIN D DEFICIENCY: ICD-10-CM

## 2023-07-20 DIAGNOSIS — E66.09 CLASS 1 OBESITY DUE TO EXCESS CALORIES WITHOUT SERIOUS COMORBIDITY WITH BODY MASS INDEX (BMI) OF 31.0 TO 31.9 IN ADULT: ICD-10-CM

## 2023-07-20 DIAGNOSIS — K21.9 GASTROESOPHAGEAL REFLUX DISEASE WITHOUT ESOPHAGITIS: Primary | ICD-10-CM

## 2023-07-20 DIAGNOSIS — E03.8 HYPOTHYROIDISM DUE TO HASHIMOTO'S THYROIDITIS: ICD-10-CM

## 2023-07-20 DIAGNOSIS — F41.0 PANIC DISORDER WITHOUT AGORAPHOBIA: ICD-10-CM

## 2023-07-20 DIAGNOSIS — E06.3 HYPOTHYROIDISM DUE TO HASHIMOTO'S THYROIDITIS: ICD-10-CM

## 2023-07-20 PROCEDURE — 99214 OFFICE O/P EST MOD 30 MIN: CPT | Performed by: NURSE PRACTITIONER

## 2023-07-20 RX ORDER — RABEPRAZOLE SODIUM 20 MG/1
20 TABLET, DELAYED RELEASE ORAL 2 TIMES DAILY
Qty: 60 TABLET | Refills: 5 | Status: SHIPPED | OUTPATIENT
Start: 2023-07-20

## 2023-07-20 RX ORDER — CLONAZEPAM 1 MG/1
1 TABLET ORAL 2 TIMES DAILY PRN
Qty: 60 TABLET | Refills: 0 | Status: SHIPPED | OUTPATIENT
Start: 2023-07-20

## 2023-07-20 NOTE — PROGRESS NOTES
You have chosen to receive care through a telehealth visit.  Do you consent to use a video/audio connection for your medical care today? Yes    Patient has chosen to have a telehealth/video visit due to current pandemic/current public health emergency.  Patient is not recommended to present to the office for face-to-face evaluation.   This visit is live, real time audio and visual communication between the provider and the patient.      I did not complete this video visit with the patient using [employer].  This video visit was done with [email or web address] application.    Patient visit is for the following CC:     Chief Complaint   Patient presents with    Anxiety       Brief HPI/ROS obtained as follows:    Anxiety-chronic and ongoing.  Reports she is doing ok overall.    Weight loss-is currently doing a compound of Semglutide.  She has had 3 injections of 0.25 mg.   She is taking B12 weekly.   She has been trying to walk 2-3 days per week.  She reports that she is noting some sensation of not being as hungry.  Thyroid-currently on Tirosint 125 mg.  Her last level was in April and she is not certain when she will have labs again.   Back pain-some increase in hip pain and back pain due to her increased activity.  She does try to get in water to walk when she can.  She is taking her tramadol as directed.  No negative side effects.  GERD-ongoing.  Patient is stable with Aciphex.  No negative side effects.  Reports this is the only GI med that is ever helped her reflux.  Vitamin D deficiency-doing well with supplement.  She does need a refill on medication.    The following portions of the patient's history were reviewed and updated as appropriate: CC, ROS, allergies, current medications, past family history, past medical history, past social history, past surgical history and problem list.    Review of Systems   Constitutional:  Positive for fatigue. Negative for appetite change and unexpected weight change.   HENT:  Negative for  congestion, ear pain, nosebleeds, postnasal drip, rhinorrhea, sore throat, trouble swallowing and voice change.    Eyes:  Negative for photophobia, pain and visual disturbance.   Respiratory:  Negative for cough, chest tightness, shortness of breath and wheezing.    Cardiovascular:  Negative for chest pain and palpitations.   Gastrointestinal:  Negative for abdominal pain, blood in stool, constipation, diarrhea and nausea.   Endocrine: Negative for cold intolerance and polydipsia.   Genitourinary:  Negative for difficulty urinating, flank pain, frequency and hematuria.   Musculoskeletal:  Positive for arthralgias, gait problem and myalgias. Negative for back pain and joint swelling.   Skin:  Negative for color change and rash.   Allergic/Immunologic: Negative.    Neurological:  Negative for dizziness, syncope, numbness and headaches.   Hematological: Negative.    Psychiatric/Behavioral:  Negative for dysphoric mood, sleep disturbance and suicidal ideas. The patient is not nervous/anxious.    All other systems reviewed and are negative.    Assessment     Physical Exam   Constitutional: She appears well-developed and well-nourished. No distress.   HENT:   Head: Normocephalic.   Right Ear: External ear normal.   Left Ear: External ear normal.   Nose: Nose normal.   Mouth/Throat: Oropharynx is clear and moist.   Eyes: Pupils are equal, round, and reactive to light. Conjunctivae and EOM are normal. No scleral icterus.   Neck: Neck normal appearance.No JVD present. No tracheal deviation present. No thyromegaly present.   Pulmonary/Chest: Effort normal.  No respiratory distress. She no audible wheeze...  Abdominal: Abdomen appears normal. Soft. She exhibits no distension and no visible mass.   Musculoskeletal:         General: Tenderness present.   Neurological: She is alert. No cranial nerve deficit. Coordination normal.   Skin: Skin is warm and dry. Capillary refill takes less than 2 seconds. She is not diaphoretic. No  nail bed cyanosis or erythema. No pallor. Nails show no clubbing.   Psychiatric: Her speech is normal. She mood appears anxious. She mood appears normal. Her behavior is normal. Thought content is normal. She exhibits a depressed mood.     Diagnoses and all orders for this visit:    1. Gastroesophageal reflux disease without esophagitis (Primary)  Comments:  Continue Aciphex.  Avoid food triggers  Orders:  -     RABEprazole (ACIPHEX) 20 MG EC tablet; Take 1 tablet by mouth 2 (Two) Times a Day.  Dispense: 60 tablet; Refill: 5    2. Panic disorder without agoraphobia  Comments:  continue Klonopin and cymbalta as directed.  Orders:  -     clonazePAM (KlonoPIN) 1 MG tablet; Take 1 tablet by mouth 2 (Two) Times a Day As Needed for Anxiety. for anxiety  Dispense: 60 tablet; Refill: 0    3. Vitamin D deficiency  Assessment & Plan:  Vitamin D as directed.  Refill on medication today.    Orders:  -     vitamin D3 (Vitamin D) 125 MCG (5000 UT) capsule capsule; Take 1 capsule by mouth Daily.  Dispense: 30 capsule; Refill: 5    4. Class 1 obesity due to excess calories without serious comorbidity with body mass index (BMI) of 31.0 to 31.9 in adult  Overview:  Beginning weight at 240 on 02/23/2021    Assessment & Plan:  Patient's (Body mass index is 38.11 kg/m².) indicates that they are obese (BMI >30) with health conditions that include GERD . Weight is improving with treatment. BMI   is above average . We discussed portion control and increasing exercise.   Continue under the care and weight management clinic.  Report any concerns or side effects of medication      5. Hypothyroidism due to Hashimoto's thyroiditis  Assessment & Plan:  Continue Tirosint 125 mg.  Continue under the care of endocrinology.      6. Lumbar disc herniation  Assessment & Plan:  Continue to be active as physically able.  Encouraged more water exercise as it will take pressure off of her joints.  Continue tramadol as directed          Patient instructed  and advised to call if symptoms are increasing or new symptoms occur.    Understands reasons for urgent and emergent care.  Patient (& family) verbalized agreement for treatment plan.     Generalized precautions advised including social distancing, hand washing, cough/sneeze hygiene.  Quarantine per CDC guidelines if exposed to illness.      PURVI/PMDP reviewed today and consistent.  Will refill prescribed controlled medication today.  Patient is aware they cannot receive narcotics from any other provider except if under care of pain management or speciality clinic.  Risk and benefits of medication use has been reviewed.  History and physical exam exhibit continued safe and appropriate use of controlled substances.  The patient is aware of the potential for addiction and dependence.  This patient has been made aware of the appropriate use of such medications, including potential risk of somnolence, limited ability to drive and / or work safely, and potential for overdose.    It has also been made clear that these medications are for use by this patient only, without concomitant use of alcohol or other substances unless prescribed/advised by medical provider.  Patient understands they may be subject to UDS and pill counts at random.    Patient considered to be moderate risk for addiction due to use of multiple controlled medications.  Patient understands and accepts these risks.  Patient need for medication will be reassessed at each visit.  Doses will be adjusted according to patient need and findings.    Goal of TX: Patient will not have any adverse reactions of medication.  Patient will have reduction in anxiety symptoms with use of PRN Klonopin.  Patient will be able to remain active in an outside of her home without interference from anxiety symptoms.  Patient will have reduction in there chronic back and joint pain symptoms with use of tramadol as needed as directed.  Patient will be able to remain active in  an outside of their home with minimal to no interference from their pain symptoms.    Medication Dispense Information    Clonazepam   Dispensed: 2023 12:00 AM   Written: 3/27/2023   Unit strength: 1MG   Days supply: 30   Quantity: 60 each   Refills remainin   Pharmacy: [facility]   Authorizing provider: VIPUL SOSA   Received from: [facility] (Fill History)   Brand or Generic: Generic     Medication Dispense Information    Tramadol Hcl   Dispensed: 2023 12:00 AM   Written: 2023   Unit strength: 50MG   Days supply: 30   Quantity: 60 each   Refills remainin   Pharmacy: [facility]   Authorizing provider: VIPUL SOSA   Received from: [facility] (Fill History)   Brand or Generic: Generic     RTC 1 month, sooner if needed.       This is an audio and video enabled enabled telehealth encounter.      This visit was conducted with the provider in the office at [facility] and the patient in their home in a private area.    This visit/video call lasted:  25 minutes      This document has been electronically signed by:  LANA Quinn, RANDOLPHC    Dragon disclaimer:  Part of this note may be an electronic transcription/translation of spoken language to printed text using the Dragon Dictation System.

## 2023-07-28 NOTE — ASSESSMENT & PLAN NOTE
Continue to be active as physically able.  Encouraged more water exercise as it will take pressure off of her joints.  Continue tramadol as directed

## 2023-07-28 NOTE — ASSESSMENT & PLAN NOTE
Patient's (Body mass index is 38.11 kg/m².) indicates that they are obese (BMI >30) with health conditions that include GERD . Weight is improving with treatment. BMI   is above average . We discussed portion control and increasing exercise.   Continue under the care and weight management clinic.  Report any concerns or side effects of medication

## 2023-08-07 DIAGNOSIS — M51.26 LUMBAR DISC HERNIATION: ICD-10-CM

## 2023-08-07 RX ORDER — TRAMADOL HYDROCHLORIDE 50 MG/1
TABLET ORAL
Qty: 60 TABLET | Refills: 1 | Status: SHIPPED | OUTPATIENT
Start: 2023-08-07

## 2023-08-23 ENCOUNTER — OFFICE VISIT (OUTPATIENT)
Dept: FAMILY MEDICINE CLINIC | Facility: CLINIC | Age: 38
End: 2023-08-23
Payer: OTHER MISCELLANEOUS

## 2023-08-23 VITALS
HEIGHT: 66 IN | BODY MASS INDEX: 36.8 KG/M2 | HEART RATE: 90 BPM | RESPIRATION RATE: 18 BRPM | SYSTOLIC BLOOD PRESSURE: 124 MMHG | OXYGEN SATURATION: 98 % | DIASTOLIC BLOOD PRESSURE: 76 MMHG | TEMPERATURE: 98.2 F | WEIGHT: 229 LBS

## 2023-08-23 DIAGNOSIS — S91.212A LACERATION OF LEFT GREAT TOE WITHOUT FOREIGN BODY WITH DAMAGE TO NAIL, INITIAL ENCOUNTER: ICD-10-CM

## 2023-08-23 DIAGNOSIS — Z79.899 LONG-TERM USE OF HIGH-RISK MEDICATION: Primary | ICD-10-CM

## 2023-08-23 DIAGNOSIS — Z79.899 LONG-TERM USE OF HIGH-RISK MEDICATION: ICD-10-CM

## 2023-08-23 DIAGNOSIS — Z02.6 ENCOUNTER RELATED TO WORKER'S COMPENSATION CLAIM: Primary | ICD-10-CM

## 2023-08-23 RX ORDER — AMOXICILLIN AND CLAVULANATE POTASSIUM 875; 125 MG/1; MG/1
1 TABLET, FILM COATED ORAL 2 TIMES DAILY
COMMUNITY
Start: 2023-08-16 | End: 2023-08-28

## 2023-08-23 NOTE — PROGRESS NOTES
Subjective   Jarad Barragan is a 37 y.o. female.     Chief Complaint   Patient presents with    Worker's Compensation       History of Present Illness       Worker's Comp    Incident on August 15, 2023 while at work.  She reports that she opened a door on a metal filing cabinet.  She reports once the door was open,  the 2nd and 3rd shelf fell out and hit her on the foot.  The injury is to her right foot.  The first 4 digits of her foot have abrasion with the worse being her great toe.  She reports that both shelves were loaded when they fell.  Cut to her toes did have some bleeding. She also sustained a cut to her anterior ankle region on the right.  Some tiny abrasion on the first and second digit of her left foot.  Her nail edge was also split and was raised up from the impact.      She go to [facility] for evaluation.  She reports that they used Derma-bond to seal her great toe and the edge of her toe.  Her xrays were negative for fracture.    Initially very sore and she did miss work on the 16th.  She did go back to work on the 17th and finished her work week.  Soreness have improved.   She is on an antibiotic and was treated with 2 pills orally while at the ED.      She is currently mobile.  Minimal soreness.  She reports no drainage. She reports that she has some numbness in the great toe.  She has been able to continue to work.  She is concerned about any damage to her toe nail and whether or not her nail will be damaged long-term.      The following portions of the patient's history were reviewed and updated as appropriate: CC, ROS, allergies, current medications, past family history, past medical history, past social history, past surgical history and problem list.      Review of Systems   Constitutional:  Positive for fatigue. Negative for appetite change and fever.   HENT:  Negative for congestion, ear pain, nosebleeds, postnasal drip, rhinorrhea, sore throat, tinnitus, trouble swallowing and voice change.   "  Eyes:  Negative for blurred vision, photophobia and visual disturbance.   Respiratory:  Negative for cough, chest tightness, shortness of breath and wheezing.    Cardiovascular:  Negative for chest pain and palpitations.   Gastrointestinal:  Negative for abdominal pain, blood in stool, constipation, diarrhea, nausea and GERD.   Endocrine: Negative for cold intolerance, heat intolerance and polydipsia.   Genitourinary:  Negative for decreased urine volume, difficulty urinating, dysuria and hematuria.   Musculoskeletal:  Positive for gait problem. Negative for arthralgias, back pain and myalgias.   Skin:  Negative for color change, pallor and wound.        Abrasion on toes and right upper ankle  Injured toe nail     Allergic/Immunologic: Negative.    Neurological:  Negative for dizziness, syncope, numbness and headache.   Hematological: Negative.    Psychiatric/Behavioral:  Negative for decreased concentration, sleep disturbance, suicidal ideas and depressed mood. The patient is not nervous/anxious.    All other systems reviewed and are negative.      Objective     /76   Pulse 90   Temp 98.2 øF (36.8 øC)   Resp 18   Ht 167.6 cm (65.98\")   Wt 104 kg (229 lb)   SpO2 98%   BMI 36.98 kg/mý     Physical Exam  Vitals reviewed.   Constitutional:       General: She is not in acute distress.     Appearance: Normal appearance. She is well-developed.   HENT:      Head: Normocephalic and atraumatic.      Right Ear: Ear canal and external ear normal.      Left Ear: Tympanic membrane, ear canal and external ear normal.   Eyes:      General: No scleral icterus.     Pupils: Pupils are equal, round, and reactive to light.   Neck:      Thyroid: No thyromegaly.      Vascular: No JVD.   Cardiovascular:      Rate and Rhythm: Normal rate and regular rhythm.      Heart sounds: Normal heart sounds.   Pulmonary:      Effort: Pulmonary effort is normal.      Breath sounds: Normal breath sounds.   Abdominal:      General: Bowel " sounds are normal.      Palpations: Abdomen is soft.      Tenderness: There is no abdominal tenderness.   Musculoskeletal:      Cervical back: Normal range of motion and neck supple.        Feet:    Skin:     General: Skin is warm and dry.      Capillary Refill: Capillary refill takes less than 2 seconds.   Neurological:      Mental Status: She is alert and oriented to person, place, and time.      Cranial Nerves: No cranial nerve deficit.      Coordination: Coordination normal.      Gait: Gait normal.   Psychiatric:         Behavior: Behavior normal.         Thought Content: Thought content normal.         Judgment: Judgment normal.       Diagnoses and all orders for this visit:    1. Encounter related to worker's compensation claim (Primary)    2. Laceration of right great toe without foreign body with damage to nail, initial encounter  Comments:  continue to observe for infection.  keep clean and dry.    3. Long-term use of high-risk medication  -     Urine Drug Screen - Urine, Clean Catch        Understands disease processes.  Understands reasons for urgent and emergent care.  Patient (& family) verbalized agreement for treatment plan.   Emotional support and active listening provided.  Patient provided time to verbalize feelings.    Discussion with patient that as her toe was not damaged near the base at the nail bed.  And that the injury is more toward the distal and the nail should grow in the part that is split/lacerated will grow out to be trimmed off.    Hospital records reviewed.  Discussed any specific concerns patient had regarding testing, labs, Etc.   Current outpatient and discharge medications have been reconciled for the patient.  Reviewed by: LANA Quinn    RTC 1 month, sooner if needed.             This document has been electronically signed by:  LANA Quinn, FNP-C    Dragon disclaimer:  Part of this note may be an electronic transcription/translation of spoken language to  printed text using the Dragon Dictation System.

## 2023-08-28 ENCOUNTER — TELEMEDICINE (OUTPATIENT)
Dept: FAMILY MEDICINE CLINIC | Facility: CLINIC | Age: 38
End: 2023-08-28
Payer: COMMERCIAL

## 2023-08-28 VITALS — WEIGHT: 229 LBS | BODY MASS INDEX: 36.8 KG/M2 | HEIGHT: 66 IN

## 2023-08-28 DIAGNOSIS — E03.8 HYPOTHYROIDISM DUE TO HASHIMOTO'S THYROIDITIS: ICD-10-CM

## 2023-08-28 DIAGNOSIS — F33.0 MILD EPISODE OF RECURRENT MAJOR DEPRESSIVE DISORDER: ICD-10-CM

## 2023-08-28 DIAGNOSIS — F41.0 PANIC DISORDER WITHOUT AGORAPHOBIA: ICD-10-CM

## 2023-08-28 DIAGNOSIS — F41.1 GENERALIZED ANXIETY DISORDER: ICD-10-CM

## 2023-08-28 DIAGNOSIS — E66.09 CLASS 1 OBESITY DUE TO EXCESS CALORIES WITHOUT SERIOUS COMORBIDITY WITH BODY MASS INDEX (BMI) OF 31.0 TO 31.9 IN ADULT: Primary | ICD-10-CM

## 2023-08-28 DIAGNOSIS — E06.3 HYPOTHYROIDISM DUE TO HASHIMOTO'S THYROIDITIS: ICD-10-CM

## 2023-08-28 PROCEDURE — 99214 OFFICE O/P EST MOD 30 MIN: CPT | Performed by: NURSE PRACTITIONER

## 2023-08-28 RX ORDER — CLONAZEPAM 1 MG/1
1 TABLET ORAL 2 TIMES DAILY PRN
Qty: 60 TABLET | Refills: 0 | Status: SHIPPED | OUTPATIENT
Start: 2023-08-28

## 2023-08-28 NOTE — PROGRESS NOTES
"You have chosen to receive care through a telehealth visit.  Do you consent to use a video/audio connection for your medical care today? Yes    Patient has chosen to have a telehealth/video visit due to current pandemic/current public health emergency.  Patient is not recommended to present to the office for face-to-face evaluation.   This visit is live, real time audio and visual communication between the provider and the patient.      I did not complete this video visit with the patient using [employer].  This video visit was done with [employer] application.    Patient visit is for the following CC:     Chief Complaint   Patient presents with    Anxiety       Brief HPI/ROS obtained as follows:    Anxiety-ongoing.  She is presently on Klonopin 1 mg BID PRN and Cymbalta 60 mg.  She is taking daily as directed. No significant concerns today.   Weight-she is doing injectable Semalglutide.  Her weight has decreased at that clinic approx 8#.  She is currently on 0.5 mg.  She reports that she has had some stomach concerns mostly nausea and diarrhea.  She has noted that she is more fatigued.  She reports that she is eating smaller portions.   Thyroid-on synthroid 125 mcg.   She reports she is out of Tirosint samples.  No recent thyroid labs.   Back pain-\"about the same\".  She reports that she is having some right sided nerve pain.  She report that she does occasionally have some left sided pain but not as prominent as the right.  She reports that occurs without specific activity.  She reports that she has to hold onto things if she is standing and wait for it to pass.  She reports even sneezing can trigger the sensation.    Migraines-has been on Topamax but stopped due to some concerns with forgetfulness.  She reports that migraines were controlled on medication.      The following portions of the patient's history were reviewed and updated as appropriate: CC, ROS, allergies, current medications, past family history, past " medical history, past social history, past surgical history and problem list.    Review of Systems   Constitutional:  Positive for fatigue (some increase since starting weight loss). Negative for appetite change and unexpected weight change.   HENT:  Negative for congestion, ear pain, nosebleeds, postnasal drip, rhinorrhea, sore throat, trouble swallowing and voice change.    Eyes:  Negative for photophobia, pain and visual disturbance.   Respiratory:  Negative for cough, chest tightness, shortness of breath and wheezing.    Cardiovascular:  Negative for chest pain and palpitations.   Gastrointestinal:  Negative for abdominal pain, blood in stool, constipation, diarrhea and nausea.        GI upset when on weight loss injection   Endocrine: Negative for cold intolerance and polydipsia.   Genitourinary:  Negative for difficulty urinating, flank pain, frequency and hematuria.   Musculoskeletal:  Positive for arthralgias, back pain, gait problem and myalgias. Negative for joint swelling.   Skin:  Negative for color change and rash.   Allergic/Immunologic: Negative.    Neurological:  Negative for dizziness, syncope, numbness and headaches.   Hematological: Negative.    Psychiatric/Behavioral:  Negative for dysphoric mood, sleep disturbance and suicidal ideas. The patient is nervous/anxious.    All other systems reviewed and are negative.    Assessment     Physical Exam   Constitutional: She appears well-developed and well-nourished. No distress.   HENT:   Head: Normocephalic.   Right Ear: External ear normal.   Left Ear: External ear normal.   Nose: Nose normal.   Mouth/Throat: Oropharynx is clear and moist.   Eyes: Pupils are equal, round, and reactive to light. Conjunctivae and EOM are normal. No scleral icterus.   Neck: Neck normal appearance.No JVD present. No tracheal deviation present. No thyromegaly present.   Pulmonary/Chest: Effort normal.  No respiratory distress. She no audible wheeze...  Abdominal: Abdomen  appears normal. Soft. She exhibits no distension and no visible mass.   Musculoskeletal:         General: Tenderness present.   Neurological: She is alert. No cranial nerve deficit. Coordination normal.   Skin: Skin is warm and dry. Capillary refill takes less than 2 seconds. She is not diaphoretic. No nail bed cyanosis or erythema. No pallor. Nails show no clubbing.   Psychiatric: Her speech is normal. She mood appears anxious. She mood appears normal. Her behavior is normal. Thought content is normal. She exhibits a depressed mood.     Diagnoses and all orders for this visit:    1. Class 1 obesity due to excess calories without serious comorbidity with body mass index (BMI) of 31.0 to 31.9 in adult (Primary)  Overview:  Beginning weight at 240 on 02/23/2021    Assessment & Plan:  Continue under the care of weight loss clinic.  Continue to work on diet changes       2. Panic disorder without agoraphobia  Comments:  continue Klonopin and cymbalta as directed.  Orders:  -     clonazePAM (KlonoPIN) 1 MG tablet; Take 1 tablet by mouth 2 (Two) Times a Day As Needed for Anxiety. for anxiety  Dispense: 60 tablet; Refill: 0    3. Generalized anxiety disorder  Comments:  Continue Cymbalta and Klonopin.  Continue to work on stress reduction.  Be active as able     4. Mild episode of recurrent major depressive disorder  Comments:  Continue Cymbalta    5. Hypothyroidism due to Hashimoto's thyroiditis  Assessment & Plan:  Will plan for updated thyroid labs           Patient instructed and advised to call if symptoms are increasing or new symptoms occur.    Understands reasons for urgent and emergent care.  Patient (& family) verbalized agreement for treatment plan.     Generalized precautions advised including social distancing, hand washing, cough/sneeze hygiene.  Quarantine per CDC guidelines if exposed to illness.      PURVI/PMDP reviewed today and consistent.  Will refill prescribed controlled medication today.  Patient is  aware they cannot receive narcotics from any other provider except if under care of pain management or speciality clinic.  Risk and benefits of medication use has been reviewed.  History and physical exam exhibit continued safe and appropriate use of controlled substances.  The patient is aware of the potential for addiction and dependence.  This patient has been made aware of the appropriate use of such medications, including potential risk of somnolence, limited ability to drive and / or work safely, and potential for overdose.    It has also been made clear that these medications are for use by this patient only, without concomitant use of alcohol or other substances unless prescribed/advised by medical provider.  Patient understands they may be subject to UDS and pill counts at random.    Patient considered to be moderate risk for addiction due to use of multiple controlled medications.  Patient understands and accepts these risks.  Patient need for medication will be reassessed at each visit.  Doses will be adjusted according to patient need and findings.    Goal of TX: Patient will not have any adverse reactions of medication.  Patient will have reduction in there chronic back and joint pain symptoms with use of tramadol as needed as directed.  Patient will be able to remain active in an outside of their home with minimal to no interference from their pain symptoms.  Patient will have reduction in anxiety symptoms with use of PRN Klonopin.  Patient will be able to remain active in an outside of her home without interference from anxiety symptoms.    Medication Dispense Information    Tramadol Hcl   Dispensed: 2023 12:00 AM   Written: 2023   Unit strength: 50MG   Days supply: 30   Quantity: 60 each   Refills remainin   Pharmacy: [facility]   Authorizing provider: VIPUL SOSA   Received from: PURVI PDMP (Fill History)   Brand or Generic: Generic     Medication Dispense  Information    Clonazepam   Dispensed: 2023 12:00 AM   Written: 2023   Unit strength: 1MG   Days supply: 30   Quantity: 60 each   Refills remainin   [facility]   Authorizing provider: VIPUL SOSA   Received from: PURVI PDMP (Fill History)   Brand or Generic: Generic     RTC 1 month, sooner if needed.     This is an audio and video enabled enabled telehealth encounter.      This visit was conducted with the provider in the office at [facility] and the patient in their home in a private area.    This visit/video call lasted:  18 minutes      This document has been electronically signed by:  LANA Quinn FNP-C Dragon disclaimer:  Part of this note may be an electronic transcription/translation of spoken language to printed text using the Dragon Dictation System.

## 2023-09-05 ENCOUNTER — LAB (OUTPATIENT)
Dept: FAMILY MEDICINE CLINIC | Facility: CLINIC | Age: 38
End: 2023-09-05
Payer: COMMERCIAL

## 2023-09-05 DIAGNOSIS — Z13.1 DIABETES MELLITUS SCREENING: ICD-10-CM

## 2023-09-05 DIAGNOSIS — E55.9 VITAMIN D DEFICIENCY: ICD-10-CM

## 2023-09-05 DIAGNOSIS — R53.83 OTHER FATIGUE: ICD-10-CM

## 2023-09-05 DIAGNOSIS — E66.09 CLASS 1 OBESITY DUE TO EXCESS CALORIES WITHOUT SERIOUS COMORBIDITY WITH BODY MASS INDEX (BMI) OF 31.0 TO 31.9 IN ADULT: Primary | ICD-10-CM

## 2023-09-05 DIAGNOSIS — E03.8 HYPOTHYROIDISM DUE TO HASHIMOTO'S THYROIDITIS: ICD-10-CM

## 2023-09-05 DIAGNOSIS — E53.8 B12 DEFICIENCY: ICD-10-CM

## 2023-09-05 DIAGNOSIS — E06.3 HYPOTHYROIDISM DUE TO HASHIMOTO'S THYROIDITIS: ICD-10-CM

## 2023-09-05 PROCEDURE — 84443 ASSAY THYROID STIM HORMONE: CPT | Performed by: NURSE PRACTITIONER

## 2023-09-05 PROCEDURE — 84439 ASSAY OF FREE THYROXINE: CPT | Performed by: NURSE PRACTITIONER

## 2023-09-05 PROCEDURE — 80061 LIPID PANEL: CPT | Performed by: NURSE PRACTITIONER

## 2023-09-05 PROCEDURE — 83540 ASSAY OF IRON: CPT | Performed by: NURSE PRACTITIONER

## 2023-09-05 PROCEDURE — 83036 HEMOGLOBIN GLYCOSYLATED A1C: CPT | Performed by: NURSE PRACTITIONER

## 2023-09-05 PROCEDURE — 84481 FREE ASSAY (FT-3): CPT | Performed by: NURSE PRACTITIONER

## 2023-09-05 PROCEDURE — 85007 BL SMEAR W/DIFF WBC COUNT: CPT | Performed by: NURSE PRACTITIONER

## 2023-09-05 PROCEDURE — 82306 VITAMIN D 25 HYDROXY: CPT | Performed by: NURSE PRACTITIONER

## 2023-09-05 PROCEDURE — 82607 VITAMIN B-12: CPT | Performed by: NURSE PRACTITIONER

## 2023-09-05 PROCEDURE — 80053 COMPREHEN METABOLIC PANEL: CPT | Performed by: NURSE PRACTITIONER

## 2023-09-05 PROCEDURE — 85025 COMPLETE CBC W/AUTO DIFF WBC: CPT | Performed by: NURSE PRACTITIONER

## 2023-09-05 PROCEDURE — 83525 ASSAY OF INSULIN: CPT | Performed by: NURSE PRACTITIONER

## 2023-09-05 PROCEDURE — 84466 ASSAY OF TRANSFERRIN: CPT | Performed by: NURSE PRACTITIONER

## 2023-09-05 PROCEDURE — 83527 ASSAY OF INSULIN: CPT | Performed by: NURSE PRACTITIONER

## 2023-09-06 LAB
25(OH)D3 SERPL-MCNC: 60.8 NG/ML (ref 30–100)
ALBUMIN SERPL-MCNC: 4.1 G/DL (ref 3.5–5.2)
ALBUMIN/GLOB SERPL: 1.6 G/DL
ALP SERPL-CCNC: 63 U/L (ref 39–117)
ALT SERPL W P-5'-P-CCNC: 14 U/L (ref 1–33)
ANION GAP SERPL CALCULATED.3IONS-SCNC: 11.9 MMOL/L (ref 5–15)
AST SERPL-CCNC: 19 U/L (ref 1–32)
BASOPHILS # BLD AUTO: 0.04 10*3/MM3 (ref 0–0.2)
BASOPHILS NFR BLD AUTO: 0.8 % (ref 0–1.5)
BILIRUB SERPL-MCNC: 0.5 MG/DL (ref 0–1.2)
BUN SERPL-MCNC: 14 MG/DL (ref 6–20)
BUN/CREAT SERPL: 15.6 (ref 7–25)
CALCIUM SPEC-SCNC: 9.2 MG/DL (ref 8.6–10.5)
CHLORIDE SERPL-SCNC: 104 MMOL/L (ref 98–107)
CHOLEST SERPL-MCNC: 165 MG/DL (ref 0–200)
CO2 SERPL-SCNC: 24.1 MMOL/L (ref 22–29)
CREAT SERPL-MCNC: 0.9 MG/DL (ref 0.57–1)
DEPRECATED RDW RBC AUTO: 43.2 FL (ref 37–54)
EGFRCR SERPLBLD CKD-EPI 2021: 83.6 ML/MIN/1.73
EOSINOPHIL # BLD AUTO: 0.08 10*3/MM3 (ref 0–0.4)
EOSINOPHIL NFR BLD AUTO: 1.7 % (ref 0.3–6.2)
ERYTHROCYTE [DISTWIDTH] IN BLOOD BY AUTOMATED COUNT: 13 % (ref 12.3–15.4)
GLOBULIN UR ELPH-MCNC: 2.6 GM/DL
GLUCOSE SERPL-MCNC: 91 MG/DL (ref 65–99)
HBA1C MFR BLD: 5 % (ref 4.8–5.6)
HCT VFR BLD AUTO: 41.3 % (ref 34–46.6)
HDLC SERPL-MCNC: 48 MG/DL (ref 40–60)
HGB BLD-MCNC: 13.4 G/DL (ref 12–15.9)
IRON 24H UR-MRATE: 87 MCG/DL (ref 37–145)
IRON SATN MFR SERPL: 22 % (ref 20–50)
LDLC SERPL CALC-MCNC: 101 MG/DL (ref 0–100)
LDLC/HDLC SERPL: 2.08 {RATIO}
LYMPHOCYTES # BLD AUTO: 1.3 10*3/MM3 (ref 0.7–3.1)
LYMPHOCYTES NFR BLD AUTO: 27.3 % (ref 19.6–45.3)
MCH RBC QN AUTO: 29.1 PG (ref 26.6–33)
MCHC RBC AUTO-ENTMCNC: 32.4 G/DL (ref 31.5–35.7)
MCV RBC AUTO: 89.6 FL (ref 79–97)
MONOCYTES # BLD AUTO: 0.24 10*3/MM3 (ref 0.1–0.9)
MONOCYTES NFR BLD AUTO: 5 % (ref 5–12)
NEUTROPHILS NFR BLD AUTO: 3.08 10*3/MM3 (ref 1.7–7)
NEUTROPHILS NFR BLD AUTO: 64.8 % (ref 42.7–76)
PLATELET # BLD AUTO: 298 10*3/MM3 (ref 140–450)
PMV BLD AUTO: 10.7 FL (ref 6–12)
POTASSIUM SERPL-SCNC: 4 MMOL/L (ref 3.5–5.2)
PROT SERPL-MCNC: 6.7 G/DL (ref 6–8.5)
RBC # BLD AUTO: 4.61 10*6/MM3 (ref 3.77–5.28)
SMALL PLATELETS BLD QL SMEAR: ADEQUATE
SODIUM SERPL-SCNC: 140 MMOL/L (ref 136–145)
T3FREE SERPL-MCNC: 2.43 PG/ML (ref 2–4.4)
T4 FREE SERPL-MCNC: 0.66 NG/DL (ref 0.93–1.7)
TIBC SERPL-MCNC: 398 MCG/DL (ref 298–536)
TRANSFERRIN SERPL-MCNC: 267 MG/DL (ref 200–360)
TRIGL SERPL-MCNC: 85 MG/DL (ref 0–150)
TSH SERPL DL<=0.05 MIU/L-ACNC: 7.46 UIU/ML (ref 0.27–4.2)
VIT B12 BLD-MCNC: 558 PG/ML (ref 211–946)
VLDLC SERPL-MCNC: 16 MG/DL (ref 5–40)
WBC NRBC COR # BLD: 4.76 10*3/MM3 (ref 3.4–10.8)

## 2023-09-07 DIAGNOSIS — F33.0 MILD EPISODE OF RECURRENT MAJOR DEPRESSIVE DISORDER: ICD-10-CM

## 2023-09-07 DIAGNOSIS — F41.0 PANIC DISORDER WITHOUT AGORAPHOBIA: ICD-10-CM

## 2023-09-07 DIAGNOSIS — E03.8 HYPOTHYROIDISM DUE TO HASHIMOTO'S THYROIDITIS: Primary | ICD-10-CM

## 2023-09-07 DIAGNOSIS — F41.1 GENERALIZED ANXIETY DISORDER: ICD-10-CM

## 2023-09-07 DIAGNOSIS — E06.3 HYPOTHYROIDISM DUE TO HASHIMOTO'S THYROIDITIS: Primary | ICD-10-CM

## 2023-09-07 RX ORDER — LEVOTHYROXINE SODIUM 137 UG/1
137 TABLET ORAL EVERY MORNING
Qty: 90 TABLET | Refills: 1 | Status: SHIPPED | OUTPATIENT
Start: 2023-09-07

## 2023-09-07 RX ORDER — DULOXETIN HYDROCHLORIDE 30 MG/1
30 CAPSULE, DELAYED RELEASE ORAL DAILY
Qty: 30 CAPSULE | Refills: 5
Start: 2023-09-07

## 2023-09-07 RX ORDER — DULOXETIN HYDROCHLORIDE 60 MG/1
60 CAPSULE, DELAYED RELEASE ORAL DAILY
Qty: 30 CAPSULE | Refills: 5
Start: 2023-09-07 | End: 2023-09-07

## 2023-09-13 LAB
INSULIN FREE SERPL-ACNC: 66 UU/ML
INSULIN SERPL-ACNC: 66 UU/ML

## 2023-09-18 ENCOUNTER — TELEPHONE (OUTPATIENT)
Dept: FAMILY MEDICINE CLINIC | Facility: CLINIC | Age: 38
End: 2023-09-18

## 2023-09-18 NOTE — TELEPHONE ENCOUNTER
Caller: Jarad Barragan    Relationship: Self    Best call back number: [phone]     What was the call regarding: PATIENT CALLED TO REQUEST A PRIOR AUTHORIZATION FOR [employer].

## 2023-09-20 ENCOUNTER — OFFICE VISIT (OUTPATIENT)
Dept: FAMILY MEDICINE CLINIC | Facility: CLINIC | Age: 38
End: 2023-09-20
Payer: OTHER MISCELLANEOUS

## 2023-09-20 VITALS
DIASTOLIC BLOOD PRESSURE: 80 MMHG | HEIGHT: 66 IN | TEMPERATURE: 98.2 F | SYSTOLIC BLOOD PRESSURE: 124 MMHG | RESPIRATION RATE: 20 BRPM | WEIGHT: 234 LBS | OXYGEN SATURATION: 98 % | BODY MASS INDEX: 37.61 KG/M2 | HEART RATE: 106 BPM

## 2023-09-20 DIAGNOSIS — S91.212D LACERATION OF LEFT GREAT TOE WITHOUT FOREIGN BODY WITH DAMAGE TO NAIL, SUBSEQUENT ENCOUNTER: ICD-10-CM

## 2023-09-20 DIAGNOSIS — Z02.6 ENCOUNTER RELATED TO WORKER'S COMPENSATION CLAIM: Primary | ICD-10-CM

## 2023-10-19 DIAGNOSIS — K21.9 GASTROESOPHAGEAL REFLUX DISEASE WITHOUT ESOPHAGITIS: ICD-10-CM

## 2023-10-19 RX ORDER — RABEPRAZOLE SODIUM 20 MG/1
20 TABLET, DELAYED RELEASE ORAL 2 TIMES DAILY
Qty: 60 TABLET | Refills: 5 | Status: SHIPPED | OUTPATIENT
Start: 2023-10-19

## 2023-10-30 DIAGNOSIS — F41.1 GENERALIZED ANXIETY DISORDER: ICD-10-CM

## 2023-10-30 DIAGNOSIS — F33.0 MILD EPISODE OF RECURRENT MAJOR DEPRESSIVE DISORDER: ICD-10-CM

## 2023-10-30 DIAGNOSIS — F41.0 PANIC DISORDER WITHOUT AGORAPHOBIA: ICD-10-CM

## 2023-10-30 RX ORDER — DULOXETIN HYDROCHLORIDE 60 MG/1
60 CAPSULE, DELAYED RELEASE ORAL DAILY
Qty: 30 CAPSULE | Refills: 2 | Status: SHIPPED | OUTPATIENT
Start: 2023-10-30

## 2023-11-29 ENCOUNTER — TELEMEDICINE (OUTPATIENT)
Dept: FAMILY MEDICINE CLINIC | Facility: CLINIC | Age: 38
End: 2023-11-29
Payer: COMMERCIAL

## 2023-11-29 VITALS — BODY MASS INDEX: 37.61 KG/M2 | HEIGHT: 66 IN | WEIGHT: 234 LBS

## 2023-11-29 DIAGNOSIS — M51.26 LUMBAR DISC HERNIATION: ICD-10-CM

## 2023-11-29 DIAGNOSIS — F41.0 PANIC DISORDER WITHOUT AGORAPHOBIA: ICD-10-CM

## 2023-11-29 DIAGNOSIS — G43.719 INTRACTABLE CHRONIC MIGRAINE WITHOUT AURA AND WITHOUT STATUS MIGRAINOSUS: Primary | ICD-10-CM

## 2023-11-29 DIAGNOSIS — F41.1 GENERALIZED ANXIETY DISORDER: ICD-10-CM

## 2023-11-29 DIAGNOSIS — F33.0 MILD EPISODE OF RECURRENT MAJOR DEPRESSIVE DISORDER: ICD-10-CM

## 2023-11-29 PROCEDURE — 99214 OFFICE O/P EST MOD 30 MIN: CPT | Performed by: NURSE PRACTITIONER

## 2023-11-29 RX ORDER — TRAMADOL HYDROCHLORIDE 50 MG/1
50 TABLET ORAL 2 TIMES DAILY
Qty: 60 TABLET | Refills: 2 | Status: SHIPPED | OUTPATIENT
Start: 2023-11-29

## 2023-11-29 RX ORDER — DULOXETIN HYDROCHLORIDE 60 MG/1
60 CAPSULE, DELAYED RELEASE ORAL DAILY
Qty: 30 CAPSULE | Refills: 5 | Status: SHIPPED | OUTPATIENT
Start: 2023-11-29

## 2023-11-29 RX ORDER — CLONAZEPAM 1 MG/1
1 TABLET ORAL 2 TIMES DAILY PRN
Qty: 60 TABLET | Refills: 0 | Status: SHIPPED | OUTPATIENT
Start: 2023-11-29

## 2023-11-29 NOTE — PROGRESS NOTES
"You have chosen to receive care through a telehealth visit.  Do you consent to use a video/audio connection for your medical care today? Yes    Patient has chosen to have a telehealth/video visit due to current pandemic/current public health emergency.  Patient is not recommended to present to the office for face-to-face evaluation.   This visit is live, real time audio and visual communication between the provider and the patient.      I did not complete this video visit with the patient using [employer].  This video visit was done with [employer] application.    Patient visit is for the following CC:     Chief Complaint   Patient presents with    Anxiety       Brief HPI/ROS obtained as follows:    Anxiety-has been increased.  She reports that she is worried about her mom who has had some kidney changes. She feels that it is related to stress.  She is taking her cymbalta and Klonopin.  She is not having any negative side effects.   Weight management-she has began adipex again.  She is taking 1/2 she reports she has stopped the weight loss injection (semaglutide).   She could not tolerate the GI side effects.  She has been on Mounjaro in the past and tolerated it better.  She continues to work on diet changes.  Headaches-report one acutely.   She is currently off medications.  She has been on Topamax but had decreased memory and a sensation of brain fog.  She would be interested in treatment again.  Back and leg pain-\"same\".  She is taking tramadol PRN for her pain.  She also has zanaflex for muscle spasms.  She continues to have some hip and leg pain.  No new symptoms reported today.      The following portions of the patient's history were reviewed and updated as appropriate: CC, ROS, allergies, current medications, past family history, past medical history, past social history, past surgical history and problem list.    Review of Systems   Constitutional:  Positive for fatigue (some increase since starting weight " loss). Negative for appetite change and unexpected weight change.   HENT:  Negative for congestion, ear pain, nosebleeds, postnasal drip, rhinorrhea, sore throat, trouble swallowing and voice change.    Eyes:  Negative for photophobia, pain and visual disturbance.   Respiratory:  Negative for cough, chest tightness, shortness of breath and wheezing.    Cardiovascular:  Negative for chest pain and palpitations.   Gastrointestinal:  Negative for abdominal pain, blood in stool, constipation, diarrhea and nausea.        GI upset when on weight loss injection   Endocrine: Negative for cold intolerance and polydipsia.   Genitourinary:  Negative for difficulty urinating, flank pain, frequency and hematuria.   Musculoskeletal:  Positive for arthralgias, back pain, gait problem and myalgias. Negative for joint swelling.   Skin:  Negative for color change and rash.   Allergic/Immunologic: Negative.    Neurological:  Negative for dizziness, syncope, numbness and headaches.   Hematological: Negative.    Psychiatric/Behavioral:  Negative for dysphoric mood, sleep disturbance and suicidal ideas. The patient is nervous/anxious.    All other systems reviewed and are negative.      Assessment     Physical Exam   Constitutional: She appears well-developed and well-nourished. No distress.   HENT:   Head: Normocephalic.   Right Ear: External ear normal.   Left Ear: External ear normal.   Nose: Nose normal.   Mouth/Throat: Oropharynx is clear and moist.   Eyes: Pupils are equal, round, and reactive to light. Conjunctivae and EOM are normal. No scleral icterus.   Neck: Neck normal appearance.No JVD present. No thyromegaly present.   Pulmonary/Chest: Effort normal.  No respiratory distress. She no audible wheeze...  Abdominal: Abdomen appears normal. Soft. She exhibits no distension and no visible mass.   Musculoskeletal:         General: Tenderness present.   Neurological: She is alert. No cranial nerve deficit. Coordination normal.    Skin: Skin is warm and dry. Capillary refill takes less than 2 seconds. She is not diaphoretic. No nail bed cyanosis or erythema. No pallor. Nails show no clubbing.   Psychiatric: She has a normal mood and affect.       Diagnoses and all orders for this visit:    1. Intractable chronic migraine without aura and without status migrainosus (Primary)  -     Atogepant (Qulipta) 30 MG tablet; Take 1 tablet by mouth Daily.  Dispense: 30 tablet; Refill: 0    2. Panic disorder without agoraphobia  -     DULoxetine (CYMBALTA) 60 MG capsule; Take 1 capsule by mouth Daily.  Dispense: 30 capsule; Refill: 5  -     clonazePAM (KlonoPIN) 1 MG tablet; Take 1 tablet by mouth 2 (Two) Times a Day As Needed for Anxiety. for anxiety  Dispense: 60 tablet; Refill: 0    3. Generalized anxiety disorder  Comments:  Continue Cymbalta and Klonopin.  Continue to work on stress reduction.  Be active as able   Orders:  -     DULoxetine (CYMBALTA) 60 MG capsule; Take 1 capsule by mouth Daily.  Dispense: 30 capsule; Refill: 5    4. Mild episode of recurrent major depressive disorder  -     DULoxetine (CYMBALTA) 60 MG capsule; Take 1 capsule by mouth Daily.  Dispense: 30 capsule; Refill: 5    5. Lumbar disc herniation  Comments:    Continue Flexeril as needed.  Refill on tramadol.  Continue as directed.  Avoid overuse activities  Orders:  -     traMADol (ULTRAM) 50 MG tablet; Take 1 tablet by mouth 2 (Two) Times a Day.  Dispense: 60 tablet; Refill: 2    6. Panic disorder without agoraphobia  Comments:  continue Klonopin and cymbalta as directed.  Orders:  -     DULoxetine (CYMBALTA) 60 MG capsule; Take 1 capsule by mouth Daily.  Dispense: 30 capsule; Refill: 5  -     clonazePAM (KlonoPIN) 1 MG tablet; Take 1 tablet by mouth 2 (Two) Times a Day As Needed for Anxiety. for anxiety  Dispense: 60 tablet; Refill: 0        Patient instructed and advised to call if symptoms are increasing or new symptoms occur.    Understands reasons for urgent and  emergent care.  Patient (& family) verbalized agreement for treatment plan.     Generalized precautions advised including social distancing, hand washing, cough/sneeze hygiene.  Quarantine per CDC guidelines if exposed to illness.    PURVI/KIMBERLY reviewed today and consistent.  Will refill prescribed controlled medication today.  Patient is aware they cannot receive narcotics from any other provider except if under care of pain management or speciality clinic.  Risk and benefits of medication use has been reviewed.  History and physical exam exhibit continued safe and appropriate use of controlled substances.  The patient is aware of the potential for addiction and dependence.  This patient has been made aware of the appropriate use of such medications, including potential risk of somnolence, limited ability to drive and / or work safely, and potential for overdose.    It has also been made clear that these medications are for use by this patient only, without concomitant use of alcohol or other substances unless prescribed/advised by medical provider.  Patient understands they may be subject to UDS and pill counts at random.    Patient considered to be moderate risk for addiction due to use of multiple controlled medications.  Patient understands and accepts these risks.  Patient need for medication will be reassessed at each visit.  Doses will be adjusted according to patient need and findings.    Goal of TX: Patient will not have any adverse reactions of medication.    Patient will have reduction in anxiety symptoms with use of PRN Klonopin.  Patient will be able to remain active in an outside of her home without interference from anxiety symptoms.  Patient will have reduction in there chronic back and joint pain symptoms with use of tramadol as needed as directed.  Patient will be able to remain active in an outside of their home with minimal to no interference from their pain symptoms.    PURVI Patient  Controlled Substance Report (from 11/29/2022 to 11/29/2023)    Dispensed  Strength Quantity Days Supply Provider Pharmacy   10/19/2023 Clonazepam 1MG 60 each 30 [facility]UL [facility]   09/27/2023 Tramadol Hcl 50MG 60 each 30 [facility]UL [facility]   09/07/2023 Clonazepam 1MG 60 each 30 [facility]   08/16/2023 Tramadol Hcl 50MG 60 each 30 [facility]   08/07/2023 Clonazepam 1MG 60 each 30 [facility]        RTC 1 month, sooner if needed.       This is an audio and video enabled enabled telehealth encounter.      This visit was conducted with the provider in the office at [facility] and the patient in their home in a private area.    This visit/video call lasted:  18 minutes      This document has been electronically signed by:  LANA Quinn FNP-C Dragon disclaimer:  Part of this note may be an electronic transcription/translation of spoken language to printed text using the Dragon Dictation System.

## 2023-11-30 RX ORDER — ATOGEPANT 30 MG/1
30 TABLET ORAL DAILY
Qty: 30 TABLET | Refills: 0 | Status: SHIPPED | OUTPATIENT
Start: 2023-11-30

## 2023-12-04 ENCOUNTER — LAB (OUTPATIENT)
Dept: FAMILY MEDICINE CLINIC | Facility: CLINIC | Age: 38
End: 2023-12-04
Payer: COMMERCIAL

## 2023-12-04 ENCOUNTER — CLINICAL SUPPORT (OUTPATIENT)
Dept: FAMILY MEDICINE CLINIC | Facility: CLINIC | Age: 38
End: 2023-12-04
Payer: COMMERCIAL

## 2023-12-04 DIAGNOSIS — E03.8 HYPOTHYROIDISM DUE TO HASHIMOTO'S THYROIDITIS: ICD-10-CM

## 2023-12-04 DIAGNOSIS — E53.8 B12 DEFICIENCY: Primary | ICD-10-CM

## 2023-12-04 DIAGNOSIS — E06.3 HYPOTHYROIDISM DUE TO HASHIMOTO'S THYROIDITIS: ICD-10-CM

## 2023-12-04 PROCEDURE — 84443 ASSAY THYROID STIM HORMONE: CPT | Performed by: INTERNAL MEDICINE

## 2023-12-04 PROCEDURE — 96372 THER/PROPH/DIAG INJ SC/IM: CPT | Performed by: NURSE PRACTITIONER

## 2023-12-04 PROCEDURE — 84439 ASSAY OF FREE THYROXINE: CPT | Performed by: INTERNAL MEDICINE

## 2023-12-04 RX ADMIN — CYANOCOBALAMIN 1000 MCG: 1000 INJECTION, SOLUTION INTRAMUSCULAR; SUBCUTANEOUS at 10:51

## 2023-12-04 NOTE — PROGRESS NOTES
Injection  Injection performed in right deltoid by Joya Mcallister MA. Patient tolerated the procedure well without complications.  12/04/23   Joya Mcallister MA

## 2023-12-05 ENCOUNTER — TELEPHONE (OUTPATIENT)
Dept: ENDOCRINOLOGY | Facility: CLINIC | Age: 38
End: 2023-12-05
Payer: COMMERCIAL

## 2023-12-05 DIAGNOSIS — E06.3 HYPOTHYROIDISM DUE TO HASHIMOTO'S THYROIDITIS: Primary | ICD-10-CM

## 2023-12-05 DIAGNOSIS — E03.8 HYPOTHYROIDISM DUE TO HASHIMOTO'S THYROIDITIS: Primary | ICD-10-CM

## 2023-12-05 LAB
T4 FREE SERPL-MCNC: 0.71 NG/DL (ref 0.93–1.7)
TSH SERPL DL<=0.05 MIU/L-ACNC: 24 UIU/ML (ref 0.27–4.2)

## 2023-12-05 RX ORDER — LEVOTHYROXINE SODIUM 0.15 MG/1
150 TABLET ORAL EVERY MORNING
Qty: 30 TABLET | Refills: 5 | Status: SHIPPED | OUTPATIENT
Start: 2023-12-05

## 2023-12-05 NOTE — TELEPHONE ENCOUNTER
Spoke to patient about lab results. Confirmed that pt has been taking medication as directed without interfering factors. Will increase levothyroxine to 150 mcg. Will check TSH at f/u on 01/02/2024.   Electronically Signed: Zulma Cardoza MD

## 2023-12-29 DIAGNOSIS — F41.0 PANIC DISORDER WITHOUT AGORAPHOBIA: ICD-10-CM

## 2023-12-29 RX ORDER — CLONAZEPAM 1 MG/1
1 TABLET ORAL 2 TIMES DAILY PRN
Qty: 60 TABLET | Refills: 0 | Status: SHIPPED | OUTPATIENT
Start: 2023-12-29

## 2024-01-02 ENCOUNTER — OFFICE VISIT (OUTPATIENT)
Dept: ENDOCRINOLOGY | Facility: CLINIC | Age: 39
End: 2024-01-02
Payer: COMMERCIAL

## 2024-01-02 VITALS
OXYGEN SATURATION: 98 % | BODY MASS INDEX: 40.02 KG/M2 | HEIGHT: 66 IN | SYSTOLIC BLOOD PRESSURE: 118 MMHG | DIASTOLIC BLOOD PRESSURE: 72 MMHG | WEIGHT: 249 LBS | HEART RATE: 111 BPM

## 2024-01-02 DIAGNOSIS — E06.3 HYPOTHYROIDISM DUE TO HASHIMOTO'S THYROIDITIS: Primary | ICD-10-CM

## 2024-01-02 DIAGNOSIS — E03.8 HYPOTHYROIDISM DUE TO HASHIMOTO'S THYROIDITIS: Primary | ICD-10-CM

## 2024-01-02 LAB
T4 FREE SERPL-MCNC: 1.39 NG/DL (ref 0.93–1.7)
TSH SERPL DL<=0.05 MIU/L-ACNC: 1.99 UIU/ML (ref 0.27–4.2)

## 2024-01-02 PROCEDURE — 84439 ASSAY OF FREE THYROXINE: CPT | Performed by: INTERNAL MEDICINE

## 2024-01-02 PROCEDURE — 84443 ASSAY THYROID STIM HORMONE: CPT | Performed by: INTERNAL MEDICINE

## 2024-01-02 PROCEDURE — 36415 COLL VENOUS BLD VENIPUNCTURE: CPT | Performed by: INTERNAL MEDICINE

## 2024-01-02 PROCEDURE — 99214 OFFICE O/P EST MOD 30 MIN: CPT | Performed by: INTERNAL MEDICINE

## 2024-01-02 NOTE — PROGRESS NOTES
Chief Complaint   Patient presents with    Hypothyroidism     Follow up        HPI:   Jarad Barragan is a 38 y.o.female who returns to Endocrine Clinic for follow-up evaluation of her hypothyroidism due to Hashimoto's thyroiditis. Last clinic visit 06/28/2023. Her history is as follows:    Interim Events:   - reports fatigue despite sleeping 7-8 hours per night  - Is taking levothyroxine as directed. Changed her dosing last in 12/2023 and had her take 7 tabs once weekly for better absorption.     1) hypothyroidism due to Hashimoto's thyroiditis:  -Patient was initially diagnosed with hypothyroidism in approximately 2013 or 2014.  She recalled having her thyroid levels checked to further evaluate an abnormal EKG. her initial abnormal TSH was completed at [facility].  Records of that initial TSH were not available for review.   -She had been taking variable doses of thyroid hormone prescribed by her PCP prior to seeing endocrinology.  Part of the variation in dosing was related to a 50 pound weight loss in September 2021 after COVID.  She then gained that weight back.   FMHx: No known thyroid cancer, patient has multiple cousins with history of hypothyroidism  (12/2022) Was taking Levothyroxine 75 mcg + liothyronine 5 mcg  Initial Endocrine Visit with me: 12/27/2022 - switched to Synthroid 100 mcg daily, and dose was gradually titrated over subsequent months   - Noted no difference between Brand Synthroid vs Brand Tirosint vs generic levothyroxine  - She has tried [place] thyroid in the past    Current Regimen: Levothyroxine 150 mcg  - Has been taking 7 tabs once weekly (Wednesdays) as directed to help with medication consistency in the setting of GI issues  - She waits approximately 45 to 60 minutes before taking her PPI or eating.  She does not have hot liquids in the morning with her medication.   - Patient denies missed doses  - Is not on a high dose biotin supplement at this time    Review of Systems  "  Constitutional:  Positive for fatigue.        Weight gain   HENT: Negative.     Eyes: Negative.    Respiratory: Negative.     Cardiovascular: Negative.    Gastrointestinal:  Negative for constipation, diarrhea and nausea.        Heartburn   Endocrine: Negative.    Genitourinary: Negative.    Musculoskeletal:  Positive for arthralgias.   Skin: Negative.    Allergic/Immunologic: Negative.    Neurological: Negative.    Hematological: Negative.    Psychiatric/Behavioral:  Positive for decreased concentration. Negative for sleep disturbance.         H/o anxiety with depression : is on Cymbalta daily. Clonazepam PRM       The following portions of the patient's history were reviewed and updated as appropriate: allergies, current medications, past family history, past medical history, past social history, past surgical history and problem list.      /72   Pulse 111   Ht 167.6 cm (66\")   Wt 113 kg (249 lb)   SpO2 98%   BMI 40.19 kg/m²   Physical Exam  Vitals reviewed.   Constitutional:       General: She is not in acute distress.     Appearance: She is well-developed. She is not diaphoretic.   HENT:      Head: Normocephalic.   Eyes:      Conjunctiva/sclera: Conjunctivae normal.      Pupils: Pupils are equal, round, and reactive to light.   Neck:      Thyroid: Thyromegaly (mild) present.      Trachea: No tracheal deviation.      Comments: No palpable thyroid nodules  Patient has a firm lobular gland consistent with Hashimoto's thyroiditis  Cardiovascular:      Rate and Rhythm: Regular rhythm. Tachycardia present.      Heart sounds: Normal heart sounds. No murmur heard.  Pulmonary:      Effort: Pulmonary effort is normal. No respiratory distress.      Breath sounds: Normal breath sounds.   Lymphadenopathy:      Cervical: No cervical adenopathy.   Skin:     General: Skin is warm and dry.      Findings: No erythema.   Neurological:      Mental Status: She is alert and oriented to person, place, and time.      " Cranial Nerves: No cranial nerve deficit.   Psychiatric:         Behavior: Behavior normal.         LABS/IMAGING:   Results for orders placed or performed in visit on 01/02/24   TSH    Specimen: Arm, Left; Blood   Result Value Ref Range    TSH 1.990 0.270 - 4.200 uIU/mL   T4, Free    Specimen: Arm, Left; Blood   Result Value Ref Range    Free T4 1.39 0.93 - 1.70 ng/dL       ASSESSMENT/PLAN:  1) hypothyroidism due to Hashimoto's thyroiditis:  - Although clinically euthyroid on exam and with normal TFTs, patient reports persistent fatigue. Discussed that her fatigue may not be due to her hypothyroidism as she is on a sufficient dose of levothyroxine.   - Discussed treatment options with patient and have made the following plan:  Continue levothyroxine 150 mcg (7 tabs once weekly) on Wednesdays  Start liothyronine 5 mcg daily. Reviewed potential side effects. Pt to call if not tolerated.     RTC 6 months    Electronically Signed: Zulma Cardoza MD

## 2024-01-04 DIAGNOSIS — F41.0 PANIC DISORDER WITHOUT AGORAPHOBIA: ICD-10-CM

## 2024-01-04 RX ORDER — CLONAZEPAM 1 MG/1
1 TABLET ORAL 2 TIMES DAILY PRN
Qty: 60 TABLET | Refills: 0 | Status: SHIPPED | OUTPATIENT
Start: 2024-01-04 | End: 2024-01-04 | Stop reason: SDUPTHER

## 2024-01-04 RX ORDER — CLONAZEPAM 1 MG/1
1 TABLET ORAL 2 TIMES DAILY PRN
Qty: 60 TABLET | Refills: 0 | Status: SHIPPED | OUTPATIENT
Start: 2024-01-04

## 2024-01-08 ENCOUNTER — TELEPHONE (OUTPATIENT)
Dept: ENDOCRINOLOGY | Facility: CLINIC | Age: 39
End: 2024-01-08
Payer: COMMERCIAL

## 2024-01-08 DIAGNOSIS — E03.8 HYPOTHYROIDISM DUE TO HASHIMOTO'S THYROIDITIS: ICD-10-CM

## 2024-01-08 DIAGNOSIS — E06.3 HYPOTHYROIDISM DUE TO HASHIMOTO'S THYROIDITIS: ICD-10-CM

## 2024-01-08 RX ORDER — LEVOTHYROXINE SODIUM 0.15 MG/1
150 TABLET ORAL EVERY MORNING
Qty: 30 TABLET | Refills: 5 | Status: SHIPPED | OUTPATIENT
Start: 2024-01-08

## 2024-01-08 RX ORDER — LIOTHYRONINE SODIUM 5 UG/1
5 TABLET ORAL DAILY
Qty: 30 TABLET | Refills: 0 | Status: SHIPPED | OUTPATIENT
Start: 2024-01-08 | End: 2025-01-07

## 2024-01-08 NOTE — TELEPHONE ENCOUNTER
Pt called to say that she needs her levothyroxine sent to [facility]  She had labs and just making sure it will be the same dose    Please call pt if you have questions

## 2024-01-08 NOTE — TELEPHONE ENCOUNTER
Spoke to patient about lab results. See clinic note from 01/0/2024 for details.   Electronically Signed: Zulma Cardoza MD

## 2024-01-08 NOTE — TELEPHONE ENCOUNTER
----- Message from Kris Ventura MA sent at 1/4/2024  8:56 AM EST -----  Regarding: FW: ZOË  Contact: [phone]    ----- Message -----  From: Jarad Barragan  Sent: 1/3/2024   5:35 PM EST  To: [facility]  Subject: THS                                              Looks like it normal unless I’m reading it wrong. So can we add the other thyroid meds so, I don’t feel so drained?

## 2024-02-13 ENCOUNTER — OFFICE VISIT (OUTPATIENT)
Dept: FAMILY MEDICINE CLINIC | Facility: CLINIC | Age: 39
End: 2024-02-13
Payer: COMMERCIAL

## 2024-02-13 VITALS
RESPIRATION RATE: 20 BRPM | DIASTOLIC BLOOD PRESSURE: 80 MMHG | SYSTOLIC BLOOD PRESSURE: 124 MMHG | HEART RATE: 110 BPM | WEIGHT: 248 LBS | OXYGEN SATURATION: 97 % | BODY MASS INDEX: 39.86 KG/M2 | TEMPERATURE: 98.4 F | HEIGHT: 66 IN

## 2024-02-13 DIAGNOSIS — K21.9 GASTROESOPHAGEAL REFLUX DISEASE WITHOUT ESOPHAGITIS: ICD-10-CM

## 2024-02-13 DIAGNOSIS — E53.8 B12 DEFICIENCY: ICD-10-CM

## 2024-02-13 DIAGNOSIS — F41.0 PANIC DISORDER WITHOUT AGORAPHOBIA: ICD-10-CM

## 2024-02-13 DIAGNOSIS — M51.26 LUMBAR DISC HERNIATION: ICD-10-CM

## 2024-02-13 DIAGNOSIS — Z79.899 LONG-TERM USE OF HIGH-RISK MEDICATION: ICD-10-CM

## 2024-02-13 RX ORDER — SYRINGE WITH NEEDLE, 1 ML 25GX5/8"
1 SYRINGE, EMPTY DISPOSABLE MISCELLANEOUS WEEKLY
Qty: 4 EACH | Refills: 5 | Status: SHIPPED | OUTPATIENT
Start: 2024-02-13

## 2024-02-13 RX ORDER — RABEPRAZOLE SODIUM 20 MG/1
20 TABLET, DELAYED RELEASE ORAL 2 TIMES DAILY
Qty: 60 TABLET | Refills: 5 | Status: SHIPPED | OUTPATIENT
Start: 2024-02-13

## 2024-02-13 RX ORDER — CLONAZEPAM 1 MG/1
1 TABLET ORAL 2 TIMES DAILY PRN
Qty: 60 TABLET | Refills: 0 | Status: SHIPPED | OUTPATIENT
Start: 2024-02-13

## 2024-02-13 RX ORDER — TRAMADOL HYDROCHLORIDE 50 MG/1
50 TABLET ORAL 2 TIMES DAILY
Qty: 60 TABLET | Refills: 2 | Status: SHIPPED | OUTPATIENT
Start: 2024-02-13

## 2024-02-13 RX ADMIN — CYANOCOBALAMIN 1000 MCG: 1000 INJECTION, SOLUTION INTRAMUSCULAR; SUBCUTANEOUS at 17:25

## 2024-02-13 NOTE — PROGRESS NOTES
Subjective   Jarad Barragan is a 38 y.o. female.     Chief Complaint   Patient presents with    MEDICATION FOLLOW UP       History of Present Illness     Medication follow up-here for routine medication refills.    Weight management-continues to struggle with weight loss.  She is not able to tolerate whole doses of adipex for long due to side effects.  She has gone to weight management.  She did initially get Mounjaro and tolerated well but it was not covered.  She has been to a semglutide clinic but had significant nausea.  She would be interested in trying to receive visit down.  She request referral to the bariatric clinic.  Thyroid-under the care of endocrinology.  She has been added Cytomel 5 mg.  She is currently on levothyroxine 150 mcg.  Back and leg pain-she continues to having ongoing pain.  She is using tramadol but mostly only PRN.  She does take cymbalta.  She feels that the Cymbalta has been effective with helping decrease her pain symptoms as well as help manage her anxiety.  Anxiety-she reports she has not taken in 6-7 days.  She reports that she has been trying to skip doses and only take it if needed.   She had a cymbalta increase and feels her symptoms are not as significant.  She does have some panic at times.    B12 deficiency-would like to obtain an injection of B12 while she is at the office today.  She does have chronic fatigue.  No new concerns.  She has not ever had any injection site reactions.    The following portions of the patient's history were reviewed and updated as appropriate: CC, ROS, allergies, current medications, past family history, past medical history, past social history, past surgical history and problem list.    Review of Systems   Constitutional:  Positive for fatigue. Negative for appetite change and fever.   HENT:  Negative for congestion, ear pain, nosebleeds, postnasal drip, rhinorrhea, sore throat, tinnitus, trouble swallowing and voice change.    Eyes:  Negative  "for blurred vision, photophobia and visual disturbance.   Respiratory:  Negative for cough, chest tightness, shortness of breath and wheezing.    Cardiovascular:  Negative for chest pain and palpitations.   Gastrointestinal:  Negative for abdominal pain, blood in stool, constipation, diarrhea, nausea and GERD.   Endocrine: Negative for cold intolerance, heat intolerance and polydipsia.   Genitourinary:  Negative for decreased urine volume, difficulty urinating, dysuria and hematuria.   Musculoskeletal:  Positive for arthralgias, back pain, gait problem and myalgias.   Skin:  Negative for color change, pallor and wound.   Allergic/Immunologic: Negative.    Neurological:  Negative for dizziness, syncope, numbness and headache.   Hematological: Negative.    Psychiatric/Behavioral:  Positive for stress. Negative for decreased concentration, sleep disturbance, suicidal ideas and depressed mood. The patient is nervous/anxious.    All other systems reviewed and are negative.      Objective     /80   Pulse 110   Temp 98.4 °F (36.9 °C)   Resp 20   Ht 167.6 cm (65.98\")   Wt 112 kg (248 lb)   SpO2 97%   BMI 40.05 kg/m²     Physical Exam  Vitals reviewed.   Constitutional:       General: She is not in acute distress.     Appearance: She is well-developed. She is obese. She is not diaphoretic.   HENT:      Head: Normocephalic and atraumatic.      Jaw: No tenderness.      Right Ear: Hearing, tympanic membrane, ear canal and external ear normal.      Left Ear: Hearing, tympanic membrane, ear canal and external ear normal.      Nose: Nose normal. No nasal tenderness or congestion.      Right Sinus: No maxillary sinus tenderness or frontal sinus tenderness.      Left Sinus: No maxillary sinus tenderness or frontal sinus tenderness.      Mouth/Throat:      Lips: Pink.      Mouth: Mucous membranes are moist.      Pharynx: Oropharynx is clear. Uvula midline.   Eyes:      General: Lids are normal. No scleral icterus.     " Extraocular Movements:      Right eye: Normal extraocular motion and no nystagmus.      Left eye: Normal extraocular motion and no nystagmus.      Conjunctiva/sclera: Conjunctivae normal.      Pupils: Pupils are equal, round, and reactive to light.   Neck:      Thyroid: No thyromegaly or thyroid tenderness.      Vascular: No carotid bruit or JVD.      Trachea: No tracheal tenderness.   Cardiovascular:      Rate and Rhythm: Normal rate and regular rhythm.      Pulses:           Dorsalis pedis pulses are 2+ on the right side and 2+ on the left side.        Posterior tibial pulses are 2+ on the right side and 2+ on the left side.      Heart sounds: Normal heart sounds, S1 normal and S2 normal. No murmur heard.  Pulmonary:      Effort: Pulmonary effort is normal. No accessory muscle usage, prolonged expiration or respiratory distress.      Breath sounds: Normal breath sounds.   Chest:      Chest wall: No tenderness.   Abdominal:      General: Bowel sounds are normal. There is no distension.      Palpations: Abdomen is soft. There is no hepatomegaly, splenomegaly or mass.      Tenderness: There is no abdominal tenderness.   Musculoskeletal:         General: No tenderness.      Cervical back: Normal range of motion and neck supple.      Thoracic back: Tenderness present.      Lumbar back: Tenderness present. Decreased range of motion.      Right hip: Tenderness present.      Left hip: Tenderness present.      Right lower leg: No edema.      Left lower leg: No edema.      Comments: No muscular atrophy or flaccidity.   Lymphadenopathy:      Head:      Right side of head: No submental or submandibular adenopathy.      Left side of head: No submental or submandibular adenopathy.      Cervical: No cervical adenopathy.      Right cervical: No superficial cervical adenopathy.     Left cervical: No superficial cervical adenopathy.   Skin:     General: Skin is warm and dry.      Capillary Refill: Capillary refill takes less than 2  "seconds.      Coloration: Skin is not jaundiced or pale.      Findings: No erythema.      Nails: There is no clubbing.   Neurological:      Mental Status: She is alert and oriented to person, place, and time.      Cranial Nerves: No cranial nerve deficit or facial asymmetry.      Sensory: No sensory deficit.      Motor: No weakness, tremor, atrophy or abnormal muscle tone.      Coordination: Coordination normal.      Gait: Gait abnormal (mild antalgia).      Deep Tendon Reflexes: Reflexes are normal and symmetric.   Psychiatric:         Attention and Perception: She is attentive.         Mood and Affect: Mood normal. Mood is anxious. Mood is not depressed.         Speech: Speech normal.         Behavior: Behavior normal. Behavior is cooperative.         Thought Content: Thought content normal.         Cognition and Memory: Cognition normal.         Judgment: Judgment normal.         Diagnoses and all orders for this visit:    1. Body mass index (BMI) of 40.0 to 44.9 in adult (Primary)  Comments:  Referral to bariatric clinic.  Orders:  -     Ambulatory Referral to Disease State Management    2. B12 deficiency  Comments:  Injection today while at the office  Orders:  -     Cyanocobalamin 1000 MCG/ML kit; Inject 1 mL as directed 1 (One) Time Per Week.  Dispense: 4 each; Refill: 5  -     Syringe/Needle, Disp, (B-D 3CC LUER-WANG SYR 25GX5/8\") 25G X 5/8\" 3 ML misc; Use 1 each 1 (One) Time Per Week.  Dispense: 4 each; Refill: 5    3. Lumbar disc herniation  Comments:    Continue Flexeril as needed.  Refill on tramadol.  Continue as directed.  Avoid overuse activities  Orders:  -     traMADol (ULTRAM) 50 MG tablet; Take 1 tablet by mouth 2 (Two) Times a Day.  Dispense: 60 tablet; Refill: 2    4. Panic disorder without agoraphobia  Comments:  continue Klonopin and cymbalta as directed.  Orders:  -     clonazePAM (KlonoPIN) 1 MG tablet; Take 1 tablet by mouth 2 (Two) Times a Day As Needed for Anxiety.  Dispense: 60 tablet; " Refill: 0    5. Gastroesophageal reflux disease without esophagitis  Comments:  Continue Aciphex.  Avoid food triggers  Orders:  -     RABEprazole (ACIPHEX) 20 MG EC tablet; Take 1 tablet by mouth 2 (Two) Times a Day.  Dispense: 60 tablet; Refill: 5    6. Long-term use of high-risk medication  Comments:  UDS for medication compliance  Orders:  -     Urine Drug Screen - Urine, Clean Catch; Future          Understands disease processes and need for medications.  Understands reasons for urgent and emergent care.  Patient (& family) verbalized agreement for treatment plan.   Emotional support and active listening provided.  Patient provided time to verbalize feelings.    PURVI/PMDP reviewed today and consistent.  Will refill prescribed controlled medication today.  Patient is aware they cannot receive narcotics from any other provider except if under care of pain management or speciality clinic.  Risk and benefits of medication use has been reviewed.  History and physical exam exhibit continued safe and appropriate use of controlled substances.  The patient is aware of the potential for addiction and dependence.  This patient has been made aware of the appropriate use of such medications, including potential risk of somnolence, limited ability to drive and / or work safely, and potential for overdose.    It has also been made clear that these medications are for use by this patient only, without concomitant use of alcohol or other substances unless prescribed/advised by medical provider.  Patient understands they may be subject to UDS and pill counts at random.    Patient considered to be moderate risk for addiction due to use of multiple controlled medications.  Patient understands and accepts these risks.  Patient need for medication will be reassessed at each visit.  Doses will be adjusted according to patient need and findings.    Goal of [place]: Patient will not have any adverse reactions of medication.  Patient will  have reduction in anxiety symptoms with use of PRN Klonopin.  Patient will be able to remain active in an outside of her home without interference from anxiety symptoms.  Patient will have reduction in there chronic back and joint pain symptoms with use of tramadol as needed as directed.  Patient will be able to remain active in an outside of their home with minimal to no interference from their pain symptoms.    PURVI Patient Controlled Substance Report (from 2/13/2023 to 2/13/2024)    Dispensed  Strength Quantity Days Supply Provider Pharmacy   01/08/2024 Clonazepam 1MG 60 each 30 [facility]ILA[facility]VIPUL [facility]   12/29/2023 Tramadol Hydrochloride 50MG 60 each 30 SHEILA,VIPUL [facility]   11/29/2023 Clonazepam 1MG 60 each 30 [facility]VIPUL [facility]   11/29/2023 Tramadol Hydrochloride 50MG 60 each 30 [facility]VIPUL [facility]   10/19/2023 Clonazepam 1MG 60 each 30 SHEILA,VIPULGAYLE [facility]        RTC 1 month, sooner if needed.           This document has been electronically signed by:  LANA Quinn FNP-C Dragon disclaimer:  Part of this note may be an electronic transcription/translation of spoken language to printed text using the Dragon Dictation System.

## 2024-02-14 ENCOUNTER — TELEPHONE (OUTPATIENT)
Dept: PHARMACY | Facility: HOSPITAL | Age: 39
End: 2024-02-14

## 2024-02-20 ENCOUNTER — PATIENT MESSAGE (OUTPATIENT)
Dept: ENDOCRINOLOGY | Facility: CLINIC | Age: 39
End: 2024-02-20
Payer: COMMERCIAL

## 2024-02-20 DIAGNOSIS — E03.8 HYPOTHYROIDISM DUE TO HASHIMOTO'S THYROIDITIS: ICD-10-CM

## 2024-02-20 DIAGNOSIS — E06.3 HYPOTHYROIDISM DUE TO HASHIMOTO'S THYROIDITIS: ICD-10-CM

## 2024-02-20 RX ORDER — LEVOTHYROXINE SODIUM 0.15 MG/1
150 TABLET ORAL EVERY MORNING
Qty: 30 TABLET | Refills: 5 | Status: CANCELLED | OUTPATIENT
Start: 2024-02-20

## 2024-02-20 RX ORDER — LIOTHYRONINE SODIUM 5 UG/1
5 TABLET ORAL DAILY
Qty: 30 TABLET | Refills: 0 | Status: CANCELLED | OUTPATIENT
Start: 2024-02-20 | End: 2025-02-19

## 2024-02-20 NOTE — TELEPHONE ENCOUNTER
From: Jarad Barragan  To: Zulma Cardoza  Sent: 2/20/2024 10:14 AM EST  Subject: Thyroid med    Can u send my medication in

## 2024-02-21 RX ORDER — LEVOTHYROXINE SODIUM 0.15 MG/1
150 TABLET ORAL EVERY MORNING
Qty: 30 TABLET | Refills: 5 | Status: SHIPPED | OUTPATIENT
Start: 2024-02-21

## 2024-04-22 ENCOUNTER — OFFICE VISIT (OUTPATIENT)
Dept: FAMILY MEDICINE CLINIC | Facility: CLINIC | Age: 39
End: 2024-04-22
Payer: COMMERCIAL

## 2024-04-22 VITALS
WEIGHT: 248 LBS | OXYGEN SATURATION: 97 % | HEIGHT: 66 IN | BODY MASS INDEX: 39.86 KG/M2 | HEART RATE: 71 BPM | TEMPERATURE: 97.9 F | SYSTOLIC BLOOD PRESSURE: 100 MMHG | DIASTOLIC BLOOD PRESSURE: 70 MMHG

## 2024-04-22 DIAGNOSIS — M51.26 LUMBAR DISC HERNIATION: ICD-10-CM

## 2024-04-22 DIAGNOSIS — E66.09 CLASS 1 OBESITY DUE TO EXCESS CALORIES WITHOUT SERIOUS COMORBIDITY WITH BODY MASS INDEX (BMI) OF 31.0 TO 31.9 IN ADULT: ICD-10-CM

## 2024-04-22 DIAGNOSIS — E06.3 HYPOTHYROIDISM DUE TO HASHIMOTO'S THYROIDITIS: Primary | ICD-10-CM

## 2024-04-22 DIAGNOSIS — E55.9 VITAMIN D DEFICIENCY: ICD-10-CM

## 2024-04-22 DIAGNOSIS — F41.0 PANIC DISORDER WITHOUT AGORAPHOBIA: ICD-10-CM

## 2024-04-22 DIAGNOSIS — H65.193 ACUTE EFFUSION OF BOTH MIDDLE EARS: ICD-10-CM

## 2024-04-22 DIAGNOSIS — F33.0 MILD EPISODE OF RECURRENT MAJOR DEPRESSIVE DISORDER: ICD-10-CM

## 2024-04-22 DIAGNOSIS — E03.8 HYPOTHYROIDISM DUE TO HASHIMOTO'S THYROIDITIS: Primary | ICD-10-CM

## 2024-04-22 PROCEDURE — 99214 OFFICE O/P EST MOD 30 MIN: CPT | Performed by: NURSE PRACTITIONER

## 2024-04-22 RX ORDER — TRAMADOL HYDROCHLORIDE 50 MG/1
50 TABLET ORAL 2 TIMES DAILY
Qty: 60 TABLET | Refills: 2 | Status: SHIPPED | OUTPATIENT
Start: 2024-04-22

## 2024-04-22 RX ORDER — PREDNISONE 20 MG/1
TABLET ORAL
Qty: 10 TABLET | Refills: 0 | Status: SHIPPED | OUTPATIENT
Start: 2024-04-22 | End: 2024-05-02

## 2024-04-22 RX ORDER — CLONAZEPAM 1 MG/1
1 TABLET ORAL 2 TIMES DAILY PRN
Qty: 60 TABLET | Refills: 0 | Status: SHIPPED | OUTPATIENT
Start: 2024-04-22

## 2024-04-22 RX ORDER — DULOXETIN HYDROCHLORIDE 60 MG/1
60 CAPSULE, DELAYED RELEASE ORAL DAILY
Qty: 30 CAPSULE | Refills: 5 | Status: SHIPPED | OUTPATIENT
Start: 2024-04-22

## 2024-04-22 NOTE — PROGRESS NOTES
Subjective   Jarad Barragan is a 38 y.o. female.     Chief Complaint   Patient presents with    Anxiety    Ear Problem              History of Present Illness     Anxiety-ongoing.  She reports she has had some increase the last 2 weeks.  She is taking Klonopin 1 mg on most days and is only taking 2 when she must.  She reports she continues cymbalta.    Ears discomfort-reports they feel full of fluid.  Some itching.  She reports sounds are muffled at times.  She would like to try a steroid.  She is not on any antihistamines.    Back and leg pain-she reports that she has some foot swelling but overall her back and leg pain is the same.   She continues Cymbalta as directed.  She is also taking as needed tramadol.  She tries to be active as symptoms will allow.  GERD-ongoing.  On Aciphex 20 mg and is doing well.  No negative side effects.  No negative side effects of medication.  Patient does avoid foods that trigger reflux symptoms.  Denies any recent exacerbations.    The following portions of the patient's history were reviewed and updated as appropriate: CC, ROS, allergies, current medications, past family history, past medical history, past social history, past surgical history and problem list.      Review of Systems   Constitutional:  Positive for fatigue. Negative for appetite change and fever.   HENT:  Negative for congestion, ear pain, nosebleeds, postnasal drip, rhinorrhea, sore throat, tinnitus, trouble swallowing and voice change.    Eyes:  Negative for blurred vision, photophobia and visual disturbance.   Respiratory:  Negative for cough, chest tightness, shortness of breath and wheezing.    Cardiovascular:  Negative for chest pain and palpitations.   Gastrointestinal:  Negative for abdominal pain, blood in stool, constipation, diarrhea, nausea and GERD.   Endocrine: Negative for cold intolerance, heat intolerance and polydipsia.   Genitourinary:  Negative for decreased urine volume, difficulty urinating,  "dysuria and hematuria.   Musculoskeletal:  Positive for arthralgias and myalgias. Negative for back pain and gait problem.   Skin:  Negative for color change, pallor and wound.   Allergic/Immunologic: Negative.    Neurological:  Negative for dizziness, syncope, numbness and headache.   Hematological: Negative.    Psychiatric/Behavioral:  Positive for stress. Negative for decreased concentration, sleep disturbance, suicidal ideas and depressed mood. The patient is nervous/anxious.    All other systems reviewed and are negative.      Objective     /70 (BP Location: Right arm, Patient Position: Sitting, Cuff Size: Adult)   Pulse 71   Temp 97.9 °F (36.6 °C) (Temporal)   Ht 167.6 cm (65.98\")   Wt 112 kg (248 lb)   SpO2 97%   BMI 40.05 kg/m²     Physical Exam  Vitals reviewed.   Constitutional:       General: She is not in acute distress.     Appearance: She is well-developed. She is obese. She is not diaphoretic.      Comments: Weight stable   HENT:      Head: Normocephalic and atraumatic.      Jaw: No tenderness.      Right Ear: Hearing, tympanic membrane, ear canal and external ear normal.      Left Ear: Hearing, tympanic membrane, ear canal and external ear normal.      Nose: Nose normal. No nasal tenderness or congestion.      Right Sinus: No maxillary sinus tenderness or frontal sinus tenderness.      Left Sinus: No maxillary sinus tenderness or frontal sinus tenderness.      Mouth/Throat:      Lips: Pink.      Mouth: Mucous membranes are moist.      Pharynx: Oropharynx is clear. Uvula midline.   Eyes:      General: Lids are normal. No scleral icterus.     Extraocular Movements:      Right eye: Normal extraocular motion and no nystagmus.      Left eye: Normal extraocular motion and no nystagmus.      Conjunctiva/sclera: Conjunctivae normal.      Pupils: Pupils are equal, round, and reactive to light.   Neck:      Thyroid: No thyromegaly or thyroid tenderness.      Vascular: No carotid bruit or JVD.      " Trachea: No tracheal tenderness.   Cardiovascular:      Rate and Rhythm: Normal rate and regular rhythm.      Pulses:           Dorsalis pedis pulses are 2+ on the right side and 2+ on the left side.        Posterior tibial pulses are 2+ on the right side and 2+ on the left side.      Heart sounds: Normal heart sounds, S1 normal and S2 normal. No murmur heard.  Pulmonary:      Effort: Pulmonary effort is normal. No accessory muscle usage, prolonged expiration or respiratory distress.      Breath sounds: Normal breath sounds.   Chest:      Chest wall: No tenderness.   Abdominal:      General: Bowel sounds are normal. There is no distension.      Palpations: Abdomen is soft. There is no hepatomegaly, splenomegaly or mass.      Tenderness: There is no abdominal tenderness.   Musculoskeletal:         General: Tenderness present.      Cervical back: Normal range of motion and neck supple.      Thoracic back: Tenderness present. Decreased range of motion.      Lumbar back: Tenderness present. Decreased range of motion.      Right hip: Tenderness present.      Left hip: Tenderness present.      Right lower leg: No edema.      Left lower leg: No edema.      Comments: No muscular atrophy or flaccidity.   Lymphadenopathy:      Head:      Right side of head: No submental or submandibular adenopathy.      Left side of head: No submental or submandibular adenopathy.      Cervical: No cervical adenopathy.      Right cervical: No superficial cervical adenopathy.     Left cervical: No superficial cervical adenopathy.   Skin:     General: Skin is warm and dry.      Capillary Refill: Capillary refill takes less than 2 seconds.      Coloration: Skin is not jaundiced or pale.      Findings: No erythema.      Nails: There is no clubbing.   Neurological:      Mental Status: She is alert and oriented to person, place, and time.      Cranial Nerves: No cranial nerve deficit or facial asymmetry.      Sensory: No sensory deficit.      Motor:  No weakness, tremor, atrophy or abnormal muscle tone.      Coordination: Coordination normal.      Gait: Gait abnormal (mild antalgia).      Deep Tendon Reflexes: Reflexes are normal and symmetric.   Psychiatric:         Attention and Perception: She is attentive.         Mood and Affect: Mood normal. Mood is not anxious or depressed.         Speech: Speech normal.         Behavior: Behavior normal. Behavior is cooperative.         Thought Content: Thought content normal.         Cognition and Memory: Cognition normal.         Judgment: Judgment normal.         Diagnoses and all orders for this visit:    1. Hypothyroidism due to Hashimoto's thyroiditis (Primary)  Comments:  Continue under the care of endocrinology.  Continue levothyroxine 150 mcg.  Orders:  -     CBC & Differential  -     Comprehensive Metabolic Panel  -     TSH  -     T4, Free    2. Class 1 obesity due to excess calories without serious comorbidity with body mass index (BMI) of 31.0 to 31.9 in adult  Comments:  Patient interested in mail order medication for weight loss.  Will send prescription.  Overview:  Beginning weight at 240 on 02/23/2021    Orders:  -     Lipid Panel  -     Hemoglobin A1c  -     Vitamin B12    3. Panic disorder without agoraphobia  -     clonazePAM (KlonoPIN) 1 MG tablet; Take 1 tablet by mouth 2 (Two) Times a Day As Needed for Anxiety.  Dispense: 60 tablet; Refill: 0  -     DULoxetine (CYMBALTA) 60 MG capsule; Take 1 capsule by mouth Daily.  Dispense: 30 capsule; Refill: 5    4. Mild episode of recurrent major depressive disorder  Comments:  continue cymbalta  Orders:  -     DULoxetine (CYMBALTA) 60 MG capsule; Take 1 capsule by mouth Daily.  Dispense: 30 capsule; Refill: 5    5. Lumbar disc herniation  Comments:    Continue Flexeril as needed.  Refill on tramadol.  Continue as directed.  Avoid overuse activities  Orders:  -     traMADol (ULTRAM) 50 MG tablet; Take 1 tablet by mouth 2 (Two) Times a Day.  Dispense: 60  tablet; Refill: 2    6. Vitamin D deficiency  Comments:  Continue vitamin D supplement.  We will plan for an updated vitamin D level  Orders:  -     Vitamin D,25-Hydroxy    7. Acute effusion of both middle ears  Comments:  Add antihistamine from home.  Short supply of prednisone given.  Orders:  -     predniSONE (DELTASONE) 20 MG tablet; 2 daily  Dispense: 10 tablet; Refill: 0        Understands disease processes and need for medications.  Understands reasons for urgent and emergent care.  Patient (& family) verbalized agreement for treatment plan.   Emotional support and active listening provided.  Patient provided time to verbalize feelings.    PURVI/PMDP reviewed today and consistent.  Will refill prescribed controlled medication today.  Patient is aware they cannot receive narcotics from any other provider except if under care of pain management or speciality clinic.  Risk and benefits of medication use has been reviewed.  History and physical exam exhibit continued safe and appropriate use of controlled substances.  The patient is aware of the potential for addiction and dependence.  This patient has been made aware of the appropriate use of such medications, including potential risk of somnolence, limited ability to drive and / or work safely, and potential for overdose.    It has also been made clear that these medications are for use by this patient only, without concomitant use of alcohol or other substances unless prescribed/advised by medical provider.  Patient understands they may be subject to UDS and pill counts at random.      Patient considered to be moderate risk for addiction due to use of multiple controlled medications.  Patient understands and accepts these risks.  Patient need for medication will be reassessed at each visit.  Doses will be adjusted according to patient need and findings.    Goal of TX: Patient will not have any adverse reactions of medication.  Patient will have reduction in  anxiety symptoms with use of PRN Klonopin.  Patient will be able to remain active in an outside of her home without interference from anxiety symptoms.    PURVI Patient Controlled Substance Report (from 4/23/2023 to 4/22/2024)    Dispensed  Strength Quantity Days Supply Provider Pharmacy   02/23/2024 Clonazepam 1MG 60 each 30 [facility]UL [facility]   02/23/2024 Tramadol Hydrochloride 50MG 60 each 30 [facility]UL [facility]   01/08/2024 Clonazepam 1MG 60 each 30 [facility]   12/29/2023 Tramadol Hydrochloride 50MG 60 each 30 [facility]   11/29/2023 Clonazepam 1MG 60 each 30 [facility]   11/29/2023 Tramadol Hydrochloride 50MG 60 each 30 [facility]VIPUL [facility]        Labs ordered.  Patient will stop biotin that she is taking at home and obtain labs next week.    RTC 2 months, sooner if needed.           This document has been electronically signed by:  LANA Quinn, FNP-C    Dragon disclaimer:  Part of this note may be an electronic transcription/translation of spoken language to printed text using the Dragon Dictation System.

## 2024-04-24 ENCOUNTER — TELEPHONE (OUTPATIENT)
Dept: FAMILY MEDICINE CLINIC | Facility: CLINIC | Age: 39
End: 2024-04-24

## 2024-04-24 RX ORDER — FUROSEMIDE 20 MG/1
20 TABLET ORAL DAILY
Qty: 5 TABLET | Refills: 0 | Status: SHIPPED | OUTPATIENT
Start: 2024-04-24

## 2024-04-24 NOTE — TELEPHONE ENCOUNTER
Pharmacy Name: [facility] [place], [place] - [address] - [phone] [facility] [phone]      Pharmacy representative name: [facility]     Pharmacy representative phone number: [phone]    What medication are you calling in regards to: A WATER PILL    What question does the pharmacy have: PATIENT CAME TO GET HER WATER PILL. PHARMACY DID NOT GET THE PRESCRIPTION. PATIENT STATES THIS WAS DISCUSSED AT THE LAST VISIT.    Additional notes: PLEASE SEND PRESCRIPTION.

## 2024-04-26 NOTE — ASSESSMENT & PLAN NOTE
Continue tramadol.  Continue Cymbalta 60 mg as directed.  Continue topical ointments as well as as needed heat and ice   Attending Attestation (For Attendings USE Only)...

## 2024-04-30 ENCOUNTER — CLINICAL SUPPORT (OUTPATIENT)
Dept: FAMILY MEDICINE CLINIC | Facility: CLINIC | Age: 39
End: 2024-04-30
Payer: COMMERCIAL

## 2024-04-30 ENCOUNTER — TELEPHONE (OUTPATIENT)
Dept: FAMILY MEDICINE CLINIC | Facility: CLINIC | Age: 39
End: 2024-04-30

## 2024-04-30 ENCOUNTER — LAB (OUTPATIENT)
Dept: FAMILY MEDICINE CLINIC | Facility: CLINIC | Age: 39
End: 2024-04-30
Payer: COMMERCIAL

## 2024-04-30 DIAGNOSIS — M51.26 LUMBAR DISC HERNIATION: Primary | ICD-10-CM

## 2024-04-30 LAB
25(OH)D3 SERPL-MCNC: 61.1 NG/ML (ref 30–100)
ALBUMIN SERPL-MCNC: 4.3 G/DL (ref 3.5–5.2)
ALBUMIN/GLOB SERPL: 1.7 G/DL
ALP SERPL-CCNC: 72 U/L (ref 39–117)
ALT SERPL W P-5'-P-CCNC: 13 U/L (ref 1–33)
ANION GAP SERPL CALCULATED.3IONS-SCNC: 12.3 MMOL/L (ref 5–15)
AST SERPL-CCNC: 16 U/L (ref 1–32)
BASOPHILS # BLD AUTO: 0.06 10*3/MM3 (ref 0–0.2)
BASOPHILS NFR BLD AUTO: 1.2 % (ref 0–1.5)
BILIRUB SERPL-MCNC: 0.4 MG/DL (ref 0–1.2)
BUN SERPL-MCNC: 10 MG/DL (ref 6–20)
BUN/CREAT SERPL: 11.9 (ref 7–25)
CALCIUM SPEC-SCNC: 9.5 MG/DL (ref 8.6–10.5)
CHLORIDE SERPL-SCNC: 104 MMOL/L (ref 98–107)
CHOLEST SERPL-MCNC: 172 MG/DL (ref 0–200)
CO2 SERPL-SCNC: 22.7 MMOL/L (ref 22–29)
CREAT SERPL-MCNC: 0.84 MG/DL (ref 0.57–1)
DEPRECATED RDW RBC AUTO: 41.9 FL (ref 37–54)
EGFRCR SERPLBLD CKD-EPI 2021: 90.2 ML/MIN/1.73
EOSINOPHIL # BLD AUTO: 0.1 10*3/MM3 (ref 0–0.4)
EOSINOPHIL NFR BLD AUTO: 1.9 % (ref 0.3–6.2)
ERYTHROCYTE [DISTWIDTH] IN BLOOD BY AUTOMATED COUNT: 13 % (ref 12.3–15.4)
GLOBULIN UR ELPH-MCNC: 2.6 GM/DL
GLUCOSE SERPL-MCNC: 73 MG/DL (ref 65–99)
HBA1C MFR BLD: 5.3 % (ref 4.8–5.6)
HCT VFR BLD AUTO: 39.3 % (ref 34–46.6)
HDLC SERPL-MCNC: 53 MG/DL (ref 40–60)
HGB BLD-MCNC: 12.4 G/DL (ref 12–15.9)
IMM GRANULOCYTES # BLD AUTO: 0.02 10*3/MM3 (ref 0–0.05)
IMM GRANULOCYTES NFR BLD AUTO: 0.4 % (ref 0–0.5)
LDLC SERPL CALC-MCNC: 104 MG/DL (ref 0–100)
LDLC/HDLC SERPL: 1.94 {RATIO}
LYMPHOCYTES # BLD AUTO: 1.42 10*3/MM3 (ref 0.7–3.1)
LYMPHOCYTES NFR BLD AUTO: 27.4 % (ref 19.6–45.3)
MCH RBC QN AUTO: 27.7 PG (ref 26.6–33)
MCHC RBC AUTO-ENTMCNC: 31.6 G/DL (ref 31.5–35.7)
MCV RBC AUTO: 87.9 FL (ref 79–97)
MONOCYTES # BLD AUTO: 0.39 10*3/MM3 (ref 0.1–0.9)
MONOCYTES NFR BLD AUTO: 7.5 % (ref 5–12)
NEUTROPHILS NFR BLD AUTO: 3.19 10*3/MM3 (ref 1.7–7)
NEUTROPHILS NFR BLD AUTO: 61.6 % (ref 42.7–76)
NRBC BLD AUTO-RTO: 0 /100 WBC (ref 0–0.2)
PLATELET # BLD AUTO: 320 10*3/MM3 (ref 140–450)
PMV BLD AUTO: 10.5 FL (ref 6–12)
POTASSIUM SERPL-SCNC: 4.1 MMOL/L (ref 3.5–5.2)
PROT SERPL-MCNC: 6.9 G/DL (ref 6–8.5)
RBC # BLD AUTO: 4.47 10*6/MM3 (ref 3.77–5.28)
SODIUM SERPL-SCNC: 139 MMOL/L (ref 136–145)
T4 FREE SERPL-MCNC: 0.79 NG/DL (ref 0.93–1.7)
TRIGL SERPL-MCNC: 81 MG/DL (ref 0–150)
TSH SERPL DL<=0.05 MIU/L-ACNC: 3.04 UIU/ML (ref 0.27–4.2)
VIT B12 BLD-MCNC: 777 PG/ML (ref 211–946)
VLDLC SERPL-MCNC: 15 MG/DL (ref 5–40)
WBC NRBC COR # BLD AUTO: 5.18 10*3/MM3 (ref 3.4–10.8)

## 2024-04-30 PROCEDURE — 84439 ASSAY OF FREE THYROXINE: CPT | Performed by: NURSE PRACTITIONER

## 2024-04-30 PROCEDURE — 36415 COLL VENOUS BLD VENIPUNCTURE: CPT | Performed by: NURSE PRACTITIONER

## 2024-04-30 PROCEDURE — 82607 VITAMIN B-12: CPT | Performed by: NURSE PRACTITIONER

## 2024-04-30 PROCEDURE — 96372 THER/PROPH/DIAG INJ SC/IM: CPT | Performed by: NURSE PRACTITIONER

## 2024-04-30 PROCEDURE — 85025 COMPLETE CBC W/AUTO DIFF WBC: CPT | Performed by: NURSE PRACTITIONER

## 2024-04-30 PROCEDURE — 80053 COMPREHEN METABOLIC PANEL: CPT | Performed by: NURSE PRACTITIONER

## 2024-04-30 PROCEDURE — 82306 VITAMIN D 25 HYDROXY: CPT | Performed by: NURSE PRACTITIONER

## 2024-04-30 PROCEDURE — 83036 HEMOGLOBIN GLYCOSYLATED A1C: CPT | Performed by: NURSE PRACTITIONER

## 2024-04-30 PROCEDURE — 84443 ASSAY THYROID STIM HORMONE: CPT | Performed by: NURSE PRACTITIONER

## 2024-04-30 PROCEDURE — 80061 LIPID PANEL: CPT | Performed by: NURSE PRACTITIONER

## 2024-04-30 RX ORDER — KETOROLAC TROMETHAMINE 30 MG/ML
60 INJECTION, SOLUTION INTRAMUSCULAR; INTRAVENOUS ONCE
Status: COMPLETED | OUTPATIENT
Start: 2024-04-30 | End: 2024-04-30

## 2024-04-30 RX ADMIN — KETOROLAC TROMETHAMINE 60 MG: 30 INJECTION, SOLUTION INTRAMUSCULAR; INTRAVENOUS at 11:18

## 2024-04-30 NOTE — TELEPHONE ENCOUNTER
----- Message from Kendra JOY sent at 4/30/2024  9:30 AM EDT -----  She can have Toradol 60 mg IM.  I'll place an order.  ----- Message -----  From: Kendra Toscano MA  Sent: 4/30/2024   9:20 AM EDT  To: LANA Quinn    Patient wants to  know if she can get pain shot for her back.

## 2024-05-01 ENCOUNTER — TELEPHONE (OUTPATIENT)
Dept: ENDOCRINOLOGY | Facility: CLINIC | Age: 39
End: 2024-05-01
Payer: COMMERCIAL

## 2024-05-01 ENCOUNTER — PATIENT MESSAGE (OUTPATIENT)
Dept: ENDOCRINOLOGY | Facility: CLINIC | Age: 39
End: 2024-05-01
Payer: COMMERCIAL

## 2024-05-01 NOTE — TELEPHONE ENCOUNTER
Spoke to patient about lab results. She has been taking 7 tabs once weekly as directed. Pt will be starting compounded Mounjaro soon through her PCP. Advised pt to continue the levothyroxine 150 mcg (7 pills once weekly for now) as her thyroid hormone requirement may decrease as she loses weight. Will check her TFTs at her follow-up in 07/2024.  Electronically Signed: Zulma Cardoza MD        ----- Message from Zulma Cardoza sent at 5/1/2024  5:00 PM EDT -----  Regarding: FW: Blood work  Contact: [phone]    ----- Message -----  From: Jarad Barragan  Sent: 5/1/2024   1:01 PM EDT  To: [facility] [place] 2[place]  Subject: Blood work                                       Yeah she did but, my ears cleared up and I didn't take them.

## 2024-07-01 DIAGNOSIS — E03.8 HYPOTHYROIDISM DUE TO HASHIMOTO'S THYROIDITIS: ICD-10-CM

## 2024-07-01 DIAGNOSIS — E06.3 HYPOTHYROIDISM DUE TO HASHIMOTO'S THYROIDITIS: ICD-10-CM

## 2024-07-01 RX ORDER — LEVOTHYROXINE SODIUM 0.15 MG/1
150 TABLET ORAL EVERY MORNING
Qty: 30 TABLET | Refills: 5 | Status: SHIPPED | OUTPATIENT
Start: 2024-07-01

## 2024-07-15 ENCOUNTER — LAB (OUTPATIENT)
Dept: FAMILY MEDICINE CLINIC | Facility: CLINIC | Age: 39
End: 2024-07-15
Payer: COMMERCIAL

## 2024-07-15 ENCOUNTER — TELEMEDICINE (OUTPATIENT)
Dept: FAMILY MEDICINE CLINIC | Facility: CLINIC | Age: 39
End: 2024-07-15
Payer: COMMERCIAL

## 2024-07-15 VITALS — BODY MASS INDEX: 35.36 KG/M2 | HEIGHT: 66 IN | WEIGHT: 220 LBS

## 2024-07-15 DIAGNOSIS — M51.26 LUMBAR DISC HERNIATION: ICD-10-CM

## 2024-07-15 DIAGNOSIS — E06.3 HYPOTHYROIDISM DUE TO HASHIMOTO'S THYROIDITIS: Primary | ICD-10-CM

## 2024-07-15 DIAGNOSIS — E03.8 HYPOTHYROIDISM DUE TO HASHIMOTO'S THYROIDITIS: Primary | ICD-10-CM

## 2024-07-15 DIAGNOSIS — F41.0 PANIC DISORDER WITHOUT AGORAPHOBIA: ICD-10-CM

## 2024-07-15 DIAGNOSIS — E53.8 B12 DEFICIENCY: ICD-10-CM

## 2024-07-15 DIAGNOSIS — K21.9 GASTROESOPHAGEAL REFLUX DISEASE WITHOUT ESOPHAGITIS: ICD-10-CM

## 2024-07-15 PROCEDURE — 84481 FREE ASSAY (FT-3): CPT | Performed by: NURSE PRACTITIONER

## 2024-07-15 PROCEDURE — 36415 COLL VENOUS BLD VENIPUNCTURE: CPT | Performed by: NURSE PRACTITIONER

## 2024-07-15 PROCEDURE — 84443 ASSAY THYROID STIM HORMONE: CPT | Performed by: NURSE PRACTITIONER

## 2024-07-15 PROCEDURE — 99214 OFFICE O/P EST MOD 30 MIN: CPT | Performed by: NURSE PRACTITIONER

## 2024-07-15 PROCEDURE — 84439 ASSAY OF FREE THYROXINE: CPT | Performed by: NURSE PRACTITIONER

## 2024-07-15 RX ORDER — CLONAZEPAM 1 MG/1
1 TABLET ORAL 2 TIMES DAILY PRN
Qty: 60 TABLET | Refills: 0 | Status: SHIPPED | OUTPATIENT
Start: 2024-07-15

## 2024-07-15 RX ORDER — RABEPRAZOLE SODIUM 20 MG/1
20 TABLET, DELAYED RELEASE ORAL 2 TIMES DAILY
Qty: 60 TABLET | Refills: 5 | Status: SHIPPED | OUTPATIENT
Start: 2024-07-15

## 2024-07-15 RX ORDER — TRAMADOL HYDROCHLORIDE 100 MG/1
100 TABLET, COATED ORAL DAILY PRN
Qty: 30 TABLET | Refills: 0 | Status: SHIPPED | OUTPATIENT
Start: 2024-07-15

## 2024-07-15 NOTE — PROGRESS NOTES
"You have chosen to receive care through a telehealth visit.  Do you consent to use a video/audio connection for your medical care today? Yes    Patient has chosen to have a telehealth/video visit due to current pandemic/current public health emergency.   This visit is live, real time audio and visual communication between the provider and the patient.      I did complete this video visit with the patient using [employer].    Patient visit is for the following CC:     Chief Complaint   Patient presents with    Hypothyroidism due to Hashimoto's thyroiditis       Brief HPI/ROS obtained as follows:    Anxiety-some possible mild increase in depression.  She worries about her dogs health who is 17 years old.   She continues her cymbalta.  She is sleeping ok.  She has been using Klonopin PRN.  She does need a refill today.    Back and leg pain-chronic and ongoing.  Back \"is ok\".  Left leg is bothersome and aching.  Varies with activity.  Hip pain continues to be present. She has noted some neck stiffness on the on the left for about 3 days.  She has some dental discomfort on the same side.  She reports some ear pain.  She is not aware of any neck injury.   She reports that her insurance is only going to pay for one tramadol per day.    Thyroid provider-missed her appt due to insurance change.   She reports that she was due to get a medication adjustment but did not get that when she was planning to start weight loss medication.  She has not tolerated weight loss injection.  She would like to try and obtain zepbound if possible.  She is due for updated thyroid test.   GERD-on Aciphex.  She has had genesite testing and aciphex is the only medication she has been able to tolerate.  Her GeneSight does show insufficient response to Dexilant, Nexium, Prilosec, Protonix, and Prevacid as she is a rapid metabolizer.  She was taking as needed Zantac before the recall.   Weight loss observation-has some adipex at home she plans to " begin for now.  She would like to try zepbound.  She does however feel that weight loss management was excluded from her plan.  If that pound is not approved she would be interested in resuming Adipex.    The following portions of the patient's history were reviewed and updated as appropriate: CC, ROS, allergies, current medications, past family history, past medical history, past social history, past surgical history and problem list.    Review of Systems   Constitutional:  Positive for fatigue. Negative for appetite change and unexpected weight change.   HENT:  Positive for sneezing. Negative for congestion, ear pain, nosebleeds, postnasal drip, rhinorrhea, sore throat, trouble swallowing and voice change.    Eyes:  Negative for photophobia, pain and visual disturbance.   Respiratory:  Negative for cough, chest tightness, shortness of breath and wheezing.    Cardiovascular:  Negative for chest pain and palpitations.   Gastrointestinal:  Negative for abdominal pain, blood in stool, constipation, diarrhea and nausea.   Endocrine: Negative for cold intolerance and polydipsia.   Genitourinary:  Negative for difficulty urinating, flank pain, frequency and hematuria.   Musculoskeletal:  Positive for arthralgias, back pain and myalgias. Negative for gait problem and joint swelling.   Skin:  Negative for color change and rash.   Allergic/Immunologic: Negative.    Neurological:  Negative for dizziness, syncope, numbness and headaches.   Hematological: Negative.    Psychiatric/Behavioral:  Positive for dysphoric mood. Negative for sleep disturbance and suicidal ideas. The patient is nervous/anxious.    All other systems reviewed and are negative.      Assessment     Physical Exam   Constitutional: She appears well-developed and well-nourished. No distress.   HENT:   Head: Normocephalic.   Right Ear: External ear normal.   Left Ear: External ear normal.   Nose: Nose normal.   Mouth/Throat: Oropharynx is clear and moist.    Eyes: Pupils are equal, round, and reactive to light. Conjunctivae and EOM are normal. No scleral icterus.   Neck: Neck normal appearance.No JVD present. No thyromegaly present.   Pulmonary/Chest: Effort normal.  No respiratory distress. She no audible wheeze...  Abdominal: Abdomen appears normal. Soft. She exhibits no distension and no visible mass.   Musculoskeletal:         General: Tenderness present.   Neurological: She is alert. No cranial nerve deficit. Coordination normal.   Skin: Skin is warm and dry. Capillary refill takes less than 2 seconds. She is not diaphoretic. No nail bed cyanosis or erythema. No pallor. Nails show no clubbing.   Psychiatric: She has a normal mood and affect.       Diagnoses and all orders for this visit:    1. Hypothyroidism due to Hashimoto's thyroiditis (Primary)  -     Tirzepatide-Weight Management (ZEPBOUND) 2.5 MG/0.5ML solution auto-injector; Inject 0.5 mL under the skin into the appropriate area as directed 1 (One) Time Per Week.  Dispense: 2 mL; Refill: 0  -     TSH  -     T4, Free  -     T3, Free    2. Lumbar disc herniation  Comments:    Continue Flexeril as needed.  Refill on tramadol.  Continue as directed.  Avoid overuse activities  Orders:  -     traMADol HCl (ULTRAM) 100 MG tablet; Take 1 tablet by mouth Daily As Needed (moderate pain).  Dispense: 30 tablet; Refill: 0    3. Panic disorder without agoraphobia  -     clonazePAM (KlonoPIN) 1 MG tablet; Take 1 tablet by mouth 2 (Two) Times a Day As Needed for Anxiety.  Dispense: 60 tablet; Refill: 0  -     Urine Drug Screen - Urine, Clean Catch; Future    4. Gastroesophageal reflux disease without esophagitis  Comments:  Continue Aciphex.  Avoid food triggers  Orders:  -     RABEprazole (ACIPHEX) 20 MG EC tablet; Take 1 tablet by mouth 2 (Two) Times a Day.  Dispense: 60 tablet; Refill: 5    5. B12 deficiency  Comments:  Injection today while at the office  Orders:  -     Cyanocobalamin 1000 MCG/ML kit; Inject 1 mL as  directed 1 (One) Time Per Week.  Dispense: 4 each; Refill: 5        Patient instructed and advised to call if symptoms are increasing or new symptoms occur.    Understands reasons for urgent and emergent care.  Patient (& family) verbalized agreement for treatment plan.     PURVI/KIMBERLY reviewed today and consistent.  Will refill prescribed controlled medication today.  Patient is aware they cannot receive narcotics from any other provider except if under care of pain management or speciality clinic.  Risk and benefits of medication use has been reviewed.  History and physical exam exhibit continued safe and appropriate use of controlled substances.  The patient is aware of the potential for addiction and dependence.  This patient has been made aware of the appropriate use of such medications, including potential risk of somnolence, limited ability to drive and / or work safely, and potential for overdose.    It has also been made clear that these medications are for use by this patient only, without concomitant use of alcohol or other substances unless prescribed/advised by medical provider.  Patient understands they may be subject to UDS and pill counts at random.    Patient considered to be moderate risk for addiction due to use of multiple controlled medications.  Patient understands and accepts these risks.  Patient need for medication will be reassessed at each visit.  Doses will be adjusted according to patient need and findings.    Goal of TX: Patient will not have any adverse reactions of medication.      PURVI Patient Controlled Substance Report (from 7/16/2023 to 7/15/2024)    Dispensed  Strength Quantity Days Supply Provider Pharmacy   07/02/2024 Tramadol Hydrochloride 50MG 60 each 30 [facility]VIPUL [facility]   06/05/2024 Tramadol Hydrochloride 50MG 60 each 30 [facility]UL [facility]   04/22/2024 Clonazepam 1MG 60 each 30 [facility]   04/22/2024 Tramadol Hydrochloride  50MG 60 each 30 [facility]UL [facility] [place]   02/23/2024 Clonazepam 1MG 60 each 30 [facility]   02/23/2024 Tramadol Hydrochloride 50MG 60 each 30 [facility] [place]   01/08/2024 Clonazepam 1MG 60 each 30 [facility] [place]        RTC 1 month, sooner if needed.     This is an audio and video enabled enabled telehealth encounter.      This visit was conducted with the provider in the office at [facility] and the patient in their home in a private area.    This visit/video call lasted:  20 minutes      This document has been electronically signed by:  LANA Quinn FNP-C Dragon disclaimer:  Part of this note may be an electronic transcription/translation of spoken language to printed text using the Dragon Dictation System.

## 2024-07-16 LAB
T3FREE SERPL-MCNC: 2.26 PG/ML (ref 2–4.4)
T4 FREE SERPL-MCNC: 0.85 NG/DL (ref 0.92–1.68)
TSH SERPL DL<=0.05 MIU/L-ACNC: 11.8 UIU/ML (ref 0.27–4.2)

## 2024-07-18 RX ORDER — TOPIRAMATE 25 MG/1
25 TABLET ORAL 2 TIMES DAILY
Qty: 60 TABLET | Refills: 0 | Status: SHIPPED | OUTPATIENT
Start: 2024-07-18

## 2024-09-16 ENCOUNTER — OFFICE VISIT (OUTPATIENT)
Dept: FAMILY MEDICINE CLINIC | Facility: CLINIC | Age: 39
End: 2024-09-16
Payer: COMMERCIAL

## 2024-09-16 VITALS
HEIGHT: 66 IN | HEART RATE: 99 BPM | OXYGEN SATURATION: 98 % | RESPIRATION RATE: 18 BRPM | BODY MASS INDEX: 39.57 KG/M2 | WEIGHT: 246.2 LBS

## 2024-09-16 DIAGNOSIS — F41.0 PANIC DISORDER WITHOUT AGORAPHOBIA: ICD-10-CM

## 2024-09-16 DIAGNOSIS — K21.9 GASTROESOPHAGEAL REFLUX DISEASE WITHOUT ESOPHAGITIS: ICD-10-CM

## 2024-09-16 DIAGNOSIS — Z51.81 ENCOUNTER FOR THERAPEUTIC DRUG MONITORING: ICD-10-CM

## 2024-09-16 DIAGNOSIS — M51.26 LUMBAR DISC HERNIATION: ICD-10-CM

## 2024-09-16 DIAGNOSIS — Z79.899 ENCOUNTER FOR LONG-TERM (CURRENT) USE OF OTHER MEDICATIONS: ICD-10-CM

## 2024-09-16 DIAGNOSIS — E66.09 CLASS 1 OBESITY DUE TO EXCESS CALORIES WITHOUT SERIOUS COMORBIDITY WITH BODY MASS INDEX (BMI) OF 31.0 TO 31.9 IN ADULT: Primary | ICD-10-CM

## 2024-09-16 PROCEDURE — 99214 OFFICE O/P EST MOD 30 MIN: CPT | Performed by: NURSE PRACTITIONER

## 2024-09-16 RX ORDER — TRAMADOL HYDROCHLORIDE 100 MG/1
100 TABLET, COATED ORAL DAILY PRN
Qty: 30 TABLET | Refills: 0 | Status: SHIPPED | OUTPATIENT
Start: 2024-09-16

## 2024-09-16 RX ORDER — CLONAZEPAM 1 MG/1
1 TABLET ORAL 2 TIMES DAILY PRN
Qty: 60 TABLET | Refills: 0 | Status: SHIPPED | OUTPATIENT
Start: 2024-09-16

## 2024-10-03 NOTE — PROGRESS NOTES
Subjective   Jarad Barragan is a 36 y.o. female.     Chief Complaint   Patient presents with   • Back Pain   • Joint Pain       History of Present Illness     Thyroid-she would like to repeat her thyroid labs.  She has been taking collagen powder and questions if it can alter her labs.   She is taking synthroid 112 mcg  B12-she would like to take injection today.  She has missed injection for 2 weeks.  After today she will be getting a monthly injection.    Back and leg pain-had been more increased over the last several weeks but is doing better today.  She reports she feels her pain is increasing.  No falls or injury.  She reports no changes in activity.    Weight loss-she reports she is taking 1/2 of adipex.  She would like to increase to whole tablet due to weight stall.  She is watching portion and no soda.  She has been trying to eat smaller more frequent meals.    GERD-chronic and ongoing.  Patient is currently taking AcipHex 20 mg.  She denies any negative side effects.  No negative side effects of medication.  Patient does avoid foods that trigger reflux symptoms.  Denies any recent exacerbations.      The following portions of the patient's history were reviewed and updated as appropriate: CC, ROS, allergies, current medications, past family history, past medical history, past social history, past surgical history and problem list.      Review of Systems   Constitutional: Positive for fatigue. Negative for activity change, appetite change and fever.   HENT: Negative for congestion, ear pain, nosebleeds, postnasal drip, rhinorrhea, sore throat, tinnitus, trouble swallowing and voice change.    Eyes: Negative for blurred vision, photophobia and visual disturbance.   Respiratory: Negative for cough, chest tightness, shortness of breath and wheezing.    Cardiovascular: Negative for chest pain, palpitations and leg swelling.   Gastrointestinal: Negative for abdominal pain, blood in stool, constipation, diarrhea,  Telephone call with patient.  Discussed upcoming PASSPORT Assessment on 10/28/2024 at 10AM.  [facility] is going to take her to an echocardiogram next week.  Braces for hands hasn't worked well. She is using a ace bandage/sports tape is helping she found.  She has groceries delivered from [employer]. She uses [employer] .     "nausea and GERD.   Endocrine: Negative for cold intolerance, heat intolerance and polydipsia.   Genitourinary: Negative for decreased urine volume, difficulty urinating, dysuria and hematuria.   Musculoskeletal: Positive for arthralgias and back pain. Negative for gait problem and myalgias.   Skin: Negative for color change, pallor and wound.   Allergic/Immunologic: Negative.    Neurological: Negative for dizziness, syncope, numbness and headache.   Hematological: Negative.    Psychiatric/Behavioral: Positive for stress. Negative for decreased concentration, self-injury, sleep disturbance, suicidal ideas and depressed mood. The patient is nervous/anxious.    All other systems reviewed and are negative.      Objective     /78   Pulse 102   Temp 98 °F (36.7 °C)   Resp 20   Ht 167.6 cm (65.98\")   Wt 91.2 kg (201 lb)   SpO2 98%   BMI 32.46 kg/m²     Physical Exam  Vitals reviewed.   Constitutional:       General: She is not in acute distress.     Appearance: She is well-developed. She is not diaphoretic.   HENT:      Head: Normocephalic and atraumatic.      Jaw: No tenderness.      Comments: Oropharynx not examined.  Patient is presently wearing a face covering/mask due to COVID-19 pandemic.     Right Ear: Hearing, tympanic membrane, ear canal and external ear normal.      Left Ear: Hearing, tympanic membrane, ear canal and external ear normal.   Eyes:      General: Lids are normal. No scleral icterus.     Extraocular Movements:      Right eye: Normal extraocular motion and no nystagmus.      Left eye: Normal extraocular motion and no nystagmus.      Conjunctiva/sclera: Conjunctivae normal.      Pupils: Pupils are equal, round, and reactive to light.   Neck:      Thyroid: No thyromegaly or thyroid tenderness.      Vascular: No carotid bruit or JVD.      Trachea: No tracheal tenderness.   Cardiovascular:      Rate and Rhythm: Normal rate and regular rhythm.      Pulses:           Dorsalis pedis pulses are 2+ " on the right side and 2+ on the left side.        Posterior tibial pulses are 2+ on the right side and 2+ on the left side.      Heart sounds: Normal heart sounds, S1 normal and S2 normal. No murmur heard.  Pulmonary:      Effort: Pulmonary effort is normal. No accessory muscle usage, prolonged expiration or respiratory distress.      Breath sounds: Normal breath sounds.   Chest:      Chest wall: No tenderness.   Abdominal:      General: Bowel sounds are normal. There is no distension.      Palpations: Abdomen is soft. There is no mass.      Tenderness: There is no abdominal tenderness.   Musculoskeletal:      Cervical back: Normal range of motion and neck supple.      Thoracic back: Tenderness present.      Lumbar back: Spasms and tenderness present. Decreased range of motion.      Right hip: Tenderness present.      Left hip: Tenderness present.      Right upper leg: Tenderness present.      Left upper leg: Tenderness present.      Right lower leg: No edema.      Left lower leg: No edema.      Comments: No muscular atrophy or flaccidity.   Lymphadenopathy:      Head:      Right side of head: No submental or submandibular adenopathy.      Left side of head: No submental or submandibular adenopathy.      Cervical: No cervical adenopathy.      Right cervical: No superficial cervical adenopathy.     Left cervical: No superficial cervical adenopathy.   Skin:     General: Skin is warm and dry.      Capillary Refill: Capillary refill takes less than 2 seconds.      Coloration: Skin is not jaundiced or pale.      Findings: No erythema.      Nails: There is no clubbing.   Neurological:      Mental Status: She is alert and oriented to person, place, and time.      Cranial Nerves: No cranial nerve deficit or facial asymmetry.      Sensory: No sensory deficit.      Motor: No weakness, tremor, atrophy or abnormal muscle tone.      Coordination: Coordination normal.      Deep Tendon Reflexes: Reflexes are normal and symmetric.    Psychiatric:         Attention and Perception: She is attentive.         Mood and Affect: Mood normal.         Speech: Speech normal.         Behavior: Behavior normal. Behavior is cooperative.         Thought Content: Thought content normal.         Judgment: Judgment normal.       Assessment/Plan     Diagnoses and all orders for this visit:    1. Hypothyroidism due to acquired atrophy of thyroid (Primary)  Comments:  continue Synthroid 112mcg.  labs today  Orders:  -     TSH; Future  -     T4, Free; Future  -     T3, Free; Future  -     Thyroid Antibodies; Future    2. B12 deficiency  Comments:  will begin monthly injections after today   Orders:  -     cyanocobalamin injection 1,000 mcg    3. Class 1 obesity due to excess calories without serious comorbidity with body mass index (BMI) of 32.0 to 32.9 in adult  Overview:  Beginning weight at 240 on 02/23/2021    Assessment & Plan:  Increase to 1 whole tablet daily  Dietary counseling provided:  Healthy food choices including fruits and vegetables, limit soda and junk foods, adequate water intake.  Be active as physically able       4. Gastroesophageal reflux disease without esophagitis  Assessment & Plan:  Continue AcipHex 20 mg as directed.  Report any new GI concerns         Patient's Body mass index is 32.46 kg/m². indicating that she is obese (BMI >30). Obesity-related health conditions include the following: GERD. Obesity is unchanged. BMI is is above average. We discussed portion control and increasing exercise..       Understands disease processes and need for medications.  Understands reasons for urgent and emergent care.  Patient (& family) verbalized agreement for treatment plan.   Emotional support and active listening provided.  Patient provided time to verbalize feelings.    PURVI/PMDP reviewed today and consistent.  Will refill prescribed controlled medication today.  Patient is aware they cannot receive narcotics from any other provider except if  under care of pain management or speciality clinic.  Risk and benefits of medication use has been reviewed.  History and physical exam exhibit continued safe and appropriate use of controlled substances.  The patient is aware of the potential for addiction and dependence.  This patient has been made aware of the appropriate use of such medications, including potential risk of somnolence, limited ability to drive and / or work safely, and potential for overdose.    It has also been made clear that these medications are for use by this patient only, without concomitant use of alcohol or other substances unless prescribed/advised by medical provider.  Patient understands they may be subject to UDS and pill counts at random.      Patient considered to be moderate risk for addiction due to use of multiple controlled medications.  Patient understands and accepts these risks.  Patient need for medication will be reassessed at each visit.  Doses will be adjusted according to patient need and findings.    Goal of TX: Patient will not have any adverse reactions of medication.  Patient will have reduction in there chronic back and joint pain symptoms with use of tramadol as needed as directed.  Patient will be able to remain active in an outside of their home with minimal to no interference from their pain symptoms.  Patient will have reduction in anxiety symptoms with use of PRN Klonopin.  Patient will be able to remain active in an outside of her home without interference from anxiety symptoms.  Patient will have reduction in weight with use of Adipex as directed with simultaneous diet and lifestyle changes.      Medication Dispense Information    Phentermine Hcl   Dispensed: 1/24/2022 12:00 AM   Written:  12/7/2021   Unit strength: 37.5MG   Days supply: 30   Dispense Note: GivenName=Mike=NOELBirthDate=[id number]=2579 [address], [address]   Quantity: 30 each   Pharmacy: [facility]    Authorizing provider: VIPUL SOSA   [facility]: [facility] (Fill [facility])   Brand or Generic:           Medication Dispense Information    Clonazepam   Dispensed: 2/4/2022 12:00 AM   Written:  2/3/2022   Unit strength: 1MG   Days supply: 30   Dispense Note: GivenName=Mike=NOELBirthDate=[id number]=9043 [address], [address]   Quantity: 60 each   Pharmacy: [facility]   Authorizing provider: PATRICIA SEQUEIRA   Received from: [facility] (Fill History)   Brand or Generic:         Medication Dispense Information    Tramadol Hcl   Dispensed: 2/8/2022 12:00 AM   Written:  2/8/2022   Unit strength: 50MG/50MG/50MG/50MG/50MG/   Days supply: 30   Dispense Note: GivenName=Mike=NOELBirthDate=[id number]=2186 [address], [address]   Quantity: 60 each   Pharmacy: [facility]   Authorizing provider: VIPUL SOSA   Received from: [facility] (Fill History)   Brand or Generic:         RTC 1 month, sooner if needed.             This document has been electronically signed by:  LANA Quinn FNP-C Dragon disclaimer:  Part of this note may be an electronic transcription/translation of spoken language to printed text using the Dragon Dictation System.

## 2024-11-14 ENCOUNTER — TELEMEDICINE (OUTPATIENT)
Dept: FAMILY MEDICINE CLINIC | Facility: CLINIC | Age: 39
End: 2024-11-14
Payer: COMMERCIAL

## 2024-11-14 DIAGNOSIS — E53.8 B12 DEFICIENCY: ICD-10-CM

## 2024-11-14 DIAGNOSIS — M51.26 LUMBAR DISC HERNIATION: ICD-10-CM

## 2024-11-14 DIAGNOSIS — E55.9 VITAMIN D DEFICIENCY: ICD-10-CM

## 2024-11-14 DIAGNOSIS — E06.3 HYPOTHYROIDISM DUE TO HASHIMOTO'S THYROIDITIS: ICD-10-CM

## 2024-11-14 DIAGNOSIS — F33.0 MILD EPISODE OF RECURRENT MAJOR DEPRESSIVE DISORDER: ICD-10-CM

## 2024-11-14 DIAGNOSIS — K21.9 GASTROESOPHAGEAL REFLUX DISEASE WITHOUT ESOPHAGITIS: ICD-10-CM

## 2024-11-14 DIAGNOSIS — F41.0 PANIC DISORDER WITHOUT AGORAPHOBIA: ICD-10-CM

## 2024-11-14 PROCEDURE — 99214 OFFICE O/P EST MOD 30 MIN: CPT | Performed by: NURSE PRACTITIONER

## 2024-11-14 RX ORDER — RABEPRAZOLE SODIUM 20 MG/1
20 TABLET, DELAYED RELEASE ORAL 2 TIMES DAILY
Qty: 60 TABLET | Refills: 5 | Status: SHIPPED | OUTPATIENT
Start: 2024-11-14

## 2024-11-14 RX ORDER — DULOXETIN HYDROCHLORIDE 60 MG/1
60 CAPSULE, DELAYED RELEASE ORAL DAILY
Qty: 30 CAPSULE | Refills: 5 | Status: SHIPPED | OUTPATIENT
Start: 2024-11-14

## 2024-11-14 RX ORDER — LEVOTHYROXINE SODIUM 150 UG/1
150 TABLET ORAL EVERY MORNING
Qty: 30 TABLET | Refills: 5 | Status: SHIPPED | OUTPATIENT
Start: 2024-11-14

## 2024-11-14 RX ORDER — FUROSEMIDE 20 MG/1
20 TABLET ORAL DAILY
Qty: 5 TABLET | Refills: 0 | Status: SHIPPED | OUTPATIENT
Start: 2024-11-14

## 2024-11-14 RX ORDER — SYRINGE WITH NEEDLE, 1 ML 25GX5/8"
1 SYRINGE, EMPTY DISPOSABLE MISCELLANEOUS WEEKLY
Qty: 4 EACH | Refills: 5 | Status: SHIPPED | OUTPATIENT
Start: 2024-11-14

## 2024-11-14 RX ORDER — CLONAZEPAM 1 MG/1
1 TABLET ORAL 2 TIMES DAILY PRN
Qty: 60 TABLET | Refills: 0 | Status: SHIPPED | OUTPATIENT
Start: 2024-11-14

## 2024-11-14 NOTE — PROGRESS NOTES
"You have chosen to receive care through a telehealth visit.  Do you consent to use a video/audio connection for your medical care today? Yes    Patient has chosen to have a telehealth/video visit due to current pandemic/current public health emergency.   This visit is live, real time audio and visual communication between the provider and the patient.      I did complete this video visit with the patient using [employer].    Patient visit is for the following CC:     Chief Complaint   Patient presents with    Anxiety     Brief HPI/ROS obtained as follows:    Anxiety-doing \"alright\".  She reports is some mild improvement over the last month or so.  She has been less stressed.  She continues to be active.   Back and leg pain-chronic and ongoing.  She is managing well at this time.  She reports that she is taking Tramadol PRN.  She continues to try and be active.        The following portions of the patient's history were reviewed and updated as appropriate: CC, ROS, allergies, current medications, past family history, past medical history, past social history, past surgical history and problem list.    Review of Systems   Constitutional:  Positive for fatigue. Negative for appetite change and unexpected weight change.   HENT:  Negative for congestion, ear pain, nosebleeds, postnasal drip, rhinorrhea, sore throat, trouble swallowing and voice change.    Eyes:  Negative for photophobia, pain and visual disturbance.   Respiratory:  Negative for cough, chest tightness, shortness of breath and wheezing.    Cardiovascular:  Negative for chest pain and palpitations. Leg swelling: occasional.  Gastrointestinal:  Negative for abdominal pain, blood in stool, constipation, diarrhea and nausea.   Endocrine: Negative for cold intolerance and polydipsia.   Genitourinary:  Negative for difficulty urinating, flank pain, frequency and hematuria.   Musculoskeletal:  Positive for arthralgias and myalgias. Negative for back pain, gait " problem and joint swelling.   Skin:  Negative for color change and rash.   Allergic/Immunologic: Negative.    Neurological:  Positive for headaches (generalized). Negative for dizziness, syncope and numbness.   Hematological: Negative.    Psychiatric/Behavioral:  Negative for dysphoric mood, sleep disturbance and suicidal ideas. The patient is nervous/anxious.    All other systems reviewed and are negative.      Assessment     Physical Exam   Constitutional: She appears well-developed and well-nourished. No distress.   HENT:   Head: Normocephalic.   Right Ear: External ear normal.   Left Ear: External ear normal.   Nose: Nose normal.   Mouth/Throat: Oropharynx is clear and moist.   Eyes: Pupils are equal, round, and reactive to light. Conjunctivae and EOM are normal. No scleral icterus.   Neck: Neck normal appearance.No JVD present. No thyromegaly present.   Pulmonary/Chest: Effort normal.  No respiratory distress. She no audible wheeze...  Abdominal: Abdomen appears normal. Soft. She exhibits no distension and no visible mass.   Musculoskeletal:         General: Tenderness present.   Neurological: She is alert. No cranial nerve deficit. Coordination normal.   Skin: Skin is warm and dry. Capillary refill takes less than 2 seconds. She is not diaphoretic. No nail bed cyanosis or erythema. No pallor. Nails show no clubbing.   Psychiatric: She has a normal mood and affect.       Diagnoses and all orders for this visit:    1. Panic disorder without agoraphobia  -     clonazePAM (KlonoPIN) 1 MG tablet; Take 1 tablet by mouth 2 (Two) Times a Day As Needed for Anxiety.  Dispense: 60 tablet; Refill: 0  -     DULoxetine (CYMBALTA) 60 MG capsule; Take 1 capsule by mouth Daily.  Dispense: 30 capsule; Refill: 5    2. Hypothyroidism due to Hashimoto's thyroiditis  Assessment & Plan:  Continue Synthroid 150 mcg as directed.  Report any negative side effects.  We will continue to monitor labs and adjust medications based on  "findings.  Report any heat or cold intolerances.  Report any changes to hair, skin, or nails.      Orders:  -     levothyroxine (SYNTHROID, LEVOTHROID) 150 MCG tablet; Take 1 tablet by mouth Every Morning.  Dispense: 30 tablet; Refill: 5    3. Gastroesophageal reflux disease without esophagitis  Comments:  Continue Aciphex.  Avoid food triggers  Assessment & Plan:  Continue Aciphex 20 mg.  Advised to avoid known GI triggers such as spicy foods.  Upright 30 minutes after meals and avoid eating large meals.  Several small meals daily as able to avoid overfilling stomach.    Orders:  -     RABEprazole (ACIPHEX) 20 MG EC tablet; Take 1 tablet by mouth 2 (Two) Times a Day.  Dispense: 60 tablet; Refill: 5    4. Lumbar disc herniation  Comments:    Continue Flexeril as needed.  Refill on tramadol.  Continue as directed.  Avoid overuse activities  Assessment & Plan:  Continue to be active as physically able.  Continue PRN Tramadol      5. B12 deficiency  Comments:  Injection today while at the office  Orders:  -     Cyanocobalamin 1000 MCG/ML kit; Inject 1 mL as directed 1 (One) Time Per Week.  Dispense: 4 each; Refill: 5  -     Syringe/Needle, Disp, (B-D 3CC LUER-WANG SYR 25GX5/8\") 25G X 5/8\" 3 ML misc; Use 1 each 1 (One) Time Per Week.  Dispense: 4 each; Refill: 5    6. Mild episode of recurrent major depressive disorder  Comments:  continue cymbalta  Orders:  -     DULoxetine (CYMBALTA) 60 MG capsule; Take 1 capsule by mouth Daily.  Dispense: 30 capsule; Refill: 5    7. Vitamin D deficiency  -     vitamin D3 (Vitamin D) 125 MCG (5000 UT) capsule capsule; Take 1 capsule by mouth Daily.  Dispense: 30 capsule; Refill: 5    Other orders  -     furosemide (Lasix) 20 MG tablet; Take 1 tablet by mouth Daily.  Dispense: 5 tablet; Refill: 0        Patient instructed and advised to call if symptoms are increasing or new symptoms occur.    Understands reasons for urgent and emergent care.  Patient (& family) verbalized agreement for " treatment plan.     PURVI/KIMBERLY reviewed today and consistent.  Will refill prescribed controlled medication today.  Patient is aware they cannot receive narcotics from any other provider except if under care of pain management or speciality clinic.  Risk and benefits of medication use has been reviewed.  History and physical exam exhibit continued safe and appropriate use of controlled substances.  The patient is aware of the potential for addiction and dependence.  This patient has been made aware of the appropriate use of such medications, including potential risk of somnolence, limited ability to drive and / or work safely, and potential for overdose.    It has also been made clear that these medications are for use by this patient only, without concomitant use of alcohol or other substances unless prescribed/advised by medical provider.  Patient understands they may be subject to UDS and pill counts at random.    Patient considered to be moderate risk for addiction due to use of multiple controlled medications.  Patient understands and accepts these risks.  Patient need for medication will be reassessed at each visit.  Doses will be adjusted according to patient need and findings.    Goal of TX: Patient will not have any adverse reactions of medication.  Patient have reduction in anxiety symptoms with use of PRN Ativan as directed.  Patient will be able to remain active in an outside of home with use of Ativan for anxiety symptoms  Patient will have reduction in there chronic back and joint pain symptoms with use of tramadol as needed as directed.  Patient will be able to remain active in an outside of their home with minimal to no interference from their pain symptoms.    PURVI Patient Controlled Substance Report (from 11/28/2023 to 11/22/2024)    Dispensed  Strength Quantity Days Supply Provider Pharmacy   11/15/2024 Clonazepam 1MG 60 each 30 [facility]   09/17/2024 Clonazepam 1MG 60 each  30 [facility] [place]   09/17/2024 Tramadol Hydrochloride 50MG 60 each 30 [facility] [place]   08/09/2024 Clonazepam 1MG 60 each 30 [facility] [place]   08/09/2024 Tramadol Hydrochloride 50MG 60 each 30 [facility] [place]   07/02/2024 Tramadol Hydrochloride 50MG 60 each 30 [facility] [place]        RTC 2 months, sooner if needed.     This is an audio and video enabled enabled telehealth encounter.      This visit was conducted with the provider in the office at [facility] and the patient in their home in a private area.    This visit/video call lasted:  14 minutes      This document has been electronically signed by:  LANA Quinn FNP-C Dragon disclaimer:  Part of this note may be an electronic transcription/translation of spoken language to printed text using the Dragon Dictation System.

## 2024-11-22 ENCOUNTER — LAB (OUTPATIENT)
Dept: FAMILY MEDICINE CLINIC | Facility: CLINIC | Age: 39
End: 2024-11-22
Payer: COMMERCIAL

## 2024-11-22 DIAGNOSIS — E06.3 HYPOTHYROIDISM DUE TO HASHIMOTO'S THYROIDITIS: ICD-10-CM

## 2024-11-22 DIAGNOSIS — E55.9 VITAMIN D DEFICIENCY: Primary | ICD-10-CM

## 2024-11-22 DIAGNOSIS — E53.8 B12 DEFICIENCY: ICD-10-CM

## 2024-11-22 LAB
25(OH)D3 SERPL-MCNC: 26.2 NG/ML (ref 30–100)
ALBUMIN SERPL-MCNC: 4.1 G/DL (ref 3.5–5.2)
ALBUMIN/GLOB SERPL: 1.8 G/DL
ALP SERPL-CCNC: 90 U/L (ref 39–117)
ALT SERPL W P-5'-P-CCNC: 17 U/L (ref 1–33)
ANION GAP SERPL CALCULATED.3IONS-SCNC: 10.3 MMOL/L (ref 5–15)
AST SERPL-CCNC: 18 U/L (ref 1–32)
BASOPHILS # BLD AUTO: 0.07 10*3/MM3 (ref 0–0.2)
BASOPHILS NFR BLD AUTO: 1.4 % (ref 0–1.5)
BILIRUB SERPL-MCNC: 0.5 MG/DL (ref 0–1.2)
BUN SERPL-MCNC: 18 MG/DL (ref 6–20)
BUN/CREAT SERPL: 22.5 (ref 7–25)
CALCIUM SPEC-SCNC: 8.6 MG/DL (ref 8.6–10.5)
CHLORIDE SERPL-SCNC: 106 MMOL/L (ref 98–107)
CHOLEST SERPL-MCNC: 147 MG/DL (ref 0–200)
CO2 SERPL-SCNC: 22.7 MMOL/L (ref 22–29)
CREAT SERPL-MCNC: 0.8 MG/DL (ref 0.57–1)
DEPRECATED RDW RBC AUTO: 42.1 FL (ref 37–54)
EGFRCR SERPLBLD CKD-EPI 2021: 95.1 ML/MIN/1.73
EOSINOPHIL # BLD AUTO: 0.09 10*3/MM3 (ref 0–0.4)
EOSINOPHIL NFR BLD AUTO: 1.8 % (ref 0.3–6.2)
ERYTHROCYTE [DISTWIDTH] IN BLOOD BY AUTOMATED COUNT: 13.4 % (ref 12.3–15.4)
GLOBULIN UR ELPH-MCNC: 2.3 GM/DL
GLUCOSE SERPL-MCNC: 96 MG/DL (ref 65–99)
HBA1C MFR BLD: 5.1 % (ref 4.8–5.6)
HCT VFR BLD AUTO: 36.7 % (ref 34–46.6)
HDLC SERPL-MCNC: 45 MG/DL (ref 40–60)
HGB BLD-MCNC: 12 G/DL (ref 12–15.9)
IMM GRANULOCYTES # BLD AUTO: 0.01 10*3/MM3 (ref 0–0.05)
IMM GRANULOCYTES NFR BLD AUTO: 0.2 % (ref 0–0.5)
LDLC SERPL CALC-MCNC: 88 MG/DL (ref 0–100)
LDLC/HDLC SERPL: 1.96 {RATIO}
LYMPHOCYTES # BLD AUTO: 1.65 10*3/MM3 (ref 0.7–3.1)
LYMPHOCYTES NFR BLD AUTO: 33.7 % (ref 19.6–45.3)
MCH RBC QN AUTO: 28.3 PG (ref 26.6–33)
MCHC RBC AUTO-ENTMCNC: 32.7 G/DL (ref 31.5–35.7)
MCV RBC AUTO: 86.6 FL (ref 79–97)
MONOCYTES # BLD AUTO: 0.43 10*3/MM3 (ref 0.1–0.9)
MONOCYTES NFR BLD AUTO: 8.8 % (ref 5–12)
NEUTROPHILS NFR BLD AUTO: 2.65 10*3/MM3 (ref 1.7–7)
NEUTROPHILS NFR BLD AUTO: 54.1 % (ref 42.7–76)
NRBC BLD AUTO-RTO: 0 /100 WBC (ref 0–0.2)
PLATELET # BLD AUTO: 283 10*3/MM3 (ref 140–450)
PMV BLD AUTO: 10.4 FL (ref 6–12)
POTASSIUM SERPL-SCNC: 3.9 MMOL/L (ref 3.5–5.2)
PROT SERPL-MCNC: 6.4 G/DL (ref 6–8.5)
RBC # BLD AUTO: 4.24 10*6/MM3 (ref 3.77–5.28)
SODIUM SERPL-SCNC: 139 MMOL/L (ref 136–145)
T3FREE SERPL-MCNC: 3.11 PG/ML (ref 2–4.4)
T4 FREE SERPL-MCNC: 1.32 NG/DL (ref 0.92–1.68)
TRIGL SERPL-MCNC: 70 MG/DL (ref 0–150)
TSH SERPL DL<=0.05 MIU/L-ACNC: 0.85 UIU/ML (ref 0.27–4.2)
VIT B12 BLD-MCNC: 502 PG/ML (ref 211–946)
VLDLC SERPL-MCNC: 14 MG/DL (ref 5–40)
WBC NRBC COR # BLD AUTO: 4.9 10*3/MM3 (ref 3.4–10.8)

## 2024-11-22 PROCEDURE — 82306 VITAMIN D 25 HYDROXY: CPT | Performed by: NURSE PRACTITIONER

## 2024-11-22 PROCEDURE — 82607 VITAMIN B-12: CPT | Performed by: NURSE PRACTITIONER

## 2024-11-22 PROCEDURE — 36415 COLL VENOUS BLD VENIPUNCTURE: CPT

## 2024-11-22 PROCEDURE — 80061 LIPID PANEL: CPT | Performed by: NURSE PRACTITIONER

## 2024-11-22 PROCEDURE — 83036 HEMOGLOBIN GLYCOSYLATED A1C: CPT | Performed by: NURSE PRACTITIONER

## 2024-11-22 PROCEDURE — 84481 FREE ASSAY (FT-3): CPT | Performed by: NURSE PRACTITIONER

## 2024-11-22 PROCEDURE — 80050 GENERAL HEALTH PANEL: CPT | Performed by: NURSE PRACTITIONER

## 2024-11-22 PROCEDURE — 84439 ASSAY OF FREE THYROXINE: CPT | Performed by: NURSE PRACTITIONER

## 2024-11-27 NOTE — ASSESSMENT & PLAN NOTE
Continue Synthroid 150 mcg as directed.  Report any negative side effects.  We will continue to monitor labs and adjust medications based on findings.  Report any heat or cold intolerances.  Report any changes to hair, skin, or nails.

## 2024-11-27 NOTE — PROGRESS NOTES
[facility]  Weight Management Program        Jarad Barragan is a 36 y.o. female referred to the [facility] by Christine Joaquin for clinical pharmacy and specialty pharmacy management of Mounjaro for weight management.  Jarad Barragan has previously tried Adipex, keto diet, portion control and lifestyle changes for weight loss.  Current weight loss efforts include Adipex and portion control. Patient is discontinuing Adipex now that she is starting Mounjaro as she reports Adipex was not helping with weight loss to begin with. The patient denies a person history or family history of thyroid cancer and denies a personal history of pancreatitis.     Relevant Past Medical History and Co-morbidities  Past Medical History:   Diagnosis Date   • Broken ankle    • Hypothyroidism      Social History     Socioeconomic History   • Marital status:    Tobacco Use   • Smoking status: Never Smoker   • Smokeless tobacco: Never Used   Vaping Use   • Vaping Use: Never used   Substance and Sexual Activity   • Alcohol use: No   • Drug use: No   • Sexual activity: Defer       Allergies  Tape, Codeine, and Hydrocodone    Current Medication List    Current Outpatient Medications:   •  clonazePAM (KlonoPIN) 1 MG tablet, Take 1 tablet by mouth 2 (Two) Times a Day As Needed for Anxiety., Disp: 60 tablet, Rfl: 0  •  Diclofenac Epolamine (FLECTOR) 1.3 % patch patch, Apply 1 patch topically to the appropriate area as directed 2 (Two) Times a Day., Disp: 60 patch, Rfl: 2  •  DULoxetine (Cymbalta) 60 MG capsule, Take 1 capsule by mouth Daily., Disp: 30 capsule, Rfl: 2  •  levothyroxine (Synthroid) 75 MCG tablet, Take 1 tablet by mouth Daily., Disp: 30 tablet, Rfl: 5  •  liothyronine (Cytomel) 25 MCG tablet, Take 1 tablet by mouth Daily., Disp: 30 tablet, Rfl: 5  •  Liraglutide (SAXENDA) 18 MG/3ML injection pen, Inject 0.6 mg under the skin into the appropriate area as directed Daily., Disp: 3 mL, Rfl: 0  •   Problem: Safety - Adult  Goal: Free from fall injury  11/27/2024 1458 by Sirena Kam RN  Outcome: Progressing  Orthostatic vital signs obtained at start of shift - see flowsheet for details.  Pt does not meet criteria for orthostasis.  Pt is a Med fall risk. See Oconnor Fall Score and ABCDS Injury Risk assessments.   - Screening for Orthostasis AND not a [place] Risk per OCONNOR/ABCDS: Pt bed is in low position, side rails up, call light and belongings are in reach.  Fall risk light is on outside pts room.  Pt encouraged to call for assistance as needed. Will continue with hourly rounds for PO intake, pain needs, toileting and repositioning as needed.      Problem: Gastrointestinal - Adult  Goal: Minimal or absence of nausea and vomiting  Outcome: Progressing- patient vomited this morning. No pills digested. NP made aware. See progress note. Medications re administered after IV nausea medication ordered.     Problem: Infection - Adult  Goal: Absence of infection during hospitalization  Outcome: Progressing- patient febrile during shift. Tylenol decreased temp to 101.1 orally. Toci ordered by GORGE Adams. And administered.       ondansetron (Zofran) 4 MG tablet, Take 1 tablet by mouth Every 8 (Eight) Hours As Needed for Nausea or Vomiting., Disp: 30 tablet, Rfl: 0  •  phentermine (ADIPEX-P) 37.5 MG tablet, Take 1 tablet by mouth Every Morning Before Breakfast. Patient takes sometimes, not every day., Disp: 30 tablet, Rfl: 0  •  promethazine (PHENERGAN) 25 MG tablet, Take 1 tablet by mouth Every 6 (Six) Hours As Needed for Nausea or Vomiting., Disp: 60 tablet, Rfl: 1  •  RABEprazole (ACIPHEX) 20 MG EC tablet, TAKE 1 TABLET BY MOUTH ONCE DAILY, Disp: 30 tablet, Rfl: 5  •  Semaglutide-Weight Management 0.25 MG/0.5ML solution auto-injector, Inject 0.25 mg under the skin into the appropriate area as directed 1 (One) Time Per Week., Disp: 2 mL, Rfl: 0  •  tiZANidine (ZANAFLEX) 4 MG tablet, Take 1 tablet by mouth 3 (Three) Times a Day As Needed for Muscle Spasms., Disp: 90 tablet, Rfl: 5  •  traMADol (ULTRAM) 50 MG tablet, Take 1 tablet by mouth 2 (Two) Times a Day As Needed for Moderate Pain., Disp: 60 tablet, Rfl: 1  •  tretinoin (RETIN-A) 0.1 % cream, Apply 1 application topically to the appropriate area as directed Every Night., Disp: 45 g, Rfl: 5  •  triamcinolone (KENALOG) 0.1 % ointment, Apply 1 application topically to the appropriate area as directed 2 (Two) Times a Day., Disp: 30 g, Rfl: 1  •  vitamin D3 (Vitamin D) 125 MCG (5000 UT) capsule capsule, Take 1 capsule by mouth Daily., Disp: 30 capsule, Rfl: 11    Current Facility-Administered Medications:   •  cyanocobalamin injection 1,000 mcg, 1,000 mcg, Intramuscular, Q30 Days, Christine Joaquin APRN, 1,000 mcg at 09/22/22 0840    Drug Interactions  Tramadol + Mounjaro: Tramadol can increase risk of hypoglycemia  Synthroid + Mounjaro: Mounjaro can increase concentration of Levothyroxine. Increase frequency of monitoring thyroid labs    Relevant Laboratory Values  Lab Results   Component Value Date    CHOL 150 07/14/2022    TRIG 45 07/14/2022    HDL 56 07/14/2022    LDL 84 07/14/2022  "    There is no height or weight on file to calculate BMI.    Vaccinations:   Patient recommended to keep up with routine vaccinations.     Goals of Therapy  • Clinical Goals or Therapeutic Targets: 10% weight loss goal      Date 09/29/22         Weight (lb) 217 lbs       BMI kg/ 35.04       Waist Circumference (in)  41.5\"         Patient Education      Medication Expectations   Why am I taking this medication? You are taking Mounjaro for weight loss because you have excess weight and also have weight-related medical problems or obesity. It should be used along with a reduced calorie diet and increased physical activity.    What should I expect while on this medication? You should expect to lose approximately 15%-20% of body weight   How does the medication work? Mounjaro is an injection that addresses one of your body's natural responses to weight loss. It works like naturally produced hormones in the body called GLP-1 and GIP that regulates appetite which can lead to eating fewer calories and losing weight. GIP complements the effects of the GLP-1 increasing energy expenditure leading to weight loss. This medication also slows down food from leaving your stomach, making you feel moise for longer.   How long will I be on this medication for? The amount of time you will be on this medication will be determined by your doctor. Do not abruptly stop this medication without talking to your doctor first.    How do I take this medication? Take as directed on your prescription label. Mounjaro is injected under the skin (subcutaneously) of your stomach, thigh, or upper arm. This medication should be taken once weekly and can be given with or without food.     For each new prefilled pen, pull off the base cap on the pen. Place the base flat on the skin, then unlock. Press and hold the button for up to 10 seconds. Listen for the first click. It means the injection has started. The second click means that the injection is " complete. Remove pen from skin and discard in a sharps container.     What are some possible side effects? You may notice you don't feel as hungry, especially when you first start using Mounjaro. Some common side effects include nausea, vomiting, diarrhea, vomiting, indigestion, constipation, and stomach (abdominal) pain. Redness, itching, and/or swelling can occur where the shot was given. You should also monitor for low blood sugar (hypoglycemia), especially if you are taking Mounjaro with other medications that cause low blood sugar. Stop using Mounjaro and call your doctor immediately if you have severe pain in your stomach area that will not go away as this could be a sign of pancreatitis (inflammation of your pancreas). Gallbladder problems have happened in some people who use Mounjaro. Tell your healthcare provider right away if you get symptoms of gallbladder problems, which may include pain in your upper stomach (abdomen), fever, yellowing of skin or eyes (jaundice), and colby-colored stools.    What happens if I miss a dose? Missed dose should be administered as soon as possible within 4 days; resume usual schedule thereafter. If >4 days have elapsed, skip the missed dose and resume administration at the next scheduled weekly dose.     Mounjaro is currently FDA approved for the treatment of type 2 diabetes, using tirzepatide for weight loss is considered “off-label”      Medication Safety   What are things I should warn my doctor immediately about? Do not use Mounjaro if you or a family member have ever had medullary thyroid cancer (MTC) or Multiple Endocrine Neoplasia syndrome type 2 (MEN 2). Tell your doctor if you get a lump or swelling in your neck, hoarseness, difficulty swallowing, or feel short of breath (these may be symptoms of thyroid cancer). Talk to your doctor if you have or have had problems with your pancreas, kidneys, or liver. Tell your doctor if you have problems with digesting food or  slowed emptying of your stomach (gastroparesis). Also tell your doctor if you notice any signs/symptoms of an allergic reaction (rash, hives, difficulty breathing, etc.).   What are things that I should be cautious of? Be cautious of any side effects from this medication. Talk to your doctor if any new ones develop or aren't getting better.   What are some medications that can interact with this one? Taking Mounjaro with other medications that lower your blood sugar such as insulins and glipizide/glimepiride/glyburide may increase the risk of low blood sugar. It should not be taken with other medicines called GLP-1 receptor agonists, as these work the same way as Mounjaro. Because Mounjaro slows stomach emptying, it can affect the way some medicines work. Always tell your doctor or pharmacist immediately if you start taking any new medications, including over-the-counter medications, vitamins, and herbal supplements.          Resources/Support   How can I remind myself to take this medication? You can download reminder apps to help you manage your refills. You may also set an alarm on your phone to remind you.    Is financial support available?  [employer], the drug , can provide co-pay cards if you have commercial insurance.    Which vaccines are recommended for me? Talk to your doctor about these vaccines: Flu, Coronavirus (COVID-19), Pneumococcal (pneumonia), Tdap, Zoster (shingles).     Medication Storage/Handling   How should I handle this medication? Keep this medication out of reach of pets/children and keep the pen capped when not in use. Do not share your medicine pens with others.    How does this medication need to be stored? Store your new, unused pens in the original carton in the refrigerator. Protect it from light. Do not freeze. If needed, each single-dose pen can be stored unrefrigerated at temperatures for up to 21 days.   How should I dispose of this medication? Used Mounjaro pens  "should be thrown away after use. Place your used pen in an approved sharps container. If you do not have a sharps container, you may use a household container made of heavy-duty plastic with a tight-fitting lid that is leak resistant (e.g., heavy-duty plastic laundry detergent bottle). If your doctor decides to stop this medication, take to your local police station for proper disposal. Some pharmacies also have take-back bins for medication drop-off.       Females of Childbearing Age   Is there anything additionally I should know as a female of childbearing age?  Talk to your doctor if you are pregnant, planning to become pregnant, or breastfeeding. You should not take this medication for weight loss if you are pregnant, planning to become pregnant or breastfeeding.     Tirzepatide may decrease the efficacy of oral hormonal contraception due to changes in gastric emptying; contraceptives administered by a non-oral route are not affected. Because the changes in gastric emptying are largest following the first dose, patients using an oral contraceptive should add a barrier method after starting tirzepatide treatment. Alternately, patients can be switched to non-oral contraceptive methods.      Medication Assessment & Plan    Patient's current BMI is 35.04, which is considered Class II Obesity.    Will order Mounjaro 2.5mg SC weekly. Patient is aware that Mounjaro is not FDA approved for weight loss and use is \"off-label\". Patient is agreeable and would like to proceed with use.     Jarad Barragan is a female of childbearing age.  She denies pregnancy, planning to become pregnant or breastfeeding.  She has been educated that Mounjaro may decrease MYRNA efficacy and back-up contraception should be used while on medication.  If patient were to become pregnant while on medication, instructed her to stop drug immediately and inform her provider.      The following medications will need dose adjustments/closer monitoring once " the GLP1 is started: Synthroid or Cytomel. Patient currently working with provider on thyroid medication and will clarify appropriate medication. Patient reports that she does blood work every 6 weeks for thyroid currently.     Patient also advised to monitor for s/sx of hypoglycemia, while not on other blood glucose lowering medications, tramadol can increase risk of hypoglycemia with Mounjaro. Patient aware of rule of 15 and verbally acknowledged understanding.     Discussed lifestyle modifications, including diet and exercise.  Patient education provided.     Worked with patient to create SMART Goal(s):   1. Increase water intake to 40 oz a day   2. Be more mindful with portion size of carbohydrates, specifically bread.     Will follow-up with patient in clinic in 4 weeks.     Danita Araujo. Trevon, PharmD  9/29/2022  16:04 EDT

## 2024-12-02 DIAGNOSIS — M51.26 LUMBAR DISC HERNIATION: ICD-10-CM

## 2024-12-02 RX ORDER — TRAMADOL HYDROCHLORIDE 50 MG/1
TABLET ORAL
Qty: 60 TABLET | Refills: 0 | OUTPATIENT
Start: 2024-12-02

## 2025-01-09 ENCOUNTER — OFFICE VISIT (OUTPATIENT)
Dept: FAMILY MEDICINE CLINIC | Facility: CLINIC | Age: 40
End: 2025-01-09
Payer: COMMERCIAL

## 2025-01-09 VITALS
HEART RATE: 95 BPM | WEIGHT: 253.8 LBS | SYSTOLIC BLOOD PRESSURE: 114 MMHG | OXYGEN SATURATION: 96 % | HEIGHT: 66 IN | BODY MASS INDEX: 40.79 KG/M2 | DIASTOLIC BLOOD PRESSURE: 74 MMHG | RESPIRATION RATE: 18 BRPM

## 2025-01-09 DIAGNOSIS — M51.26 LUMBAR DISC HERNIATION: ICD-10-CM

## 2025-01-09 DIAGNOSIS — Z79.891 ENCOUNTER FOR LONG-TERM USE OF OPIATE ANALGESIC: ICD-10-CM

## 2025-01-09 DIAGNOSIS — Z51.81 ENCOUNTER FOR THERAPEUTIC DRUG LEVEL MONITORING: ICD-10-CM

## 2025-01-09 DIAGNOSIS — E06.3 HYPOTHYROIDISM DUE TO HASHIMOTO'S THYROIDITIS: ICD-10-CM

## 2025-01-09 DIAGNOSIS — F41.0 PANIC DISORDER WITHOUT AGORAPHOBIA: Primary | ICD-10-CM

## 2025-01-09 DIAGNOSIS — F41.8 DEPRESSION WITH ANXIETY: ICD-10-CM

## 2025-01-09 PROCEDURE — 99214 OFFICE O/P EST MOD 30 MIN: CPT | Performed by: NURSE PRACTITIONER

## 2025-01-09 RX ORDER — CLONAZEPAM 1 MG/1
1 TABLET ORAL 2 TIMES DAILY PRN
Qty: 60 TABLET | Refills: 0 | Status: SHIPPED | OUTPATIENT
Start: 2025-01-09

## 2025-01-09 RX ORDER — TRAMADOL HYDROCHLORIDE 50 MG/1
50 TABLET ORAL EVERY 12 HOURS PRN
Qty: 60 TABLET | Refills: 2 | Status: SHIPPED | OUTPATIENT
Start: 2025-01-09

## 2025-01-09 NOTE — PROGRESS NOTES
Subjective   Jarad Barragan is a 39 y.o. female.     Chief Complaint   Patient presents with    Anxiety    Back Pain       History of Present Illness     Anxiety-ongoing.   She continues Cymbalta 60 mg as directed.  She is taking Klonopin only PRN. She reports that she is managing well.   No negative side effects.  She is managing overall well.    Back pain-chronic and ongoing.   She reports some increased stiffness.  She has continued leg/hip pain.  No progression of symptoms.  She reports that she has not been able to obtain tramadol from her pharmacy.,    Thyroid-last labs in November WNL.  She is taking synthroid 150 mcg x's 7 tabs weekly on Sunday or Monday.  No associated symptoms such as heat or cold intolerance.  No hair or skin changes are reported.  Vitamin B12 deficiency-chronic and ongoing.  Patient is presently taking vitamin B12 supplement.  Patient is tolerating well and denies any negative side effects.    The following portions of the patient's history were reviewed and updated as appropriate: CC, ROS, allergies, current medications, past family history, past medical history, past social history, past surgical history and problem list.      Review of Systems   Constitutional:  Positive for fatigue. Negative for appetite change and fever.   HENT:  Negative for congestion, ear pain, nosebleeds, postnasal drip, rhinorrhea, sore throat, tinnitus, trouble swallowing and voice change.    Eyes:  Negative for blurred vision, photophobia and visual disturbance.   Respiratory:  Negative for cough, chest tightness, shortness of breath and wheezing.    Cardiovascular:  Negative for chest pain and palpitations.   Gastrointestinal:  Negative for abdominal pain, blood in stool, constipation, diarrhea, nausea and GERD.   Endocrine: Negative for cold intolerance, heat intolerance and polydipsia.   Genitourinary:  Negative for decreased urine volume, difficulty urinating, dysuria and hematuria.   Musculoskeletal:   "Positive for arthralgias and myalgias. Negative for back pain and gait problem.   Skin:  Negative for color change, pallor and wound.   Allergic/Immunologic: Negative.    Neurological:  Negative for dizziness, syncope, numbness and headache.   Hematological: Negative.    Psychiatric/Behavioral:  Positive for stress. Negative for decreased concentration, sleep disturbance, suicidal ideas and depressed mood. The patient is nervous/anxious.    All other systems reviewed and are negative.      Objective     /74   Pulse 95   Resp 18   Ht 167.6 cm (65.98\")   Wt 115 kg (253 lb 12.8 oz)   SpO2 96%   BMI 40.99 kg/m²     Physical Exam  Vitals reviewed.   Constitutional:       General: She is not in acute distress.     Appearance: She is well-developed. She is obese. She is not diaphoretic.   HENT:      Head: Normocephalic and atraumatic.      Jaw: No tenderness.      Right Ear: Hearing, tympanic membrane, ear canal and external ear normal.      Left Ear: Hearing, tympanic membrane, ear canal and external ear normal.      Nose: Nose normal. No nasal tenderness or congestion.      Right Sinus: No maxillary sinus tenderness or frontal sinus tenderness.      Left Sinus: No maxillary sinus tenderness or frontal sinus tenderness.      Mouth/Throat:      Lips: Pink.      Mouth: Mucous membranes are moist.      Pharynx: Oropharynx is clear. Uvula midline.   Eyes:      General: Lids are normal. No scleral icterus.     Extraocular Movements:      Right eye: Normal extraocular motion and no nystagmus.      Left eye: Normal extraocular motion and no nystagmus.      Conjunctiva/sclera: Conjunctivae normal.      Pupils: Pupils are equal, round, and reactive to light.   Neck:      Thyroid: No thyromegaly or thyroid tenderness.      Vascular: No carotid bruit or JVD.      Trachea: No tracheal tenderness.   Cardiovascular:      Rate and Rhythm: Normal rate and regular rhythm.      Pulses:           Dorsalis pedis pulses are 2+ on " the right side and 2+ on the left side.        Posterior tibial pulses are 2+ on the right side and 2+ on the left side.      Heart sounds: Normal heart sounds, S1 normal and S2 normal. No murmur heard.  Pulmonary:      Effort: Pulmonary effort is normal. No accessory muscle usage, prolonged expiration or respiratory distress.      Breath sounds: Normal breath sounds.   Chest:      Chest wall: No tenderness.   Abdominal:      General: Bowel sounds are normal. There is no distension.      Palpations: Abdomen is soft. There is no hepatomegaly, splenomegaly or mass.      Tenderness: There is no abdominal tenderness.   Musculoskeletal:         General: Tenderness present.      Cervical back: Normal range of motion and neck supple.      Thoracic back: Spasms present.      Lumbar back: Tenderness present. Decreased range of motion.      Right hip: Tenderness present.      Left hip: Tenderness present.      Right lower leg: No edema.      Left lower leg: No edema.      Comments: No muscular atrophy or flaccidity.   Lymphadenopathy:      Head:      Right side of head: No submental or submandibular adenopathy.      Left side of head: No submental or submandibular adenopathy.      Cervical: No cervical adenopathy.      Right cervical: No superficial cervical adenopathy.     Left cervical: No superficial cervical adenopathy.   Skin:     General: Skin is warm and dry.      Capillary Refill: Capillary refill takes less than 2 seconds.      Coloration: Skin is not jaundiced or pale.      Findings: No erythema.      Nails: There is no clubbing.   Neurological:      Mental Status: She is alert and oriented to person, place, and time.      Cranial Nerves: No cranial nerve deficit or facial asymmetry.      Sensory: No sensory deficit.      Motor: No weakness, tremor, atrophy or abnormal muscle tone.      Coordination: Coordination normal.      Deep Tendon Reflexes: Reflexes are normal and symmetric.   Psychiatric:         Attention  and Perception: She is attentive.         Mood and Affect: Mood normal. Mood is not anxious or depressed.         Speech: Speech normal.         Behavior: Behavior normal. Behavior is cooperative.         Thought Content: Thought content normal.         Cognition and Memory: Cognition normal.         Judgment: Judgment normal.         Diagnoses and all orders for this visit:    1. Panic disorder without agoraphobia (Primary)  -     clonazePAM (KlonoPIN) 1 MG tablet; Take 1 tablet by mouth 2 (Two) Times a Day As Needed for Anxiety.  Dispense: 60 tablet; Refill: 0    2. Lumbar disc herniation  Assessment & Plan:  Continue to be active as physically able.  Continue PRN Tramadol when available    Orders:  -     traMADol (ULTRAM) 50 MG tablet; Take 1 tablet by mouth Every 12 (Twelve) Hours As Needed for Moderate Pain.  Dispense: 60 tablet; Refill: 2    3. Hypothyroidism due to Hashimoto's thyroiditis  Assessment & Plan:  Continue Synthroid 150 mcg as directed.  Report any negative side effects.  We will continue to monitor labs and adjust medications based on findings.  Report any heat or cold intolerances.  Report any changes to hair, skin, or nails.        4. Depression with anxiety  Assessment & Plan:  Continue Cymbalta and Klonopin as directed.  Stress reduction advised (examples:  make time for self, Reading, Listening to music, Exercise, keeping a journal, venting to a confidant, etc.)      5. Encounter for therapeutic drug level monitoring  -     Urine Drug Screen - Urine, Clean Catch; Future    6. Encounter for long-term use of opiate analgesic  -     Urine Drug Screen - Urine, Clean Catch; Future       Class 3 Severe Obesity (BMI >=40). Obesity-related health conditions include the following: GERD. Obesity is unchanged. BMI is  above average . We discussed portion control and increasing exercise.    Understands disease processes and need for medications.  Understands reasons for urgent and emergent care.   Patient (& family) verbalized agreement for treatment plan.   Emotional support and active listening provided.  Patient provided time to verbalize feelings.    PURVI/KIMBERLY reviewed today and consistent.  Will refill prescribed controlled medication today.  Patient is aware they cannot receive narcotics from any other provider except if under care of pain management or speciality clinic.  Risk and benefits of medication use has been reviewed.  History and physical exam exhibit continued safe and appropriate use of controlled substances.  The patient is aware of the potential for addiction and dependence.  This patient has been made aware of the appropriate use of such medications, including potential risk of somnolence, limited ability to drive and / or work safely, and potential for overdose.    It has also been made clear that these medications are for use by this patient only, without concomitant use of alcohol or other substances unless prescribed/advised by medical provider.  Patient understands they may be subject to UDS and pill counts at random.    Patient considered to be moderate risk for addiction due to use of multiple controlled medications.  Patient understands and accepts these risks.  Patient need for medication will be reassessed at each visit.  Doses will be adjusted according to patient need and findings.    Goal of TX: Patient will not have any adverse reactions of medication.  Patient will have reduction in there chronic back and joint pain symptoms with use of tramadol as needed as directed.  Patient will be able to remain active in an outside of their home with minimal to no interference from their pain symptoms.  Patient will have reduction in anxiety symptoms with use of PRN Klonopin.  Patient will be able to remain active in an outside of her home without interference from anxiety symptoms.    PURVI Patient Controlled Substance Report (from 1/10/2024 to 1/9/2025)    Dispensed  Strength  Quantity Days Supply Provider Pharmacy   11/15/2024 Clonazepam 1MG 60 each 30 [facility]   09/17/2024 Clonazepam 1MG 60 each 30 [facility]   09/17/2024 Tramadol Hydrochloride 50MG 60 each 30 [facility]   08/09/2024 Clonazepam 1MG 60 each 30 [facility]   08/09/2024 Tramadol Hydrochloride 50MG 60 each 30 [facility]   07/02/2024 Tramadol Hydrochloride 50MG 60 each 30 [facility]UL [facility]          RTC 2 months, sooner if needed.           This document has been electronically signed by:  LANA Quinn FNP-C Dragon disclaimer:  Part of this note may be an electronic transcription/translation of spoken language to printed text using the Dragon Dictation System.

## 2025-01-16 NOTE — ASSESSMENT & PLAN NOTE
Continue Cymbalta and Klonopin as directed.  Stress reduction advised (examples:  make time for self, Reading, Listening to music, Exercise, keeping a journal, venting to a confidant, etc.)

## 2025-03-05 ENCOUNTER — TELEMEDICINE (OUTPATIENT)
Dept: FAMILY MEDICINE CLINIC | Facility: CLINIC | Age: 40
End: 2025-03-05
Payer: COMMERCIAL

## 2025-03-05 DIAGNOSIS — F41.0 PANIC DISORDER WITHOUT AGORAPHOBIA: ICD-10-CM

## 2025-03-05 DIAGNOSIS — F33.0 MILD EPISODE OF RECURRENT MAJOR DEPRESSIVE DISORDER: ICD-10-CM

## 2025-03-05 DIAGNOSIS — K21.9 GASTROESOPHAGEAL REFLUX DISEASE WITHOUT ESOPHAGITIS: ICD-10-CM

## 2025-03-05 DIAGNOSIS — E06.3 HYPOTHYROIDISM DUE TO HASHIMOTO'S THYROIDITIS: ICD-10-CM

## 2025-03-05 DIAGNOSIS — M51.26 LUMBAR DISC HERNIATION: ICD-10-CM

## 2025-03-05 DIAGNOSIS — E53.8 B12 DEFICIENCY: ICD-10-CM

## 2025-03-05 DIAGNOSIS — E55.9 VITAMIN D DEFICIENCY: ICD-10-CM

## 2025-03-05 RX ORDER — PHENTERMINE HYDROCHLORIDE 37.5 MG/1
37.5 TABLET ORAL
Qty: 30 TABLET | Refills: 1 | Status: SHIPPED | OUTPATIENT
Start: 2025-03-05

## 2025-03-05 RX ORDER — TRAMADOL HYDROCHLORIDE 50 MG/1
50 TABLET ORAL EVERY 12 HOURS PRN
Qty: 60 TABLET | Refills: 2 | Status: SHIPPED | OUTPATIENT
Start: 2025-03-05

## 2025-03-05 RX ORDER — DULOXETIN HYDROCHLORIDE 60 MG/1
60 CAPSULE, DELAYED RELEASE ORAL DAILY
Qty: 30 CAPSULE | Refills: 5 | Status: SHIPPED | OUTPATIENT
Start: 2025-03-05

## 2025-03-05 RX ORDER — RABEPRAZOLE SODIUM 20 MG/1
20 TABLET, DELAYED RELEASE ORAL 2 TIMES DAILY
Qty: 60 TABLET | Refills: 5 | Status: SHIPPED | OUTPATIENT
Start: 2025-03-05

## 2025-03-05 RX ORDER — CLONAZEPAM 1 MG/1
1 TABLET ORAL 2 TIMES DAILY PRN
Qty: 60 TABLET | Refills: 0 | Status: SHIPPED | OUTPATIENT
Start: 2025-03-05

## 2025-03-05 RX ORDER — LEVOTHYROXINE SODIUM 150 UG/1
150 TABLET ORAL EVERY MORNING
Qty: 30 TABLET | Refills: 5 | Status: SHIPPED | OUTPATIENT
Start: 2025-03-05

## 2025-03-05 NOTE — PROGRESS NOTES
You have chosen to receive care through a telehealth visit.  Do you consent to use a video/audio connection for your medical care today? Yes    Patient has chosen to have a telehealth/video visit due to current pandemic/current public health emergency.   This visit is live, real time audio and visual communication between the provider and the patient.      I did complete this video visit with the patient using [employer].    Patient visit is for the following CC:     Chief Complaint   Patient presents with    Hypothyroidism       Brief HPI/ROS obtained as follows:    Anxiety-ongoing. She reports she is doing ok.  She is taking cymbalta and doing fine. She is taking klonopin 1 mg up to BID.     Weight management-is looking into online Tirzepatide.  She reports it will be a vial.  She has done well on mounjaro.  She could not tolerate wegovy/ozempic.  She has not been on Adipex recently.        The following portions of the patient's history were reviewed and updated as appropriate: CC, ROS, allergies, current medications, past family history, past medical history, past social history, past surgical history and problem list.    Review of Systems   Constitutional:  Positive for fatigue. Negative for appetite change and unexpected weight change.   HENT:  Negative for congestion, ear pain, nosebleeds, postnasal drip, rhinorrhea, sore throat, trouble swallowing and voice change.    Eyes:  Negative for photophobia, pain and visual disturbance.   Respiratory:  Negative for cough, chest tightness, shortness of breath and wheezing.    Cardiovascular:  Negative for chest pain and palpitations.   Gastrointestinal:  Negative for abdominal pain, blood in stool, constipation, diarrhea, nausea and vomiting.   Endocrine: Negative for cold intolerance and polydipsia.   Genitourinary:  Negative for difficulty urinating, flank pain, frequency and hematuria.   Musculoskeletal:  Positive for arthralgias and back pain. Negative for  gait problem, joint swelling and myalgias.   Skin:  Negative for color change and rash.   Allergic/Immunologic: Negative.    Neurological:  Positive for headaches (ongoing but not more than usual). Negative for dizziness, syncope and numbness.   Hematological: Negative.    Psychiatric/Behavioral:  Negative for dysphoric mood, sleep disturbance and suicidal ideas. The patient is nervous/anxious.    All other systems reviewed and are negative.      Assessment     Physical Exam   Constitutional: She appears well-developed and well-nourished. No distress.   HENT:   Head: Normocephalic.   Right Ear: External ear normal.   Left Ear: External ear normal.   Nose: Nose normal.   Mouth/Throat: Oropharynx is clear and moist.   Eyes: Pupils are equal, round, and reactive to light. Conjunctivae and EOM are normal. No scleral icterus.   Neck: Neck normal appearance.No JVD present. No thyromegaly present.   Pulmonary/Chest: Effort normal.  No respiratory distress. She no audible wheeze...  Abdominal: Abdomen appears normal. Soft. She exhibits no distension and no visible mass.   Musculoskeletal: Normal range of motion.         General: Tenderness present.      Comments: Low back tenderness and bilateral leg tenderness   Neurological: She is alert. No cranial nerve deficit. Coordination normal.   Skin: Skin is warm and dry. Capillary refill takes less than 2 seconds. She is not diaphoretic. No nail bed cyanosis or erythema. No pallor. Nails show no clubbing.   Psychiatric: She has a normal mood and affect.       Diagnoses and all orders for this visit:    1. Body mass index (BMI) of 40.0 to 44.9 in adult (Primary)  -     phentermine (Adipex-P) 37.5 MG tablet; Take 1 tablet by mouth Every Morning Before Breakfast.  Dispense: 30 tablet; Refill: 1  -     Ambulatory Referral to Bariatric Surgery    2. Panic disorder without agoraphobia  Comments:  continue Cymbalta and Ativan  Orders:  -     clonazePAM (KlonoPIN) 1 MG tablet; Take 1  tablet by mouth 2 (Two) Times a Day As Needed for Anxiety.  Dispense: 60 tablet; Refill: 0  -     DULoxetine (CYMBALTA) 60 MG capsule; Take 1 capsule by mouth Daily.  Dispense: 30 capsule; Refill: 5    3. Lumbar disc herniation  Comments:  be active.  avoid overuse activity  Orders:  -     traMADol (ULTRAM) 50 MG tablet; Take 1 tablet by mouth Every 12 (Twelve) Hours As Needed for Moderate Pain.  Dispense: 60 tablet; Refill: 2    4. Mild episode of recurrent major depressive disorder  Comments:  continue cymbalta  Orders:  -     DULoxetine (CYMBALTA) 60 MG capsule; Take 1 capsule by mouth Daily.  Dispense: 30 capsule; Refill: 5    5. B12 deficiency  Comments:  Injection today while at the office  Orders:  -     Cyanocobalamin 1000 MCG/ML kit; Inject 1 mL as directed 1 (One) Time Per Week.  Dispense: 4 each; Refill: 5    6. Hypothyroidism due to Hashimoto's thyroiditis  Comments:  continue Synthroid 150 mcg  report fatigue, hair, skin or nail changes  Orders:  -     levothyroxine (SYNTHROID, LEVOTHROID) 150 MCG tablet; Take 1 tablet by mouth Every Morning.  Dispense: 30 tablet; Refill: 5    7. Gastroesophageal reflux disease without esophagitis  Comments:  Continue Aciphex.  Avoid food triggers  Orders:  -     RABEprazole (ACIPHEX) 20 MG EC tablet; Take 1 tablet by mouth 2 (Two) Times a Day.  Dispense: 60 tablet; Refill: 5    8. Vitamin D deficiency  Comments:  continue vit D        Patient instructed and advised to call if symptoms are increasing or new symptoms occur.    Understands reasons for urgent and emergent care.  Patient (& family) verbalized agreement for treatment plan.     PURVI/PMDP reviewed today and consistent.  Will refill prescribed controlled medication today.  Patient is aware they cannot receive narcotics from any other provider except if under care of pain management or speciality clinic.  Risk and benefits of medication use has been reviewed.  History and physical exam exhibit continued safe  and appropriate use of controlled substances.  The patient is aware of the potential for addiction and dependence.  This patient has been made aware of the appropriate use of such medications, including potential risk of somnolence, limited ability to drive and / or work safely, and potential for overdose.    It has also been made clear that these medications are for use by this patient only, without concomitant use of alcohol or other substances unless prescribed/advised by medical provider.  Patient understands they may be subject to UDS and pill counts at random.    Patient considered to be moderate risk for addiction due to use of multiple controlled medications.  Patient understands and accepts these risks.  Patient need for medication will be reassessed at each visit.  Doses will be adjusted according to patient need and findings.    Goal of TX: Patient will not have any adverse reactions of medication.  Patient will have reduction in there chronic back and joint pain symptoms with use of tramadol as needed as directed.  Patient will be able to remain active in an outside of their home with minimal to no interference from their pain symptoms.  Patient will have reduction in anxiety symptoms with use of PRN Klonopin.  Patient will be able to remain active in an outside of her home without interference from anxiety symptoms.  Patient will have reduction in weight with use of Adipex as directed with simultaneous diet and lifestyle changes.    PURVI Patient Controlled Substance Report (from 3/5/2024 to 3/5/2025)    Dispensed  Strength Quantity Days Supply Provider Pharmacy   02/06/2025 Clonazepam 1MG 60 each 30 [facility]   02/06/2025 Tramadol Hydrochloride 50MG 60 each 30 [facility] [place]   11/15/2024 Clonazepam 1MG 60 each 30 [facility] [place]   09/17/2024 Clonazepam 1MG 60 each 30 [facility] [place]   09/17/2024 Tramadol Hydrochloride 50MG 60 each 30  [facility]   08/09/2024 Clonazepam 1MG 60 each 30 [facility] [place]   08/09/2024 Tramadol Hydrochloride 50MG 60 each 30 [facility] [place]        RTC 2 months, sooner if needed.       This is an audio and video enabled enabled telehealth encounter.      This visit was conducted with the provider in the office at [facility] and the patient in their home in a private area.    This visit/video call lasted:  24 minutes       This document has been electronically signed by:  LANA Quinn FNP-C Dragon disclaimer:  Part of this note may be an electronic transcription/translation of spoken language to printed text using the Dragon Dictation System.

## 2025-03-13 ENCOUNTER — LAB (OUTPATIENT)
Dept: FAMILY MEDICINE CLINIC | Facility: CLINIC | Age: 40
End: 2025-03-13
Payer: COMMERCIAL

## 2025-03-13 DIAGNOSIS — R53.83 OTHER FATIGUE: ICD-10-CM

## 2025-03-13 DIAGNOSIS — F41.8 DEPRESSION WITH ANXIETY: ICD-10-CM

## 2025-03-13 DIAGNOSIS — Z51.81 ENCOUNTER FOR THERAPEUTIC DRUG MONITORING: ICD-10-CM

## 2025-03-13 DIAGNOSIS — Z79.899 ENCOUNTER FOR LONG-TERM (CURRENT) USE OF MEDICATIONS: ICD-10-CM

## 2025-03-13 DIAGNOSIS — E53.8 B12 DEFICIENCY: Primary | ICD-10-CM

## 2025-03-13 DIAGNOSIS — Z51.81 ENCOUNTER FOR THERAPEUTIC DRUG MONITORING: Primary | ICD-10-CM

## 2025-03-13 DIAGNOSIS — E06.3 HYPOTHYROIDISM DUE TO HASHIMOTO'S THYROIDITIS: ICD-10-CM

## 2025-03-13 DIAGNOSIS — E55.9 VITAMIN D DEFICIENCY: ICD-10-CM

## 2025-03-13 LAB
ALBUMIN SERPL-MCNC: 4.3 G/DL (ref 3.5–5.2)
ALBUMIN/GLOB SERPL: 1.6 G/DL
ALP SERPL-CCNC: 83 U/L (ref 39–117)
ALT SERPL W P-5'-P-CCNC: 14 U/L (ref 1–33)
ANION GAP SERPL CALCULATED.3IONS-SCNC: 6.6 MMOL/L (ref 5–15)
AST SERPL-CCNC: 14 U/L (ref 1–32)
BASOPHILS # BLD AUTO: 0.08 10*3/MM3 (ref 0–0.2)
BASOPHILS NFR BLD AUTO: 1.2 % (ref 0–1.5)
BILIRUB SERPL-MCNC: 0.4 MG/DL (ref 0–1.2)
BUN SERPL-MCNC: 14 MG/DL (ref 6–20)
BUN/CREAT SERPL: 16.9 (ref 7–25)
CALCIUM SPEC-SCNC: 8.8 MG/DL (ref 8.6–10.5)
CHLORIDE SERPL-SCNC: 102 MMOL/L (ref 98–107)
CHOLEST SERPL-MCNC: 160 MG/DL (ref 0–200)
CO2 SERPL-SCNC: 28.4 MMOL/L (ref 22–29)
CREAT SERPL-MCNC: 0.83 MG/DL (ref 0.57–1)
DEPRECATED RDW RBC AUTO: 42.6 FL (ref 37–54)
EGFRCR SERPLBLD CKD-EPI 2021: 91 ML/MIN/1.73
EOSINOPHIL # BLD AUTO: 0.1 10*3/MM3 (ref 0–0.4)
EOSINOPHIL NFR BLD AUTO: 1.5 % (ref 0.3–6.2)
ERYTHROCYTE [DISTWIDTH] IN BLOOD BY AUTOMATED COUNT: 13.3 % (ref 12.3–15.4)
GLOBULIN UR ELPH-MCNC: 2.7 GM/DL
GLUCOSE SERPL-MCNC: 73 MG/DL (ref 65–99)
HBA1C MFR BLD: 5.3 % (ref 4.8–5.6)
HCT VFR BLD AUTO: 37.9 % (ref 34–46.6)
HDLC SERPL-MCNC: 51 MG/DL (ref 40–60)
HGB BLD-MCNC: 12.6 G/DL (ref 12–15.9)
IMM GRANULOCYTES # BLD AUTO: 0.01 10*3/MM3 (ref 0–0.05)
IMM GRANULOCYTES NFR BLD AUTO: 0.2 % (ref 0–0.5)
LDLC SERPL CALC-MCNC: 92 MG/DL (ref 0–100)
LDLC/HDLC SERPL: 1.78 {RATIO}
LYMPHOCYTES # BLD AUTO: 1.98 10*3/MM3 (ref 0.7–3.1)
LYMPHOCYTES NFR BLD AUTO: 29.9 % (ref 19.6–45.3)
MCH RBC QN AUTO: 29 PG (ref 26.6–33)
MCHC RBC AUTO-ENTMCNC: 33.2 G/DL (ref 31.5–35.7)
MCV RBC AUTO: 87.3 FL (ref 79–97)
MONOCYTES # BLD AUTO: 0.62 10*3/MM3 (ref 0.1–0.9)
MONOCYTES NFR BLD AUTO: 9.4 % (ref 5–12)
NEUTROPHILS NFR BLD AUTO: 3.84 10*3/MM3 (ref 1.7–7)
NEUTROPHILS NFR BLD AUTO: 57.8 % (ref 42.7–76)
NRBC BLD AUTO-RTO: 0 /100 WBC (ref 0–0.2)
PLATELET # BLD AUTO: 322 10*3/MM3 (ref 140–450)
PMV BLD AUTO: 10.6 FL (ref 6–12)
POTASSIUM SERPL-SCNC: 3.8 MMOL/L (ref 3.5–5.2)
PROT SERPL-MCNC: 7 G/DL (ref 6–8.5)
RBC # BLD AUTO: 4.34 10*6/MM3 (ref 3.77–5.28)
SODIUM SERPL-SCNC: 137 MMOL/L (ref 136–145)
T4 FREE SERPL-MCNC: 0.85 NG/DL (ref 0.92–1.68)
TRIGL SERPL-MCNC: 91 MG/DL (ref 0–150)
TSH SERPL DL<=0.05 MIU/L-ACNC: 7.93 UIU/ML (ref 0.27–4.2)
VLDLC SERPL-MCNC: 17 MG/DL (ref 5–40)
WBC NRBC COR # BLD AUTO: 6.63 10*3/MM3 (ref 3.4–10.8)

## 2025-03-13 PROCEDURE — 83540 ASSAY OF IRON: CPT | Performed by: NURSE PRACTITIONER

## 2025-03-13 PROCEDURE — 36415 COLL VENOUS BLD VENIPUNCTURE: CPT

## 2025-03-13 PROCEDURE — 84439 ASSAY OF FREE THYROXINE: CPT | Performed by: NURSE PRACTITIONER

## 2025-03-13 PROCEDURE — 84466 ASSAY OF TRANSFERRIN: CPT | Performed by: NURSE PRACTITIONER

## 2025-03-13 PROCEDURE — 80050 GENERAL HEALTH PANEL: CPT | Performed by: NURSE PRACTITIONER

## 2025-03-13 PROCEDURE — 84481 FREE ASSAY (FT-3): CPT | Performed by: NURSE PRACTITIONER

## 2025-03-13 PROCEDURE — 80061 LIPID PANEL: CPT | Performed by: NURSE PRACTITIONER

## 2025-03-13 PROCEDURE — 82607 VITAMIN B-12: CPT | Performed by: NURSE PRACTITIONER

## 2025-03-13 PROCEDURE — 82306 VITAMIN D 25 HYDROXY: CPT | Performed by: NURSE PRACTITIONER

## 2025-03-13 PROCEDURE — 83036 HEMOGLOBIN GLYCOSYLATED A1C: CPT | Performed by: NURSE PRACTITIONER

## 2025-03-13 RX ORDER — DOCUSATE SODIUM 250 MG
250 CAPSULE ORAL DAILY
Qty: 30 CAPSULE | Refills: 2 | Status: SHIPPED | OUTPATIENT
Start: 2025-03-13

## 2025-03-14 LAB
25(OH)D3 SERPL-MCNC: 35.5 NG/ML (ref 30–100)
IRON 24H UR-MRATE: 51 MCG/DL (ref 37–145)
IRON SATN MFR SERPL: 12 % (ref 20–50)
T3FREE SERPL-MCNC: 2.55 PG/ML (ref 2–4.4)
TIBC SERPL-MCNC: 413 MCG/DL (ref 298–536)
TRANSFERRIN SERPL-MCNC: 277 MG/DL (ref 200–360)
VIT B12 BLD-MCNC: 520 PG/ML (ref 211–946)

## 2025-03-15 DIAGNOSIS — E06.3 HYPOTHYROIDISM DUE TO HASHIMOTO'S THYROIDITIS: Primary | ICD-10-CM

## 2025-04-15 RX ORDER — AZITHROMYCIN 250 MG/1
TABLET, FILM COATED ORAL
Qty: 6 TABLET | Refills: 0 | Status: SHIPPED | OUTPATIENT
Start: 2025-04-15

## 2025-04-28 DIAGNOSIS — K76.9 LESION OF LIVER GREATER THAN 1 CM IN DIAMETER: Primary | ICD-10-CM

## 2025-05-07 ENCOUNTER — TELEMEDICINE (OUTPATIENT)
Dept: FAMILY MEDICINE CLINIC | Facility: CLINIC | Age: 40
End: 2025-05-07
Payer: COMMERCIAL

## 2025-05-07 VITALS — WEIGHT: 137 LBS | HEIGHT: 66 IN | BODY MASS INDEX: 22.02 KG/M2

## 2025-05-07 DIAGNOSIS — K59.04 CHRONIC IDIOPATHIC CONSTIPATION: Primary | ICD-10-CM

## 2025-05-07 DIAGNOSIS — E66.09 CLASS 1 OBESITY DUE TO EXCESS CALORIES WITHOUT SERIOUS COMORBIDITY WITH BODY MASS INDEX (BMI) OF 31.0 TO 31.9 IN ADULT: ICD-10-CM

## 2025-05-07 DIAGNOSIS — K76.9 LESION OF LIVER GREATER THAN 1 CM IN DIAMETER: ICD-10-CM

## 2025-05-07 DIAGNOSIS — M51.26 LUMBAR DISC HERNIATION: ICD-10-CM

## 2025-05-07 DIAGNOSIS — R11.0 NAUSEA: ICD-10-CM

## 2025-05-07 DIAGNOSIS — E66.811 CLASS 1 OBESITY DUE TO EXCESS CALORIES WITHOUT SERIOUS COMORBIDITY WITH BODY MASS INDEX (BMI) OF 31.0 TO 31.9 IN ADULT: ICD-10-CM

## 2025-05-07 DIAGNOSIS — F41.0 PANIC DISORDER WITHOUT AGORAPHOBIA: ICD-10-CM

## 2025-05-07 PROCEDURE — 99214 OFFICE O/P EST MOD 30 MIN: CPT | Performed by: NURSE PRACTITIONER

## 2025-05-07 RX ORDER — CLONAZEPAM 1 MG/1
1 TABLET ORAL 2 TIMES DAILY PRN
Qty: 60 TABLET | Refills: 0 | Status: SHIPPED | OUTPATIENT
Start: 2025-05-07

## 2025-05-07 RX ORDER — PROMETHAZINE HYDROCHLORIDE 25 MG/1
25 TABLET ORAL EVERY 6 HOURS PRN
Qty: 60 TABLET | Refills: 1 | Status: SHIPPED | OUTPATIENT
Start: 2025-05-07

## 2025-05-07 NOTE — PROGRESS NOTES
"You have chosen to receive care through a telehealth visit.  Do you consent to use a video/audio connection for your medical care today? Yes    Patient has chosen to have a telehealth/video visit due to current pandemic/current public health emergency.   This visit is live, real time audio and visual communication between the provider and the patient.      I did complete this video visit with the patient using its learning/"[employer].    Patient visit is for the following CC:     Chief Complaint   Patient presents with   • Class 1 obesity due to excess calories without serious amanda       Brief HPI/ROS obtained as follows:    Obesity-under care of weight management clinic.  She is taking injections that are mixed with a \"fat burner\".  She has also been on Adipex.    She is currently on compounded Mounjaro at 50 units (5 mg).   She is having some constipation.  Started on Linzess but she continues to not have consistent BM.    Anxiety-managing well overall.  She is currently on Klonopin 1 mg up to BID PRN and cymbalta 60 mg.  No side effects are noted.   She feels that \"it is a little worse\".  She reports that she has woke 2 mornings with a panic symptoms.  She is not noting any increased stressors.  She is not having bad dreams.  She is not certain of the triggers.    GERD-stable on Aciphex.  She is not having any s/sx of reflux. No abdomen pain.    Recent GI upset-went to the ED for evaluation of vomiting.  CT of abdomen due to abdomen pain and vomiting.  Noted to have a liver lesion.  She is awaiting an appt for a US of the liver.    Iron def-has obtained OTC iron.  She is taking daily most of the time.    Back and leg pain-not changed.  She continue PRN Tramadol.  No new or change in symptoms.  She tries to be as active as she is able.      The following portions of the patient's history were reviewed and updated as appropriate: CC, ROS, allergies, current medications, past family history, past medical history, past social " "history, past surgical history and problem list.    Review of Systems   Constitutional:  Positive for fatigue. Negative for appetite change and unexpected weight change.   HENT:  Negative for congestion, ear pain, nosebleeds, postnasal drip, rhinorrhea, sore throat, trouble swallowing and voice change.    Eyes:  Negative for photophobia, pain and visual disturbance.   Respiratory:  Negative for cough, chest tightness, shortness of breath and wheezing.    Cardiovascular:  Negative for chest pain and palpitations.   Gastrointestinal:  Negative for abdominal pain, blood in stool, constipation, diarrhea and nausea.   Endocrine: Negative for cold intolerance and polydipsia.   Genitourinary:  Negative for difficulty urinating, flank pain, frequency and hematuria.   Musculoskeletal:  Positive for arthralgias, back pain and myalgias. Negative for gait problem and joint swelling.   Skin:  Negative for color change and rash.   Allergic/Immunologic: Negative.    Neurological:  Negative for dizziness, syncope, numbness and headaches.   Hematological: Negative.    Psychiatric/Behavioral:  Negative for dysphoric mood, sleep disturbance and suicidal ideas. The patient is not nervous/anxious.    All other systems reviewed and are negative.    Ht 167.6 cm (65.98\")   Wt 62.1 kg (137 lb)   BMI 22.13 kg/m²   Patient reported weight.      Assessment     Physical Exam   Constitutional: She appears well-developed and well-nourished. No distress.   HENT:   Head: Normocephalic.   Right Ear: External ear normal.   Left Ear: External ear normal.   Nose: Nose normal.   Mouth/Throat: Oropharynx is clear and moist.   Eyes: Pupils are equal, round, and reactive to light. Conjunctivae and EOM are normal. No scleral icterus.   Neck: Neck normal appearance.No JVD present. No thyromegaly present.   Pulmonary/Chest: Effort normal.  No respiratory distress. She no audible wheeze...  Abdominal: Abdomen appears normal. Soft. She exhibits no distension " and no visible mass.   Musculoskeletal:         General: Tenderness present.   Neurological: She is alert. No cranial nerve deficit. Coordination normal.   Skin: Skin is warm and dry. Capillary refill takes less than 2 seconds. She is not diaphoretic. No nail bed cyanosis or erythema. No pallor. Nails show no clubbing.   Psychiatric: She has a normal mood and affect. Her speech is normal. She is attentive.       Diagnoses and all orders for this visit:    1. Chronic idiopathic constipation (Primary)  Comments:  trial of linzess.  colase not helping.   push fluids for hydration  Orders:  -     linaclotide (LINZESS) 145 MCG capsule capsule; 1 every morning before breakfast  Dispense: 30 capsule; Refill: 5    2. Panic disorder without agoraphobia  Comments:  continue Cymbalta and klonopin  Orders:  -     clonazePAM (KlonoPIN) 1 MG tablet; Take 1 tablet by mouth 2 (Two) Times a Day As Needed for Anxiety.  Dispense: 60 tablet; Refill: 0    3. Lumbar disc herniation  Comments:  Continue Flexeril as needed.  Refill on tramadol.  Continue as directed.  Orders:  -     tiZANidine (ZANAFLEX) 4 MG tablet; Take 1 tablet by mouth 3 (Three) Times a Day As Needed for Muscle Spasms.  Dispense: 90 tablet; Refill: 1    4. Class 1 obesity due to excess calories without serious comorbidity with body mass index (BMI) of 31.0 to 31.9 in adult  Overview:  Beginning weight at 240 on 02/23/2021      5. Nausea  Comments:  due to medication side effect  Orders:  -     promethazine (PHENERGAN) 25 MG tablet; Take 1 tablet by mouth Every 6 (Six) Hours As Needed for Nausea or Vomiting.  Dispense: 60 tablet; Refill: 1        Patient instructed and advised to call if symptoms are increasing or new symptoms occur.    Understands reasons for urgent and emergent care.  Patient (& family) verbalized agreement for treatment plan.     PURVI/KIMBERLY reviewed today and consistent.  Will refill prescribed controlled medication today.  Patient is aware they  cannot receive narcotics from any other provider except if under care of pain management or speciality clinic.  Risk and benefits of medication use has been reviewed.  History and physical exam exhibit continued safe and appropriate use of controlled substances.  The patient is aware of the potential for addiction and dependence.  This patient has been made aware of the appropriate use of such medications, including potential risk of somnolence, limited ability to drive and / or work safely, and potential for overdose.    It has also been made clear that these medications are for use by this patient only, without concomitant use of alcohol or other substances unless prescribed/advised by medical provider.  Patient understands they may be subject to UDS and pill counts at random.      Patient considered to be moderate risk for addiction due to use of multiple controlled medications.  Patient understands and accepts these risks.  Patient need for medication will be reassessed at each visit.  Doses will be adjusted according to patient need and findings.    Goal of TX: Patient will not have any adverse reactions of medication.    Patient will have reduction in anxiety symptoms with use of PRN Klonopin.  Patient will be able to remain active in an outside of her home without interference from anxiety symptoms.  Patient will have reduction in weight with use of Adipex as directed with simultaneous diet and lifestyle changes.  Patient will have reduction in there chronic back and joint pain symptoms with use of tramadol as needed as directed.  Patient will be able to remain active in an outside of their home with minimal to no interference from their pain symptoms.    PURVI Patient Controlled Substance Report (from 5/7/2024 to 5/7/2025)    Dispensed  Strength Quantity Days Supply Provider Pharmacy   04/21/2025 Tramadol Hydrochloride 50MG 60 each 30 VIPUL S[facility] [place]   04/08/2025 Phentermine Hcl 37.5MG 30  each 30 SHEILA,VIPUL [facility] [place]   03/06/2025 Clonazepam 1MG 60 each 30 SHEILA,VIPUL [facility] [place]   03/06/2025 Phentermine Hcl 37.5MG 30 each 30 SHEILA,VIPUL [facility] [place]   03/06/2025 Tramadol Hydrochloride 50MG 60 each 30 SHEILA,VIPUL [facility] [place]   02/06/2025 Clonazepam 1MG 60 each 30 SHEILA,VIPUL [facility] [place]        RTC 2 months, sooner if needed.     This is an audio and video enabled enabled telehealth encounter.      This visit was conducted with the provider in the office at [facility] and the patient in their home in a private area.    This visit/video call lasted:  24 minutes      This document has been electronically signed by:  LANA Quinn FNP-C Dragon disclaimer:  Part of this note may be an electronic transcription/translation of spoken language to printed text using the Dragon Dictation System.

## 2025-06-18 RX ORDER — HYDROCORTISONE ACETATE 25 MG/1
25 SUPPOSITORY RECTAL 2 TIMES DAILY
Qty: 12 EACH | Refills: 0 | Status: SHIPPED | OUTPATIENT
Start: 2025-06-18

## 2025-06-18 RX ORDER — DIAPER,BRIEF,INFANT-TODD,DISP
1 EACH MISCELLANEOUS 2 TIMES DAILY
Qty: 56 G | Refills: 1 | Status: SHIPPED | OUTPATIENT
Start: 2025-06-18

## 2025-08-13 ENCOUNTER — TELEMEDICINE (OUTPATIENT)
Dept: FAMILY MEDICINE CLINIC | Facility: CLINIC | Age: 40
End: 2025-08-13
Payer: COMMERCIAL

## 2025-08-13 DIAGNOSIS — K21.9 GASTROESOPHAGEAL REFLUX DISEASE WITHOUT ESOPHAGITIS: ICD-10-CM

## 2025-08-13 DIAGNOSIS — F41.0 PANIC DISORDER WITHOUT AGORAPHOBIA: ICD-10-CM

## 2025-08-13 DIAGNOSIS — E53.8 B12 DEFICIENCY: ICD-10-CM

## 2025-08-13 DIAGNOSIS — E55.9 VITAMIN D DEFICIENCY: ICD-10-CM

## 2025-08-13 DIAGNOSIS — E06.3 HYPOTHYROIDISM DUE TO HASHIMOTO'S THYROIDITIS: ICD-10-CM

## 2025-08-13 DIAGNOSIS — M51.26 LUMBAR DISC HERNIATION: ICD-10-CM

## 2025-08-13 DIAGNOSIS — Z13.1 DIABETES MELLITUS SCREENING: ICD-10-CM

## 2025-08-13 DIAGNOSIS — E66.811 CLASS 1 OBESITY DUE TO EXCESS CALORIES WITHOUT SERIOUS COMORBIDITY WITH BODY MASS INDEX (BMI) OF 31.0 TO 31.9 IN ADULT: Primary | ICD-10-CM

## 2025-08-13 DIAGNOSIS — R30.0 DYSURIA: ICD-10-CM

## 2025-08-13 DIAGNOSIS — E66.09 CLASS 1 OBESITY DUE TO EXCESS CALORIES WITHOUT SERIOUS COMORBIDITY WITH BODY MASS INDEX (BMI) OF 31.0 TO 31.9 IN ADULT: Primary | ICD-10-CM

## 2025-08-13 DIAGNOSIS — F41.8 DEPRESSION WITH ANXIETY: ICD-10-CM

## 2025-08-13 PROCEDURE — 99214 OFFICE O/P EST MOD 30 MIN: CPT | Performed by: NURSE PRACTITIONER

## 2025-08-13 PROCEDURE — 81003 URINALYSIS AUTO W/O SCOPE: CPT | Performed by: NURSE PRACTITIONER

## 2025-08-13 RX ORDER — KETOROLAC TROMETHAMINE 30 MG/ML
60 INJECTION, SOLUTION INTRAMUSCULAR; INTRAVENOUS ONCE
Status: COMPLETED | OUTPATIENT
Start: 2025-08-14 | End: 2025-08-14

## 2025-08-13 RX ORDER — CLONAZEPAM 1 MG/1
1 TABLET ORAL 2 TIMES DAILY PRN
Qty: 60 TABLET | Refills: 0 | Status: SHIPPED | OUTPATIENT
Start: 2025-08-13

## 2025-08-13 RX ORDER — RABEPRAZOLE SODIUM 20 MG/1
20 TABLET, DELAYED RELEASE ORAL 2 TIMES DAILY
Qty: 60 TABLET | Refills: 5 | Status: SHIPPED | OUTPATIENT
Start: 2025-08-13

## 2025-08-14 ENCOUNTER — LAB (OUTPATIENT)
Dept: FAMILY MEDICINE CLINIC | Facility: CLINIC | Age: 40
End: 2025-08-14
Payer: COMMERCIAL

## 2025-08-14 DIAGNOSIS — M51.26 LUMBAR DISC HERNIATION: ICD-10-CM

## 2025-08-14 DIAGNOSIS — R39.9 LOWER URINARY TRACT SYMPTOMS (LUTS): Primary | ICD-10-CM

## 2025-08-14 LAB
25(OH)D3 SERPL-MCNC: 32.6 NG/ML (ref 30–100)
ALBUMIN SERPL-MCNC: 4.1 G/DL (ref 3.5–5.2)
ALBUMIN/GLOB SERPL: 1.6 G/DL
ALP SERPL-CCNC: 73 U/L (ref 39–117)
ALT SERPL W P-5'-P-CCNC: 12 U/L (ref 1–33)
ANION GAP SERPL CALCULATED.3IONS-SCNC: 10.3 MMOL/L (ref 5–15)
AST SERPL-CCNC: 16 U/L (ref 1–32)
BASOPHILS # BLD AUTO: 0.06 10*3/MM3 (ref 0–0.2)
BASOPHILS NFR BLD AUTO: 1.1 % (ref 0–1.5)
BILIRUB BLD-MCNC: NEGATIVE MG/DL
BILIRUB SERPL-MCNC: 0.4 MG/DL (ref 0–1.2)
BUN SERPL-MCNC: 10 MG/DL (ref 6–20)
BUN/CREAT SERPL: 11 (ref 7–25)
CALCIUM SPEC-SCNC: 8.5 MG/DL (ref 8.6–10.5)
CHLORIDE SERPL-SCNC: 104 MMOL/L (ref 98–107)
CHOLEST SERPL-MCNC: 160 MG/DL (ref 0–200)
CLARITY, POC: CLEAR
CO2 SERPL-SCNC: 24.7 MMOL/L (ref 22–29)
COLOR UR: NORMAL
CREAT SERPL-MCNC: 0.91 MG/DL (ref 0.57–1)
DEPRECATED RDW RBC AUTO: 41.1 FL (ref 37–54)
EGFRCR SERPLBLD CKD-EPI 2021: 81.4 ML/MIN/1.73
EOSINOPHIL # BLD AUTO: 0.11 10*3/MM3 (ref 0–0.4)
EOSINOPHIL NFR BLD AUTO: 2 % (ref 0.3–6.2)
ERYTHROCYTE [DISTWIDTH] IN BLOOD BY AUTOMATED COUNT: 13 % (ref 12.3–15.4)
EXPIRATION DATE: NORMAL
GLOBULIN UR ELPH-MCNC: 2.6 GM/DL
GLUCOSE SERPL-MCNC: 68 MG/DL (ref 65–99)
GLUCOSE UR STRIP-MCNC: NEGATIVE MG/DL
HBA1C MFR BLD: 5.02 % (ref 4.8–5.6)
HCT VFR BLD AUTO: 39.6 % (ref 34–46.6)
HDLC SERPL-MCNC: 50 MG/DL (ref 40–60)
HGB BLD-MCNC: 13.3 G/DL (ref 12–15.9)
IMM GRANULOCYTES # BLD AUTO: 0.01 10*3/MM3 (ref 0–0.05)
IMM GRANULOCYTES NFR BLD AUTO: 0.2 % (ref 0–0.5)
KETONES UR QL: NEGATIVE
LDLC SERPL CALC-MCNC: 93 MG/DL (ref 0–100)
LDLC/HDLC SERPL: 1.83 {RATIO}
LEUKOCYTE EST, POC: NEGATIVE
LYMPHOCYTES # BLD AUTO: 1.54 10*3/MM3 (ref 0.7–3.1)
LYMPHOCYTES NFR BLD AUTO: 28.2 % (ref 19.6–45.3)
Lab: NORMAL
MCH RBC QN AUTO: 29.4 PG (ref 26.6–33)
MCHC RBC AUTO-ENTMCNC: 33.6 G/DL (ref 31.5–35.7)
MCV RBC AUTO: 87.4 FL (ref 79–97)
MONOCYTES # BLD AUTO: 0.47 10*3/MM3 (ref 0.1–0.9)
MONOCYTES NFR BLD AUTO: 8.6 % (ref 5–12)
NEUTROPHILS NFR BLD AUTO: 3.28 10*3/MM3 (ref 1.7–7)
NEUTROPHILS NFR BLD AUTO: 59.9 % (ref 42.7–76)
NITRITE UR-MCNC: NEGATIVE MG/ML
NRBC BLD AUTO-RTO: 0 /100 WBC (ref 0–0.2)
PH UR: 6 [PH] (ref 5–8)
PLATELET # BLD AUTO: 307 10*3/MM3 (ref 140–450)
PMV BLD AUTO: 10.2 FL (ref 6–12)
POTASSIUM SERPL-SCNC: 3.7 MMOL/L (ref 3.5–5.2)
PROT SERPL-MCNC: 6.7 G/DL (ref 6–8.5)
PROT UR STRIP-MCNC: NEGATIVE MG/DL
RBC # BLD AUTO: 4.53 10*6/MM3 (ref 3.77–5.28)
RBC # UR STRIP: NEGATIVE /UL
SODIUM SERPL-SCNC: 139 MMOL/L (ref 136–145)
SP GR UR: 1.02 (ref 1–1.03)
T4 FREE SERPL-MCNC: 1.7 NG/DL (ref 0.92–1.68)
TRIGL SERPL-MCNC: 93 MG/DL (ref 0–150)
TSH SERPL DL<=0.05 MIU/L-ACNC: 4.28 UIU/ML (ref 0.27–4.2)
UROBILINOGEN UR QL: NORMAL
VIT B12 BLD-MCNC: 547 PG/ML (ref 211–946)
VLDLC SERPL-MCNC: 17 MG/DL (ref 5–40)
WBC NRBC COR # BLD AUTO: 5.47 10*3/MM3 (ref 3.4–10.8)

## 2025-08-14 PROCEDURE — 96372 THER/PROPH/DIAG INJ SC/IM: CPT | Performed by: NURSE PRACTITIONER

## 2025-08-14 PROCEDURE — 82607 VITAMIN B-12: CPT | Performed by: NURSE PRACTITIONER

## 2025-08-14 PROCEDURE — 80050 GENERAL HEALTH PANEL: CPT | Performed by: NURSE PRACTITIONER

## 2025-08-14 PROCEDURE — 83036 HEMOGLOBIN GLYCOSYLATED A1C: CPT | Performed by: NURSE PRACTITIONER

## 2025-08-14 PROCEDURE — 82306 VITAMIN D 25 HYDROXY: CPT | Performed by: NURSE PRACTITIONER

## 2025-08-14 PROCEDURE — 80061 LIPID PANEL: CPT | Performed by: NURSE PRACTITIONER

## 2025-08-14 PROCEDURE — 36415 COLL VENOUS BLD VENIPUNCTURE: CPT | Performed by: NURSE PRACTITIONER

## 2025-08-14 PROCEDURE — 84439 ASSAY OF FREE THYROXINE: CPT | Performed by: NURSE PRACTITIONER

## 2025-08-14 RX ADMIN — KETOROLAC TROMETHAMINE 60 MG: 30 INJECTION, SOLUTION INTRAMUSCULAR; INTRAVENOUS at 09:35
